# Patient Record
Sex: FEMALE | Race: WHITE | NOT HISPANIC OR LATINO | Employment: FULL TIME | URBAN - METROPOLITAN AREA
[De-identification: names, ages, dates, MRNs, and addresses within clinical notes are randomized per-mention and may not be internally consistent; named-entity substitution may affect disease eponyms.]

---

## 2017-01-16 ENCOUNTER — PATIENT MESSAGE (OUTPATIENT)
Dept: NEUROLOGY | Facility: CLINIC | Age: 42
End: 2017-01-16

## 2017-01-18 ENCOUNTER — PATIENT MESSAGE (OUTPATIENT)
Dept: NEUROLOGY | Facility: CLINIC | Age: 42
End: 2017-01-18

## 2017-01-18 DIAGNOSIS — G47.419 NARCOLEPSY WITHOUT CATAPLEXY(347.00): ICD-10-CM

## 2017-01-19 ENCOUNTER — PATIENT MESSAGE (OUTPATIENT)
Dept: NEUROLOGY | Facility: CLINIC | Age: 42
End: 2017-01-19

## 2017-01-19 RX ORDER — METHYLPHENIDATE HYDROCHLORIDE 10 MG/1
10 TABLET ORAL 4 TIMES DAILY
Qty: 120 TABLET | Refills: 0 | Status: SHIPPED | OUTPATIENT
Start: 2017-01-19 | End: 2017-02-20 | Stop reason: SDUPTHER

## 2017-01-24 ENCOUNTER — TELEPHONE (OUTPATIENT)
Dept: NEUROLOGY | Facility: CLINIC | Age: 42
End: 2017-01-24

## 2017-01-24 NOTE — TELEPHONE ENCOUNTER
Left message for patient to contact our office to discuss rescheduling appointment of 3-17 as  will be out of the office.

## 2017-01-25 ENCOUNTER — TELEPHONE (OUTPATIENT)
Dept: NEUROLOGY | Facility: CLINIC | Age: 42
End: 2017-01-25

## 2017-02-13 ENCOUNTER — PATIENT MESSAGE (OUTPATIENT)
Dept: INTERNAL MEDICINE | Facility: CLINIC | Age: 42
End: 2017-02-13

## 2017-02-13 ENCOUNTER — TELEPHONE (OUTPATIENT)
Dept: NEUROLOGY | Facility: CLINIC | Age: 42
End: 2017-02-13

## 2017-02-16 ENCOUNTER — OFFICE VISIT (OUTPATIENT)
Dept: INTERNAL MEDICINE | Facility: CLINIC | Age: 42
End: 2017-02-16
Payer: COMMERCIAL

## 2017-02-16 VITALS
DIASTOLIC BLOOD PRESSURE: 78 MMHG | OXYGEN SATURATION: 99 % | HEIGHT: 61 IN | SYSTOLIC BLOOD PRESSURE: 130 MMHG | RESPIRATION RATE: 16 BRPM | TEMPERATURE: 99 F | BODY MASS INDEX: 40.46 KG/M2 | HEART RATE: 80 BPM | WEIGHT: 214.31 LBS

## 2017-02-16 DIAGNOSIS — M54.16 LUMBAR BACK PAIN WITH RADICULOPATHY AFFECTING RIGHT LOWER EXTREMITY: Primary | ICD-10-CM

## 2017-02-16 DIAGNOSIS — G89.28 CHRONIC PAIN FOLLOWING SURGERY OR PROCEDURE: ICD-10-CM

## 2017-02-16 DIAGNOSIS — R32 URINARY INCONTINENCE, UNSPECIFIED TYPE: ICD-10-CM

## 2017-02-16 DIAGNOSIS — G47.421 NARCOLEPSY DUE TO UNDERLYING CONDITION WITH CATAPLEXY: ICD-10-CM

## 2017-02-16 DIAGNOSIS — E66.01 MORBID OBESITY WITH BODY MASS INDEX (BMI) OF 40.0 TO 44.9 IN ADULT: ICD-10-CM

## 2017-02-16 DIAGNOSIS — E03.8 HYPOTHYROIDISM DUE TO HASHIMOTO'S THYROIDITIS: ICD-10-CM

## 2017-02-16 DIAGNOSIS — Z13.6 SCREENING FOR CARDIOVASCULAR CONDITION: ICD-10-CM

## 2017-02-16 DIAGNOSIS — R60.9 EDEMA, UNSPECIFIED TYPE: ICD-10-CM

## 2017-02-16 DIAGNOSIS — E06.3 HYPOTHYROIDISM DUE TO HASHIMOTO'S THYROIDITIS: ICD-10-CM

## 2017-02-16 PROCEDURE — 99204 OFFICE O/P NEW MOD 45 MIN: CPT | Mod: S$GLB,,, | Performed by: INTERNAL MEDICINE

## 2017-02-16 RX ORDER — LEVOTHYROXINE SODIUM 25 UG/1
1 TABLET ORAL DAILY
COMMUNITY
Start: 2017-02-07 | End: 2019-07-10 | Stop reason: SDUPTHER

## 2017-02-16 NOTE — MR AVS SNAPSHOT
[facility]  [address] ,  [address]  [place] [address]  Phone: [phone]  Fax: [phone]                  Amanda Navarrete   2017 7:40 AM   Office Visit    Description:  Female : 1975   Provider:  Jovany Regalado MD   Department:  [facility]           Reason for Visit     Establish Care     Urinary Tract Infection     Foot Swelling           Diagnoses this Visit        Comments    Lumbar back pain with radiculopathy affecting right lower extremity    -  Primary     Hypothyroidism due to Hashimoto's thyroiditis         Urinary incontinence, unspecified type         Screening for cardiovascular condition         Morbid obesity with body mass index (BMI) of 40.0 to 44.9 in adult         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    Follow-up and Disposition History      [facility] On Call     [facility] On Call Nurse Care Line -  Assistance  Registered nurses in the [facility] On Call Center provide clinical advisement, health education, appointment booking, and other advisory services.  Call for this free service at [phone].             Medications           Message regarding Medications     Verify the changes and/or additions to your medication regime listed below are the same as discussed with your clinician today.  If any of these changes or additions are incorrect, please notify your healthcare provider.        STOP taking these medications     duloxetine (CYMBALTA) 30 MG capsule     fluconazole (DIFLUCAN) 150 MG Tab            Verify that the below list  "of medications is an accurate representation of the medications you are currently taking.  If none reported, the list may be blank. If incorrect, please contact your healthcare provider. Carry this list with you in case of emergency.           Current Medications     cyclobenzaprine (FLEXERIL) 10 MG tablet     levothyroxine (SYNTHROID) 25 MCG tablet Take 1 tablet by mouth once daily.    medroxyPROGESTERone (DEPO-PROVERA) 150 mg/mL injection     methylphenidate (RITALIN) 10 MG tablet Take 1 tablet (10 mg total) by mouth 4 (four) times daily.    modafinil (PROVIGIL) 200 MG Tab TAKE 1 TABLET BY MOUTH EVERY MORNING AND 1/2 AT NOON    promethazine (PHENERGAN) 25 MG tablet     topiramate (TOPAMAX) 100 MG tablet TAKE 1 TABLET BY MOUTH IN THE MORNING AND 1 AND 1/2 TABLET AT BEDTIME    valacyclovir (VALTREX) 1000 MG tablet            Clinical Reference Information           Your Vitals Were     BP Pulse Temp Resp Height Weight    130/78 (BP Location: Left arm, Patient Position: Sitting, BP Method: Manual) 80 98.6 °F (37 °C) (Oral) 16 5' 1" (1.549 m) 97.2 kg (214 lb 4.6 oz)    SpO2 BMI             99% 40.49 kg/m2         Blood Pressure          Most Recent Value    BP  130/78      Allergies as of 2/16/2017     Tamiflu [Oseltamivir]      Immunizations Administered on Date of Encounter - 2/16/2017     None      Orders Placed During Today's Visit     Future Labs/Procedures Expected by Expires      2/16/2017 5/17/2017    CBC auto differential  2/16/2017 2/16/2018    Comprehensive metabolic panel  2/16/2017 2/16/2018    Lipid panel  2/16/2017 2/16/2018    Rheumatoid factor  2/16/2017 (Approximate) 5/17/2017    Sedimentation rate, manual  2/16/2017 2/16/2018    TSH  2/16/2017 2/16/2018    Uric acid  2/16/2017 2/16/2018    Urinalysis  2/16/2017 2/16/2018    Urine culture  2/16/2017 (Approximate) 4/17/2017    Vitamin D  2/16/2017 (Approximate) 5/17/2017      Language Assistance Services     ATTENTION: Language assistance services " are available, free of charge. Please call [phone].      ATENCIÓN: Si habla nnekaañol, tiene a orlando disposición servicios gratuitos de asistencia lingüística. Llame al [phone].     CHÚ Ý: N?u b?n nói Ti?ng Vi?t, có các d?ch v? h? tr? ngôn ng? mi?n phí dành cho b?n. G?i s? [phone].         [facility] complies with applicable Federal civil rights laws and does not discriminate on the basis of race, color, national origin, age, disability, or sex.

## 2017-02-16 NOTE — PROGRESS NOTES
"Subjective:      Patient ID: Amanda Navarrete is a 41 y.o. female.    Chief Complaint: Establish Care; Urinary Tract Infection (pt c/o urinating more then frequent then normal); and Foot Swelling (pt c/o both feet swelling)    HPI:   Sees Neurology,Gyn,Ortho,Pain management., Neurosurgeon, PT.  Review of Systems   Constitutional: Activity change: doesn't do anything. Appetite change: gained 80#   HENT: Positive for postnasal drip. Negative for sinus pressure, sneezing and sore throat.         Rt. TMJ   Respiratory: Negative.    Cardiovascular: Negative.    Gastrointestinal: Positive for blood in stool, constipation and diarrhea. Negative for nausea, rectal pain and vomiting.        Saw her GYN md for annual and then had BRBPR.  Episodic.   Endocrine: Negative.    Genitourinary: Positive for dysuria, frequency and urgency.   Musculoskeletal: Positive for arthralgias, back pain and gait problem.        Injured back 6/2003 picking up a box.  Incapacitated since then  Hands and feet swell.   Skin: Negative.    Allergic/Immunologic: Positive for environmental allergies.   Neurological: Positive for weakness and headaches. Negative for seizures.        Migraine   Hematological: Negative.    Psychiatric/Behavioral: Positive for dysphoric mood and sleep disturbance. Negative for suicidal ideas.     Sometimes takes Excedrine migraine OTC, but no longer takes NSAI, due to melanotic stools.  Objective:     Visit Vitals    /78 (BP Location: Left arm, Patient Position: Sitting, BP Method: Manual)    Pulse 80    Temp 98.6 °F (37 °C) (Oral)    Resp 16    Ht 5' 1" (1.549 m)    Wt 97.2 kg (214 lb 4.6 oz)    SpO2 99%    BMI 40.49 kg/m2       Physical Exam   Constitutional: She is oriented to person, place, and time. She appears well-developed and well-nourished. No distress.   HENT:   Head: Normocephalic and atraumatic.   Right Ear: External ear normal.   Left Ear: External ear normal.   Nose: Nose normal. "   Mouth/Throat: Oropharynx is clear and moist.   Eyes: Conjunctivae and EOM are normal. Pupils are equal, round, and reactive to light.   Neck: Normal range of motion. Neck supple.   Cardiovascular: Normal rate, regular rhythm and normal heart sounds.    Pulmonary/Chest: Effort normal and breath sounds normal.   Abdominal: Soft. Bowel sounds are normal.   Musculoskeletal: Normal range of motion. She exhibits edema.   Puffy feet   Neurological: She is alert and oriented to person, place, and time.   Skin: Skin is warm and dry. She is not diaphoretic.   Psychiatric: She has a normal mood and affect. Her behavior is normal. Judgment and thought content normal.   Nursing note and vitals reviewed.      Assessment:     1. Lumbar back pain with radiculopathy affecting right lower extremity    2. Hypothyroidism due to Hashimoto's thyroiditis    3. Urinary incontinence, unspecified type    4. Screening for cardiovascular condition    5. Morbid obesity with body mass index (BMI) of 40.0 to 44.9 in adult    6. 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 Edema, unspecified type    8. 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

## 2017-02-17 ENCOUNTER — PATIENT MESSAGE (OUTPATIENT)
Dept: NEUROLOGY | Facility: CLINIC | Age: 42
End: 2017-02-17

## 2017-02-17 DIAGNOSIS — G47.419 NARCOLEPSY WITHOUT CATAPLEXY(347.00): ICD-10-CM

## 2017-02-20 RX ORDER — METHYLPHENIDATE HYDROCHLORIDE 10 MG/1
10 TABLET ORAL 4 TIMES DAILY
Qty: 120 TABLET | Refills: 0 | Status: SHIPPED | OUTPATIENT
Start: 2017-02-20 | End: 2017-03-21 | Stop reason: SDUPTHER

## 2017-03-15 ENCOUNTER — PATIENT MESSAGE (OUTPATIENT)
Dept: NEUROLOGY | Facility: CLINIC | Age: 42
End: 2017-03-15

## 2017-03-17 ENCOUNTER — PATIENT MESSAGE (OUTPATIENT)
Dept: NEUROLOGY | Facility: CLINIC | Age: 42
End: 2017-03-17

## 2017-03-20 ENCOUNTER — TELEPHONE (OUTPATIENT)
Dept: NEUROLOGY | Facility: CLINIC | Age: 42
End: 2017-03-20

## 2017-03-20 NOTE — TELEPHONE ENCOUNTER
----- Message from Caroline Marinelli sent at 3/20/2017  9:42 AM CDT -----  Contact: Patient [phone]  Patient is requesting a refill on her methylphenidate (RITALIN) 10 MG tablet.    [facility] [address] [place] [address]  Phone: [phone] Fax: [phone]

## 2017-03-21 ENCOUNTER — TELEPHONE (OUTPATIENT)
Dept: NEUROLOGY | Facility: CLINIC | Age: 42
End: 2017-03-21

## 2017-03-21 ENCOUNTER — PATIENT MESSAGE (OUTPATIENT)
Dept: INTERNAL MEDICINE | Facility: CLINIC | Age: 42
End: 2017-03-21

## 2017-03-21 DIAGNOSIS — G47.419 NARCOLEPSY WITHOUT CATAPLEXY(347.00): ICD-10-CM

## 2017-03-21 RX ORDER — METHYLPHENIDATE HYDROCHLORIDE 10 MG/1
10 TABLET ORAL 4 TIMES DAILY
Qty: 120 TABLET | Refills: 0 | Status: SHIPPED | OUTPATIENT
Start: 2017-03-21 | End: 2017-04-20 | Stop reason: SDUPTHER

## 2017-03-21 RX ORDER — MELOXICAM 7.5 MG/1
TABLET ORAL
COMMUNITY
Start: 2017-02-26 | End: 2017-05-29

## 2017-03-21 NOTE — TELEPHONE ENCOUNTER
----- Message from Caesar Keith MD sent at 3/16/2017  4:28 PM CDT -----  Call Ritalin Rx on 3/21/2017

## 2017-03-23 ENCOUNTER — TELEPHONE (OUTPATIENT)
Dept: INTERNAL MEDICINE | Facility: CLINIC | Age: 42
End: 2017-03-23

## 2017-03-23 DIAGNOSIS — G47.411 NARCOLEPSY CATAPLEXY SYNDROME: Primary | ICD-10-CM

## 2017-03-23 RX ORDER — MODAFINIL 200 MG/1
TABLET ORAL
Qty: 45 TABLET | Refills: 5 | Status: SHIPPED | OUTPATIENT
Start: 2017-03-23 | End: 2017-11-29 | Stop reason: SDUPTHER

## 2017-03-23 NOTE — TELEPHONE ENCOUNTER
I have looked at her lab results from 2/16/17 and see that her  is abnormal and her vit D is low. I don't see that she was put on Vit D or where she was referred to Rheumatology. Is this something that the patient should be informed of? Please advise

## 2017-03-23 NOTE — TELEPHONE ENCOUNTER
I called pt to make her appointment to come in per . Pt states she does not have a car. She will call office back when she get a ride. Vf/ma

## 2017-04-18 ENCOUNTER — PATIENT MESSAGE (OUTPATIENT)
Dept: NEUROLOGY | Facility: CLINIC | Age: 42
End: 2017-04-18

## 2017-04-20 ENCOUNTER — TELEPHONE (OUTPATIENT)
Dept: NEUROLOGY | Facility: CLINIC | Age: 42
End: 2017-04-20

## 2017-04-20 DIAGNOSIS — G47.419 NARCOLEPSY WITHOUT CATAPLEXY(347.00): ICD-10-CM

## 2017-04-20 RX ORDER — METHYLPHENIDATE HYDROCHLORIDE 10 MG/1
10 TABLET ORAL 4 TIMES DAILY
Qty: 120 TABLET | Refills: 0 | Status: SHIPPED | OUTPATIENT
Start: 2017-04-20 | End: 2017-05-18 | Stop reason: SDUPTHER

## 2017-04-20 NOTE — TELEPHONE ENCOUNTER
----- Message from Caesar Keith MD sent at 4/19/2017  3:22 PM CDT -----       I appologize i realized that my last message has a typo and it says it is 3 times a day and it is 4 x a day and i do not want it to change my prior authorization if it is sent wrong . It is for 10 mg , 120 pills 4x times a day, i am sure you know but i know how redicous the insurace is . Thank you for your help. Lluvia for the confussion.   Amanda Navarrete      Prescription is due tomorrow at which time it will be sent to the pharmacy.

## 2017-04-20 NOTE — TELEPHONE ENCOUNTER
----- Message from Seymour Solares sent at 4/20/2017 10:22 AM CDT -----  Contact: Self [phone]  Patient is calling to get an update on the medication refill ( methylphenidate (RITALIN) 10 MG tablet )     [facility] - [place] [address]  Phone: [phone] Fax: [phone]

## 2017-05-16 ENCOUNTER — TELEPHONE (OUTPATIENT)
Dept: NEUROLOGY | Facility: CLINIC | Age: 42
End: 2017-05-16

## 2017-05-16 NOTE — TELEPHONE ENCOUNTER
----- Message from Caroline Marinelli sent at 5/16/2017  2:53 PM CDT -----  Contact: Patient [phone]  Patient is calling to request a refill on her methylphenidate (RITALIN) 10 MG tablet.        [facility] - [place] [address]  Phone: [phone] Fax: [phone]

## 2017-05-18 ENCOUNTER — TELEPHONE (OUTPATIENT)
Dept: NEUROLOGY | Facility: CLINIC | Age: 42
End: 2017-05-18

## 2017-05-18 DIAGNOSIS — G47.419 NARCOLEPSY WITHOUT CATAPLEXY(347.00): ICD-10-CM

## 2017-05-18 RX ORDER — METHYLPHENIDATE HYDROCHLORIDE 10 MG/1
10 TABLET ORAL 4 TIMES DAILY
Qty: 120 TABLET | Refills: 0 | Status: SHIPPED | OUTPATIENT
Start: 2017-05-19 | End: 2017-06-16 | Stop reason: SDUPTHER

## 2017-05-18 NOTE — TELEPHONE ENCOUNTER
----- Message from Renu Garduno sent at 5/18/2017 10:52 AM CDT -----  Contact: OTTO MELENDREZ [[id number]]  x_  1st Request  _  2nd Request  _  3rd Request    Please refill the medication(s) listed below.     Medication #1 methylphenidate (RITALIN) 10 MG tablet      Preferred Pharmacy:  [facility] [address]  [facility] [address]  [facility] [place] [address]  Phone: [phone] Fax: [phone]

## 2017-05-18 NOTE — TELEPHONE ENCOUNTER
----- Message from Caesar Keith MD sent at 5/16/2017  4:24 PM CDT -----  Needs refill on Ritalin, due on May 19

## 2017-05-26 DIAGNOSIS — Z12.31 OTHER SCREENING MAMMOGRAM: ICD-10-CM

## 2017-05-29 ENCOUNTER — OFFICE VISIT (OUTPATIENT)
Dept: INTERNAL MEDICINE | Facility: CLINIC | Age: 42
End: 2017-05-29
Payer: COMMERCIAL

## 2017-05-29 VITALS
OXYGEN SATURATION: 98 % | BODY MASS INDEX: 39.48 KG/M2 | TEMPERATURE: 99 F | DIASTOLIC BLOOD PRESSURE: 80 MMHG | SYSTOLIC BLOOD PRESSURE: 120 MMHG | RESPIRATION RATE: 16 BRPM | HEIGHT: 61 IN | WEIGHT: 209.13 LBS | HEART RATE: 100 BPM

## 2017-05-29 DIAGNOSIS — E55.9 VITAMIN D DEFICIENCY: ICD-10-CM

## 2017-05-29 DIAGNOSIS — J02.9 SORE THROAT: Primary | ICD-10-CM

## 2017-05-29 DIAGNOSIS — R19.7 DIARRHEA, UNSPECIFIED TYPE: ICD-10-CM

## 2017-05-29 LAB
CTP QC/QA: YES
S PYO RRNA THROAT QL PROBE: NEGATIVE

## 2017-05-29 PROCEDURE — 99214 OFFICE O/P EST MOD 30 MIN: CPT | Mod: S$GLB,,, | Performed by: INTERNAL MEDICINE

## 2017-05-29 PROCEDURE — 87880 STREP A ASSAY W/OPTIC: CPT | Mod: ,,, | Performed by: INTERNAL MEDICINE

## 2017-05-29 RX ORDER — ERGOCALCIFEROL 1.25 MG/1
50000 CAPSULE ORAL
COMMUNITY
End: 2019-07-10

## 2017-05-29 NOTE — PROGRESS NOTES
Subjective:      Patient ID: Amanda Navarrete is a 42 y.o. female.    Chief Complaint: Results (Lab Results); Diarrhea (four weeks); Fatigue; and Sore Throat (off and on for four)    HPI:   43y/o WF, has had a long history of low back pain, all stemming from a workman's comp injury in 2003,  Resulting in surgery at [facility], 11/15.  All previous arthritic work-up is negative.  Has on/off diarrhea, with weight shifts of up to 15#, per pt.  States she has an odor to her urine.      Date/Time Component Value Flag Lab Status   02/16/17 0846 TSH 2.210 - Final result   02/16/17 0846 HDL 52 - Final result   02/16/17 0846 CHOL 204 High Final result   02/16/17 0846 TRIG 88 - Final result   02/16/17 0846 LDLCALC 134.4 - Final result   02/16/17 0846 CHOLHDL 25.5 - Final result   02/16/17 0846 NONHDLCHOL 152 - Final result   02/16/17 0846 TOTALCHOLEST 3.9 - Final result   02/16/17 0847 COLORU Yellow - Final result   02/16/17 0847 APPEARANCEUA Clear - Final result   02/16/17 0847 SPECGRAV >=1.030 Abnormal Final result   02/16/17 0847 PHUR 6.0 - Final result   02/16/17 0847 KETONESU Negative - Final result   02/16/17 0847 OCCULTUA Negative - Final result   02/16/17 0847 NITRITE Negative - Final result   02/16/17 0847 UROBILINOGEN Negative - Final result   11/19/15 0708 PREGNANCYTES Negative - Final result   02/16/17 0847 LEUKOCYTESUR Negative - Final result   02/16/17 0846 WBC 6.34 - Final result   02/16/17 0846 RBC 4.31 - Final result   02/16/17 0846 HGB 13.7 - Final result   02/16/17 0846 HCT 41.0 - Final result   02/16/17 0846 MCH 31.8 High Final result   02/16/17 0846 RDW 13.2 - Final result   02/16/17 0846  - Final result   02/16/17 0846 MPV 10.8 - Final result   03/19/12 1538 SEGS 90 High Final result   03/19/12 1538 LYMPHS 9 Low Final result   03/19/12 1538 PLTEST Adequate - Final result   02/16/17 0846 GLU 95 - Final result   02/16/17 0846 BUN 14 - Final result   02/16/17 0846 CREATININE 0.61 - Final result   02/16/17  "0846 CALCIUM 9.2 - Final result   02/16/17 0846  High Final result   02/16/17 0846 K 3.9 - Final result   02/16/17 0846  - Final result   02/16/17 0846 PROT 7.5 - Final result   02/16/17 0846 ALBUMIN 4.4 - Final result   02/16/17 0846 BILITOT 0.5 - Final result   02/16/17 0846 AST 28 - Final result   02/16/17 0846 ALKPHOS 87 - Final result   02/16/17 0846 CO2 25 - Final result   02/16/17 0846 ALT 38 - Final result   02/16/17 0846 ANIONGAP 16 - Final result   02/16/17 0846 EGFRNONAA >60.0 - Final result   02/16/17 0846 ESTGFRAFRICA >60.0 - Final result   02/16/17 0846 MCV 95 -        Review of Systems   Constitutional: Positive for unexpected weight change. Negative for activity change, chills and fever.   HENT: Positive for rhinorrhea and trouble swallowing. Negative for hearing loss.    Eyes: Negative for discharge and visual disturbance.   Respiratory: Positive for wheezing. Negative for chest tightness.    Cardiovascular: Negative for chest pain and palpitations.   Gastrointestinal: Positive for blood in stool, diarrhea and vomiting. Negative for constipation.   Endocrine: Positive for polydipsia and polyuria.   Genitourinary: Positive for dysuria. Negative for difficulty urinating, hematuria and menstrual problem.   Musculoskeletal: Positive for arthralgias, joint swelling and neck pain.   Neurological: Positive for weakness and headaches.   Psychiatric/Behavioral: Positive for confusion and dysphoric mood.       Objective:   /80 (BP Location: Left arm, Patient Position: Sitting, BP Method: Manual)   Pulse 100   Temp 98.5 °F (36.9 °C) (Oral)   Resp 16   Ht 5' 1" (1.549 m)   Wt 94.8 kg (209 lb 1.7 oz)   SpO2 98%   BMI 39.51 kg/m²     Physical Exam   Constitutional: She is oriented to person, place, and time. She appears well-developed and well-nourished.   HENT:   Head: Normocephalic and atraumatic.   Right Ear: External ear normal.   Left Ear: External ear normal.   Nose: Nose normal. "   Mouth/Throat: Oropharynx is clear and moist.   Eyes: Conjunctivae are normal. Pupils are equal, round, and reactive to light.   Neck: Normal range of motion. Neck supple.   Cardiovascular: Normal rate, regular rhythm and normal heart sounds.    Pulmonary/Chest: Effort normal and breath sounds normal.   Abdominal: Soft. Bowel sounds are normal.   Musculoskeletal: Normal range of motion.   Neurological: She is alert and oriented to person, place, and time.   Skin: Skin is warm and dry.   Psychiatric: She has a normal mood and affect. Her behavior is normal.   Nursing note and vitals reviewed.      Assessment:     1. Sore throat    2. Diarrhea, unspecified type    3. Vitamin D deficiency    4. BMI 40.0-44.9, adult    Her mammograms are done at [facility], by her GYN, because she has had multiple problems with the interpretations, and needed  Other testing.  Plan:     Sore throat  -     POCT Rapid Strep A    Diarrhea, unspecified type    Vitamin D deficiency    BMI 40.0-44.9, adult

## 2017-06-15 ENCOUNTER — TELEPHONE (OUTPATIENT)
Dept: NEUROLOGY | Facility: CLINIC | Age: 42
End: 2017-06-15

## 2017-06-15 NOTE — TELEPHONE ENCOUNTER
----- Message from Chasidy Tarango sent at 6/15/2017  3:57 PM CDT -----  Contact: Pt  Pt is requesting a refill on Ritalin 10mg to be sent to [facility] [phone] (Phone)  [phone] (Fax)     Pt contact number [phone]  Thanks

## 2017-06-16 ENCOUNTER — PATIENT MESSAGE (OUTPATIENT)
Dept: NEUROLOGY | Facility: CLINIC | Age: 42
End: 2017-06-16

## 2017-06-16 ENCOUNTER — TELEPHONE (OUTPATIENT)
Dept: NEUROLOGY | Facility: CLINIC | Age: 42
End: 2017-06-16

## 2017-06-16 DIAGNOSIS — G47.419 NARCOLEPSY WITHOUT CATAPLEXY(347.00): ICD-10-CM

## 2017-06-16 RX ORDER — DULOXETIN HYDROCHLORIDE 60 MG/1
60 CAPSULE, DELAYED RELEASE ORAL DAILY
Refills: 2 | COMMUNITY
Start: 2017-03-10 | End: 2018-07-20

## 2017-06-16 RX ORDER — METHYLPHENIDATE HYDROCHLORIDE 10 MG/1
10 TABLET ORAL 4 TIMES DAILY
Qty: 120 TABLET | Refills: 0 | Status: SHIPPED | OUTPATIENT
Start: 2017-06-17 | End: 2017-06-17 | Stop reason: SDUPTHER

## 2017-06-16 RX ORDER — OXYCODONE AND ACETAMINOPHEN 10; 325 MG/1; MG/1
1 TABLET ORAL 2 TIMES DAILY PRN
Refills: 0 | COMMUNITY
Start: 2017-04-12 | End: 2018-07-20

## 2017-06-16 NOTE — TELEPHONE ENCOUNTER
----- Message from Caesar Keith MD sent at 6/15/2017  4:47 PM CDT -----  Contact: Pt  Pt is requesting a refill on Ritalin 10mg to be sent to [facility] [phone] (Phone)  [phone] (Fax)      Pt contact number [phone]

## 2017-06-17 ENCOUNTER — NURSE TRIAGE (OUTPATIENT)
Dept: ADMINISTRATIVE | Facility: CLINIC | Age: 42
End: 2017-06-17

## 2017-06-17 DIAGNOSIS — G47.419 NARCOLEPSY WITHOUT CATAPLEXY(347.00): ICD-10-CM

## 2017-06-17 RX ORDER — METHYLPHENIDATE HYDROCHLORIDE 10 MG/1
10 TABLET ORAL 4 TIMES DAILY
Qty: 120 TABLET | Refills: 0 | Status: SHIPPED | OUTPATIENT
Start: 2017-06-17 | End: 2017-06-19 | Stop reason: SDUPTHER

## 2017-06-17 NOTE — TELEPHONE ENCOUNTER
Spoke with dr wilks/neuro. Dr ramires on call for his own pts. MD will sent again today within two hours. Will confirm that sent. Pt notified. Spoke with Guy at pharmacy - will loan pts pills today until receiving escribe. MD to escribe again today. Pharmacist states that computer system for escribe being updated and that is why escribe rx may not have gone through. Pt notified. call back with questions.     Reason for Disposition   Caller has medication question about med not prescribed by PCP and triager unable to answer question (e.g., compatibility with other med, storage)    Protocols used: ST MEDICATION QUESTION CALL-A-AH

## 2017-06-17 NOTE — TELEPHONE ENCOUNTER
Pt called re rx. Called thurs evening re rx. escribe failed per epic. Guy at pharmacy states that their escribe has been down since yest. Per  no neuro MD on call. Pt going without med x 3 days. Per pharmacist rx cannot be called in. Cannot give three day emergency fill of med for weekend. Call back with questions.     Reason for Disposition   Caller has medication question about med not prescribed by PCP and triager unable to answer question (e.g., compatibility with other med, storage)    Protocols used: ST MEDICATION QUESTION CALL-A-

## 2017-06-19 DIAGNOSIS — G47.419 NARCOLEPSY WITHOUT CATAPLEXY(347.00): ICD-10-CM

## 2017-06-19 RX ORDER — METHYLPHENIDATE HYDROCHLORIDE 10 MG/1
10 TABLET ORAL 4 TIMES DAILY
Qty: 120 TABLET | Refills: 0 | Status: SHIPPED | OUTPATIENT
Start: 2017-06-19 | End: 2017-07-14 | Stop reason: SDUPTHER

## 2017-06-19 RX ORDER — METHYLPHENIDATE HYDROCHLORIDE 10 MG/1
10 TABLET ORAL 4 TIMES DAILY
Qty: 120 TABLET | Refills: 0 | Status: CANCELLED | OUTPATIENT
Start: 2017-06-19 | End: 2017-07-19

## 2017-06-30 ENCOUNTER — OFFICE VISIT (OUTPATIENT)
Dept: NEUROLOGY | Facility: CLINIC | Age: 42
End: 2017-06-30
Payer: COMMERCIAL

## 2017-06-30 VITALS
HEART RATE: 87 BPM | BODY MASS INDEX: 39.87 KG/M2 | HEIGHT: 61 IN | WEIGHT: 211.19 LBS | DIASTOLIC BLOOD PRESSURE: 85 MMHG | SYSTOLIC BLOOD PRESSURE: 121 MMHG

## 2017-06-30 DIAGNOSIS — G47.411 NARCOLEPSY CATAPLEXY SYNDROME: Primary | ICD-10-CM

## 2017-06-30 DIAGNOSIS — G43.009 MIGRAINE WITHOUT AURA AND WITHOUT STATUS MIGRAINOSUS, NOT INTRACTABLE: ICD-10-CM

## 2017-06-30 PROCEDURE — 99214 OFFICE O/P EST MOD 30 MIN: CPT | Mod: S$GLB,,, | Performed by: PSYCHIATRY & NEUROLOGY

## 2017-06-30 PROCEDURE — 99999 PR PBB SHADOW E&M-EST. PATIENT-LVL III: CPT | Mod: PBBFAC,,, | Performed by: PSYCHIATRY & NEUROLOGY

## 2017-06-30 NOTE — PROGRESS NOTES
Subjective:       Patient ID: Amanda Navarrete is a 42 y.o. female.    Chief Complaint:  Narcolepsy      History of Present Illness  HPI   This is a 42-year-old female who is being followed by me with a history of narcolepsy with sleep attacks and occasional mild cataplectic episodes during which time her hands become weak. However these are infrequent. Symptoms are well controlled with a combination of Ritalin and Provigil.  She also has a history of migraines. Headaches are intermittent, usually noted on awakening in the morning. Headaches are usually frontal temporal and pressure-like and are not associated with any focal neurological or visual symptoms. They are usually precipitated by stress. She is presently on Topamax 100 mg twice a day which has decreased the intensity and frequency of the headaches.  She denies any new medical problems otherwise and has been overall stable    She continues to be followed by pain management and orthopedics surgery for her chronic back problems and is also seeing neurosurgery.       Review of Systems  Review of Systems   Constitutional: Negative.    Eyes: Negative.    Respiratory: Negative.    Cardiovascular: Negative.    Gastrointestinal: Negative.    Genitourinary: Negative.    Musculoskeletal: Positive for back pain.   Skin: Negative.    Neurological: Positive for headaches.   Psychiatric/Behavioral: Negative.        Objective:      Neurologic Exam      Physical Exam   Constitutional: She appears well-developed and well-nourished.   HENT:   Head: Normocephalic and atraumatic.   Right Ear: Hearing normal.   Left Ear: Hearing normal.   Neck: Normal range of motion. Neck supple. Carotid bruit is not present.   Neurological: She is alert. She has normal reflexes. She displays no atrophy. No cranial nerve deficit or sensory deficit. She exhibits normal muscle tone. She displays a negative Romberg sign. Coordination and gait normal.   Mental status examination: Patient is fully  oriented and able to give an adequate history.  Recall of recent and past information is good.  Immediate recall is normal.  Attention span and concentration was normal.  No difficulty with spelling backwards and serial sevens.  Judgment and insight is normal.  Language functions are intact with no evidence of aphasia or dysarthria.  Comprehension is unimpaired.  Affect is appropriate, mood was even.  No thought disorder is noted.   Vitals reviewed.        Assessment:        1. Narcolepsy cataplexy syndrome    2. Migraine without aura and without status migrainosus, not intractable             Plan:       No medication changes.  Continue Ritalin and Provigil.  Follow-up in 6 months if stable.

## 2017-07-14 DIAGNOSIS — G47.419 NARCOLEPSY WITHOUT CATAPLEXY(347.00): ICD-10-CM

## 2017-07-14 RX ORDER — METHYLPHENIDATE HYDROCHLORIDE 10 MG/1
10 TABLET ORAL 4 TIMES DAILY
Qty: 120 TABLET | Refills: 0 | Status: SHIPPED | OUTPATIENT
Start: 2017-07-14 | End: 2017-08-11 | Stop reason: SDUPTHER

## 2017-08-09 ENCOUNTER — PATIENT MESSAGE (OUTPATIENT)
Dept: NEUROLOGY | Facility: CLINIC | Age: 42
End: 2017-08-09

## 2017-08-09 DIAGNOSIS — G47.419 NARCOLEPSY WITHOUT CATAPLEXY(347.00): ICD-10-CM

## 2017-08-09 RX ORDER — METHYLPHENIDATE HYDROCHLORIDE 10 MG/1
10 TABLET ORAL 4 TIMES DAILY
Qty: 120 TABLET | Refills: 0 | Status: CANCELLED | OUTPATIENT
Start: 2017-08-09 | End: 2017-09-08

## 2017-08-11 ENCOUNTER — TELEPHONE (OUTPATIENT)
Dept: NEUROLOGY | Facility: CLINIC | Age: 42
End: 2017-08-11

## 2017-08-11 DIAGNOSIS — G47.419 NARCOLEPSY WITHOUT CATAPLEXY(347.00): ICD-10-CM

## 2017-08-11 RX ORDER — METHYLPHENIDATE HYDROCHLORIDE 10 MG/1
10 TABLET ORAL 4 TIMES DAILY
Qty: 120 TABLET | Refills: 0 | Status: SHIPPED | OUTPATIENT
Start: 2017-08-11 | End: 2017-09-08 | Stop reason: SDUPTHER

## 2017-08-11 NOTE — TELEPHONE ENCOUNTER
----- Message from Caesar Keith MD sent at 8/9/2017  4:27 PM CDT -----  Amanda WhitleyLandon would like a refill of the following medications:   methylphenidate (RITALIN) 10 MG tablet [Caesar Keith MD]     Preferred pharmacy: [facility] - [place], [place] - [address], [place]     Comment:   Please send my prescription for ritilin 10 mg 4 times a day. 120 pill to the [place] pharmacy.  I know   We discussed not waiting until the end of the week to avoid issues so i am.sending this today to make sure that you have time to get this message and send my prescription before the week is out.   Thank you for your help

## 2017-09-06 ENCOUNTER — PATIENT MESSAGE (OUTPATIENT)
Dept: NEUROLOGY | Facility: CLINIC | Age: 42
End: 2017-09-06

## 2017-09-06 DIAGNOSIS — G47.419 NARCOLEPSY WITHOUT CATAPLEXY(347.00): ICD-10-CM

## 2017-09-06 RX ORDER — METHYLPHENIDATE HYDROCHLORIDE 10 MG/1
10 TABLET ORAL 4 TIMES DAILY
Qty: 120 TABLET | Refills: 0 | Status: CANCELLED | OUTPATIENT
Start: 2017-09-06 | End: 2017-10-06

## 2017-09-08 ENCOUNTER — PATIENT MESSAGE (OUTPATIENT)
Dept: NEUROLOGY | Facility: CLINIC | Age: 42
End: 2017-09-08

## 2017-09-08 ENCOUNTER — TELEPHONE (OUTPATIENT)
Dept: NEUROLOGY | Facility: CLINIC | Age: 42
End: 2017-09-08

## 2017-09-08 DIAGNOSIS — G47.419 NARCOLEPSY WITHOUT CATAPLEXY(347.00): ICD-10-CM

## 2017-09-08 RX ORDER — METHYLPHENIDATE HYDROCHLORIDE 10 MG/1
10 TABLET ORAL 4 TIMES DAILY
Qty: 120 TABLET | Refills: 0 | Status: SHIPPED | OUTPATIENT
Start: 2017-09-09 | End: 2017-10-06 | Stop reason: SDUPTHER

## 2017-09-08 NOTE — TELEPHONE ENCOUNTER
----- Message from Caesar Keith MD sent at 9/6/2017  1:34 PM CDT -----  Amanda Landon would like a refill of the following medications:         methylphenidate (RITALIN) 10 MG tablet [Caesar Keith MD]     Preferred pharmacy: [place] [address], [facility] [place]

## 2017-09-11 ENCOUNTER — TELEPHONE (OUTPATIENT)
Dept: NEUROLOGY | Facility: CLINIC | Age: 42
End: 2017-09-11

## 2017-09-11 NOTE — TELEPHONE ENCOUNTER
----- Message from Nayely Guerrero sent at 9/8/2017  4:45 PM CDT -----  Contact: pt  _  1st Request  _  2nd Request  _  3rd Request    Please refill the medication(s) listed below. Please call the patient when the prescription(s) is ready for  at the phone number (___)(___-_____) .[phone]    Medication #1methylphenidate (RITALIN) 10 MG tablet - needs PA    Medication #2      Preferred Pharmacy:[facility]

## 2017-10-06 ENCOUNTER — PATIENT MESSAGE (OUTPATIENT)
Dept: NEUROLOGY | Facility: CLINIC | Age: 42
End: 2017-10-06

## 2017-10-06 DIAGNOSIS — G47.419 NARCOLEPSY WITHOUT CATAPLEXY(347.00): ICD-10-CM

## 2017-10-06 DIAGNOSIS — G47.411 PRIMARY NARCOLEPSY WITH CATAPLEXY: ICD-10-CM

## 2017-10-06 RX ORDER — METHYLPHENIDATE HYDROCHLORIDE 10 MG/1
10 TABLET ORAL 4 TIMES DAILY
Qty: 120 TABLET | Refills: 0 | Status: SHIPPED | OUTPATIENT
Start: 2017-10-06 | End: 2017-11-03 | Stop reason: SDUPTHER

## 2017-10-06 RX ORDER — METHYLPHENIDATE HYDROCHLORIDE 10 MG/1
10 TABLET ORAL 4 TIMES DAILY
Qty: 120 TABLET | Refills: 0 | Status: CANCELLED | OUTPATIENT
Start: 2017-10-06 | End: 2017-11-05

## 2017-11-03 ENCOUNTER — PATIENT MESSAGE (OUTPATIENT)
Dept: NEUROLOGY | Facility: CLINIC | Age: 42
End: 2017-11-03

## 2017-11-03 DIAGNOSIS — G47.411 PRIMARY NARCOLEPSY WITH CATAPLEXY: ICD-10-CM

## 2017-11-03 RX ORDER — METHYLPHENIDATE HYDROCHLORIDE 10 MG/1
10 TABLET ORAL 4 TIMES DAILY
Qty: 120 TABLET | Refills: 0 | Status: SHIPPED | OUTPATIENT
Start: 2017-11-03 | End: 2017-11-21 | Stop reason: SDUPTHER

## 2017-11-21 ENCOUNTER — TELEPHONE (OUTPATIENT)
Dept: NEUROLOGY | Facility: CLINIC | Age: 42
End: 2017-11-21

## 2017-11-21 ENCOUNTER — PATIENT MESSAGE (OUTPATIENT)
Dept: NEUROLOGY | Facility: CLINIC | Age: 42
End: 2017-11-21

## 2017-11-21 DIAGNOSIS — G47.411 PRIMARY NARCOLEPSY WITH CATAPLEXY: ICD-10-CM

## 2017-11-21 RX ORDER — METHYLPHENIDATE HYDROCHLORIDE 10 MG/1
10 TABLET ORAL 4 TIMES DAILY
Qty: 120 TABLET | Refills: 0 | Status: SHIPPED | OUTPATIENT
Start: 2017-12-01 | End: 2017-11-21 | Stop reason: SDUPTHER

## 2017-11-21 RX ORDER — DICLOFENAC SODIUM 10 MG/G
GEL TOPICAL
Refills: 0 | COMMUNITY
Start: 2017-10-20

## 2017-11-21 RX ORDER — METHYLPHENIDATE HYDROCHLORIDE 10 MG/1
10 TABLET ORAL 4 TIMES DAILY
Qty: 120 TABLET | Refills: 0 | Status: SHIPPED | OUTPATIENT
Start: 2017-12-01 | End: 2017-12-29 | Stop reason: SDUPTHER

## 2017-11-21 RX ORDER — OXYCODONE HYDROCHLORIDE 10 MG/1
10 TABLET, FILM COATED, EXTENDED RELEASE ORAL 2 TIMES DAILY PRN
Refills: 0 | COMMUNITY
Start: 2017-10-30 | End: 2019-02-01

## 2017-11-21 NOTE — TELEPHONE ENCOUNTER
Patient will need Ritlian  prescription due for next Thursday, and wants to know if you want her to  predated RX as she will be in the area today.

## 2017-11-27 DIAGNOSIS — G47.411 NARCOLEPSY CATAPLEXY SYNDROME: ICD-10-CM

## 2017-11-27 RX ORDER — MODAFINIL 200 MG/1
TABLET ORAL
Qty: 45 TABLET | Refills: 5 | Status: CANCELLED | OUTPATIENT
Start: 2017-11-27

## 2017-11-28 ENCOUNTER — PATIENT MESSAGE (OUTPATIENT)
Dept: NEUROLOGY | Facility: CLINIC | Age: 42
End: 2017-11-28

## 2017-11-28 ENCOUNTER — TELEPHONE (OUTPATIENT)
Dept: NEUROLOGY | Facility: CLINIC | Age: 42
End: 2017-11-28

## 2017-11-28 DIAGNOSIS — G47.411 NARCOLEPSY CATAPLEXY SYNDROME: ICD-10-CM

## 2017-11-28 NOTE — TELEPHONE ENCOUNTER
----- Message from Miguel Fairchild sent at 2017 10:31 AM CST -----  Contact: Pt  X_ 1st Request  _ 2nd Request  _ 3rd Request    Who: OTTO MELENDREZ [[id number]]    Why: Request refill on  prescription of modafinil (PROVIGIL) 200 MG Tab sent to [id number] - [place], [place] - [address]    What Number to Call Back: [phone]    When to Expect a call back: (Before the end of the day)  -- if call after 3:00 call back will be tomorrow.

## 2017-11-29 RX ORDER — MODAFINIL 200 MG/1
TABLET ORAL
Qty: 45 TABLET | Refills: 5 | Status: SHIPPED | OUTPATIENT
Start: 2017-11-29 | End: 2017-12-16 | Stop reason: SDUPTHER

## 2017-12-16 DIAGNOSIS — G47.411 NARCOLEPSY CATAPLEXY SYNDROME: ICD-10-CM

## 2017-12-16 RX ORDER — MODAFINIL 200 MG/1
TABLET ORAL
Qty: 45 TABLET | Refills: 5 | Status: SHIPPED | OUTPATIENT
Start: 2017-12-16 | End: 2018-06-01 | Stop reason: SDUPTHER

## 2017-12-27 ENCOUNTER — TELEPHONE (OUTPATIENT)
Dept: NEUROLOGY | Facility: CLINIC | Age: 42
End: 2017-12-27

## 2017-12-27 NOTE — TELEPHONE ENCOUNTER
----- Message from John Saravia sent at 12/26/2017  4:41 PM CST -----  Contact: [phone]  Pt is asking for refill on methylphenidate HCl (RITALIN) 10 MG tablet    [facility] [address] [place] [address]  Phone: [phone] Fax: [phone]

## 2017-12-28 ENCOUNTER — PATIENT MESSAGE (OUTPATIENT)
Dept: NEUROLOGY | Facility: CLINIC | Age: 42
End: 2017-12-28

## 2017-12-28 DIAGNOSIS — G47.411 NARCOLEPSY CATAPLEXY SYNDROME: Primary | ICD-10-CM

## 2017-12-28 DIAGNOSIS — G47.411 PRIMARY NARCOLEPSY WITH CATAPLEXY: ICD-10-CM

## 2017-12-29 ENCOUNTER — OFFICE VISIT (OUTPATIENT)
Dept: NEUROLOGY | Facility: CLINIC | Age: 42
End: 2017-12-29
Payer: COMMERCIAL

## 2017-12-29 VITALS
HEART RATE: 88 BPM | DIASTOLIC BLOOD PRESSURE: 89 MMHG | BODY MASS INDEX: 42.35 KG/M2 | WEIGHT: 224.31 LBS | SYSTOLIC BLOOD PRESSURE: 133 MMHG | HEIGHT: 61 IN

## 2017-12-29 DIAGNOSIS — G47.411 PRIMARY NARCOLEPSY WITH CATAPLEXY: ICD-10-CM

## 2017-12-29 DIAGNOSIS — M54.9 BACK PAIN, UNSPECIFIED BACK LOCATION, UNSPECIFIED BACK PAIN LATERALITY, UNSPECIFIED CHRONICITY: ICD-10-CM

## 2017-12-29 DIAGNOSIS — G43.009 MIGRAINE WITHOUT AURA AND WITHOUT STATUS MIGRAINOSUS, NOT INTRACTABLE: ICD-10-CM

## 2017-12-29 DIAGNOSIS — G47.411 NARCOLEPSY CATAPLEXY SYNDROME: Primary | ICD-10-CM

## 2017-12-29 PROCEDURE — 99214 OFFICE O/P EST MOD 30 MIN: CPT | Mod: S$GLB,,, | Performed by: PSYCHIATRY & NEUROLOGY

## 2017-12-29 PROCEDURE — 99999 PR PBB SHADOW E&M-EST. PATIENT-LVL III: CPT | Mod: PBBFAC,,, | Performed by: PSYCHIATRY & NEUROLOGY

## 2017-12-29 RX ORDER — METHYLPHENIDATE HYDROCHLORIDE 10 MG/1
10 TABLET ORAL 4 TIMES DAILY
Qty: 120 TABLET | Refills: 0 | Status: SHIPPED | OUTPATIENT
Start: 2017-12-29 | End: 2018-01-26 | Stop reason: SDUPTHER

## 2017-12-29 RX ORDER — METHYLPHENIDATE HYDROCHLORIDE 10 MG/1
10 TABLET ORAL 4 TIMES DAILY
Qty: 120 TABLET | Refills: 0 | Status: CANCELLED | OUTPATIENT
Start: 2017-12-29 | End: 2018-01-28

## 2017-12-29 NOTE — PROGRESS NOTES
Subjective:       Patient ID: Amanda Navarrete is a 42 y.o. female.    Chief Complaint:  Narcolepsy      History of Present Illness  HPI   This is a 42-year-old female who is being followed by me with a history of narcolepsy with sleep attacks and occasional mild cataplectic episodes during which time her hands become weak. However these are infrequent. Symptoms are well controlled with a combination of Ritalin and Provigil.  She also has a history of migraines. Headaches are intermittent, usually noted on awakening in the morning. Headaches are usually frontal temporal and pressure-like and are not associated with any focal neurological or visual symptoms. They are usually precipitated by stress. She is presently on Topamax 100 mg twice a day which has decreased the intensity and frequency of the headaches.  She denies any new medical problems otherwise and has been overall stable.  She continues to follow-up with pain management for her back problems.  She reports having had back surgery a few weeks ago.         Review of Systems  Review of Systems   Constitutional: Negative.    Eyes: Negative.    Respiratory: Negative.    Cardiovascular: Negative.    Gastrointestinal: Negative.    Genitourinary: Negative.    Musculoskeletal: Positive for back pain.   Skin: Negative.    Neurological: Positive for headaches.   Psychiatric/Behavioral: Negative.        Objective:      Neurologic Exam      Physical Exam   Constitutional: She appears well-developed and well-nourished.   HENT:   Head: Normocephalic and atraumatic.   Right Ear: Hearing normal.   Left Ear: Hearing normal.   Neck: Normal range of motion. Neck supple. Carotid bruit is not present.   Neurological: She is alert. She has normal reflexes. She displays no atrophy. No cranial nerve deficit or sensory deficit. She exhibits normal muscle tone. She displays a negative Romberg sign. Coordination and gait normal.   Mental status examination: Patient is fully oriented  and able to give an adequate history.  Recall of recent and past information is good.  Immediate recall is normal.  Attention span and concentration was normal.  No difficulty with spelling backwards and serial sevens.  Judgment and insight is normal.  Language functions are intact with no evidence of aphasia or dysarthria.  Comprehension is unimpaired.  Affect is appropriate, mood was even.  No thought disorder is noted.   Vitals reviewed.        Assessment:        1. Narcolepsy cataplexy syndrome    2. Migraine without aura and without status migrainosus, not intractable    3. Back pain, unspecified back location, unspecified back pain laterality, unspecified chronicity             Plan:       No medication changes.  Continue Ritalin and Provigil.  Follow-up in 6 months if stable.

## 2018-01-25 ENCOUNTER — PATIENT MESSAGE (OUTPATIENT)
Dept: NEUROLOGY | Facility: CLINIC | Age: 43
End: 2018-01-25

## 2018-01-25 DIAGNOSIS — G47.411 PRIMARY NARCOLEPSY WITH CATAPLEXY: ICD-10-CM

## 2018-01-26 RX ORDER — METHYLPHENIDATE HYDROCHLORIDE 10 MG/1
10 TABLET ORAL 4 TIMES DAILY
Qty: 120 TABLET | Refills: 0 | Status: SHIPPED | OUTPATIENT
Start: 2018-01-26 | End: 2018-02-23 | Stop reason: SDUPTHER

## 2018-01-26 NOTE — TELEPHONE ENCOUNTER
----- Message from John Saravia sent at 1/26/2018  3:43 PM CST -----  Contact: Self @ [phone]  Pt is asking for refill on methylphenidate HCl (RITALIN) 10 MG tablet. Pt is asking this be completed today.    [facility] [place] - [address] [place] [address]  Phone: [phone] Fax: [phone]

## 2018-01-30 ENCOUNTER — PATIENT MESSAGE (OUTPATIENT)
Dept: NEUROLOGY | Facility: CLINIC | Age: 43
End: 2018-01-30

## 2018-02-10 ENCOUNTER — PATIENT MESSAGE (OUTPATIENT)
Dept: NEUROLOGY | Facility: CLINIC | Age: 43
End: 2018-02-10

## 2018-02-21 RX ORDER — TOPIRAMATE 100 MG/1
TABLET, FILM COATED ORAL
Qty: 75 TABLET | Refills: 9 | Status: SHIPPED | OUTPATIENT
Start: 2018-02-21 | End: 2018-12-14 | Stop reason: SDUPTHER

## 2018-02-22 ENCOUNTER — PATIENT MESSAGE (OUTPATIENT)
Dept: NEUROLOGY | Facility: CLINIC | Age: 43
End: 2018-02-22

## 2018-02-22 DIAGNOSIS — G47.411 PRIMARY NARCOLEPSY WITH CATAPLEXY: ICD-10-CM

## 2018-02-23 RX ORDER — METHYLPHENIDATE HYDROCHLORIDE 10 MG/1
10 TABLET ORAL 4 TIMES DAILY
Qty: 120 TABLET | Refills: 0 | Status: SHIPPED | OUTPATIENT
Start: 2018-02-24 | End: 2018-03-22 | Stop reason: SDUPTHER

## 2018-02-23 RX ORDER — PROMETHAZINE HYDROCHLORIDE 25 MG/1
TABLET ORAL
COMMUNITY
Start: 2018-02-15 | End: 2020-12-08

## 2018-02-23 RX ORDER — ASPIRIN 325 MG
TABLET, DELAYED RELEASE (ENTERIC COATED) ORAL
Refills: 0 | COMMUNITY
Start: 2018-02-05 | End: 2019-09-18 | Stop reason: SDUPTHER

## 2018-03-22 ENCOUNTER — PATIENT MESSAGE (OUTPATIENT)
Dept: NEUROLOGY | Facility: CLINIC | Age: 43
End: 2018-03-22

## 2018-03-22 DIAGNOSIS — G47.411 PRIMARY NARCOLEPSY WITH CATAPLEXY: ICD-10-CM

## 2018-03-23 RX ORDER — METHYLPHENIDATE HYDROCHLORIDE 10 MG/1
10 TABLET ORAL 4 TIMES DAILY
Qty: 120 TABLET | Refills: 0 | Status: SHIPPED | OUTPATIENT
Start: 2018-03-23 | End: 2018-04-11 | Stop reason: SDUPTHER

## 2018-03-23 NOTE — TELEPHONE ENCOUNTER
----- Message from John Saravia sent at 3/23/2018 10:12 AM CDT -----  Contact: Self @ [phone]  Pt is asking for refill on methylphenidate HCl (RITALIN) 10 MG tablet    [facility] [place] [address] [place] [address]  Phone: [phone] Fax: [phone]

## 2018-04-11 ENCOUNTER — PATIENT MESSAGE (OUTPATIENT)
Dept: NEUROLOGY | Facility: CLINIC | Age: 43
End: 2018-04-11

## 2018-04-11 ENCOUNTER — TELEPHONE (OUTPATIENT)
Dept: NEUROLOGY | Facility: CLINIC | Age: 43
End: 2018-04-11

## 2018-04-11 DIAGNOSIS — G47.411 PRIMARY NARCOLEPSY WITH CATAPLEXY: ICD-10-CM

## 2018-04-11 RX ORDER — METHYLPHENIDATE HYDROCHLORIDE 10 MG/1
10 TABLET ORAL 4 TIMES DAILY
Qty: 120 TABLET | Refills: 0 | Status: SHIPPED | OUTPATIENT
Start: 2018-04-11 | End: 2018-05-11 | Stop reason: SDUPTHER
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
## 2018-05-11 ENCOUNTER — PATIENT MESSAGE (OUTPATIENT)
Dept: NEUROLOGY | Facility: CLINIC | Age: 43
End: 2018-05-11

## 2018-05-11 DIAGNOSIS — G47.411 PRIMARY NARCOLEPSY WITH CATAPLEXY: ICD-10-CM

## 2018-05-14 DIAGNOSIS — G47.411 PRIMARY NARCOLEPSY WITH CATAPLEXY: ICD-10-CM

## 2018-05-14 RX ORDER — METHYLPHENIDATE HYDROCHLORIDE 10 MG/1
10 TABLET ORAL 4 TIMES DAILY
Qty: 120 TABLET | Refills: 0 | Status: SHIPPED | OUTPATIENT
Start: 2018-05-14 | End: 2018-06-11 | Stop reason: SDUPTHER

## 2018-05-14 RX ORDER — METHYLPHENIDATE HYDROCHLORIDE 10 MG/1
10 TABLET ORAL 4 TIMES DAILY
Qty: 120 TABLET | Refills: 0 | Status: CANCELLED | OUTPATIENT
Start: 2018-05-14 | End: 2018-06-13

## 2018-06-01 DIAGNOSIS — G47.411 NARCOLEPSY CATAPLEXY SYNDROME: ICD-10-CM

## 2018-06-01 RX ORDER — MODAFINIL 200 MG/1
TABLET ORAL
Qty: 45 TABLET | Refills: 4 | Status: SHIPPED | OUTPATIENT
Start: 2018-06-01 | End: 2018-10-30 | Stop reason: SDUPTHER

## 2018-06-11 DIAGNOSIS — G47.411 PRIMARY NARCOLEPSY WITH CATAPLEXY: ICD-10-CM

## 2018-06-12 RX ORDER — METHYLPHENIDATE HYDROCHLORIDE 10 MG/1
10 TABLET ORAL 4 TIMES DAILY
Qty: 120 TABLET | Refills: 0 | Status: SHIPPED | OUTPATIENT
Start: 2018-06-12 | End: 2018-07-10 | Stop reason: SDUPTHER

## 2018-07-09 ENCOUNTER — PATIENT MESSAGE (OUTPATIENT)
Dept: INTERNAL MEDICINE | Facility: CLINIC | Age: 43
End: 2018-07-09

## 2018-07-09 ENCOUNTER — PATIENT MESSAGE (OUTPATIENT)
Dept: NEUROLOGY | Facility: CLINIC | Age: 43
End: 2018-07-09

## 2018-07-09 DIAGNOSIS — G47.411 PRIMARY NARCOLEPSY WITH CATAPLEXY: ICD-10-CM

## 2018-07-09 RX ORDER — METHYLPHENIDATE HYDROCHLORIDE 10 MG/1
10 TABLET ORAL 4 TIMES DAILY
Qty: 120 TABLET | Refills: 0 | Status: CANCELLED | OUTPATIENT
Start: 2018-07-09 | End: 2018-08-08

## 2018-07-09 RX ORDER — METHYLPHENIDATE HYDROCHLORIDE 10 MG/1
10 TABLET ORAL 4 TIMES DAILY
Qty: 120 TABLET | Refills: 0 | OUTPATIENT
Start: 2018-07-09 | End: 2018-08-08

## 2018-07-10 DIAGNOSIS — G47.411 PRIMARY NARCOLEPSY WITH CATAPLEXY: ICD-10-CM

## 2018-07-10 RX ORDER — OXYCODONE AND ACETAMINOPHEN 7.5; 325 MG/1; MG/1
TABLET ORAL
COMMUNITY
Start: 2018-05-24 | End: 2018-07-20

## 2018-07-10 RX ORDER — METHYLPHENIDATE HYDROCHLORIDE 10 MG/1
10 TABLET ORAL 4 TIMES DAILY
Qty: 120 TABLET | Refills: 0 | Status: SHIPPED | OUTPATIENT
Start: 2018-07-11 | End: 2018-08-10 | Stop reason: SDUPTHER

## 2018-07-10 NOTE — TELEPHONE ENCOUNTER
Left message on patient voice Dr. Regalado does not write medication for Ritlin. Please call office back. Vf/ma

## 2018-07-13 PROBLEM — M54.16 LUMBAR RADICULITIS: Status: ACTIVE | Noted: 2018-07-13

## 2018-07-20 ENCOUNTER — OFFICE VISIT (OUTPATIENT)
Dept: NEUROLOGY | Facility: CLINIC | Age: 43
End: 2018-07-20
Payer: COMMERCIAL

## 2018-07-20 VITALS
WEIGHT: 219.19 LBS | SYSTOLIC BLOOD PRESSURE: 113 MMHG | BODY MASS INDEX: 41.38 KG/M2 | HEART RATE: 90 BPM | DIASTOLIC BLOOD PRESSURE: 84 MMHG | HEIGHT: 61 IN

## 2018-07-20 DIAGNOSIS — G43.009 MIGRAINE WITHOUT AURA AND WITHOUT STATUS MIGRAINOSUS, NOT INTRACTABLE: ICD-10-CM

## 2018-07-20 DIAGNOSIS — G47.411 NARCOLEPSY CATAPLEXY SYNDROME: Primary | ICD-10-CM

## 2018-07-20 DIAGNOSIS — M54.9 BACK PAIN, UNSPECIFIED BACK LOCATION, UNSPECIFIED BACK PAIN LATERALITY, UNSPECIFIED CHRONICITY: ICD-10-CM

## 2018-07-20 PROCEDURE — 3008F BODY MASS INDEX DOCD: CPT | Mod: CPTII,S$GLB,, | Performed by: PSYCHIATRY & NEUROLOGY

## 2018-07-20 PROCEDURE — 99999 PR PBB SHADOW E&M-EST. PATIENT-LVL III: CPT | Mod: PBBFAC,,, | Performed by: PSYCHIATRY & NEUROLOGY

## 2018-07-20 PROCEDURE — 99214 OFFICE O/P EST MOD 30 MIN: CPT | Mod: S$GLB,,, | Performed by: PSYCHIATRY & NEUROLOGY

## 2018-07-20 NOTE — PROGRESS NOTES
Subjective:       Patient ID: Amanda Navarrete is a 43 y.o. female.    Chief Complaint:  Narcolepsy      History of Present Illness  HPI   This is a 43-year-old female who is being followed by me with a history of narcolepsy with sleep attacks and occasional mild cataplectic episodes during which time her hands become weak. However these are infrequent. Symptoms are well controlled with a combination of Ritalin and Provigil.  She also has a history of migraines. Headaches are intermittent, usually noted on awakening in the morning. Headaches are usually frontal temporal and pressure-like and are not associated with any focal neurological or visual symptoms. They are usually precipitated by stress. She is presently on Topamax 100 mg twice a day which has decreased the intensity and frequency of the headaches.  She denies any new medical problems otherwise and has been overall stable.  She continues to follow-up with pain management for her back problems having had back surgery and is presently on pain medications.  She reports that she may need further surgery and placement of a nerve stimulator in the future.  She denies any new medical problems otherwise.         Review of Systems  Review of Systems   Constitutional: Negative.    Eyes: Negative.    Respiratory: Negative.    Cardiovascular: Negative.    Gastrointestinal: Negative.    Genitourinary: Negative.    Musculoskeletal: Positive for back pain.   Skin: Negative.    Neurological: Positive for headaches.   Psychiatric/Behavioral: Negative.        Objective:      Neurologic Exam      Physical Exam   Constitutional: She appears well-developed and well-nourished.   HENT:   Head: Normocephalic and atraumatic.   Right Ear: Hearing normal.   Left Ear: Hearing normal.   Neck: Normal range of motion. Neck supple. Carotid bruit is not present.   Neurological: She is alert. She has normal reflexes. She displays no atrophy. No cranial nerve deficit or sensory deficit. She  exhibits normal muscle tone. She displays a negative Romberg sign. Coordination and gait normal.   Mental status examination: Patient is fully oriented and able to give an adequate history.  Recall of recent and past information is good.  Immediate recall is normal.  Attention span and concentration was normal.  No difficulty with spelling backwards and serial sevens.  Judgment and insight is normal.  Language functions are intact with no evidence of aphasia or dysarthria.  Comprehension is unimpaired.  Affect is appropriate, mood was even.  No thought disorder is noted.   Vitals reviewed.        Assessment:        1. Narcolepsy cataplexy syndrome    2. Migraine without aura and without status migrainosus, not intractable    3. Back pain, unspecified back location, unspecified back pain laterality, unspecified chronicity             Plan:       No medication changes.  Continue Ritalin and Provigil.  Continue topiramate for headache prophylaxis.  Follow-up in 6 months if stable.

## 2018-07-20 NOTE — PATIENT INSTRUCTIONS
No medication changes.  Continue Ritalin and Provigil.  Continue topiramate for headache prophylaxis.

## 2018-08-02 DIAGNOSIS — Z12.39 BREAST CANCER SCREENING: ICD-10-CM

## 2018-08-09 ENCOUNTER — PATIENT MESSAGE (OUTPATIENT)
Dept: NEUROLOGY | Facility: CLINIC | Age: 43
End: 2018-08-09

## 2018-08-09 DIAGNOSIS — G47.411 PRIMARY NARCOLEPSY WITH CATAPLEXY: ICD-10-CM

## 2018-08-09 RX ORDER — METHYLPHENIDATE HYDROCHLORIDE 10 MG/1
10 TABLET ORAL 4 TIMES DAILY
Qty: 120 TABLET | Refills: 0 | Status: CANCELLED | OUTPATIENT
Start: 2018-08-09 | End: 2018-09-08

## 2018-08-10 ENCOUNTER — TELEPHONE (OUTPATIENT)
Dept: SLEEP MEDICINE | Facility: CLINIC | Age: 43
End: 2018-08-10

## 2018-08-10 DIAGNOSIS — G47.411 PRIMARY NARCOLEPSY WITH CATAPLEXY: ICD-10-CM

## 2018-08-10 RX ORDER — METHYLPHENIDATE HYDROCHLORIDE 10 MG/1
10 TABLET ORAL 4 TIMES DAILY
Qty: 120 TABLET | Refills: 0 | Status: SHIPPED | OUTPATIENT
Start: 2018-08-10 | End: 2018-09-07 | Stop reason: SDUPTHER

## 2018-08-10 NOTE — TELEPHONE ENCOUNTER
----- Message from Chuck Mcbride sent at 8/10/2018  9:36 AM CDT -----            Name of Who is Calling: OTTO MELENDREZ [[id number]]      What is the request in detail: Pt states she needs a refill the medication below to the pharmacy below. She states she was informed  Dr. Keith is out of the office until Tuesday and she needs a refill because she runs out today. She states she cannot operate a vehicle without the medication. Please call to discuss:    methylphenidate HCl (RITALIN) 10 MG tablet 120 tablet 0 7/11/2018 8/10/2018 No  Sig - Route: Take 1 tablet (10 mg total) by mouth 4 (four) times daily. - Oral  Class: Normal  Order: [id number]    [facility] [place] - [address], [place]    Can the clinic reply by [facility]: yes      [facility]: [phone]

## 2018-08-31 ENCOUNTER — TELEPHONE (OUTPATIENT)
Dept: NEUROLOGY | Facility: CLINIC | Age: 43
End: 2018-08-31

## 2018-08-31 NOTE — TELEPHONE ENCOUNTER
----- Message from John Saravia sent at 8/31/2018  3:47 PM CDT -----  Needs Advice    Reason for call: Pt is calling to confirm if the doctor will be in the clinic next Friday, even though I confirmed the doctor would be in next Friday. If the doctor will be in on Friday, pt is not requesting a call back  Communication Preference: [phone]  Additional Information:

## 2018-09-07 ENCOUNTER — PATIENT MESSAGE (OUTPATIENT)
Dept: NEUROLOGY | Facility: CLINIC | Age: 43
End: 2018-09-07

## 2018-09-07 DIAGNOSIS — G47.411 PRIMARY NARCOLEPSY WITH CATAPLEXY: ICD-10-CM

## 2018-09-07 RX ORDER — METHYLPHENIDATE HYDROCHLORIDE 10 MG/1
10 TABLET ORAL 4 TIMES DAILY
Qty: 120 TABLET | Refills: 0 | Status: SHIPPED | OUTPATIENT
Start: 2018-09-08 | End: 2018-10-03 | Stop reason: SDUPTHER

## 2018-09-29 DIAGNOSIS — G47.411 NARCOLEPSY CATAPLEXY SYNDROME: ICD-10-CM

## 2018-10-01 RX ORDER — OXYCODONE AND ACETAMINOPHEN 7.5; 325 MG/1; MG/1
1 TABLET ORAL 2 TIMES DAILY PRN
Refills: 0 | COMMUNITY
Start: 2018-08-17 | End: 2019-05-30

## 2018-10-01 RX ORDER — MODAFINIL 200 MG/1
TABLET ORAL
Qty: 45 TABLET | Refills: 3 | OUTPATIENT
Start: 2018-10-01

## 2018-10-03 ENCOUNTER — PATIENT MESSAGE (OUTPATIENT)
Dept: NEUROLOGY | Facility: CLINIC | Age: 43
End: 2018-10-03

## 2018-10-03 DIAGNOSIS — G47.411 PRIMARY NARCOLEPSY WITH CATAPLEXY: ICD-10-CM

## 2018-10-04 NOTE — TELEPHONE ENCOUNTER
Spoke to patient to explain that her prescriptions have been sent in two days early for the last several months, and that she may consider seeing sleep specialist in [place] to explore options of newer medications which may not be so difficult to obtain. Patients only request is that she needs this sent into the pharmacy in the early part of the week so if it has to be ordered she will not have to be without medication.

## 2018-10-05 RX ORDER — METHYLPHENIDATE HYDROCHLORIDE 10 MG/1
10 TABLET ORAL 4 TIMES DAILY
Qty: 120 TABLET | Refills: 0 | Status: SHIPPED | OUTPATIENT
Start: 2018-10-06 | End: 2018-11-05 | Stop reason: SDUPTHER

## 2018-10-29 DIAGNOSIS — G47.411 NARCOLEPSY CATAPLEXY SYNDROME: ICD-10-CM

## 2018-10-30 DIAGNOSIS — G47.411 NARCOLEPSY CATAPLEXY SYNDROME: ICD-10-CM

## 2018-10-30 RX ORDER — MODAFINIL 200 MG/1
TABLET ORAL
Qty: 45 TABLET | Refills: 3 | OUTPATIENT
Start: 2018-10-30

## 2018-10-30 RX ORDER — DULOXETIN HYDROCHLORIDE 30 MG/1
CAPSULE, DELAYED RELEASE ORAL
COMMUNITY
Start: 2018-10-19 | End: 2019-09-18

## 2018-10-30 RX ORDER — MODAFINIL 200 MG/1
TABLET ORAL
Qty: 45 TABLET | Refills: 5 | Status: SHIPPED | OUTPATIENT
Start: 2018-10-30 | End: 2019-04-27 | Stop reason: SDUPTHER

## 2018-10-30 RX ORDER — CLONAZEPAM 0.5 MG/1
TABLET ORAL
COMMUNITY
Start: 2018-10-19

## 2018-11-05 ENCOUNTER — PATIENT MESSAGE (OUTPATIENT)
Dept: NEUROLOGY | Facility: CLINIC | Age: 43
End: 2018-11-05

## 2018-11-05 DIAGNOSIS — G47.411 PRIMARY NARCOLEPSY WITH CATAPLEXY: ICD-10-CM

## 2018-11-05 RX ORDER — METHYLPHENIDATE HYDROCHLORIDE 10 MG/1
10 TABLET ORAL 4 TIMES DAILY
Qty: 120 TABLET | Refills: 0 | Status: SHIPPED | OUTPATIENT
Start: 2018-11-06 | End: 2018-12-03 | Stop reason: SDUPTHER

## 2018-12-03 ENCOUNTER — PATIENT MESSAGE (OUTPATIENT)
Dept: NEUROLOGY | Facility: CLINIC | Age: 43
End: 2018-12-03

## 2018-12-03 DIAGNOSIS — G47.411 PRIMARY NARCOLEPSY WITH CATAPLEXY: ICD-10-CM

## 2018-12-03 RX ORDER — METHYLPHENIDATE HYDROCHLORIDE 10 MG/1
10 TABLET ORAL 4 TIMES DAILY
Qty: 120 TABLET | Refills: 0 | Status: SHIPPED | OUTPATIENT
Start: 2018-12-03 | End: 2019-01-03 | Stop reason: SDUPTHER

## 2018-12-14 RX ORDER — TOPIRAMATE 100 MG/1
TABLET, FILM COATED ORAL
Qty: 75 TABLET | Refills: 11 | Status: SHIPPED | OUTPATIENT
Start: 2018-12-14 | End: 2019-07-10 | Stop reason: SDUPTHER

## 2019-01-02 ENCOUNTER — TELEPHONE (OUTPATIENT)
Dept: NEUROLOGY | Facility: CLINIC | Age: 44
End: 2019-01-02

## 2019-01-02 DIAGNOSIS — G47.411 PRIMARY NARCOLEPSY WITH CATAPLEXY: ICD-10-CM

## 2019-01-02 NOTE — TELEPHONE ENCOUNTER
----- Message from John Saravia sent at 1/2/2019  2:45 PM CST -----  Rx Refill/Request     Is this a Refill or New Rx: Refill  Rx Name and Strength: methylphenidate HCl (RITALIN) 10 MG tablet   Preferred Pharmacy with phone number: see below  Communication Preference: [phone]  Additional Information:     [facility] [place] - [address], [address], [place] [address]  Phone: [phone] Fax: [phone]

## 2019-01-03 RX ORDER — ONDANSETRON 4 MG/1
TABLET, FILM COATED ORAL
COMMUNITY
Start: 2018-11-30 | End: 2020-12-08

## 2019-01-03 RX ORDER — METHYLPHENIDATE HYDROCHLORIDE 10 MG/1
10 TABLET ORAL 4 TIMES DAILY
Qty: 120 TABLET | Refills: 0 | Status: SHIPPED | OUTPATIENT
Start: 2019-01-03 | End: 2019-02-01 | Stop reason: SDUPTHER

## 2019-01-03 RX ORDER — PNEUMOCOCCAL VACCINE POLYVALENT 25; 25; 25; 25; 25; 25; 25; 25; 25; 25; 25; 25; 25; 25; 25; 25; 25; 25; 25; 25; 25; 25; 25 UG/.5ML; UG/.5ML; UG/.5ML; UG/.5ML; UG/.5ML; UG/.5ML; UG/.5ML; UG/.5ML; UG/.5ML; UG/.5ML; UG/.5ML; UG/.5ML; UG/.5ML; UG/.5ML; UG/.5ML; UG/.5ML; UG/.5ML; UG/.5ML; UG/.5ML; UG/.5ML; UG/.5ML; UG/.5ML; UG/.5ML
INJECTION, SOLUTION INTRAMUSCULAR; SUBCUTANEOUS
COMMUNITY
Start: 2018-11-17 | End: 2020-04-24 | Stop reason: ALTCHOICE

## 2019-01-03 RX ORDER — CARIPRAZINE 3 MG/1
3 CAPSULE, GELATIN COATED ORAL DAILY
COMMUNITY
Start: 2018-12-10 | End: 2024-03-21

## 2019-01-03 RX ORDER — TETANUS TOXOID, REDUCED DIPHTHERIA TOXOID AND ACELLULAR PERTUSSIS VACCINE, ADSORBED 5; 2.5; 8; 8; 2.5 [IU]/.5ML; [IU]/.5ML; UG/.5ML; UG/.5ML; UG/.5ML
SUSPENSION INTRAMUSCULAR
COMMUNITY
Start: 2018-11-17 | End: 2020-08-24 | Stop reason: ALTCHOICE

## 2019-01-31 ENCOUNTER — PATIENT MESSAGE (OUTPATIENT)
Dept: NEUROLOGY | Facility: CLINIC | Age: 44
End: 2019-01-31

## 2019-02-01 ENCOUNTER — OFFICE VISIT (OUTPATIENT)
Dept: NEUROLOGY | Facility: CLINIC | Age: 44
End: 2019-02-01
Payer: COMMERCIAL

## 2019-02-01 VITALS
HEART RATE: 101 BPM | BODY MASS INDEX: 40.05 KG/M2 | SYSTOLIC BLOOD PRESSURE: 123 MMHG | HEIGHT: 61 IN | WEIGHT: 212.13 LBS | DIASTOLIC BLOOD PRESSURE: 85 MMHG

## 2019-02-01 DIAGNOSIS — G43.009 MIGRAINE WITHOUT AURA AND WITHOUT STATUS MIGRAINOSUS, NOT INTRACTABLE: ICD-10-CM

## 2019-02-01 DIAGNOSIS — M54.9 BACK PAIN, UNSPECIFIED BACK LOCATION, UNSPECIFIED BACK PAIN LATERALITY, UNSPECIFIED CHRONICITY: ICD-10-CM

## 2019-02-01 DIAGNOSIS — G47.411 NARCOLEPSY CATAPLEXY SYNDROME: Primary | ICD-10-CM

## 2019-02-01 DIAGNOSIS — G47.411 PRIMARY NARCOLEPSY WITH CATAPLEXY: ICD-10-CM

## 2019-02-01 PROCEDURE — 3008F PR BODY MASS INDEX (BMI) DOCUMENTED: ICD-10-PCS | Mod: CPTII,S$GLB,, | Performed by: PSYCHIATRY & NEUROLOGY

## 2019-02-01 PROCEDURE — 99999 PR PBB SHADOW E&M-EST. PATIENT-LVL III: CPT | Mod: PBBFAC,,, | Performed by: PSYCHIATRY & NEUROLOGY

## 2019-02-01 PROCEDURE — 99214 OFFICE O/P EST MOD 30 MIN: CPT | Mod: S$GLB,,, | Performed by: PSYCHIATRY & NEUROLOGY

## 2019-02-01 PROCEDURE — 3008F BODY MASS INDEX DOCD: CPT | Mod: CPTII,S$GLB,, | Performed by: PSYCHIATRY & NEUROLOGY

## 2019-02-01 PROCEDURE — 99214 PR OFFICE/OUTPT VISIT, EST, LEVL IV, 30-39 MIN: ICD-10-PCS | Mod: S$GLB,,, | Performed by: PSYCHIATRY & NEUROLOGY

## 2019-02-01 PROCEDURE — 99999 PR PBB SHADOW E&M-EST. PATIENT-LVL III: ICD-10-PCS | Mod: PBBFAC,,, | Performed by: PSYCHIATRY & NEUROLOGY

## 2019-02-01 RX ORDER — METHYLPHENIDATE HYDROCHLORIDE 10 MG/1
10 TABLET ORAL 4 TIMES DAILY
Qty: 120 TABLET | Refills: 0 | Status: SHIPPED | OUTPATIENT
Start: 2019-02-01 | End: 2019-03-04 | Stop reason: SDUPTHER

## 2019-02-01 NOTE — PROGRESS NOTES
Subjective:       Patient ID: Amanda Navarrete is a 43 y.o. female.    Chief Complaint:  narcolepsy      History of Present Illness  HPI   This is a 43-year-old female who is being followed by me with a history of narcolepsy with sleep attacks and occasional mild cataplectic episodes during which time her hands become weak. However these are infrequent. Symptoms are well controlled with a combination of Ritalin and Provigil.  She also has a history of migraines. Headaches are intermittent, usually noted on awakening in the morning. Headaches are usually frontal temporal and pressure-like and are not associated with any focal neurological or visual symptoms. They are usually precipitated by stress. She is presently on Topamax 100 mg twice a day which has decreased the intensity and frequency of the headaches.  She denies any new medical problems otherwise and has been overall stable.  She continues to follow-up with pain management for her back problems having had back surgery and is presently on pain medications.  She is also under the care of his psychiatrist because of significant problem with depression and reports that she was diagnosed as having bipolar disorder. She denies any new medical problems otherwise.         Review of Systems  Review of Systems   Constitutional: Negative.    Eyes: Negative.    Respiratory: Negative.    Cardiovascular: Negative.    Gastrointestinal: Negative.    Genitourinary: Negative.    Musculoskeletal: Positive for back pain.   Skin: Negative.    Neurological: Positive for headaches.   Psychiatric/Behavioral: Negative.        Objective:      Neurologic Exam      Physical Exam   Constitutional: She appears well-developed and well-nourished.   HENT:   Head: Normocephalic and atraumatic.   Right Ear: Hearing normal.   Left Ear: Hearing normal.   Neck: Normal range of motion. Neck supple. Carotid bruit is not present.   Neurological: She is alert. She has normal reflexes. She displays  no atrophy. No cranial nerve deficit or sensory deficit. She exhibits normal muscle tone. She displays a negative Romberg sign. Coordination and gait normal.   Mental status examination: Patient is fully oriented and able to give an adequate history.  Recall of recent and past information is good.  Immediate recall is normal.  Attention span and concentration was normal.  No difficulty with spelling backwards and serial sevens.  Judgment and insight is normal.  Language functions are intact with no evidence of aphasia or dysarthria.  Comprehension is unimpaired.  Affect is appropriate, mood was even.  No thought disorder is noted.   Vitals reviewed.        Assessment:        1. Narcolepsy cataplexy syndrome    2. Migraine without aura and without status migrainosus, not intractable    3. Back pain, unspecified back location, unspecified back pain laterality, unspecified chronicity             Plan:       No medication changes.  Continue Ritalin and Provigil.  Continue topiramate for headache prophylaxis.  Follow-up in 6 months if stable.

## 2019-02-28 ENCOUNTER — TELEPHONE (OUTPATIENT)
Dept: NEUROLOGY | Facility: CLINIC | Age: 44
End: 2019-02-28

## 2019-03-02 ENCOUNTER — PATIENT MESSAGE (OUTPATIENT)
Dept: NEUROLOGY | Facility: CLINIC | Age: 44
End: 2019-03-02

## 2019-03-02 DIAGNOSIS — G47.411 PRIMARY NARCOLEPSY WITH CATAPLEXY: ICD-10-CM

## 2019-03-04 ENCOUNTER — PATIENT MESSAGE (OUTPATIENT)
Dept: NEUROLOGY | Facility: CLINIC | Age: 44
End: 2019-03-04

## 2019-03-04 RX ORDER — METHYLPHENIDATE HYDROCHLORIDE 10 MG/1
10 TABLET ORAL 4 TIMES DAILY
Qty: 120 TABLET | Refills: 0 | Status: SHIPPED | OUTPATIENT
Start: 2019-03-04 | End: 2019-04-01 | Stop reason: SDUPTHER

## 2019-04-01 ENCOUNTER — PATIENT MESSAGE (OUTPATIENT)
Dept: NEUROLOGY | Facility: CLINIC | Age: 44
End: 2019-04-01

## 2019-04-01 DIAGNOSIS — G47.411 PRIMARY NARCOLEPSY WITH CATAPLEXY: ICD-10-CM

## 2019-04-01 RX ORDER — METHYLPHENIDATE HYDROCHLORIDE 10 MG/1
10 TABLET ORAL 4 TIMES DAILY
Qty: 120 TABLET | Refills: 0 | Status: SHIPPED | OUTPATIENT
Start: 2019-04-02 | End: 2019-04-30 | Stop reason: SDUPTHER

## 2019-04-27 DIAGNOSIS — G47.411 NARCOLEPSY CATAPLEXY SYNDROME: ICD-10-CM

## 2019-04-29 RX ORDER — MODAFINIL 200 MG/1
TABLET ORAL
Qty: 45 TABLET | Refills: 5 | Status: SHIPPED | OUTPATIENT
Start: 2019-04-29 | End: 2019-10-16 | Stop reason: SDUPTHER

## 2019-04-30 ENCOUNTER — PATIENT MESSAGE (OUTPATIENT)
Dept: NEUROLOGY | Facility: CLINIC | Age: 44
End: 2019-04-30

## 2019-04-30 DIAGNOSIS — G47.411 PRIMARY NARCOLEPSY WITH CATAPLEXY: ICD-10-CM

## 2019-04-30 RX ORDER — METHYLPHENIDATE HYDROCHLORIDE 10 MG/1
10 TABLET ORAL 4 TIMES DAILY
Qty: 120 TABLET | Refills: 0 | Status: SHIPPED | OUTPATIENT
Start: 2019-04-30 | End: 2019-05-30 | Stop reason: SDUPTHER

## 2019-05-30 ENCOUNTER — PATIENT MESSAGE (OUTPATIENT)
Dept: NEUROLOGY | Facility: CLINIC | Age: 44
End: 2019-05-30

## 2019-05-30 DIAGNOSIS — G47.411 PRIMARY NARCOLEPSY WITH CATAPLEXY: ICD-10-CM

## 2019-05-30 RX ORDER — OXYCODONE AND ACETAMINOPHEN 5; 325 MG/1; MG/1
1 TABLET ORAL 2 TIMES DAILY PRN
Refills: 0 | COMMUNITY
Start: 2019-05-20

## 2019-05-30 RX ORDER — METHOCARBAMOL 500 MG/1
500 TABLET, FILM COATED ORAL NIGHTLY
Refills: 2 | COMMUNITY
Start: 2019-05-20

## 2019-05-30 RX ORDER — METHYLPHENIDATE HYDROCHLORIDE 10 MG/1
10 TABLET ORAL 4 TIMES DAILY
Qty: 120 TABLET | Refills: 0 | Status: SHIPPED | OUTPATIENT
Start: 2019-05-30 | End: 2019-06-28 | Stop reason: SDUPTHER

## 2019-06-26 ENCOUNTER — TELEPHONE (OUTPATIENT)
Dept: ADMINISTRATIVE | Facility: HOSPITAL | Age: 44
End: 2019-06-26

## 2019-06-26 ENCOUNTER — PATIENT OUTREACH (OUTPATIENT)
Dept: ADMINISTRATIVE | Facility: HOSPITAL | Age: 44
End: 2019-06-26

## 2019-06-28 ENCOUNTER — TELEPHONE (OUTPATIENT)
Dept: ADMINISTRATIVE | Facility: HOSPITAL | Age: 44
End: 2019-06-28

## 2019-06-28 ENCOUNTER — PATIENT MESSAGE (OUTPATIENT)
Dept: NEUROLOGY | Facility: CLINIC | Age: 44
End: 2019-06-28

## 2019-06-28 DIAGNOSIS — G47.411 PRIMARY NARCOLEPSY WITH CATAPLEXY: ICD-10-CM

## 2019-06-28 RX ORDER — METHYLPHENIDATE HYDROCHLORIDE 10 MG/1
10 TABLET ORAL 4 TIMES DAILY
Qty: 120 TABLET | Refills: 0 | Status: SHIPPED | OUTPATIENT
Start: 2019-06-29 | End: 2019-08-09 | Stop reason: SDUPTHER

## 2019-07-10 ENCOUNTER — OFFICE VISIT (OUTPATIENT)
Dept: FAMILY MEDICINE | Facility: CLINIC | Age: 44
End: 2019-07-10
Payer: COMMERCIAL

## 2019-07-10 ENCOUNTER — TELEPHONE (OUTPATIENT)
Dept: ADMINISTRATIVE | Facility: HOSPITAL | Age: 44
End: 2019-07-10

## 2019-07-10 VITALS
RESPIRATION RATE: 16 BRPM | HEART RATE: 83 BPM | HEIGHT: 61 IN | SYSTOLIC BLOOD PRESSURE: 112 MMHG | WEIGHT: 218.88 LBS | OXYGEN SATURATION: 99 % | BODY MASS INDEX: 41.32 KG/M2 | DIASTOLIC BLOOD PRESSURE: 72 MMHG | TEMPERATURE: 98 F

## 2019-07-10 DIAGNOSIS — R26.81 GAIT INSTABILITY: ICD-10-CM

## 2019-07-10 DIAGNOSIS — M54.16 LUMBAR BACK PAIN WITH RADICULOPATHY AFFECTING RIGHT LOWER EXTREMITY: ICD-10-CM

## 2019-07-10 DIAGNOSIS — E78.00 PURE HYPERCHOLESTEROLEMIA: ICD-10-CM

## 2019-07-10 DIAGNOSIS — R35.0 FREQUENT URINATION: Primary | ICD-10-CM

## 2019-07-10 DIAGNOSIS — E06.3 HYPOTHYROIDISM DUE TO HASHIMOTO'S THYROIDITIS: ICD-10-CM

## 2019-07-10 DIAGNOSIS — E03.8 HYPOTHYROIDISM DUE TO HASHIMOTO'S THYROIDITIS: ICD-10-CM

## 2019-07-10 DIAGNOSIS — R60.0 BILATERAL LOWER EXTREMITY EDEMA: ICD-10-CM

## 2019-07-10 DIAGNOSIS — E55.9 VITAMIN D DEFICIENCY: ICD-10-CM

## 2019-07-10 DIAGNOSIS — G47.411 NARCOLEPSY CATAPLEXY SYNDROME: ICD-10-CM

## 2019-07-10 DIAGNOSIS — F41.1 GENERALIZED ANXIETY DISORDER: ICD-10-CM

## 2019-07-10 LAB
BILIRUB SERPL-MCNC: ABNORMAL MG/DL
BLOOD, POC UA: ABNORMAL
GLUCOSE UR QL STRIP: NORMAL
KETONES UR QL STRIP: NEGATIVE
LEUKOCYTE ESTERASE URINE, POC: NEGATIVE
NITRITE, POC UA: NEGATIVE
PH, POC UA: 5
PROTEIN, POC: NEGATIVE
SPECIFIC GRAVITY, POC UA: 1.02
UROBILINOGEN, POC UA: NORMAL

## 2019-07-10 PROCEDURE — 3008F PR BODY MASS INDEX (BMI) DOCUMENTED: ICD-10-PCS | Mod: CPTII,S$GLB,, | Performed by: INTERNAL MEDICINE

## 2019-07-10 PROCEDURE — 99999 PR PBB SHADOW E&M-EST. PATIENT-LVL IV: CPT | Mod: PBBFAC,,, | Performed by: INTERNAL MEDICINE

## 2019-07-10 PROCEDURE — 81000 POCT URINALYSIS: ICD-10-PCS | Mod: S$GLB,,, | Performed by: INTERNAL MEDICINE

## 2019-07-10 PROCEDURE — 99215 PR OFFICE/OUTPT VISIT, EST, LEVL V, 40-54 MIN: ICD-10-PCS | Mod: 25,S$GLB,, | Performed by: INTERNAL MEDICINE

## 2019-07-10 PROCEDURE — 99215 OFFICE O/P EST HI 40 MIN: CPT | Mod: 25,S$GLB,, | Performed by: INTERNAL MEDICINE

## 2019-07-10 PROCEDURE — 81000 URINALYSIS NONAUTO W/SCOPE: CPT | Mod: S$GLB,,, | Performed by: INTERNAL MEDICINE

## 2019-07-10 PROCEDURE — 99999 PR PBB SHADOW E&M-EST. PATIENT-LVL IV: ICD-10-PCS | Mod: PBBFAC,,, | Performed by: INTERNAL MEDICINE

## 2019-07-10 PROCEDURE — 3008F BODY MASS INDEX DOCD: CPT | Mod: CPTII,S$GLB,, | Performed by: INTERNAL MEDICINE

## 2019-07-10 RX ORDER — VALACYCLOVIR HYDROCHLORIDE 1 G/1
1000 TABLET, FILM COATED ORAL EVERY 12 HOURS PRN
Qty: 60 TABLET | Refills: 1 | Status: SHIPPED | OUTPATIENT
Start: 2019-07-10 | End: 2020-08-24 | Stop reason: SDUPTHER

## 2019-07-10 RX ORDER — LEVOTHYROXINE SODIUM 25 UG/1
25 TABLET ORAL DAILY
Qty: 30 TABLET | Refills: 3 | Status: SHIPPED | OUTPATIENT
Start: 2019-07-10 | End: 2019-10-24 | Stop reason: SDUPTHER

## 2019-07-10 RX ORDER — OXYBUTYNIN CHLORIDE 15 MG/1
15 TABLET, EXTENDED RELEASE ORAL DAILY
Qty: 30 TABLET | Refills: 3 | Status: SHIPPED | OUTPATIENT
Start: 2019-07-10 | End: 2019-09-18

## 2019-07-10 RX ORDER — TOPIRAMATE 100 MG/1
TABLET, FILM COATED ORAL
Qty: 75 TABLET | Refills: 3 | Status: SHIPPED | OUTPATIENT
Start: 2019-07-10 | End: 2020-01-22

## 2019-07-10 NOTE — PATIENT INSTRUCTIONS
We have reviewed your prescription medications. We checked a urine sample because of frequent urination. I have ordered labs for maintenance and to evaluate swelling in ankles. I refilled your thyroid medicine and your Valtrex. Please call Dr Ness's office to find out about plans for your neurology folow-up. Continue to follow-up with psychiatry, pain management, and GYN as recommended. We will add ditropan for bladder control. Follow-up in 4-6 weeks.

## 2019-07-10 NOTE — PROGRESS NOTES
NEW PATIENT VISIT INTERNAL MEDICINE    Patient Active Problem List   Diagnosis    Back pain    Migraine headache    TMJ arthralgia    GERD (gastroesophageal reflux disease)    Narcolepsy cataplexy syndrome    Lumbar back pain with radiculopathy affecting right lower extremity    Back pain with right-sided radiculopathy    Hypothyroidism due to Hashimoto's thyroiditis    Lumbar radiculitis    Generalized anxiety disorder    Vitamin D deficiency    Gait instability       CC:   Chief Complaint   Patient presents with    Establish Care    Foot Swelling     both ankles    Weight Gain       HPI: Amanda Navarrete   is a 44 y.o. female  I have reviewed the PMH, SH and FH for this patient. This is a new patient to me. She  has a past medical history of Affective disorder, Back pain, GERD (gastroesophageal reflux disease), Headache(784.0), Migraine headache, Narcolepsy without cataplexy(347.00), Thyroid disease, and TMJ arthralgia. She has seen Dr Regalado a few years ago, but she has been seeing a doctor at [facility] recently. She sees a pain management doctor for chronic back pain. She had back surgery a few years ago but her pain has been persistent despite the surgery. She sees a psychiatrist for anxiety. She sees a neurologist, Dr Diaz, for narcolepsy. She had been seeing a gynecologist, but her gynecologist has moved. She is concerned about swelling in her legs. It has been going on for a couple years. She does not usually see impressions in her skin. She does not have any redness. She reports that she has had a lot of weight gain since her surgery. She reports that she has gained almost 100 pounds in the last 2.5 years. She is unable to exercise because of her back pain and gait instability. She is also concerned about bladder leakage and urinary frequency. She does not feel like she has a bladder infection. She is not having fever or dysuria. She has not had a hysterectomy. She reports that she  had kidney reflux as a child, and they were considering doing surgery to repair it, but it got better. Her neurologist has done nerve testing on her lower extremities and it was normal. She has not had labs done in a while. Her BP looks good at 112/72.  She also has a history of vitamin D deficiency. She reports that her vitamin D was as low as 9 and her GYN started her on supplements. We will retest this.     Patient's family history indicates:    Problem: Hypertension      Relation: Mother          Age of Onset: (Not Specified)  Problem: Kidney disease      Relation: Mother          Age of Onset: (Not Specified)  Problem: Sleep apnea      Relation: Mother          Age of Onset: (Not Specified)  Problem: Narcolepsy      Relation: Father          Age of Onset: (Not Specified)  Problem: Cancer      Relation: Maternal Aunt          Age of Onset: (Not Specified)          Comment: colon cancer gene  Problem: Cancer      Relation: Paternal Aunt          Age of Onset: (Not Specified)          Comment: lung ca          ROS: Review of Systems   Constitutional: Positive for fatigue and unexpected weight change. Negative for appetite change, chills and fever.   HENT: Negative for congestion, ear discharge, ear pain, mouth sores, postnasal drip, rhinorrhea, sinus pressure, sinus pain and sore throat.    Eyes: Negative for discharge, redness, itching and visual disturbance.   Respiratory: Negative for cough, chest tightness, shortness of breath and wheezing.    Cardiovascular: Positive for leg swelling. Negative for chest pain and palpitations.   Gastrointestinal: Negative for abdominal pain, blood in stool, constipation, diarrhea, nausea and vomiting.   Endocrine: Negative for cold intolerance, heat intolerance and polyuria.   Genitourinary: Positive for frequency. Negative for difficulty urinating, dysuria, flank pain, hematuria, pelvic pain, urgency, vaginal bleeding and vaginal discharge.   Musculoskeletal: Negative for  arthralgias, back pain, joint swelling, myalgias, neck pain and neck stiffness.   Skin: Negative for rash and wound.   Neurological: Negative for dizziness, tremors, syncope, speech difficulty, weakness, light-headedness, numbness and headaches.   Hematological: Negative for adenopathy. Does not bruise/bleed easily.   Psychiatric/Behavioral: Negative for agitation, decreased concentration, dysphoric mood and sleep disturbance. The patient is not nervous/anxious.        PMHX:   Past Medical History:   Diagnosis Date    Affective disorder     sses Dr. Dial, psychiatry    Back pain     GERD (gastroesophageal reflux disease)     Headache(784.0)     Migraine headache     Narcolepsy without cataplexy(347.00)     Thyroid disease     hyperthyroidism    TMJ arthralgia        PSHX:   Past Surgical History:   Procedure Laterality Date     SECTION, CLASSIC      COSMETIC SURGERY      breast augmentation    FUSION-TRANSLUMINAL LUMBAR INTERBODY (TLIF) N/A 2015    Per[facility]illon Jr[facility]    INJECTION, STEROID, CAUDAL, EPIDURAL N/A 2018    Performed by Geoffrey [facility]man [facility] at [facility] OR    SPINE SURGERY  2015    fusion L spine       FAMHX:   Family History   Problem Relation Age of Onset    Hypertension Mother     Kidney disease Mother     Sleep apnea Mother     Narcolepsy Father     Cancer Maternal Aunt         colon cancer gene    Cancer Paternal Aunt         lung ca       SOCHX:   Social History     Socioeconomic History    Marital status:      Spouse name: John    Number of children: 3    Years of education: Not on file    Highest education level: Not on file   Occupational History    Occupation: post master   Social Needs    Financial resource strain: Not on file    Food insecurity:     Worry: Not on file     Inability: Not on file    Transportation needs:     Medical: Not on file     Non-medical: Not on file   Tobacco Use    Smoking status:  Former Smoker     Packs/day: 1.00     Years: 15.00     Pack years: 15.00     Start date: 8/11/2014    Smokeless tobacco: Never Used   Substance and Sexual Activity    Alcohol use: Yes     Comment: Social, rare    Drug use: No    Sexual activity: Yes     Partners: Male   Lifestyle    Physical activity:     Days per week: Not on file     Minutes per session: Not on file    Stress: Not on file   Relationships    Social connections:     Talks on phone: Not on file     Gets together: Not on file     Attends Latter-day service: Not on file     Active member of club or organization: Not on file     Attends meetings of clubs or organizations: Not on file     Relationship status: Not on file   Other Topics Concern    Not on file   Social History Narrative    She works at [employer]. She still has a daughter living at home while in college. The others are grown. She has 4 dogs.        ALLERGIES:   Review of patient's allergies indicates:   Allergen Reactions    Tamiflu [oseltamivir] Itching and Swelling       MEDS:   Current Outpatient Medications:     clonazePAM (KLONOPIN) 0.5 MG tablet, , Disp: , Rfl:     diclofenac sodium 1 % Gel, APPLY 2 GRAMS TOPICALLY TO AFFECTED AREA(S) UP TO 5 TIMES DAILY (10 GRAMS PER DAY), Disp: , Rfl: 0    DULoxetine (CYMBALTA) 30 MG capsule, , Disp: , Rfl:     levothyroxine (SYNTHROID) 25 MCG tablet, Take 1 tablet (25 mcg total) by mouth once daily., Disp: 30 tablet, Rfl: 3    methocarbamol (ROBAXIN) 500 MG Tab, Take 500 mg by mouth every evening., Disp: , Rfl: 2    methylphenidate HCl (RITALIN) 10 MG tablet, Take 1 tablet (10 mg total) by mouth 4 (four) times daily., Disp: 120 tablet, Rfl: 0    modafinil (PROVIGIL) 200 MG Tab, TAKE 1 TABLET BY MOUTH EVERY MORNING AND 1/2 TABLET AT NOON., Disp: 45 tablet, Rfl: 5    oxyCODONE-acetaminophen (PERCOCET) 5-325 mg per tablet, Take 1 tablet by mouth 2 (two) times daily as needed., Disp: , Rfl: 0    topiramate (TOPAMAX) 100  "MG tablet, TAKE 1 TABLET BY MOUTH IN THE MORNING AND 1 AND 1/2 TABLET AT BEDTIME, Disp: 75 tablet, Rfl: 3    valACYclovir (VALTREX) 1000 MG tablet, Take 1 tablet (1,000 mg total) by mouth every 12 (twelve) hours as needed., Disp: 60 tablet, Rfl: 1    VRAYLAR 3 mg Cap, Take 3 mg by mouth once daily. , Disp: , Rfl:     BOOSTRIX TDAP 2.5-8-5 Lf-mcg-Lf/0.5mL Susp, , Disp: , Rfl:     cholecalciferol, vitamin D3, 50,000 unit capsule, , Disp: , Rfl: 0    FLUZONE QUAD 9697-6817 60 mcg (15 mcg x 4)/0.5 mL Susp, , Disp: , Rfl:     medroxyPROGESTERone (DEPO-PROVERA) 150 mg/mL injection, , Disp: , Rfl:     ondansetron (ZOFRAN) 4 MG tablet, , Disp: , Rfl:     oxybutynin (DITROPAN XL) 15 MG TR24, Take 1 tablet (15 mg total) by mouth once daily., Disp: 30 tablet, Rfl: 3    PNEUMOVAX 23 25 mcg/0.5 mL, , Disp: , Rfl:     promethazine (PHENERGAN) 25 MG tablet, , Disp: , Rfl:     OBJECTIVE:   Vitals:    07/10/19 0929   BP: 112/72   BP Location: Left arm   Patient Position: Sitting   BP Method: Large (Manual)   Pulse: 83   Resp: 16   Temp: 97.9 °F (36.6 °C)   TempSrc: Oral   SpO2: 99%   Weight: 99.3 kg (218 lb 14.4 oz)   Height: 5' 1" (1.549 m)     Body mass index is 41.36 kg/m².    Physical Exam   Constitutional: She is oriented to person, place, and time. She appears well-developed and well-nourished. She does not have a sickly appearance. No distress.   HENT:   Head: Normocephalic and atraumatic. Hair is normal.   Right Ear: Hearing and external ear normal. Tympanic membrane is not erythematous, not retracted and not bulging. No middle ear effusion.   Left Ear: Hearing and external ear normal. Tympanic membrane is not erythematous, not retracted and not bulging.  No middle ear effusion.   Nose: Nose normal. No rhinorrhea or sinus tenderness. Right sinus exhibits no maxillary sinus tenderness and no frontal sinus tenderness. Left sinus exhibits no frontal sinus tenderness.   Mouth/Throat: Oropharynx is clear and moist and " mucous membranes are normal. No oropharyngeal exudate or posterior oropharyngeal erythema. No tonsillar exudate.   Eyes: Pupils are equal, round, and reactive to light. Conjunctivae, EOM and lids are normal. Right eye exhibits no discharge. Left eye exhibits no discharge. Right conjunctiva is not injected. Left conjunctiva is not injected. No scleral icterus.   Neck: Normal range of motion. Neck supple. No JVD present. No tracheal tenderness present. Carotid bruit is not present. No neck rigidity. No tracheal deviation present. No thyroid mass and no thyromegaly present.   Cardiovascular: Normal rate, regular rhythm, normal heart sounds and intact distal pulses. PMI is not displaced. Exam reveals no gallop and no friction rub.   No murmur heard.  Pulmonary/Chest: Effort normal and breath sounds normal. No tachypnea. No respiratory distress. She has no decreased breath sounds. She has no wheezes. She has no rhonchi. She has no rales. She exhibits no tenderness.   Abdominal: Soft. Bowel sounds are normal. She exhibits no distension and no mass. There is no hepatosplenomegaly. There is no tenderness. There is no rigidity, no rebound, no guarding and no CVA tenderness. No hernia.   Musculoskeletal: Normal range of motion. She exhibits no edema or tenderness.   Lymphadenopathy:     She has no cervical adenopathy.   Neurological: She is alert and oriented to person, place, and time. She displays no atrophy, no tremor and normal reflexes. No cranial nerve deficit or sensory deficit. Gait normal.   Skin: Skin is warm and dry. No bruising and no rash noted. She is not diaphoretic. No cyanosis or erythema. No pallor. Nails show no clubbing.   Psychiatric: She has a normal mood and affect. Her speech is normal and behavior is normal. Judgment normal. Her mood appears not anxious. She is not agitated and not aggressive. She does not exhibit a depressed mood.   Nursing note and vitals reviewed.        [facility] Patient [facility]  Questionnaire 2/1/2019 7/20/2018 12/29/2017   Over the last two weeks how often have you been bothered by little interest or pleasure in doing things 0 0 0   Over the last two weeks how often have you been bothered by feeling down, depressed or hopeless 0 0 0   PHQ-2 Total Score 0 0 0       PERTINENT RESULTS:   UA showed bilirubinuria  Otherwise nl.      ASSESSMENT:  Problem List Items Addressed This Visit        Neuro    Lumbar back pain with radiculopathy affecting right lower extremity - follow-up with pain doctor.        Psychiatric    Generalized anxiety disorder - sees psychiatry. On duloxetine, clonazepine, vraylar       Endocrine    Hypothyroidism due to Hashimoto's thyroiditis - we will check tsh. I refilled thyroid for now.       Vitamin D deficiency- check vitamin D level. Just completed supplements.     Relevant Orders    Vitamin D (Completed)       Other    Narcolepsy cataplexy syndrome - I informed her that Dr. Diaz has recently passed away. She will callhis office. He has been prescribing her meds for narcolepsy and she had an appointment planned with him next week.       Gait instability - she uses a walker.       Other Visit Diagnoses     Frequent urination    -  Primary - UA was normal except for bilirubinuria. We will try adding ditropan.     Relevant Orders    POCT Urinalysis (Completed)    Bilateral lower extremity edema    - uncertain cause. She has had a cardiac work-up in the past. We will check her labs to make sure nothing needs to be adjusted. None of her medicines should be causing this.     Relevant Orders    CBC auto differential (Completed)    Comprehensive metabolic panel (Completed)    TSH (Completed)    Pure hypercholesterolemia    - she is not on meds. Last ldl was 134. We will check labs.     Relevant Orders    Lipid panel (Completed)          PLAN:   Orders Placed This Encounter    CBC auto differential    Comprehensive metabolic panel    Lipid panel    TSH    Vitamin D     POCT Urinalysis    levothyroxine (SYNTHROID) 25 MCG tablet    valACYclovir (VALTREX) 1000 MG tablet    oxybutynin (DITROPAN XL) 15 MG TR24    topiramate (TOPAMAX) 100 MG tablet       Time spent > 40 minutes    Follow-up with me in 1 month.

## 2019-07-23 ENCOUNTER — TELEPHONE (OUTPATIENT)
Dept: NEUROLOGY | Facility: CLINIC | Age: 44
End: 2019-07-23

## 2019-07-23 NOTE — TELEPHONE ENCOUNTER
----- Message from Seymour Solares sent at 7/22/2019 10:57 AM CDT -----  Contact: Patient @ [phone]  Patient requesting a return call regarding the 8-9th appt, pls call

## 2019-07-23 NOTE — TELEPHONE ENCOUNTER
Kate, this is Kwame I've received your request I'm returning your call from the Neurology clinic at [facility]. I just wanted to let you know that I'm working on getting an answer for you by you call back and reschedule with another Neurologist.

## 2019-07-26 ENCOUNTER — TELEPHONE (OUTPATIENT)
Dept: NEUROLOGY | Facility: CLINIC | Age: 44
End: 2019-07-26

## 2019-07-26 NOTE — TELEPHONE ENCOUNTER
----- Message from Seymour Solares sent at 7/22/2019 10:57 AM CDT -----  Contact: Patient @ [phone]  Patient requesting a return call regarding the 8-9th appt, pls call

## 2019-08-02 ENCOUNTER — PATIENT MESSAGE (OUTPATIENT)
Dept: INTERNAL MEDICINE | Facility: CLINIC | Age: 44
End: 2019-08-02

## 2019-08-06 ENCOUNTER — PATIENT MESSAGE (OUTPATIENT)
Dept: INTERNAL MEDICINE | Facility: CLINIC | Age: 44
End: 2019-08-06

## 2019-08-06 RX ORDER — ROSUVASTATIN CALCIUM 10 MG/1
10 TABLET, COATED ORAL DAILY
Qty: 30 TABLET | Refills: 3 | Status: SHIPPED | OUTPATIENT
Start: 2019-08-06 | End: 2019-11-26 | Stop reason: SDUPTHER

## 2019-08-07 ENCOUNTER — PATIENT OUTREACH (OUTPATIENT)
Dept: ADMINISTRATIVE | Facility: HOSPITAL | Age: 44
End: 2019-08-07

## 2019-08-09 DIAGNOSIS — G47.411 PRIMARY NARCOLEPSY WITH CATAPLEXY: ICD-10-CM

## 2019-08-09 RX ORDER — METHYLPHENIDATE HYDROCHLORIDE 10 MG/1
10 TABLET ORAL 4 TIMES DAILY
Qty: 120 TABLET | Refills: 0 | Status: SHIPPED | OUTPATIENT
Start: 2019-08-09 | End: 2019-09-09

## 2019-09-09 DIAGNOSIS — G47.411 PRIMARY NARCOLEPSY WITH CATAPLEXY: ICD-10-CM

## 2019-09-09 RX ORDER — METHYLPHENIDATE HYDROCHLORIDE 10 MG/1
10 TABLET ORAL 4 TIMES DAILY
Qty: 120 TABLET | Refills: 0 | Status: CANCELLED | OUTPATIENT
Start: 2019-09-09 | End: 2019-10-09

## 2019-09-09 NOTE — TELEPHONE ENCOUNTER
----- Message from John Saravia sent at 9/9/2019 12:37 PM CDT -----  Rx Refill/Request     Is this a Refill or New Rx: Refill   Rx Name and Strength: methylphenidate HCl (RITALIN) 10 MG tablet    Preferred Pharmacy with phone number: see below  Communication Preference: [phone]  Additional Information: requesting before appt on 09/26/19, pt of Dr. Inna REHMAN #[id number] - [place] - [id number] [facility] [address] [id number] [facility] [address]  [place] [id number]  Phone: [phone] Fax: [phone]

## 2019-09-10 RX ORDER — METHYLPHENIDATE HYDROCHLORIDE 10 MG/1
10 TABLET ORAL 4 TIMES DAILY
Qty: 120 TABLET | Refills: 0 | Status: SHIPPED | OUTPATIENT
Start: 2019-09-10 | End: 2019-10-07 | Stop reason: SDUPTHER

## 2019-09-18 ENCOUNTER — OFFICE VISIT (OUTPATIENT)
Dept: FAMILY MEDICINE | Facility: CLINIC | Age: 44
End: 2019-09-18
Payer: COMMERCIAL

## 2019-09-18 VITALS
SYSTOLIC BLOOD PRESSURE: 116 MMHG | HEIGHT: 61 IN | BODY MASS INDEX: 38.96 KG/M2 | TEMPERATURE: 99 F | HEART RATE: 60 BPM | OXYGEN SATURATION: 98 % | WEIGHT: 206.38 LBS | DIASTOLIC BLOOD PRESSURE: 80 MMHG

## 2019-09-18 DIAGNOSIS — E55.9 VITAMIN D DEFICIENCY: Primary | ICD-10-CM

## 2019-09-18 DIAGNOSIS — M54.16 LUMBAR BACK PAIN WITH RADICULOPATHY AFFECTING RIGHT LOWER EXTREMITY: ICD-10-CM

## 2019-09-18 DIAGNOSIS — E78.2 HYPERLIPIDEMIA, MIXED: ICD-10-CM

## 2019-09-18 DIAGNOSIS — Z23 NEED FOR VACCINATION: ICD-10-CM

## 2019-09-18 PROCEDURE — 99214 PR OFFICE/OUTPT VISIT, EST, LEVL IV, 30-39 MIN: ICD-10-PCS | Mod: 25,S$GLB,, | Performed by: INTERNAL MEDICINE

## 2019-09-18 PROCEDURE — 99214 OFFICE O/P EST MOD 30 MIN: CPT | Mod: 25,S$GLB,, | Performed by: INTERNAL MEDICINE

## 2019-09-18 PROCEDURE — 99999 PR PBB SHADOW E&M-EST. PATIENT-LVL III: CPT | Mod: PBBFAC,,, | Performed by: INTERNAL MEDICINE

## 2019-09-18 PROCEDURE — 90471 IMMUNIZATION ADMIN: CPT | Mod: S$GLB,,, | Performed by: INTERNAL MEDICINE

## 2019-09-18 PROCEDURE — 90686 FLU VACCINE (QUAD) GREATER THAN OR EQUAL TO 3YO PRESERVATIVE FREE IM: ICD-10-PCS | Mod: S$GLB,,, | Performed by: INTERNAL MEDICINE

## 2019-09-18 PROCEDURE — 99999 PR PBB SHADOW E&M-EST. PATIENT-LVL III: ICD-10-PCS | Mod: PBBFAC,,, | Performed by: INTERNAL MEDICINE

## 2019-09-18 PROCEDURE — 3008F BODY MASS INDEX DOCD: CPT | Mod: CPTII,S$GLB,, | Performed by: INTERNAL MEDICINE

## 2019-09-18 PROCEDURE — 90686 IIV4 VACC NO PRSV 0.5 ML IM: CPT | Mod: S$GLB,,, | Performed by: INTERNAL MEDICINE

## 2019-09-18 PROCEDURE — 3008F PR BODY MASS INDEX (BMI) DOCUMENTED: ICD-10-PCS | Mod: CPTII,S$GLB,, | Performed by: INTERNAL MEDICINE

## 2019-09-18 PROCEDURE — 90471 FLU VACCINE (QUAD) GREATER THAN OR EQUAL TO 3YO PRESERVATIVE FREE IM: ICD-10-PCS | Mod: S$GLB,,, | Performed by: INTERNAL MEDICINE

## 2019-09-18 RX ORDER — DULOXETIN HYDROCHLORIDE 60 MG/1
1 CAPSULE, DELAYED RELEASE ORAL DAILY
COMMUNITY
Start: 2019-09-04

## 2019-09-18 RX ORDER — ERGOCALCIFEROL 1.25 MG/1
50000 CAPSULE ORAL
Qty: 5 CAPSULE | Refills: 5 | Status: SHIPPED | OUTPATIENT
Start: 2019-09-18 | End: 2020-05-04

## 2019-09-18 RX ORDER — OXYBUTYNIN CHLORIDE 15 MG/1
1 TABLET, EXTENDED RELEASE ORAL DAILY
COMMUNITY
Start: 2019-08-08 | End: 2019-10-30 | Stop reason: SDUPTHER

## 2019-09-18 NOTE — PROGRESS NOTES
Subjective:       Patient ID: Amanda Navarrete is a 44 y.o. female.    Chief Complaint: Follow-up (6 weeks)     I have reviewed the PMH,  and  for this patient.  She has a past medical history of Affective disorder, Back pain, GERD (gastroesophageal reflux disease), Headache(784.0), Hyperlipidemia, mixed (9/18/2019), Migraine headache, Narcolepsy without cataplexy(347.00), Thyroid disease, and TMJ arthralgia. She is here for follow-up of hyperlipidemia and vitamin-D deficiency.  Her last blood work showed a high cholesterol and so we started her on Crestor.  She reports that she is not having any side effects from the medicine.  She is not having muscle aches or reflux.  She is concerned about the vitamin-D deficiency.  I had advised her to start taking a 1000 units of vitamin-D over-the-counter but she did not get that message.  She reports that when she took 32656 units of vitamin-D in the past it was difficult to bring her vitamin-D levels up.  Her vitamin-D on this lab work was 15.    Active Ambulatory Problems     Diagnosis Date Noted    Back pain     Migraine headache     TMJ arthralgia     GERD (gastroesophageal reflux disease)     Narcolepsy cataplexy syndrome     Lumbar back pain with radiculopathy affecting right lower extremity 11/20/2015    Back pain with right-sided radiculopathy 11/20/2015    Hypothyroidism due to Hashimoto's thyroiditis 02/16/2017    Lumbar radiculitis 07/13/2018    Generalized anxiety disorder 07/10/2019    Vitamin D deficiency 07/10/2019    Gait instability 07/10/2019    Hyperlipidemia, mixed 09/18/2019     Resolved Ambulatory Problems     Diagnosis Date Noted    Spondylosis of lumbar region without myelopathy or radiculopathy 11/19/2015     Past Medical History:   Diagnosis Date    Affective disorder     Back pain     GERD (gastroesophageal reflux disease)     Headache(784.0)     Hyperlipidemia, mixed 9/18/2019    Migraine headache     Narcolepsy without  cataplexy(347.00)     Thyroid disease     TMJ arthralgia          MEDICATIONS:  Current Outpatient Medications:     clonazePAM (KLONOPIN) 0.5 MG tablet, , Disp: , Rfl:     diclofenac sodium 1 % Gel, APPLY 2 GRAMS TOPICALLY TO AFFECTED AREA(S) UP TO 5 TIMES DAILY (10 GRAMS PER DAY), Disp: , Rfl: 0    DULoxetine (CYMBALTA) 60 MG capsule, Take 1 capsule by mouth once daily., Disp: , Rfl:     FLUZONE QUAD [id number] 60 mcg (15 mcg x 4)/0.5 mL Susp, , Disp: , Rfl:     levothyroxine (SYNTHROID) 25 MCG tablet, Take 1 tablet (25 mcg total) by mouth once daily., Disp: 30 tablet, Rfl: 3    methocarbamol (ROBAXIN) 500 MG Tab, Take 500 mg by mouth every evening., Disp: , Rfl: 2    methylphenidate HCl (RITALIN) 10 MG tablet, Take 1 tablet (10 mg total) by mouth 4 (four) times daily., Disp: 120 tablet, Rfl: 0    modafinil (PROVIGIL) 200 MG Tab, TAKE 1 TABLET BY MOUTH EVERY MORNING AND 1/2 TABLET AT NOON., Disp: 45 tablet, Rfl: 5    oxybutynin (DITROPAN XL) 15 MG TR24, Take 1 tablet by mouth once daily., Disp: , Rfl:     oxyCODONE-acetaminophen (PERCOCET) 5-325 mg per tablet, Take 1 tablet by mouth 2 (two) times daily as needed., Disp: , Rfl: 0    promethazine (PHENERGAN) 25 MG tablet, , Disp: , Rfl:     rosuvastatin (CRESTOR) 10 MG tablet, Take 1 tablet (10 mg total) by mouth once daily., Disp: 30 tablet, Rfl: 3    topiramate (TOPAMAX) 100 MG tablet, TAKE 1 TABLET BY MOUTH IN THE MORNING AND 1 AND 1/2 TABLET AT BEDTIME, Disp: 75 tablet, Rfl: 3    valACYclovir (VALTREX) 1000 MG tablet, Take 1 tablet (1,000 mg total) by mouth every 12 (twelve) hours as needed., Disp: 60 tablet, Rfl: 1    VRAYLAR 3 mg Cap, Take 3 mg by mouth once daily. , Disp: , Rfl:     BOOSTRIX TDAP 2.5-8-5 Lf-mcg-Lf/0.5mL Susp, , Disp: , Rfl:     ergocalciferol (ERGOCALCIFEROL) 50,000 unit Cap, Take 1 capsule (50,000 Units total) by mouth every 7 days., Disp: 5 capsule, Rfl: 5    medroxyPROGESTERone (DEPO-PROVERA) 150 mg/mL injection, , Disp:  , Rfl:     ondansetron (ZOFRAN) 4 MG tablet, , Disp: , Rfl:     PNEUMOVAX 23 25 mcg/0.5 mL, , Disp: , Rfl:       HEALTH MAINTENANCE:   Health Maintenance   Topic Date Due    Mammogram  05/31/2021    Pap Smear with HPV Cotest  04/04/2022    Lipid Panel  07/10/2024    TETANUS VACCINE  11/17/2028       Review of Systems   Constitutional: Negative for chills, fatigue and fever.   HENT: Negative for congestion, ear discharge, ear pain, rhinorrhea and sore throat.    Eyes: Negative for discharge, redness and itching.   Respiratory: Negative for cough, chest tightness, shortness of breath and wheezing.    Cardiovascular: Negative for chest pain, palpitations and leg swelling.   Gastrointestinal: Negative for abdominal pain, constipation, diarrhea, nausea and vomiting.   Endocrine: Negative for cold intolerance and heat intolerance.   Genitourinary: Negative for dysuria, flank pain, frequency and hematuria.   Musculoskeletal: Negative for arthralgias, back pain, joint swelling and myalgias.   Skin: Negative for color change and rash.   Neurological: Negative for dizziness, tremors, numbness and headaches.   Psychiatric/Behavioral: Negative for dysphoric mood and sleep disturbance. The patient is not nervous/anxious.        Objective:          A1C:      CBC:  Recent Labs   Lab 02/16/17  0846 07/10/19  1034   WBC 6.34 7.74   RBC 4.31 4.43   Hemoglobin 13.7 14.0   Hematocrit 41.0 41.5   Platelets 290 251   Mean Corpuscular Volume 95 94   Mean Corpuscular Hemoglobin 31.8 H 31.6 H   Mean Corpuscular Hemoglobin Conc 33.4 33.7     CMP:  Recent Labs   Lab 02/16/17  0846 07/10/19  1034   Glucose 95 90   Calcium 9.2 9.3   Albumin 4.4 4.3   Total Protein 7.5 7.6   Sodium 146 H 146 H   Potassium 3.9 4.0   CO2 25 24   Chloride 105 110   BUN, Bld 14 11   Creatinine 0.61 0.75   Alkaline Phosphatase 87 97   ALT 38 22   AST 28 22   Total Bilirubin 0.5 0.6     LIPIDS:  Recent Labs   Lab 02/16/17  0846 07/10/19  1034   TSH 2.210 2.570    HDL 52 57   Cholesterol 204 H 231 H   Triglycerides 88 110   LDL Cholesterol 134.4 152.0   Hdl/Cholesterol Ratio 25.5 24.7   Non-HDL Cholesterol 152 174   Total Cholesterol/HDL Ratio 3.9 4.1     TSH:  Recent Labs   Lab 02/16/17  0846 07/10/19  1034   TSH 2.210 2.570           Physical Exam   Constitutional: She appears well-developed and well-nourished. She does not have a sickly appearance.   HENT:   Head: Normocephalic and atraumatic.   Right Ear: External ear normal. Tympanic membrane is not erythematous. No middle ear effusion.   Left Ear: External ear normal. Tympanic membrane is not erythematous.  No middle ear effusion.   Nose: No rhinorrhea. Right sinus exhibits no maxillary sinus tenderness and no frontal sinus tenderness. Left sinus exhibits no maxillary sinus tenderness and no frontal sinus tenderness.   Mouth/Throat: No posterior oropharyngeal edema or posterior oropharyngeal erythema. No tonsillar exudate.   Eyes: Pupils are equal, round, and reactive to light. Conjunctivae and EOM are normal. Right eye exhibits no discharge. Left eye exhibits no discharge. Right conjunctiva is not injected. Left conjunctiva is not injected.   Neck: No thyromegaly present.   Cardiovascular: Normal rate, regular rhythm, normal heart sounds and intact distal pulses.   No murmur heard.  Pulmonary/Chest: Effort normal and breath sounds normal. She has no wheezes. She has no rhonchi. She has no rales.   Abdominal: Bowel sounds are normal. She exhibits no distension. There is no tenderness.   Musculoskeletal: She exhibits no edema or tenderness.   Lymphadenopathy:     She has no cervical adenopathy.   Neurological: She displays normal reflexes. No cranial nerve deficit.   Skin: Skin is warm and dry. No lesion and no rash noted.   Psychiatric: She has a normal mood and affect. Her behavior is normal. Her mood appears not anxious. Her speech is not rapid and/or pressured. She is not agitated and not aggressive. She does not  exhibit a depressed mood.             Assessment and Plan:     Problem List Items Addressed This Visit        Neuro    Lumbar back pain with radiculopathy affecting right lower extremity - chronic. folow-up with pain management.        Cardiac/Vascular    Hyperlipidemia, mixed - on crestor. Check labs.     Relevant Orders    Comprehensive metabolic panel    Lipid panel       Endocrine    Vitamin D deficiency - Primary - begin vitamin D 50,000 units a day. Recheck next time.       Other Visit Diagnoses     Need for vaccination    - give flu shot.           Orders Placed This Encounter    Influenza - Quadrivalent (6 months+) (PF)    Comprehensive metabolic panel    Lipid panel    ergocalciferol (ERGOCALCIFEROL) 50,000 unit Cap         Follow-up with me in January. .       Yeni Marroquin MD,  FACP  Internal Medicine

## 2019-09-18 NOTE — PATIENT INSTRUCTIONS
We have reviewed your prescription medications. We will start you back on vitamin D 50,000 units a week. We alex check labs to folow-up on starting cholesterol medicine. We are giving you a flu shot today. Follow-up in January.    gradual onset

## 2019-09-19 ENCOUNTER — TELEPHONE (OUTPATIENT)
Dept: SLEEP MEDICINE | Facility: CLINIC | Age: 44
End: 2019-09-19

## 2019-09-19 NOTE — TELEPHONE ENCOUNTER
----- Message from Yury Coyne sent at 9/19/2019  1:34 PM CDT -----  Contact: Pt   Type:  Sooner Appoointment Request    When is the first available appointment?9/26/19    Best Call Back Number:[phone]    Additional Info:  The Pt states that she was offered an earlier appt, but when she went to click on it she couldn't get it.  If the appt isn't available any longer, the Pt would like to keep the appt she has since she waited 2 months for it.

## 2019-09-26 ENCOUNTER — OFFICE VISIT (OUTPATIENT)
Dept: SLEEP MEDICINE | Facility: CLINIC | Age: 44
End: 2019-09-26
Payer: COMMERCIAL

## 2019-09-26 VITALS
WEIGHT: 205.31 LBS | BODY MASS INDEX: 38.76 KG/M2 | HEART RATE: 68 BPM | HEIGHT: 61 IN | SYSTOLIC BLOOD PRESSURE: 102 MMHG | DIASTOLIC BLOOD PRESSURE: 74 MMHG

## 2019-09-26 DIAGNOSIS — G47.411 PRIMARY NARCOLEPSY WITH CATAPLEXY: Primary | ICD-10-CM

## 2019-09-26 DIAGNOSIS — G43.009 MIGRAINE WITHOUT AURA AND WITHOUT STATUS MIGRAINOSUS, NOT INTRACTABLE: ICD-10-CM

## 2019-09-26 PROCEDURE — 99999 PR PBB SHADOW E&M-EST. PATIENT-LVL IV: CPT | Mod: PBBFAC,,, | Performed by: NURSE PRACTITIONER

## 2019-09-26 PROCEDURE — 3008F BODY MASS INDEX DOCD: CPT | Mod: CPTII,S$GLB,, | Performed by: NURSE PRACTITIONER

## 2019-09-26 PROCEDURE — 3008F PR BODY MASS INDEX (BMI) DOCUMENTED: ICD-10-PCS | Mod: CPTII,S$GLB,, | Performed by: NURSE PRACTITIONER

## 2019-09-26 PROCEDURE — 99214 OFFICE O/P EST MOD 30 MIN: CPT | Mod: S$GLB,,, | Performed by: NURSE PRACTITIONER

## 2019-09-26 PROCEDURE — 99999 PR PBB SHADOW E&M-EST. PATIENT-LVL IV: ICD-10-PCS | Mod: PBBFAC,,, | Performed by: NURSE PRACTITIONER

## 2019-09-26 PROCEDURE — 99214 PR OFFICE/OUTPT VISIT, EST, LEVL IV, 30-39 MIN: ICD-10-PCS | Mod: S$GLB,,, | Performed by: NURSE PRACTITIONER

## 2019-09-26 NOTE — PROGRESS NOTES
"Amanda Navarrete returns today for mgt of narcolepsy and migraine, last seen by neuro 2/2019, initial visit with me    She continues to take 10mg Ritalin QID and provigil 200mg qam and 100mg afternoon. She has to be sure not to leave house without it or else can't drive. She keeps med in her purse. No study report available, used to see Dr. Ruffin. DICKSON remain stable.   Has mild infrequent cataplexy.     HX  She has history of narcolepsy with sleep attacks and occasional mild cataplectic episodes during which time her hands become weak. However these are infrequent. Symptoms are well controlled with a combination of Ritalin and Provigil.  She also has a history of migraines. Headaches are intermittent, usually noted on awakening in the morning. Headaches are usually frontal temporal and pressure-like and are not associated with any focal neurological or visual symptoms. They are usually precipitated by stress. She is presently on Topamax 100 mg twice a day which has decreased the intensity and frequency of the headaches.  She denies any new medical problems otherwise and has been overall stable.  She continues to follow-up with pain management for her back problems having had back surgery and is presently on pain medications.  She is also under the care of his psychiatrist because of significant problem with depression and reports that she was diagnosed as having bipolar disorder    OBJECTIVE  Well groomed, W/D, obese  /74 (BP Location: Left arm, Patient Position: Sitting, BP Method: Medium (Automatic))   Pulse 68   Ht 5' 1" (1.549 m)   Wt 93.1 kg (205 lb 4.8 oz)   BMI 38.79 kg/m²     ASSESSMENT:  Narcolepsy with mild cataplexy  Migraine w/o aura      PLAN:  Continue topamax 100mg as prescribed  Continue Ritalin 10mg qid and provigil 200mg qm and 1/2 tab noon BUT recommend beginning Xyrem (discussed today/safe use) and increasing Ritalin to XR 20mg and 20mg bid prn (most likely wouldn't need " provigil)  Attempt obtain old study /MSLT report  RTC 6- mos, sooner if begins Xyrem (pt enrollment papers signed today)

## 2019-10-07 ENCOUNTER — PATIENT MESSAGE (OUTPATIENT)
Dept: SLEEP MEDICINE | Facility: CLINIC | Age: 44
End: 2019-10-07

## 2019-10-07 DIAGNOSIS — G47.411 PRIMARY NARCOLEPSY WITH CATAPLEXY: ICD-10-CM

## 2019-10-07 RX ORDER — METHYLPHENIDATE HYDROCHLORIDE 10 MG/1
10 TABLET ORAL 4 TIMES DAILY
Qty: 120 TABLET | Refills: 0 | Status: SHIPPED | OUTPATIENT
Start: 2019-10-07 | End: 2019-11-06

## 2019-10-08 DIAGNOSIS — G47.411 PRIMARY NARCOLEPSY WITH CATAPLEXY: Primary | ICD-10-CM

## 2019-10-08 RX ORDER — METHYLPHENIDATE HYDROCHLORIDE 20 MG/1
20 CAPSULE, EXTENDED RELEASE ORAL EVERY MORNING
Qty: 30 CAPSULE | Refills: 0 | Status: SHIPPED | OUTPATIENT
Start: 2019-10-08 | End: 2019-11-13 | Stop reason: SDUPTHER

## 2019-10-16 ENCOUNTER — PATIENT MESSAGE (OUTPATIENT)
Dept: SLEEP MEDICINE | Facility: CLINIC | Age: 44
End: 2019-10-16

## 2019-10-16 DIAGNOSIS — G47.411 NARCOLEPSY CATAPLEXY SYNDROME: ICD-10-CM

## 2019-10-16 RX ORDER — MODAFINIL 200 MG/1
TABLET ORAL
Qty: 45 TABLET | Refills: 5 | Status: SHIPPED | OUTPATIENT
Start: 2019-10-16 | End: 2020-03-23

## 2019-10-24 RX ORDER — LEVOTHYROXINE SODIUM 25 UG/1
TABLET ORAL
Qty: 30 TABLET | Refills: 4 | Status: SHIPPED | OUTPATIENT
Start: 2019-10-24 | End: 2020-02-21

## 2019-10-30 RX ORDER — OXYBUTYNIN CHLORIDE 15 MG/1
TABLET, EXTENDED RELEASE ORAL DAILY
Qty: 30 TABLET | Refills: 4 | Status: SHIPPED | OUTPATIENT
Start: 2019-10-30 | End: 2020-03-24

## 2019-11-13 ENCOUNTER — PATIENT MESSAGE (OUTPATIENT)
Dept: SLEEP MEDICINE | Facility: CLINIC | Age: 44
End: 2019-11-13

## 2019-11-13 DIAGNOSIS — G47.411 PRIMARY NARCOLEPSY WITH CATAPLEXY: ICD-10-CM

## 2019-11-13 RX ORDER — METHYLPHENIDATE HYDROCHLORIDE 20 MG/1
20 CAPSULE, EXTENDED RELEASE ORAL EVERY MORNING
Qty: 30 CAPSULE | Refills: 0 | Status: SHIPPED | OUTPATIENT
Start: 2019-11-13 | End: 2019-11-18 | Stop reason: SDUPTHER

## 2019-11-13 NOTE — TELEPHONE ENCOUNTER
methylphenidate HCl (RITALIN LA) 20 MG 24 hr capsule () 20 mg, Every morning  EditCancel Reorder       Summary: Take 1 capsule (20 mg total) by mouth every morning., Starting Tue 10/8/2019, Until 2019, Normal   Dose, Route, Frequency: 20 mg, Oral, Every morning  Start: 10/8/2019  End: 2019  Ord/Sold: 10/8/2019 (O)  Report  Adh:   Long-term:   Pharmacy: [id number] - [place], [place] - [id number] [address]  Med Dose History       Patient Sig: Take 1 capsule (20 mg total) by mouth every morning.       Ordered on: 10/8/2019       Authorized by: ADAMS SANCHEZ       Dispense: 30 capsule       Refills: 0 ordered        Last office visit: 2019

## 2019-11-18 ENCOUNTER — PATIENT MESSAGE (OUTPATIENT)
Dept: SLEEP MEDICINE | Facility: CLINIC | Age: 44
End: 2019-11-18

## 2019-11-18 DIAGNOSIS — G47.411 PRIMARY NARCOLEPSY WITH CATAPLEXY: ICD-10-CM

## 2019-11-19 DIAGNOSIS — G47.411 PRIMARY NARCOLEPSY WITH CATAPLEXY: Primary | ICD-10-CM

## 2019-11-19 RX ORDER — METHYLPHENIDATE HYDROCHLORIDE 20 MG/1
20 CAPSULE, EXTENDED RELEASE ORAL EVERY MORNING
Qty: 30 CAPSULE | Refills: 0 | Status: SHIPPED | OUTPATIENT
Start: 2019-11-19 | End: 2019-12-16 | Stop reason: SDUPTHER

## 2019-11-19 RX ORDER — METHYLPHENIDATE HYDROCHLORIDE 10 MG/1
10 TABLET ORAL 4 TIMES DAILY
Qty: 120 TABLET | Refills: 0 | Status: SHIPPED | OUTPATIENT
Start: 2019-11-19 | End: 2019-12-16 | Stop reason: SDUPTHER

## 2019-11-26 RX ORDER — ROSUVASTATIN CALCIUM 10 MG/1
TABLET, COATED ORAL
Qty: 30 TABLET | Refills: 3 | Status: SHIPPED | OUTPATIENT
Start: 2019-11-26 | End: 2020-12-08 | Stop reason: SDUPTHER

## 2019-12-16 DIAGNOSIS — G47.411 PRIMARY NARCOLEPSY WITH CATAPLEXY: ICD-10-CM

## 2019-12-17 RX ORDER — METHYLPHENIDATE HYDROCHLORIDE 10 MG/1
10 TABLET ORAL 4 TIMES DAILY
Qty: 120 TABLET | Refills: 0 | Status: SHIPPED | OUTPATIENT
Start: 2019-12-17 | End: 2020-01-20 | Stop reason: SDUPTHER

## 2019-12-17 RX ORDER — METHYLPHENIDATE HYDROCHLORIDE 20 MG/1
20 CAPSULE, EXTENDED RELEASE ORAL EVERY MORNING
Qty: 30 CAPSULE | Refills: 0 | Status: SHIPPED | OUTPATIENT
Start: 2019-12-17 | End: 2020-01-16

## 2020-01-20 ENCOUNTER — PATIENT MESSAGE (OUTPATIENT)
Dept: SLEEP MEDICINE | Facility: CLINIC | Age: 45
End: 2020-01-20

## 2020-01-20 DIAGNOSIS — G47.411 PRIMARY NARCOLEPSY WITH CATAPLEXY: ICD-10-CM

## 2020-01-21 RX ORDER — METHYLPHENIDATE HYDROCHLORIDE 10 MG/1
10 TABLET ORAL 4 TIMES DAILY
Qty: 120 TABLET | Refills: 0 | Status: SHIPPED | OUTPATIENT
Start: 2020-01-21 | End: 2020-02-18 | Stop reason: SDUPTHER

## 2020-01-22 RX ORDER — TOPIRAMATE 100 MG/1
TABLET, FILM COATED ORAL
Qty: 75 TABLET | Refills: 2 | Status: SHIPPED | OUTPATIENT
Start: 2020-01-22 | End: 2020-04-21

## 2020-02-18 ENCOUNTER — PATIENT MESSAGE (OUTPATIENT)
Dept: SLEEP MEDICINE | Facility: CLINIC | Age: 45
End: 2020-02-18

## 2020-02-18 DIAGNOSIS — G47.411 PRIMARY NARCOLEPSY WITH CATAPLEXY: ICD-10-CM

## 2020-02-19 RX ORDER — METHYLPHENIDATE HYDROCHLORIDE 10 MG/1
10 TABLET ORAL 4 TIMES DAILY
Qty: 120 TABLET | Refills: 0 | Status: SHIPPED | OUTPATIENT
Start: 2020-02-19 | End: 2020-03-02 | Stop reason: SDUPTHER

## 2020-02-21 RX ORDER — LEVOTHYROXINE SODIUM 25 UG/1
TABLET ORAL
Qty: 30 TABLET | Refills: 0 | Status: SHIPPED | OUTPATIENT
Start: 2020-02-21 | End: 2020-03-22

## 2020-03-02 ENCOUNTER — PATIENT OUTREACH (OUTPATIENT)
Dept: ADMINISTRATIVE | Facility: HOSPITAL | Age: 45
End: 2020-03-02

## 2020-03-02 DIAGNOSIS — G47.411 PRIMARY NARCOLEPSY WITH CATAPLEXY: ICD-10-CM

## 2020-03-02 RX ORDER — METHYLPHENIDATE HYDROCHLORIDE 10 MG/1
10 TABLET ORAL 4 TIMES DAILY
Qty: 120 TABLET | Refills: 0 | Status: SHIPPED | OUTPATIENT
Start: 2020-03-02 | End: 2020-04-16 | Stop reason: SDUPTHER

## 2020-03-22 DIAGNOSIS — G47.411 NARCOLEPSY CATAPLEXY SYNDROME: ICD-10-CM

## 2020-03-22 RX ORDER — LEVOTHYROXINE SODIUM 25 UG/1
TABLET ORAL
Qty: 30 TABLET | Refills: 0 | Status: SHIPPED | OUTPATIENT
Start: 2020-03-22 | End: 2020-04-21

## 2020-03-23 RX ORDER — MODAFINIL 200 MG/1
TABLET ORAL
Qty: 45 TABLET | Refills: 4 | Status: SHIPPED | OUTPATIENT
Start: 2020-03-23 | End: 2020-08-19

## 2020-03-24 RX ORDER — OXYBUTYNIN CHLORIDE 15 MG/1
TABLET, EXTENDED RELEASE ORAL DAILY
Qty: 30 TABLET | Refills: 0 | Status: SHIPPED | OUTPATIENT
Start: 2020-03-24 | End: 2020-04-27

## 2020-03-24 NOTE — TELEPHONE ENCOUNTER
The patient is due for follow-up appointment. Please ask them to schedule it. The patient can schedule a virtual visit now or schedule an in -office visit in about a month.

## 2020-04-08 ENCOUNTER — TELEPHONE (OUTPATIENT)
Dept: SLEEP MEDICINE | Facility: CLINIC | Age: 45
End: 2020-04-08

## 2020-04-16 ENCOUNTER — TELEPHONE (OUTPATIENT)
Dept: SLEEP MEDICINE | Facility: CLINIC | Age: 45
End: 2020-04-16

## 2020-04-16 ENCOUNTER — OFFICE VISIT (OUTPATIENT)
Dept: SLEEP MEDICINE | Facility: CLINIC | Age: 45
End: 2020-04-16
Payer: COMMERCIAL

## 2020-04-16 DIAGNOSIS — G47.411 NARCOLEPSY CATAPLEXY SYNDROME: Primary | ICD-10-CM

## 2020-04-16 DIAGNOSIS — G47.411 PRIMARY NARCOLEPSY WITH CATAPLEXY: ICD-10-CM

## 2020-04-16 DIAGNOSIS — G43.009 MIGRAINE WITHOUT AURA AND WITHOUT STATUS MIGRAINOSUS, NOT INTRACTABLE: ICD-10-CM

## 2020-04-16 PROCEDURE — 99214 OFFICE O/P EST MOD 30 MIN: CPT | Mod: 95,CR,, | Performed by: NURSE PRACTITIONER

## 2020-04-16 PROCEDURE — 99214 PR OFFICE/OUTPT VISIT, EST, LEVL IV, 30-39 MIN: ICD-10-PCS | Mod: 95,CR,, | Performed by: NURSE PRACTITIONER

## 2020-04-16 RX ORDER — METHYLPHENIDATE HYDROCHLORIDE 20 MG/1
20 CAPSULE, EXTENDED RELEASE ORAL EVERY MORNING
Qty: 30 CAPSULE | Refills: 0 | Status: SHIPPED | OUTPATIENT
Start: 2020-04-16 | End: 2020-05-22 | Stop reason: SDUPTHER

## 2020-04-16 RX ORDER — METHYLPHENIDATE HYDROCHLORIDE 10 MG/1
10 TABLET ORAL 4 TIMES DAILY
Qty: 120 TABLET | Refills: 0 | Status: SHIPPED | OUTPATIENT
Start: 2020-04-16 | End: 2020-05-22 | Stop reason: SDUPTHER

## 2020-04-16 NOTE — PROGRESS NOTES
The patient location is: home  The chief complaint leading to consultation is- migraine and narcolepsy with cataplexy mgt  Visit type: TELE AUDIOVISUAL  Each patient to whom he or she provides medical services by telemedicine is:  (1) informed of the relationship between the physician and patient and the respective role of any other health care provider with respect to management of the patient; and (2) notified that he or she may decline to receive medical services by telemedicine and may withdraw from such care at any time. COVID-19    NOTE: She continues to take 10mg Ritalin QID (less since covid-19) and Ritalin XR 20mg qam. She didn't get it at pharmacy 3mos ago so instead of XR has been taking provigil 200mg qam and 100mg afternoon. She has to be sure not to leave house without it or else can't drive. Has mild cataplexy few times per week.  No study report available, used to see Dr. Ruffin. DICKSON remain stable in location and nature as below. Takes topamax 100mg am and 50mg qhs. Denies parasthesias, word finding difficulties, dry mouth. Defers xyrem      HX  She has history of narcolepsy with sleep attacks and occasional mild cataplectic episodes during which time her hands become weak. However these are infrequent. Symptoms are well controlled with a combination of Ritalin and Provigil.  She also has a history of migraines. Headaches are intermittent, usually noted on awakening in the morning. Headaches are usually frontal temporal and pressure-like and are not associated with any focal neurological or visual symptoms. They are usually precipitated by stress. She is presently on Topamax 100 mg twice a day which has decreased the intensity and frequency of the headaches.  She denies any new medical problems otherwise and has been overall stable.  She continues to follow-up with pain management for her back problems having had back surgery and is presently on pain medications.  She is also under the care of his  psychiatrist because of significant problem with depression and reports that she was diagnosed as having bipolar disorder    OBJECTIVE  Well groomed, W/D    ASSESSMENT:  Narcolepsy with mild cataplexy  Migraine w/o aura      PLAN:  Continue topamax 100mg as prescribed (defers CGRB inhibitor at this time, discussed new preventive and abortive therapies)  Continue Ritalin 10mg qid (provigil 200mg qm and 1/2 tab noon prn) and Ritalin XR 20mg   Attempt in future again, obtaining old study /MSLT report  RTC 6- mos, sooner

## 2020-04-24 ENCOUNTER — OFFICE VISIT (OUTPATIENT)
Dept: FAMILY MEDICINE | Facility: CLINIC | Age: 45
End: 2020-04-24
Payer: COMMERCIAL

## 2020-04-24 DIAGNOSIS — E06.3 HYPOTHYROIDISM DUE TO HASHIMOTO'S THYROIDITIS: ICD-10-CM

## 2020-04-24 DIAGNOSIS — F41.1 GENERALIZED ANXIETY DISORDER: ICD-10-CM

## 2020-04-24 DIAGNOSIS — G43.009 MIGRAINE WITHOUT AURA AND WITHOUT STATUS MIGRAINOSUS, NOT INTRACTABLE: ICD-10-CM

## 2020-04-24 DIAGNOSIS — E55.9 VITAMIN D DEFICIENCY: Primary | ICD-10-CM

## 2020-04-24 DIAGNOSIS — E78.2 HYPERLIPIDEMIA, MIXED: ICD-10-CM

## 2020-04-24 DIAGNOSIS — M54.16 LUMBAR BACK PAIN WITH RADICULOPATHY AFFECTING RIGHT LOWER EXTREMITY: ICD-10-CM

## 2020-04-24 DIAGNOSIS — E03.8 HYPOTHYROIDISM DUE TO HASHIMOTO'S THYROIDITIS: ICD-10-CM

## 2020-04-24 DIAGNOSIS — G47.411 NARCOLEPSY CATAPLEXY SYNDROME: ICD-10-CM

## 2020-04-24 PROCEDURE — 99214 OFFICE O/P EST MOD 30 MIN: CPT | Mod: 95,,, | Performed by: INTERNAL MEDICINE

## 2020-04-24 PROCEDURE — 99214 PR OFFICE/OUTPT VISIT, EST, LEVL IV, 30-39 MIN: ICD-10-PCS | Mod: 95,,, | Performed by: INTERNAL MEDICINE

## 2020-04-24 NOTE — PROGRESS NOTES
Subjective:       Patient ID: Amanda Navarrete is a 45 y.o. female.    Chief Complaint: No chief complaint on file.     I have reviewed the PMH,  and FH for this patient    She  has a past medical history of Affective disorder, Back pain, GERD (gastroesophageal reflux disease), Headache(784.0), Hyperlipidemia, mixed (9/18/2019), Migraine headache, Narcolepsy without cataplexy(347.00), Thyroid disease, and TMJ arthralgia.     The patient presents today for follow-up of chronic conditions.     The patient location is: [place]  The chief complaint leading to consultation is: chronic conditions  Visit type: audiovisual  Total time spent with patient: 6 minutes  Each patient to whom he or she provides medical services by telemedicine is:  (1) informed of the relationship between the physician and patient and the respective role of any other health care provider with respect to management of the patient; and (2) notified that he or she may decline to receive medical services by telemedicine and may withdraw from such care at any time.    The patient presents today for follow-up of chronic conditions.  She is a former patient of Dr. Regalado who is here today to establish care.  I have seen her in the past when Dr. Regalado was out.  She reports that she has been doing well.  The last time she was here, her vitamin-D was low.  We started her back on the weekly dose of vitamin-D.  These levels have not been checked since last visit.  She is also on Synthroid.  She is on multiple medicines prescribed by her psychiatrist.  She feels that her mood is good.  She takes Crestor for high cholesterol.  She is not having any problems with it.  We need to get her cholesterol rechecked.  She reports that she has been staying inside.  She does wear a mask when she goes to the grocery.  She has been seeing neurology about narcolepsy and migraine prevention.  She takes Topamax for migraine prevention.  She takes Ritalin and Provigil  for narcolepsy.  It appears that a sleep doctor has been prescribing the Ritalin and Provigil.  She also has hypothyroidism.  We will check her thyroid blood tests.  She is on levothyroxine.      The patient denies chest pain, shortness of breath, nausea, or dizziness.     Active Ambulatory Problems     Diagnosis Date Noted    Back pain     Migraine headache     TMJ arthralgia     GERD (gastroesophageal reflux disease)     Narcolepsy cataplexy syndrome     Lumbar back pain with radiculopathy affecting right lower extremity 11/20/2015    Back pain with right-sided radiculopathy 11/20/2015    Hypothyroidism due to Hashimoto's thyroiditis 02/16/2017    Lumbar radiculitis 07/13/2018    Generalized anxiety disorder 07/10/2019    Vitamin D deficiency 07/10/2019    Gait instability 07/10/2019    Hyperlipidemia, mixed 09/18/2019     Resolved Ambulatory Problems     Diagnosis Date Noted    Spondylosis of lumbar region without myelopathy or radiculopathy 11/19/2015     Past Medical History:   Diagnosis Date    Affective disorder     Headache(784.0)     Narcolepsy without cataplexy(347.00)     Thyroid disease          MEDICATIONS:  Current Outpatient Medications:     BOOSTRIX TDAP 2.5-8-5 Lf-mcg-Lf/0.5mL Susp, , Disp: , Rfl:     clonazePAM (KLONOPIN) 0.5 MG tablet, , Disp: , Rfl:     diclofenac sodium 1 % Gel, APPLY 2 GRAMS TOPICALLY TO AFFECTED AREA(S) UP TO 5 TIMES DAILY (10 GRAMS PER DAY), Disp: , Rfl: 0    DULoxetine (CYMBALTA) 60 MG capsule, Take 1 capsule by mouth once daily., Disp: , Rfl:     ergocalciferol (ERGOCALCIFEROL) 50,000 unit Cap, Take 1 capsule (50,000 Units total) by mouth every 7 days., Disp: 5 capsule, Rfl: 5    levothyroxine (SYNTHROID) 25 MCG tablet, TAKE 1 TABLET BY MOUTH ONCE DAILY, Disp: 30 tablet, Rfl: 0    medroxyPROGESTERone (DEPO-PROVERA) 150 mg/mL injection, , Disp: , Rfl:     methocarbamol (ROBAXIN) 500 MG Tab, Take 500 mg by mouth every evening., Disp: , Rfl: 2     methylphenidate HCl (RITALIN LA) 20 MG 24 hr capsule, Take 1 capsule (20 mg total) by mouth every morning., Disp: 30 capsule, Rfl: 0    methylphenidate HCl (RITALIN) 10 MG tablet, Take 1 tablet (10 mg total) by mouth 4 (four) times daily., Disp: 120 tablet, Rfl: 0    modafiniL (PROVIGIL) 200 MG Tab, TAKE 1 TABLET BY MOUTH EVERY MORNING AND 1/2 TABLET AT NOON., Disp: 45 tablet, Rfl: 4    ondansetron (ZOFRAN) 4 MG tablet, , Disp: , Rfl:     oxybutynin (DITROPAN XL) 15 MG TR24, TAKE 1 TABLET BY MOUTH ONCE DAILY, Disp: 30 tablet, Rfl: 0    oxyCODONE-acetaminophen (PERCOCET) 5-325 mg per tablet, Take 1 tablet by mouth 2 (two) times daily as needed., Disp: , Rfl: 0    promethazine (PHENERGAN) 25 MG tablet, , Disp: , Rfl:     rosuvastatin (CRESTOR) 10 MG tablet, TAKE 1 TABLET BY MOUTH ONCE DAILY, Disp: 30 tablet, Rfl: 3    topiramate (TOPAMAX) 100 MG tablet, TAKE 1 TABLET BY MOUTH IN THE MORNING AND 1 & 1/2 TABLET AT BEDTIME., Disp: 75 tablet, Rfl: 0    valACYclovir (VALTREX) 1000 MG tablet, Take 1 tablet (1,000 mg total) by mouth every 12 (twelve) hours as needed., Disp: 60 tablet, Rfl: 1    VRAYLAR 3 mg Cap, Take 3 mg by mouth once daily. , Disp: , Rfl:       HEALTH MAINTENANCE:   Health Maintenance   Topic Date Due    Mammogram  05/31/2021    Pap Smear with HPV Cotest  04/04/2022    Lipid Panel  09/18/2024    TETANUS VACCINE  11/17/2028       Review of Systems   Constitutional: Negative for activity change, chills, fatigue, fever and unexpected weight change.   HENT: Negative for congestion, ear discharge, ear pain, hearing loss, rhinorrhea, sore throat and trouble swallowing.    Eyes: Negative for discharge, redness, itching and visual disturbance.   Respiratory: Negative for cough, chest tightness, shortness of breath and wheezing.    Cardiovascular: Negative for chest pain, palpitations and leg swelling.   Gastrointestinal: Negative for abdominal pain, blood in stool, constipation, diarrhea, nausea and  vomiting.   Endocrine: Negative for cold intolerance, heat intolerance, polydipsia and polyuria.   Genitourinary: Negative for difficulty urinating, dysuria, flank pain, frequency, hematuria and menstrual problem.   Musculoskeletal: Negative for arthralgias, back pain, joint swelling, myalgias and neck pain.   Skin: Negative for color change and rash.   Neurological: Negative for dizziness, tremors, weakness, numbness and headaches.   Psychiatric/Behavioral: Negative for confusion, dysphoric mood and sleep disturbance. The patient is not nervous/anxious.        Objective:          A1C:      CBC:  Recent Labs   Lab 07/10/19  1034   WBC 7.74   RBC 4.43   Hemoglobin 14.0   Hematocrit 41.5   Platelets 251   Mean Corpuscular Volume 94   Mean Corpuscular Hemoglobin 31.6 H   Mean Corpuscular Hemoglobin Conc 33.7     CMP:  Recent Labs   Lab 07/10/19  1034 09/18/19  0917   Glucose 90 104   Calcium 9.3 9.5   Albumin 4.3 4.2   Total Protein 7.6 7.6   Sodium 146 H 140   Potassium 4.0 4.2   CO2 24 22 L   Chloride 110 111 H   BUN, Bld 11 12   Creatinine 0.75 0.78   Alkaline Phosphatase 97 98   ALT 22 19   AST 22 21   Total Bilirubin 0.6 0.5     LIPIDS:  Recent Labs   Lab 07/10/19  1034 09/18/19  0917   TSH 2.570  --    HDL 57 44   Cholesterol 231 H 197   Triglycerides 110 199 H   LDL Cholesterol 152.0 113.2   Hdl/Cholesterol Ratio 24.7 22.3   Non-HDL Cholesterol 174 153   Total Cholesterol/HDL Ratio 4.1 4.5     TSH:  Recent Labs   Lab 07/10/19  1034   TSH 2.570           Physical Exam   Constitutional: She appears well-developed and well-nourished. No distress.   HENT:   Head: Normocephalic and atraumatic.   Eyes: Pupils are equal, round, and reactive to light. Conjunctivae and EOM are normal.   Pulmonary/Chest: Effort normal. No respiratory distress.   Musculoskeletal: Normal range of motion.   Skin: She is not diaphoretic.   Psychiatric: She has a normal mood and affect. Her behavior is normal.             Assessment and Plan:      Problem List Items Addressed This Visit        Neuro    Migraine headache- she is on Topamax for prevention.  She saw neurology for this in the past, but Dr. Keith has passed away.  Stable.      Lumbar back pain with radiculopathy affecting right lower extremity-she has been seeing Dr. Aleman about back pain.  Stable.       Psychiatric    Generalized anxiety disorder- she sees a psychiatrist about this.  Follow-up as recommended.       Endocrine    Hypothyroidism due to Hashimoto's thyroiditis- we will check thyroid hormone.  She is on levothyroxine.  Last TSH was normal.      Vitamin D deficiency - Primary- she reports that she has been taking her vitamin-D weekly.  We will order recheck on this.       Other    Narcolepsy cataplexy syndrome- stable.  She sees a sleep medicine doctor for this.  She takes Provigil and Ritalin.    Hyperlipidemia - she is stable on Crestor.  We will refill this when it is needed.  Check labs.          Orders Placed This Encounter    CBC auto differential    Comprehensive metabolic panel    Lipid panel    TSH    Vitamin D         Follow-up with me in 6 months.       Yeni Marroquin MD,  FACP  Internal Medicine

## 2020-04-24 NOTE — PATIENT INSTRUCTIONS
We have reviewed your prescription medications.   We will order routine labs.   Follow-up with Dr Zhou, neurology, sleep doctor, and psychiatry as recommended.

## 2020-04-27 RX ORDER — OXYBUTYNIN CHLORIDE 15 MG/1
TABLET, EXTENDED RELEASE ORAL DAILY
Qty: 90 TABLET | Refills: 1 | Status: SHIPPED | OUTPATIENT
Start: 2020-04-27 | End: 2020-08-24 | Stop reason: SDUPTHER

## 2020-05-04 RX ORDER — ERGOCALCIFEROL 1.25 MG/1
CAPSULE ORAL
Qty: 5 CAPSULE | Refills: 4 | Status: SHIPPED | OUTPATIENT
Start: 2020-05-04 | End: 2020-05-29

## 2020-05-11 NOTE — PATIENT INSTRUCTIONS
[facility]  Report of Consultation    Jessica Larson Patient Status:  Observation    1987 MRN [id number]   [facility] 3NE-A Attending Oswald Sam MD   Hosp Day # 0 PCP DESTIN TAYLOR, Lilia Mccoy MD       Chief Complaint:  Abdominal p No medication changes.  Continue Ritalin and Provigil.     SURGICAL HISTORY  09/09/2019    cysto right rpg, right stent dr Denise Everett   • OTHER SURGICAL HISTORY  09/16/2019    cysto stent removal dr Onofre Acosta     Family History   Problem Relation Age of Onset   • Other (Other) Mother       reports that she has never smoked. Continuous  •  ALPRAZolam (XANAX) tab 0.25 mg, 0.25 mg, Oral, Daily PRN  •  Sertraline HCl (ZOLOFT) tab 50 mg, 50 mg, Oral, Nightly  •  acetaminophen (TYLENOL) tab 650 mg, 650 mg, Oral, Q6H PRN  •  morphINE sulfate (PF) 4 MG/ML injection 1 mg, 1 mg, Nahum Arlene and reactive to light. EOM are normal.   Neck: Normal range of motion. Neck supple. Cardiovascular: Normal rate and regular rhythm. Edema not present. Pulmonary/Chest: Effort normal and breath sounds normal.   Abdominal: Soft.  Bowel sounds are seda cholecystectomy with intraoperative cholangiogram by Dr. Tamera Damian. 2. N.p.o. until procedure  3. Continue pain control  4.  Continue antiemetics as needed      Leodan Sauceda  5/11/2020  9:42 AM    Addendum:    I have seen and examined the patient in the pre cholecystectomy with cholangiogram today. ·   · The [place]-operative care plan was discussed with the patient, who voices understanding. Activity and lifting recommendations were discussed in length.    ·   · The risks, benefits, and alternatives to the pro

## 2020-05-21 RX ORDER — TOPIRAMATE 100 MG/1
TABLET, FILM COATED ORAL
Qty: 75 TABLET | Refills: 0 | Status: SHIPPED | OUTPATIENT
Start: 2020-05-21 | End: 2020-07-28

## 2020-05-21 RX ORDER — LEVOTHYROXINE SODIUM 25 UG/1
TABLET ORAL
Qty: 30 TABLET | Refills: 0 | Status: SHIPPED | OUTPATIENT
Start: 2020-05-21 | End: 2020-05-29

## 2020-05-22 DIAGNOSIS — G47.411 PRIMARY NARCOLEPSY WITH CATAPLEXY: ICD-10-CM

## 2020-05-22 RX ORDER — METHYLPHENIDATE HYDROCHLORIDE 10 MG/1
10 TABLET ORAL 4 TIMES DAILY
Qty: 120 TABLET | Refills: 0 | Status: SHIPPED | OUTPATIENT
Start: 2020-05-22 | End: 2020-06-22 | Stop reason: SDUPTHER

## 2020-05-22 RX ORDER — METHYLPHENIDATE HYDROCHLORIDE 20 MG/1
20 CAPSULE, EXTENDED RELEASE ORAL EVERY MORNING
Qty: 30 CAPSULE | Refills: 0 | Status: SHIPPED | OUTPATIENT
Start: 2020-05-22 | End: 2020-06-22 | Stop reason: SDUPTHER

## 2020-05-29 ENCOUNTER — TELEPHONE (OUTPATIENT)
Dept: INTERNAL MEDICINE | Facility: CLINIC | Age: 45
End: 2020-05-29

## 2020-05-29 ENCOUNTER — TELEPHONE (OUTPATIENT)
Dept: FAMILY MEDICINE | Facility: CLINIC | Age: 45
End: 2020-05-29

## 2020-05-29 RX ORDER — LEVOTHYROXINE SODIUM 50 UG/1
50 TABLET ORAL
Qty: 30 TABLET | Refills: 3 | Status: SHIPPED | OUTPATIENT
Start: 2020-05-29 | End: 2020-08-24 | Stop reason: SDUPTHER

## 2020-05-29 RX ORDER — ERGOCALCIFEROL 1.25 MG/1
50000 CAPSULE ORAL
Qty: 10 CAPSULE | Refills: 2 | Status: SHIPPED | OUTPATIENT
Start: 2020-06-01 | End: 2020-08-24 | Stop reason: SDUPTHER

## 2020-05-29 RX ORDER — ERGOCALCIFEROL 1.25 MG/1
50000 CAPSULE ORAL
Qty: 10 CAPSULE | Refills: 5 | Status: SHIPPED | OUTPATIENT
Start: 2020-05-29 | End: 2020-05-29

## 2020-05-29 NOTE — TELEPHONE ENCOUNTER
Spoke with pharmacist clarified the directions on patients vitamin d script. Pt should take medication twice a week.

## 2020-05-29 NOTE — TELEPHONE ENCOUNTER
----- Message from Mariola Carrasco sent at 5/29/2020 10:38 AM CDT -----  Type:  Pharmacy Calling to Clarify an RX    Name of Caller:Leonor  Pharmacy Name:[facility]  Prescription Name: vitamin D  What do they need to clarify?: Needs clarification on the sig  Best Call Back Number:[phone]  Additional Information:

## 2020-05-29 NOTE — TELEPHONE ENCOUNTER
Spoke with pt.as advised by . Pt.wants to sched.own follow up appt. In 3 months.----- Message from Marcie Marroquin MD sent at 5/29/2020  8:33 AM CDT -----  Vitamin D was a little worse. Increase vitamin D to 2 pills a week. Cholesterol was OK. TSh was high. The blood chemistries, kidney function test  and liver function tests were within normal range.Blood counts were good. I am sending new prescriptions for vitamin D and levothyroxine. Make appt to recheck in 3 months.

## 2020-05-29 NOTE — TELEPHONE ENCOUNTER
----- Message from Mariola Carrasco sent at 5/29/2020 10:38 AM CDT -----  Type:  Pharmacy Calling to Clarify an RX    Name of Caller:Leonor  Pharmacy Name:[facility]  Prescription Name: vitamin D  What do they need to clarify?: Needs clarification on the sig  Best Call Back Number:[phone]  Additional Information:

## 2020-06-05 ENCOUNTER — PATIENT MESSAGE (OUTPATIENT)
Dept: FAMILY MEDICINE | Facility: CLINIC | Age: 45
End: 2020-06-05

## 2020-07-21 DIAGNOSIS — G47.411 PRIMARY NARCOLEPSY WITH CATAPLEXY: ICD-10-CM

## 2020-07-21 RX ORDER — METHYLPHENIDATE HYDROCHLORIDE 10 MG/1
10 TABLET ORAL 4 TIMES DAILY
Qty: 120 TABLET | Refills: 0 | Status: SHIPPED | OUTPATIENT
Start: 2020-07-21 | End: 2020-08-23 | Stop reason: SDUPTHER

## 2020-07-21 RX ORDER — METHYLPHENIDATE HYDROCHLORIDE 20 MG/1
20 CAPSULE, EXTENDED RELEASE ORAL EVERY MORNING
Qty: 30 CAPSULE | Refills: 0 | Status: SHIPPED | OUTPATIENT
Start: 2020-07-21 | End: 2020-08-23 | Stop reason: SDUPTHER

## 2020-08-19 DIAGNOSIS — G47.411 NARCOLEPSY CATAPLEXY SYNDROME: ICD-10-CM

## 2020-08-19 RX ORDER — MODAFINIL 200 MG/1
TABLET ORAL
Qty: 45 TABLET | Refills: 0 | Status: SHIPPED | OUTPATIENT
Start: 2020-08-19 | End: 2020-10-19

## 2020-08-24 ENCOUNTER — OFFICE VISIT (OUTPATIENT)
Dept: FAMILY MEDICINE | Facility: CLINIC | Age: 45
End: 2020-08-24
Payer: COMMERCIAL

## 2020-08-24 VITALS
WEIGHT: 209.81 LBS | HEART RATE: 105 BPM | OXYGEN SATURATION: 98 % | SYSTOLIC BLOOD PRESSURE: 122 MMHG | TEMPERATURE: 99 F | RESPIRATION RATE: 18 BRPM | HEIGHT: 61 IN | DIASTOLIC BLOOD PRESSURE: 68 MMHG | BODY MASS INDEX: 39.61 KG/M2

## 2020-08-24 DIAGNOSIS — E03.8 HYPOTHYROIDISM DUE TO HASHIMOTO'S THYROIDITIS: Primary | ICD-10-CM

## 2020-08-24 DIAGNOSIS — E55.9 VITAMIN D DEFICIENCY: ICD-10-CM

## 2020-08-24 DIAGNOSIS — E78.2 HYPERLIPIDEMIA, MIXED: ICD-10-CM

## 2020-08-24 DIAGNOSIS — F41.1 GENERALIZED ANXIETY DISORDER: ICD-10-CM

## 2020-08-24 DIAGNOSIS — M54.10 BACK PAIN WITH RIGHT-SIDED RADICULOPATHY: ICD-10-CM

## 2020-08-24 DIAGNOSIS — E06.3 HYPOTHYROIDISM DUE TO HASHIMOTO'S THYROIDITIS: Primary | ICD-10-CM

## 2020-08-24 PROCEDURE — 99999 PR PBB SHADOW E&M-EST. PATIENT-LVL V: ICD-10-PCS | Mod: PBBFAC,,, | Performed by: INTERNAL MEDICINE

## 2020-08-24 PROCEDURE — 3008F PR BODY MASS INDEX (BMI) DOCUMENTED: ICD-10-PCS | Mod: CPTII,S$GLB,, | Performed by: INTERNAL MEDICINE

## 2020-08-24 PROCEDURE — 99214 OFFICE O/P EST MOD 30 MIN: CPT | Mod: S$GLB,,, | Performed by: INTERNAL MEDICINE

## 2020-08-24 PROCEDURE — 99999 PR PBB SHADOW E&M-EST. PATIENT-LVL V: CPT | Mod: PBBFAC,,, | Performed by: INTERNAL MEDICINE

## 2020-08-24 PROCEDURE — 3008F BODY MASS INDEX DOCD: CPT | Mod: CPTII,S$GLB,, | Performed by: INTERNAL MEDICINE

## 2020-08-24 PROCEDURE — 99214 PR OFFICE/OUTPT VISIT, EST, LEVL IV, 30-39 MIN: ICD-10-PCS | Mod: S$GLB,,, | Performed by: INTERNAL MEDICINE

## 2020-08-24 RX ORDER — OXYBUTYNIN CHLORIDE 15 MG/1
15 TABLET, EXTENDED RELEASE ORAL DAILY
Qty: 90 TABLET | Refills: 1 | Status: SHIPPED | OUTPATIENT
Start: 2020-08-24 | End: 2020-12-08 | Stop reason: SDUPTHER

## 2020-08-24 RX ORDER — TOPIRAMATE 100 MG/1
TABLET, FILM COATED ORAL
Qty: 75 TABLET | Refills: 5 | Status: SHIPPED | OUTPATIENT
Start: 2020-08-24 | End: 2020-12-08 | Stop reason: SDUPTHER

## 2020-08-24 RX ORDER — LEVOTHYROXINE SODIUM 50 UG/1
50 TABLET ORAL
Qty: 30 TABLET | Refills: 5 | Status: SHIPPED | OUTPATIENT
Start: 2020-08-24 | End: 2020-12-08

## 2020-08-24 RX ORDER — ERGOCALCIFEROL 1.25 MG/1
50000 CAPSULE ORAL
Qty: 10 CAPSULE | Refills: 5 | Status: SHIPPED | OUTPATIENT
Start: 2020-08-24 | End: 2020-12-08 | Stop reason: SDUPTHER

## 2020-08-24 RX ORDER — VALACYCLOVIR HYDROCHLORIDE 1 G/1
1000 TABLET, FILM COATED ORAL EVERY 12 HOURS PRN
Qty: 60 TABLET | Refills: 1 | Status: SHIPPED | OUTPATIENT
Start: 2020-08-24 | End: 2020-12-08

## 2020-08-24 NOTE — PATIENT INSTRUCTIONS
We have reviewed your prescription medications.   We will recheck the vitamin D and the thyroid test.   I refilled your medicines.   Try to get more sun exposure.  Follow-up in about 3 months.

## 2020-08-31 NOTE — PROGRESS NOTES
Subjective:       Patient ID: Amanda Navarrete is a 45 y.o. female.    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     The patient presents today for follow-up of chronic conditions. We did a virtual visit in May. She had labs checked after that and we increased her synthroid because her TSh was 5.2. Her BP is good today at 122/68. She is having some back pain. She takes all of her medicines. She is not having any side effects from crestor. She does complain of some back pain, but this is chronic. She reports pain is 7/10 on the pain scale. She sees a psychiatrist about anxiety and depression. She feels ok in this regard.     The patient denies chest pain, shortness of breath, nausea, or dizziness.     Active Ambulatory Problems     Diagnosis Date Noted    Back pain     Migraine headache     TMJ arthralgia     GERD (gastroesophageal reflux disease)     Narcolepsy cataplexy syndrome     Lumbar back pain with radiculopathy affecting right lower extremity 11/20/2015    Back pain with right-sided radiculopathy 11/20/2015    Hypothyroidism due to Hashimoto's thyroiditis 02/16/2017    Lumbar radiculitis 07/13/2018    Generalized anxiety disorder 07/10/2019    Vitamin D deficiency 07/10/2019    Gait instability 07/10/2019    Hyperlipidemia, mixed 09/18/2019     Resolved Ambulatory Problems     Diagnosis Date Noted    Spondylosis of lumbar region without myelopathy or radiculopathy 11/19/2015     Past Medical History:   Diagnosis Date    Affective disorder     Headache(784.0)     Narcolepsy without cataplexy(347.00)     Thyroid disease          MEDICATIONS:  Current Outpatient Medications:     clonazePAM (KLONOPIN)  0.5 MG tablet, , Disp: , Rfl:     diclofenac sodium 1 % Gel, APPLY 2 GRAMS TOPICALLY TO AFFECTED AREA(S) UP TO 5 TIMES DAILY (10 GRAMS PER DAY), Disp: , Rfl: 0    DULoxetine (CYMBALTA) 60 MG capsule, Take 1 capsule by mouth once daily., Disp: , Rfl:     ergocalciferol (ERGOCALCIFEROL) 50,000 unit Cap, Take 1 capsule (50,000 Units total) by mouth twice a week., Disp: 10 capsule, Rfl: 5    levothyroxine (SYNTHROID) 50 MCG tablet, Take 1 tablet (50 mcg total) by mouth before breakfast., Disp: 30 tablet, Rfl: 5    methocarbamol (ROBAXIN) 500 MG Tab, Take 500 mg by mouth every evening., Disp: , Rfl: 2    modafiniL (PROVIGIL) 200 MG Tab, TAKE 1 TABLET BY MOUTH EVERY MORNING AND 1/2 AT NOON., Disp: 45 tablet, Rfl: 0    oxybutynin (DITROPAN XL) 15 MG TR24, Take 1 tablet (15 mg total) by mouth once daily., Disp: 90 tablet, Rfl: 1    oxyCODONE-acetaminophen (PERCOCET) 5-325 mg per tablet, Take 1 tablet by mouth 2 (two) times daily as needed., Disp: , Rfl: 0    rosuvastatin (CRESTOR) 10 MG tablet, TAKE 1 TABLET BY MOUTH ONCE DAILY, Disp: 30 tablet, Rfl: 3    topiramate (TOPAMAX) 100 MG tablet, TAKE ONE TABLET BY MOUTH EVERY MORNING AND 1 AND 1/2 AT BEDTIME, Disp: 75 tablet, Rfl: 5    valACYclovir (VALTREX) 1000 MG tablet, Take 1 tablet (1,000 mg total) by mouth every 12 (twelve) hours as needed., Disp: 60 tablet, Rfl: 1    VRAYLAR 3 mg Cap, Take 3 mg by mouth once daily. , Disp: , Rfl:     medroxyPROGESTERone (DEPO-PROVERA) 150 mg/mL injection, , Disp: , Rfl:     methylphenidate HCl (RITALIN LA) 20 MG 24 hr capsule, Take 1 capsule (20 mg total) by mouth every morning., Disp: 30 capsule, Rfl: 0    methylphenidate HCl (RITALIN) 10 MG tablet, Take 1 tablet (10 mg total) by mouth 4 (four) times daily., Disp: 120 tablet, Rfl: 0    ondansetron (ZOFRAN) 4 MG tablet, , Disp: , Rfl:     promethazine (PHENERGAN) 25 MG tablet, , Disp: , Rfl:     valACYclovir (VALTREX) 1000 MG tablet, Take 1 tablet (1,000 mg total) by  mouth every 12 (twelve) hours as needed., Disp: 60 tablet, Rfl: 1      HEALTH MAINTENANCE:   Health Maintenance   Topic Date Due    Hepatitis C Screening  1975    Mammogram  05/31/2021    Lipid Panel  05/22/2025    TETANUS VACCINE  11/17/2028       Review of Systems   Constitutional: Negative for chills, fatigue and fever.   HENT: Negative for congestion, ear discharge, ear pain, rhinorrhea and sore throat.    Eyes: Negative for discharge, redness and itching.   Respiratory: Negative for cough, chest tightness, shortness of breath and wheezing.    Cardiovascular: Negative for chest pain, palpitations and leg swelling.   Gastrointestinal: Negative for abdominal pain, constipation, diarrhea, nausea and vomiting.   Endocrine: Negative for cold intolerance and heat intolerance.   Genitourinary: Negative for dysuria, flank pain, frequency and hematuria.   Musculoskeletal: Negative for arthralgias, back pain, joint swelling and myalgias.   Skin: Negative for color change and rash.   Neurological: Negative for dizziness, tremors, numbness and headaches.   Psychiatric/Behavioral: Negative for dysphoric mood and sleep disturbance. The patient is not nervous/anxious.        Objective:          A1C:      CBC:  Recent Labs   Lab 07/10/19  1034 05/22/20  0745   WBC 7.74 6.78   RBC 4.43 4.26   Hemoglobin 14.0 13.4   Hematocrit 41.5 40.6   Platelets 251 209   Mean Corpuscular Volume 94 95   Mean Corpuscular Hemoglobin 31.6 H 31.5 H   Mean Corpuscular Hemoglobin Conc 33.7 33.0     CMP:  Recent Labs   Lab 07/10/19  1034 09/18/19  0917 05/22/20  0745   Glucose 90 104 102   Calcium 9.3 9.5 9.2   Albumin 4.3 4.2 3.9   Total Protein 7.6 7.6 6.6   Sodium 146 H 140 144   Potassium 4.0 4.2 3.9   CO2 24 22 L 18 L   Chloride 110 111 H 114 H   BUN, Bld 11 12 11   Creatinine 0.75 0.78 0.68   Alkaline Phosphatase 97 98 61   ALT 22 19 13   AST 22 21 20   Total Bilirubin 0.6 0.5 0.3     LIPIDS:  Recent Labs   Lab 07/10/19  1034  09/18/19  0917 05/22/20  0745 08/24/20  0932   TSH 2.570  --  5.280 H 1.710   HDL 57 44 49  --    Cholesterol 231 H 197 186  --    Triglycerides 110 199 H 75  --    LDL Cholesterol 152.0 113.2 122.0  --    Hdl/Cholesterol Ratio 24.7 22.3 26.3  --    Non-HDL Cholesterol 174 153 137  --    Total Cholesterol/HDL Ratio 4.1 4.5 3.8  --      TSH:  Recent Labs   Lab 07/10/19  1034 05/22/20  0745 08/24/20  0932   TSH 2.570 5.280 H 1.710           Physical Exam  Constitutional:       Appearance: She is well-developed.   HENT:      Head: Normocephalic and atraumatic.      Right Ear: External ear normal. No middle ear effusion. Tympanic membrane is not erythematous.      Left Ear: External ear normal.  No middle ear effusion. Tympanic membrane is not erythematous.      Nose: No rhinorrhea.      Right Sinus: No maxillary sinus tenderness or frontal sinus tenderness.      Left Sinus: No maxillary sinus tenderness or frontal sinus tenderness.      Mouth/Throat:      Pharynx: No posterior oropharyngeal erythema.      Tonsils: No tonsillar exudate.   Eyes:      General:         Right eye: No discharge.         Left eye: No discharge.      Conjunctiva/sclera: Conjunctivae normal.      Right eye: Right conjunctiva is not injected.      Left eye: Left conjunctiva is not injected.      Pupils: Pupils are equal, round, and reactive to light.   Neck:      Thyroid: No thyromegaly.   Cardiovascular:      Rate and Rhythm: Normal rate and regular rhythm.      Heart sounds: Normal heart sounds. No murmur.   Pulmonary:      Effort: Pulmonary effort is normal.      Breath sounds: Normal breath sounds. No wheezing, rhonchi or rales.   Abdominal:      General: Bowel sounds are normal. There is no distension.      Tenderness: There is no abdominal tenderness.   Musculoskeletal:         General: No tenderness.   Lymphadenopathy:      Cervical: No cervical adenopathy.   Skin:     General: Skin is warm and dry.      Findings: No lesion or rash.    Neurological:      Cranial Nerves: No cranial nerve deficit.      Deep Tendon Reflexes: Reflexes normal.   Psychiatric:         Mood and Affect: Mood is not anxious or depressed.         Speech: Speech is not rapid and pressured.         Behavior: Behavior normal. Behavior is not agitated or aggressive.               Assessment and Plan:     Problem List Items Addressed This Visit        Neuro    Back pain with right-sided radiculopathy - increasing pain. Follow-up with back doctor.        Psychiatric    Generalized anxiety disorder - stable. Follow-up with psychiatry.        Cardiac/Vascular    Hyperlipidemia, mixed - on crestor. No side effects.        Endocrine    Hypothyroidism due to Hashimoto's thyroiditis - Primary - We adjusted thyroid medicine in may. Recheck levels. She feels better.     Relevant Orders    TSH (Completed)    Vitamin D deficiency - she is on a supplement. Get more sunlight also.     Relevant Orders    Vitamin D (Completed)          Orders Placed This Encounter    Vitamin D    TSH    topiramate (TOPAMAX) 100 MG tablet    valACYclovir (VALTREX) 1000 MG tablet    levothyroxine (SYNTHROID) 50 MCG tablet    oxybutynin (DITROPAN XL) 15 MG TR24    ergocalciferol (ERGOCALCIFEROL) 50,000 unit Cap         Follow-up  in 6 months.       Yeni Marroquin MD,  FACP  Internal Medicine

## 2020-10-17 ENCOUNTER — IMMUNIZATION (OUTPATIENT)
Dept: FAMILY MEDICINE | Facility: CLINIC | Age: 45
End: 2020-10-17
Payer: COMMERCIAL

## 2020-10-17 PROCEDURE — 90686 IIV4 VACC NO PRSV 0.5 ML IM: CPT | Mod: S$GLB,,, | Performed by: FAMILY MEDICINE

## 2020-10-17 PROCEDURE — 90471 IMMUNIZATION ADMIN: CPT | Mod: S$GLB,,, | Performed by: FAMILY MEDICINE

## 2020-10-17 PROCEDURE — 90471 FLU VACCINE (QUAD) GREATER THAN OR EQUAL TO 3YO PRESERVATIVE FREE IM: ICD-10-PCS | Mod: S$GLB,,, | Performed by: FAMILY MEDICINE

## 2020-10-17 PROCEDURE — 90686 FLU VACCINE (QUAD) GREATER THAN OR EQUAL TO 3YO PRESERVATIVE FREE IM: ICD-10-PCS | Mod: S$GLB,,, | Performed by: FAMILY MEDICINE

## 2020-10-18 DIAGNOSIS — G47.411 NARCOLEPSY CATAPLEXY SYNDROME: ICD-10-CM

## 2020-10-19 RX ORDER — MODAFINIL 200 MG/1
TABLET ORAL
Qty: 45 TABLET | Refills: 0 | Status: SHIPPED | OUTPATIENT
Start: 2020-10-19 | End: 2020-11-17

## 2020-10-27 DIAGNOSIS — G47.411 PRIMARY NARCOLEPSY WITH CATAPLEXY: ICD-10-CM

## 2020-10-27 RX ORDER — METHYLPHENIDATE HYDROCHLORIDE 10 MG/1
10 TABLET ORAL 4 TIMES DAILY
Qty: 120 TABLET | Refills: 0 | Status: SHIPPED | OUTPATIENT
Start: 2020-10-27 | End: 2020-12-07 | Stop reason: SDUPTHER

## 2020-10-27 RX ORDER — METHYLPHENIDATE HYDROCHLORIDE 20 MG/1
20 CAPSULE, EXTENDED RELEASE ORAL EVERY MORNING
Qty: 30 CAPSULE | Refills: 0 | Status: SHIPPED | OUTPATIENT
Start: 2020-10-27 | End: 2020-12-07 | Stop reason: SDUPTHER

## 2020-10-27 NOTE — TELEPHONE ENCOUNTER
methylphenidate HCl (RITALIN LA) 20 MG 24 hr capsule 20 mg, Every morning  EditCancel Reorder      Summary: Take 1 capsule (20 mg total) by mouth every morning., Starting Wed 9/30/2020, Normal   Dose, Route, Frequency: 20 mg, Oral, Every morning  Start: 9/30/2020  Ord/Sold: 9/30/2020 (O)  Report  Adh:   Long-term:   Pharmacy: [id number] - [place] - [id number]  Med Dose History       Patient Sig: Take 1 capsule (20 mg total) by mouth every morning.       Ordered on: 9/30/2020       Authorized by: ADAMS SANCHEZ       Dispense: 30 capsule       Refills: 0 ordered        methylphenidate HCl (RITALIN) 10 MG tablet 10 mg, 4 times daily  EditCancel Reorder       Summary: Take 1 tablet (10 mg total) by mouth 4 (four) times daily., Starting Wed 9/30/2020, Normal   Dose, Route, Frequency: 10 mg, Oral, 4 times daily  Start: 9/30/2020  Ord/Sold: 9/30/2020 (O)  Report  Adh:   Long-term:   Pharmacy: [id number] - [place] - [id number]  Med Dose History       Patient Sig: Take 1 tablet (10 mg total) by mouth 4 (four) times daily.       Ordered on: 9/30/2020       Authorized by: ADAMS SANCHEZ       Dispense: 120 tablet       Refills: 0 ordered        Last office visit: 4/16/2020

## 2020-12-07 DIAGNOSIS — G47.411 PRIMARY NARCOLEPSY WITH CATAPLEXY: ICD-10-CM

## 2020-12-07 PROBLEM — E66.812 CLASS 2 OBESITY DUE TO EXCESS CALORIES IN ADULT: Status: ACTIVE | Noted: 2020-12-07

## 2020-12-07 PROBLEM — E66.09 CLASS 2 OBESITY DUE TO EXCESS CALORIES IN ADULT: Status: ACTIVE | Noted: 2020-12-07

## 2020-12-07 RX ORDER — METHYLPHENIDATE HYDROCHLORIDE 20 MG/1
20 CAPSULE, EXTENDED RELEASE ORAL EVERY MORNING
Qty: 30 CAPSULE | Refills: 0 | Status: SHIPPED | OUTPATIENT
Start: 2020-12-07 | End: 2021-01-11 | Stop reason: SDUPTHER

## 2020-12-07 RX ORDER — METHYLPHENIDATE HYDROCHLORIDE 10 MG/1
10 TABLET ORAL 4 TIMES DAILY
Qty: 120 TABLET | Refills: 0 | Status: SHIPPED | OUTPATIENT
Start: 2020-12-07 | End: 2021-01-11 | Stop reason: SDUPTHER

## 2020-12-07 NOTE — TELEPHONE ENCOUNTER
methylphenidate HCl (RITALIN LA) 20 MG 24 hr capsule 20 mg, Every morning  EditCancel Reorder       Summary: Take 1 capsule (20 mg total) by mouth every morning., Starting Tue 10/27/2020, Normal   Dose, Route, Frequency: 20 mg, Oral, Every morning  Start: 10/27/2020  Ord/Sold: 10/27/2020 (O)  Report  Adh:   Long-term:   Pharmacy: [id number] - [place] - [id number]  Med Dose History       Patient Sig: Take 1 capsule (20 mg total) by mouth every morning.       Ordered on: 10/27/2020       Authorized by: ADAMS SANCHEZ       Dispense: 30 capsule       Refills: 0 ordered        methylphenidate HCl (RITALIN) 10 MG tablet 10 mg, 4 times daily  EditCancel Reorder       Summary: Take 1 tablet (10 mg total) by mouth 4 (four) times daily., Starting Tue 10/27/2020, Normal   Dose, Route, Frequency: 10 mg, Oral, 4 times daily  Start: 10/27/2020  Ord/Sold: 10/27/2020 (O)  Report  Adh:   Long-term:   Pharmacy: [id number] - [place] - [id number]  Med Dose History       Patient Sig: Take 1 tablet (10 mg total) by mouth 4 (four) times daily.       Ordered on: 10/27/2020       Authorized by: ADAMS SANCHEZ       Dispense: 120 tablet       Refills: 0 ordered        Last office visit: 4/16/2020

## 2020-12-07 NOTE — PROGRESS NOTES
FAMILY MEDICINE  [facility]    CC:   Chief Complaint   Patient presents with    Establish Care    Medication Refill     all meds        SUBJECTIVE:  Amanda Navarrete   is a 45 y.o. female  - with obesity, chronic low back pain with bilateral sciatica, migraine headaches, hypothyroidism, anxiety, hyperlipidemia, vitamin-D deficiency, GERD, and narcolepsy cataplexy syndrome presents to establish care and refill medications. Last PCP Dr. Marroquin    Sleep Medicine: NP Estela Reilly  Gynecology: Dr. Duong  - mammogram and [facility] NIRMAL signed 12/8/2020  Ortho Dr. Najera  Pain Dr. Geoffrey Aleman    1. Hypothyroidism     Age diagnosed: 35 yo    Symptomatic at diagnosis: none and reports noted on routine labs   - family history of thyroid disease: mother and issters  Prior evaluation by Endocrinologist: No  Prior thyroid US: no    Current medication:   levothyroxine (SYNTHROID) 50 MCG tablet, Take 1 tablet (50 mcg total) by mouth before breakfast., Disp: 30 tablet, Rfl: 5    Side effects from medication: none  Scheduled for medication: AM fasting separate from other medications    Symptoms from thyroid issue: denies fatigue, weight changes, heat/cold intolerance, bowel/skin changes or CVS symptoms  Concerns: denies    Lab Results       Component                Value               Date                       TSH                      1.710               08/24/2020                 FREET4                   0.85                05/22/2020              2. Migraine Headache    Age of onset: teenager    Headache history: diffuse to one sided  - well controlled on Topamax  Medications tried and failed in the past: OTC mediations, triptans  Neurology evaluation: Dr. Keith in the past    Current treatment:   methocarbamol (ROBAXIN) 500 MG Tab, Take 500 mg by mouth every evening., Disp: , Rfl: 2  topiramate (TOPAMAX) 100 MG tablet, TAKE ONE TABLET BY MOUTH EVERY MORNING AND 1 AND 1/2 AT BEDTIME, Disp: 75 tablet, Rfl:  5    Current symptoms: none      ROS: Review of Systems   Constitutional: Negative.    HENT: Negative.    Eyes: Negative.    Respiratory: Negative.    Cardiovascular: Negative.    Gastrointestinal: Negative.    Endocrine: Negative.    Genitourinary: Negative.    Musculoskeletal: Positive for back pain (Chronic unchanged).        Otherwise negative   Skin: Negative.    Allergic/Immunologic: Negative.    Neurological: Negative.    Hematological: Negative.    Psychiatric/Behavioral: Negative.        Past Medical History:   Diagnosis Date    Affective disorder     sses Dr. Dial, psychiatry    Back pain     GERD (gastroesophageal reflux disease)     Headache(784.0)     Hyperlipidemia, mixed 2019    Migraine headache     Narcolepsy without cataplexy(347.00)     Thyroid disease     hyperthyroidism    TMJ arthralgia        Past Surgical History:   Procedure Laterality Date    CAUDAL EPIDURAL STEROID INJECTION N/A 2018    Procedure: INJECTION, STEROID, CAUDAL, EPIDURAL;  Surgeon: Geoffrey Aleman MD;  [facility];  Service: Pain Management;  Laterality: N/A;     SECTION, CLASSIC      COSMETIC SURGERY      breast augmentation    SPINE SURGERY  2015    fusion L spine       Family History   Problem Relation Age of Onset    Hypertension Mother     Kidney disease Mother     Sleep apnea Mother     Narcolepsy Father     Cancer Maternal Aunt         colon cancer gene    Cancer Paternal Aunt         lung ca       Social History     Tobacco Use    Smoking status: Former Smoker     Packs/day: 1.00     Years: 15.00     Pack years: 15.00     Start date: 2014    Smokeless tobacco: Never Used   Substance Use Topics    Alcohol use: Yes     Frequency: Never     Drinks per session: 1 or 2     Binge frequency: Never     Comment: Social, rare    Drug use: No       Social History     Social History Narrative    She works at [employer]. She still has a daughter living  at home while in college. The others are grown. She has 4 dogs.        ALLERGIES:   Review of patient's allergies indicates:   Allergen Reactions    Tamiflu [oseltamivir] Itching and Swelling       MEDS:   Current Outpatient Medications:     clonazePAM (KLONOPIN) 0.5 MG tablet, , Disp: , Rfl:     diclofenac sodium 1 % Gel, APPLY 2 GRAMS TOPICALLY TO AFFECTED AREA(S) UP TO 5 TIMES DAILY (10 GRAMS PER DAY), Disp: , Rfl: 0    DULoxetine (CYMBALTA) 60 MG capsule, Take 1 capsule by mouth once daily., Disp: , Rfl:     [START ON 12/10/2020] ergocalciferol (ERGOCALCIFEROL) 50,000 unit Cap, Take 1 capsule (50,000 Units total) by mouth twice a week., Disp: 10 capsule, Rfl: 5    levothyroxine (SYNTHROID) 50 MCG tablet, Take 1 tablet (50 mcg total) by mouth before breakfast., Disp: 90 tablet, Rfl: 1    methocarbamol (ROBAXIN) 500 MG Tab, Take 500 mg by mouth every evening., Disp: , Rfl: 2    methylphenidate HCl (RITALIN LA) 20 MG 24 hr capsule, Take 1 capsule (20 mg total) by mouth every morning., Disp: 30 capsule, Rfl: 0    methylphenidate HCl (RITALIN) 10 MG tablet, Take 1 tablet (10 mg total) by mouth 4 (four) times daily., Disp: 120 tablet, Rfl: 0    modafiniL (PROVIGIL) 200 MG Tab, TAKE 1 TABLET BY MOUTH EVERY MORNING AND 1/2 AT NOON , Disp: 45 tablet, Rfl: 3    oxybutynin (DITROPAN XL) 15 MG TR24, Take 1 tablet (15 mg total) by mouth once daily., Disp: 90 tablet, Rfl: 4    oxyCODONE-acetaminophen (PERCOCET) 5-325 mg per tablet, Take 1 tablet by mouth 2 (two) times daily as needed., Disp: , Rfl: 0    rosuvastatin (CRESTOR) 10 MG tablet, Take 1 tablet (10 mg total) by mouth once daily., Disp: 90 tablet, Rfl: 4    topiramate (TOPAMAX) 100 MG tablet, TAKE ONE TABLET BY MOUTH EVERY MORNING AND 1 AND 1/2 AT BEDTIME, Disp: 75 tablet, Rfl: 5    valACYclovir (VALTREX) 1000 MG tablet, Take 1 tablet (1,000 mg total) by mouth every 12 (twelve) hours as needed., Disp: 60 tablet, Rfl: 5    VRAYLAR 3 mg Cap, Take 3 mg by  "mouth once daily. , Disp: , Rfl:     OBJECTIVE:   Vitals:    12/08/20 0857   BP: 110/60   BP Location: Left arm   Patient Position: Sitting   BP Method: X-Large (Manual)   Pulse: 98   Resp: 18   Temp: 98.1 °F (36.7 °C)   TempSrc: Oral   SpO2: 99%   Weight: 98.1 kg (216 lb 4.8 oz)   Height: 5' 1" (1.549 m)     Body mass index is 40.87 kg/m².    Physical Exam  Vitals signs reviewed.   Constitutional:       General: She is not in acute distress.  HENT:      Head: Normocephalic and atraumatic.      Right Ear: Tympanic membrane and ear canal normal.      Left Ear: Tympanic membrane and ear canal normal.   Eyes:      General: No scleral icterus.     Pupils: Pupils are equal, round, and reactive to light.   Neck:      Musculoskeletal: Normal range of motion and neck supple.      Thyroid: No thyromegaly.      Vascular: No carotid bruit.   Cardiovascular:      Rate and Rhythm: Normal rate and regular rhythm.      Pulses: Normal pulses.      Heart sounds: Normal heart sounds. No murmur. No friction rub. No gallop.    Pulmonary:      Effort: Pulmonary effort is normal.      Breath sounds: Normal breath sounds. No wheezing, rhonchi or rales.   Abdominal:      General: Bowel sounds are normal. There is no distension.      Palpations: Abdomen is soft.      Tenderness: There is no abdominal tenderness. There is no guarding or rebound.   Musculoskeletal:      Right lower leg: No edema.      Left lower leg: No edema.   Skin:     General: Skin is warm.      Capillary Refill: Capillary refill takes less than 2 seconds.   Neurological:      Mental Status: She is alert.           PERTINENT RESULTS:   No visits with results within 1 Week(s) from this visit.   Latest known visit with results is:   Lab Visit on 08/24/2020   Component Date Value Ref Range Status    Vit D, 25-Hydroxy 08/24/2020 23* 30 - 96 ng/mL Final    Comment: Vitamin D deficiency.........<10 ng/mL                              Vitamin D insufficiency......10-29 ng/mL     "   Vitamin D sufficiency........> or equal to 30 ng/mL  Vitamin D toxicity............>100 ng/mL      TSH 08/24/2020 1.710  0.400 - 4.000 uIU/mL Final    Comment: Warning:  Heterophilic antibodies in serum or plasma of   certain individuals are known to cause interference with   immunoassays. These antibodies may be present in blood samples   from individuals regularly exposed to animal or who have been   treated with animal products.   Patients taking high doses of supplemental biotin may have  negatively biased results.          ASSESSMENT/PLAN:  Problem List Items Addressed This Visit        Neuro    Migraine headache    Overview     - well controlled  - continue current medication         Relevant Medications    topiramate (TOPAMAX) 100 MG tablet       Cardiac/Vascular    Hyperlipidemia, mixed    Overview     Lab Results   Component Value Date    LDLCALC 122.0 05/22/2020     - LDL goal <100  - repeat lipids next visit  - continue rosuvastatin 10 mg daily           Relevant Medications    rosuvastatin (CRESTOR) 10 MG tablet    Other Relevant Orders    Comprehensive Metabolic Panel    Lipid Panel       Renal/    Overactive bladder    Relevant Medications    oxybutynin (DITROPAN XL) 15 MG TR24       ID    History of herpes labialis    Relevant Medications    valACYclovir (VALTREX) 1000 MG tablet       Endocrine    Hypothyroidism due to Hashimoto's thyroiditis - Primary    Overview     Lab Results   Component Value Date    TSH 1.710 08/24/2020     - well controlled  - continue current medication         Relevant Medications    levothyroxine (SYNTHROID) 50 MCG tablet    Other Relevant Orders    CBC Auto Differential    TSH    Vitamin D deficiency    Overview     - reports very low in the past  - on D2 50,000 units twice a week and last D on this regimen was 23  - recommend continued D2 18315 units twice a week.  Recommend adjusted D3 5000 units daily 1 month prior to next visit with repeat vitamin-D at that time          Relevant Medications    ergocalciferol (ERGOCALCIFEROL) 50,000 unit Cap (Start on 12/10/2020)       Other    Class 3 severe obesity due to excess calories with serious comorbidity and body mass index (BMI) of 40.0 to 44.9 in adult    Overview     - discussed recommendation for diet and cardiovascular exercise  - counseling on lifestyle modifications for risk factor reduction           Other Visit Diagnoses     Need for hepatitis C screening test        Relevant Orders    Hepatitis C Antibody          ORDERS:   Orders Placed This Encounter    Hepatitis C Antibody    CBC Auto Differential    Comprehensive Metabolic Panel    Lipid Panel    TSH    levothyroxine (SYNTHROID) 50 MCG tablet    oxybutynin (DITROPAN XL) 15 MG TR24    valACYclovir (VALTREX) 1000 MG tablet    rosuvastatin (CRESTOR) 10 MG tablet    topiramate (TOPAMAX) 100 MG tablet    ergocalciferol (ERGOCALCIFEROL) 50,000 unit Cap     HIV screening: discussed recommendations for HIV screening. Pt declined  Vaccines recommended: up to date    Follow-up 6 months with labs or sooner if any concerns.    Dr. Laya Jaffe D.O.   Family Medicine

## 2020-12-08 ENCOUNTER — OFFICE VISIT (OUTPATIENT)
Dept: FAMILY MEDICINE | Facility: CLINIC | Age: 45
End: 2020-12-08
Payer: COMMERCIAL

## 2020-12-08 ENCOUNTER — PATIENT OUTREACH (OUTPATIENT)
Dept: ADMINISTRATIVE | Facility: HOSPITAL | Age: 45
End: 2020-12-08

## 2020-12-08 VITALS
HEIGHT: 61 IN | BODY MASS INDEX: 40.84 KG/M2 | SYSTOLIC BLOOD PRESSURE: 110 MMHG | TEMPERATURE: 98 F | OXYGEN SATURATION: 99 % | RESPIRATION RATE: 18 BRPM | WEIGHT: 216.31 LBS | HEART RATE: 98 BPM | DIASTOLIC BLOOD PRESSURE: 60 MMHG

## 2020-12-08 DIAGNOSIS — N32.81 OVERACTIVE BLADDER: ICD-10-CM

## 2020-12-08 DIAGNOSIS — E03.8 HYPOTHYROIDISM DUE TO HASHIMOTO'S THYROIDITIS: Primary | ICD-10-CM

## 2020-12-08 DIAGNOSIS — E78.2 HYPERLIPIDEMIA, MIXED: ICD-10-CM

## 2020-12-08 DIAGNOSIS — E66.01 CLASS 3 SEVERE OBESITY DUE TO EXCESS CALORIES WITH SERIOUS COMORBIDITY AND BODY MASS INDEX (BMI) OF 40.0 TO 44.9 IN ADULT: ICD-10-CM

## 2020-12-08 DIAGNOSIS — G43.019 INTRACTABLE MIGRAINE WITHOUT AURA AND WITHOUT STATUS MIGRAINOSUS: ICD-10-CM

## 2020-12-08 DIAGNOSIS — E55.9 VITAMIN D DEFICIENCY: ICD-10-CM

## 2020-12-08 DIAGNOSIS — E06.3 HYPOTHYROIDISM DUE TO HASHIMOTO'S THYROIDITIS: Primary | ICD-10-CM

## 2020-12-08 DIAGNOSIS — Z86.19 HISTORY OF HERPES LABIALIS: ICD-10-CM

## 2020-12-08 DIAGNOSIS — Z11.59 NEED FOR HEPATITIS C SCREENING TEST: ICD-10-CM

## 2020-12-08 PROBLEM — E66.813 CLASS 3 SEVERE OBESITY DUE TO EXCESS CALORIES WITH SERIOUS COMORBIDITY AND BODY MASS INDEX (BMI) OF 40.0 TO 44.9 IN ADULT: Status: ACTIVE | Noted: 2020-12-07

## 2020-12-08 PROCEDURE — 3008F PR BODY MASS INDEX (BMI) DOCUMENTED: ICD-10-PCS | Mod: CPTII,S$GLB,, | Performed by: FAMILY MEDICINE

## 2020-12-08 PROCEDURE — 1125F PR PAIN SEVERITY QUANTIFIED, PAIN PRESENT: ICD-10-PCS | Mod: S$GLB,,, | Performed by: FAMILY MEDICINE

## 2020-12-08 PROCEDURE — 3008F BODY MASS INDEX DOCD: CPT | Mod: CPTII,S$GLB,, | Performed by: FAMILY MEDICINE

## 2020-12-08 PROCEDURE — 99214 PR OFFICE/OUTPT VISIT, EST, LEVL IV, 30-39 MIN: ICD-10-PCS | Mod: S$GLB,,, | Performed by: FAMILY MEDICINE

## 2020-12-08 PROCEDURE — 99999 PR PBB SHADOW E&M-EST. PATIENT-LVL V: CPT | Mod: PBBFAC,,, | Performed by: FAMILY MEDICINE

## 2020-12-08 PROCEDURE — 99999 PR PBB SHADOW E&M-EST. PATIENT-LVL V: ICD-10-PCS | Mod: PBBFAC,,, | Performed by: FAMILY MEDICINE

## 2020-12-08 PROCEDURE — 1125F AMNT PAIN NOTED PAIN PRSNT: CPT | Mod: S$GLB,,, | Performed by: FAMILY MEDICINE

## 2020-12-08 PROCEDURE — 99214 OFFICE O/P EST MOD 30 MIN: CPT | Mod: S$GLB,,, | Performed by: FAMILY MEDICINE

## 2020-12-08 RX ORDER — ERGOCALCIFEROL 1.25 MG/1
50000 CAPSULE ORAL
Qty: 10 CAPSULE | Refills: 5 | Status: SHIPPED | OUTPATIENT
Start: 2020-12-10 | End: 2021-12-22 | Stop reason: SDUPTHER

## 2020-12-08 RX ORDER — VALACYCLOVIR HYDROCHLORIDE 1 G/1
1000 TABLET, FILM COATED ORAL EVERY 12 HOURS PRN
Qty: 60 TABLET | Refills: 5 | Status: SHIPPED | OUTPATIENT
Start: 2020-12-08 | End: 2022-02-07

## 2020-12-08 RX ORDER — LEVOTHYROXINE SODIUM 50 UG/1
50 TABLET ORAL
Qty: 90 TABLET | Refills: 1 | Status: SHIPPED | OUTPATIENT
Start: 2020-12-08 | End: 2021-09-04

## 2020-12-08 RX ORDER — ROSUVASTATIN CALCIUM 10 MG/1
10 TABLET, COATED ORAL DAILY
Qty: 90 TABLET | Refills: 4 | Status: SHIPPED | OUTPATIENT
Start: 2020-12-08 | End: 2021-12-22 | Stop reason: SDUPTHER

## 2020-12-08 RX ORDER — OXYBUTYNIN CHLORIDE 15 MG/1
15 TABLET, EXTENDED RELEASE ORAL DAILY
Qty: 90 TABLET | Refills: 4 | Status: SHIPPED | OUTPATIENT
Start: 2020-12-08 | End: 2021-12-22 | Stop reason: SDUPTHER

## 2020-12-08 RX ORDER — TOPIRAMATE 100 MG/1
TABLET, FILM COATED ORAL
Qty: 75 TABLET | Refills: 5 | Status: SHIPPED | OUTPATIENT
Start: 2020-12-08 | End: 2021-12-14

## 2020-12-08 NOTE — LETTER
AUTHORIZATION FOR RELEASE OF   CONFIDENTIAL INFORMATION    Dear [facility],    We are seeing Amanda Navarrete, date of birth 1975, in the clinic at [facility]. Laya Jaffe DO is the patient's PCP. Amanda Navarrete has an outstanding lab/procedure at the time we reviewed her chart. In order to help keep her health information updated, she has authorized us to request the following medical record(s):          ( X )  MAMMOGRAM & ULTRASOUND          Please fax records to us at [phone].     Attention: Rosalba Gonsalez     If you have any questions, please contact me at [phone].          Patient Name: Amanda Navarrete  : 1975  Patient Phone #: [phone]

## 2020-12-08 NOTE — PROGRESS NOTES
Scanned signed NIRMAL in .   Sent E-fax to [facility] for mammogram records. Attached signed NIRMAL.

## 2020-12-08 NOTE — PATIENT INSTRUCTIONS
1. Continue D2 50,000 twice a week   2. 1 month before visit with me switch to D3 over the counter 5,000 units daily

## 2020-12-11 ENCOUNTER — PATIENT OUTREACH (OUTPATIENT)
Dept: ADMINISTRATIVE | Facility: HOSPITAL | Age: 45
End: 2020-12-11

## 2020-12-11 PROBLEM — M47.896 OTHER SPONDYLOSIS, LUMBAR REGION: Status: ACTIVE | Noted: 2020-12-11

## 2021-01-11 DIAGNOSIS — G47.411 PRIMARY NARCOLEPSY WITH CATAPLEXY: ICD-10-CM

## 2021-01-11 RX ORDER — METHYLPHENIDATE HYDROCHLORIDE 10 MG/1
10 TABLET ORAL 4 TIMES DAILY
Qty: 120 TABLET | Refills: 0 | Status: SHIPPED | OUTPATIENT
Start: 2021-01-11 | End: 2021-02-24 | Stop reason: SDUPTHER

## 2021-01-11 RX ORDER — METHYLPHENIDATE HYDROCHLORIDE 20 MG/1
20 CAPSULE, EXTENDED RELEASE ORAL EVERY MORNING
Qty: 30 CAPSULE | Refills: 0 | Status: SHIPPED | OUTPATIENT
Start: 2021-01-11 | End: 2021-03-05 | Stop reason: SDUPTHER

## 2021-02-24 DIAGNOSIS — G47.411 PRIMARY NARCOLEPSY WITH CATAPLEXY: ICD-10-CM

## 2021-02-24 RX ORDER — METHYLPHENIDATE HYDROCHLORIDE 10 MG/1
10 TABLET ORAL 4 TIMES DAILY
Qty: 120 TABLET | Refills: 0 | OUTPATIENT
Start: 2021-02-24

## 2021-02-24 RX ORDER — METHYLPHENIDATE HYDROCHLORIDE 10 MG/1
10 TABLET ORAL 4 TIMES DAILY
Qty: 120 TABLET | Refills: 0 | Status: SHIPPED | OUTPATIENT
Start: 2021-02-24 | End: 2021-02-24 | Stop reason: SDUPTHER

## 2021-02-24 RX ORDER — METHYLPHENIDATE HYDROCHLORIDE 20 MG/1
20 CAPSULE, EXTENDED RELEASE ORAL EVERY MORNING
Qty: 30 CAPSULE | Refills: 0 | OUTPATIENT
Start: 2021-02-24

## 2021-03-04 ENCOUNTER — TELEPHONE (OUTPATIENT)
Dept: SLEEP MEDICINE | Facility: CLINIC | Age: 46
End: 2021-03-04

## 2021-03-05 ENCOUNTER — TELEPHONE (OUTPATIENT)
Dept: SLEEP MEDICINE | Facility: CLINIC | Age: 46
End: 2021-03-05

## 2021-03-05 ENCOUNTER — OFFICE VISIT (OUTPATIENT)
Dept: SLEEP MEDICINE | Facility: CLINIC | Age: 46
End: 2021-03-05
Payer: COMMERCIAL

## 2021-03-05 VITALS — SYSTOLIC BLOOD PRESSURE: 119 MMHG | HEART RATE: 96 BPM | DIASTOLIC BLOOD PRESSURE: 84 MMHG | TEMPERATURE: 97 F

## 2021-03-05 DIAGNOSIS — G47.411 PRIMARY NARCOLEPSY WITH CATAPLEXY: ICD-10-CM

## 2021-03-05 DIAGNOSIS — G47.411 NARCOLEPSY CATAPLEXY SYNDROME: Primary | ICD-10-CM

## 2021-03-05 DIAGNOSIS — F41.1 GENERALIZED ANXIETY DISORDER: ICD-10-CM

## 2021-03-05 DIAGNOSIS — G47.30 SLEEP APNEA, UNSPECIFIED TYPE: ICD-10-CM

## 2021-03-05 DIAGNOSIS — G43.019 INTRACTABLE MIGRAINE WITHOUT AURA AND WITHOUT STATUS MIGRAINOSUS: ICD-10-CM

## 2021-03-05 PROCEDURE — 99214 OFFICE O/P EST MOD 30 MIN: CPT | Mod: S$GLB,,, | Performed by: NURSE PRACTITIONER

## 2021-03-05 PROCEDURE — 99214 PR OFFICE/OUTPT VISIT, EST, LEVL IV, 30-39 MIN: ICD-10-PCS | Mod: S$GLB,,, | Performed by: NURSE PRACTITIONER

## 2021-03-05 PROCEDURE — 1125F PR PAIN SEVERITY QUANTIFIED, PAIN PRESENT: ICD-10-PCS | Mod: S$GLB,,, | Performed by: NURSE PRACTITIONER

## 2021-03-05 PROCEDURE — 1125F AMNT PAIN NOTED PAIN PRSNT: CPT | Mod: S$GLB,,, | Performed by: NURSE PRACTITIONER

## 2021-03-05 PROCEDURE — 99999 PR PBB SHADOW E&M-EST. PATIENT-LVL III: ICD-10-PCS | Mod: PBBFAC,,, | Performed by: NURSE PRACTITIONER

## 2021-03-05 PROCEDURE — 99999 PR PBB SHADOW E&M-EST. PATIENT-LVL III: CPT | Mod: PBBFAC,,, | Performed by: NURSE PRACTITIONER

## 2021-03-05 RX ORDER — METHYLPHENIDATE HYDROCHLORIDE 10 MG/1
10 TABLET ORAL 4 TIMES DAILY
Qty: 120 TABLET | Refills: 0 | Status: SHIPPED | OUTPATIENT
Start: 2021-03-05 | End: 2021-03-09 | Stop reason: SDUPTHER

## 2021-03-05 RX ORDER — METHYLPHENIDATE HYDROCHLORIDE 20 MG/1
20 CAPSULE, EXTENDED RELEASE ORAL EVERY MORNING
Qty: 30 CAPSULE | Refills: 0 | Status: SHIPPED | OUTPATIENT
Start: 2021-03-05 | End: 2021-04-12 | Stop reason: SDUPTHER

## 2021-03-05 RX ORDER — UBROGEPANT 100 MG/1
100 TABLET ORAL
Qty: 10 TABLET | Refills: 3 | Status: SHIPPED | OUTPATIENT
Start: 2021-03-05 | End: 2021-12-08 | Stop reason: SDUPTHER

## 2021-03-09 ENCOUNTER — TELEPHONE (OUTPATIENT)
Dept: SLEEP MEDICINE | Facility: OTHER | Age: 46
End: 2021-03-09

## 2021-03-09 ENCOUNTER — TELEPHONE (OUTPATIENT)
Dept: SLEEP MEDICINE | Facility: CLINIC | Age: 46
End: 2021-03-09

## 2021-03-09 ENCOUNTER — PATIENT MESSAGE (OUTPATIENT)
Dept: SLEEP MEDICINE | Facility: CLINIC | Age: 46
End: 2021-03-09

## 2021-03-09 DIAGNOSIS — G47.411 PRIMARY NARCOLEPSY WITH CATAPLEXY: ICD-10-CM

## 2021-03-09 RX ORDER — METHYLPHENIDATE HYDROCHLORIDE 10 MG/1
10 TABLET ORAL 4 TIMES DAILY
Qty: 120 TABLET | Refills: 0 | Status: SHIPPED | OUTPATIENT
Start: 2021-03-09 | End: 2021-03-09 | Stop reason: SDUPTHER

## 2021-03-09 RX ORDER — METHYLPHENIDATE HYDROCHLORIDE 10 MG/1
10 TABLET ORAL 4 TIMES DAILY
Qty: 120 TABLET | Refills: 0 | Status: SHIPPED | OUTPATIENT
Start: 2021-03-09 | End: 2021-03-12 | Stop reason: SDUPTHER

## 2021-03-10 ENCOUNTER — TELEPHONE (OUTPATIENT)
Dept: SLEEP MEDICINE | Facility: CLINIC | Age: 46
End: 2021-03-10

## 2021-03-11 ENCOUNTER — TELEPHONE (OUTPATIENT)
Dept: SLEEP MEDICINE | Facility: CLINIC | Age: 46
End: 2021-03-11

## 2021-03-12 ENCOUNTER — TELEPHONE (OUTPATIENT)
Dept: SLEEP MEDICINE | Facility: CLINIC | Age: 46
End: 2021-03-12

## 2021-03-12 ENCOUNTER — TELEPHONE (OUTPATIENT)
Dept: SLEEP MEDICINE | Facility: OTHER | Age: 46
End: 2021-03-12

## 2021-03-12 ENCOUNTER — PATIENT MESSAGE (OUTPATIENT)
Dept: SLEEP MEDICINE | Facility: CLINIC | Age: 46
End: 2021-03-12

## 2021-03-12 DIAGNOSIS — G47.411 PRIMARY NARCOLEPSY WITH CATAPLEXY: ICD-10-CM

## 2021-03-12 RX ORDER — METHYLPHENIDATE HYDROCHLORIDE 10 MG/1
10 TABLET ORAL 4 TIMES DAILY
Qty: 120 TABLET | Refills: 0 | Status: SHIPPED | OUTPATIENT
Start: 2021-03-12 | End: 2021-03-12 | Stop reason: SDUPTHER

## 2021-03-12 RX ORDER — METHYLPHENIDATE HYDROCHLORIDE 10 MG/1
10 TABLET ORAL 4 TIMES DAILY
Qty: 120 TABLET | Refills: 0 | Status: SHIPPED | OUTPATIENT
Start: 2021-03-12 | End: 2021-04-12 | Stop reason: SDUPTHER

## 2021-03-16 ENCOUNTER — TELEPHONE (OUTPATIENT)
Dept: SLEEP MEDICINE | Facility: CLINIC | Age: 46
End: 2021-03-16

## 2021-03-25 ENCOUNTER — TELEPHONE (OUTPATIENT)
Dept: SLEEP MEDICINE | Facility: OTHER | Age: 46
End: 2021-03-25

## 2021-03-26 ENCOUNTER — HOSPITAL ENCOUNTER (OUTPATIENT)
Dept: SLEEP MEDICINE | Facility: OTHER | Age: 46
Discharge: HOME | End: 2021-03-26
Attending: NURSE PRACTITIONER
Payer: COMMERCIAL

## 2021-03-26 DIAGNOSIS — G47.33 OSA (OBSTRUCTIVE SLEEP APNEA): ICD-10-CM

## 2021-03-26 DIAGNOSIS — G47.30 SLEEP APNEA, UNSPECIFIED TYPE: ICD-10-CM

## 2021-03-26 PROCEDURE — 95800 SLP STDY UNATTENDED: CPT | Mod: 26,,, | Performed by: PSYCHIATRY & NEUROLOGY

## 2021-03-26 PROCEDURE — 95800 SLP STDY UNATTENDED: CPT

## 2021-03-26 PROCEDURE — 95800 PR SLEEP STUDY, UNATTENDED, RECORD HEART RATE/O2 SAT/RESP ANAL/SLEEP TIME: ICD-10-PCS | Mod: 26,,, | Performed by: PSYCHIATRY & NEUROLOGY

## 2021-04-06 DIAGNOSIS — G47.33 OBSTRUCTIVE SLEEP APNEA: Primary | ICD-10-CM

## 2021-04-12 DIAGNOSIS — G47.411 PRIMARY NARCOLEPSY WITH CATAPLEXY: ICD-10-CM

## 2021-04-13 RX ORDER — METHYLPHENIDATE HYDROCHLORIDE 20 MG/1
20 CAPSULE, EXTENDED RELEASE ORAL EVERY MORNING
Qty: 30 CAPSULE | Refills: 0 | Status: SHIPPED | OUTPATIENT
Start: 2021-04-13 | End: 2021-05-18 | Stop reason: SDUPTHER

## 2021-04-13 RX ORDER — METHYLPHENIDATE HYDROCHLORIDE 10 MG/1
10 TABLET ORAL 4 TIMES DAILY
Qty: 120 TABLET | Refills: 0 | Status: SHIPPED | OUTPATIENT
Start: 2021-04-13 | End: 2021-04-15

## 2021-04-15 ENCOUNTER — PATIENT MESSAGE (OUTPATIENT)
Dept: SLEEP MEDICINE | Facility: CLINIC | Age: 46
End: 2021-04-15

## 2021-04-15 DIAGNOSIS — G47.411 PRIMARY NARCOLEPSY WITH CATAPLEXY: ICD-10-CM

## 2021-04-15 RX ORDER — METHYLPHENIDATE HYDROCHLORIDE 10 MG/1
TABLET ORAL
Qty: 120 TABLET | Refills: 0 | Status: SHIPPED | OUTPATIENT
Start: 2021-04-15 | End: 2021-05-17 | Stop reason: SDUPTHER

## 2021-05-04 ENCOUNTER — PATIENT MESSAGE (OUTPATIENT)
Dept: RESEARCH | Facility: HOSPITAL | Age: 46
End: 2021-05-04

## 2021-05-10 ENCOUNTER — PATIENT MESSAGE (OUTPATIENT)
Dept: RESEARCH | Facility: HOSPITAL | Age: 46
End: 2021-05-10

## 2021-05-17 DIAGNOSIS — G47.411 PRIMARY NARCOLEPSY WITH CATAPLEXY: ICD-10-CM

## 2021-05-17 RX ORDER — METHYLPHENIDATE HYDROCHLORIDE 10 MG/1
TABLET ORAL
Qty: 120 TABLET | Refills: 0 | Status: SHIPPED | OUTPATIENT
Start: 2021-05-17 | End: 2021-06-14 | Stop reason: SDUPTHER

## 2021-05-25 ENCOUNTER — TELEPHONE (OUTPATIENT)
Dept: FAMILY MEDICINE | Facility: CLINIC | Age: 46
End: 2021-05-25

## 2021-06-09 PROBLEM — G89.4 CHRONIC PAIN SYNDROME: Status: ACTIVE | Noted: 2021-06-09

## 2021-06-09 PROBLEM — R76.8 POSITIVE HEPATITIS C ANTIBODY TEST: Status: ACTIVE | Noted: 2021-06-09

## 2021-06-10 ENCOUNTER — PATIENT MESSAGE (OUTPATIENT)
Dept: FAMILY MEDICINE | Facility: CLINIC | Age: 46
End: 2021-06-10

## 2021-06-10 ENCOUNTER — OFFICE VISIT (OUTPATIENT)
Dept: FAMILY MEDICINE | Facility: CLINIC | Age: 46
End: 2021-06-10
Payer: COMMERCIAL

## 2021-06-10 VITALS
WEIGHT: 201.81 LBS | BODY MASS INDEX: 38.1 KG/M2 | OXYGEN SATURATION: 99 % | HEIGHT: 61 IN | HEART RATE: 71 BPM | DIASTOLIC BLOOD PRESSURE: 89 MMHG | SYSTOLIC BLOOD PRESSURE: 120 MMHG | RESPIRATION RATE: 18 BRPM

## 2021-06-10 DIAGNOSIS — G47.33 OSA (OBSTRUCTIVE SLEEP APNEA): ICD-10-CM

## 2021-06-10 DIAGNOSIS — E06.3 HYPOTHYROIDISM DUE TO HASHIMOTO'S THYROIDITIS: Primary | ICD-10-CM

## 2021-06-10 DIAGNOSIS — R76.8 POSITIVE HEPATITIS C ANTIBODY TEST: ICD-10-CM

## 2021-06-10 DIAGNOSIS — R68.2 DRY MOUTH: ICD-10-CM

## 2021-06-10 DIAGNOSIS — G43.019 INTRACTABLE MIGRAINE WITHOUT AURA AND WITHOUT STATUS MIGRAINOSUS: ICD-10-CM

## 2021-06-10 DIAGNOSIS — Z11.4 SCREENING FOR HIV (HUMAN IMMUNODEFICIENCY VIRUS): ICD-10-CM

## 2021-06-10 DIAGNOSIS — R76.8 POSITIVE ANA (ANTINUCLEAR ANTIBODY): ICD-10-CM

## 2021-06-10 DIAGNOSIS — R35.0 URINARY FREQUENCY: ICD-10-CM

## 2021-06-10 DIAGNOSIS — E03.8 HYPOTHYROIDISM DUE TO HASHIMOTO'S THYROIDITIS: Primary | ICD-10-CM

## 2021-06-10 DIAGNOSIS — E55.9 VITAMIN D INSUFFICIENCY: ICD-10-CM

## 2021-06-10 DIAGNOSIS — E78.2 HYPERLIPIDEMIA, MIXED: ICD-10-CM

## 2021-06-10 PROCEDURE — 3008F BODY MASS INDEX DOCD: CPT | Mod: CPTII,S$GLB,, | Performed by: FAMILY MEDICINE

## 2021-06-10 PROCEDURE — 1125F AMNT PAIN NOTED PAIN PRSNT: CPT | Mod: S$GLB,,, | Performed by: FAMILY MEDICINE

## 2021-06-10 PROCEDURE — 3008F PR BODY MASS INDEX (BMI) DOCUMENTED: ICD-10-PCS | Mod: CPTII,S$GLB,, | Performed by: FAMILY MEDICINE

## 2021-06-10 PROCEDURE — 1125F PR PAIN SEVERITY QUANTIFIED, PAIN PRESENT: ICD-10-PCS | Mod: S$GLB,,, | Performed by: FAMILY MEDICINE

## 2021-06-10 PROCEDURE — 99214 PR OFFICE/OUTPT VISIT, EST, LEVL IV, 30-39 MIN: ICD-10-PCS | Mod: S$GLB,,, | Performed by: FAMILY MEDICINE

## 2021-06-10 PROCEDURE — 99214 OFFICE O/P EST MOD 30 MIN: CPT | Mod: S$GLB,,, | Performed by: FAMILY MEDICINE

## 2021-06-10 PROCEDURE — 99999 PR PBB SHADOW E&M-EST. PATIENT-LVL IV: CPT | Mod: PBBFAC,,, | Performed by: FAMILY MEDICINE

## 2021-06-10 PROCEDURE — 99999 PR PBB SHADOW E&M-EST. PATIENT-LVL IV: ICD-10-PCS | Mod: PBBFAC,,, | Performed by: FAMILY MEDICINE

## 2021-06-11 ENCOUNTER — PATIENT MESSAGE (OUTPATIENT)
Dept: FAMILY MEDICINE | Facility: CLINIC | Age: 46
End: 2021-06-11

## 2021-06-14 ENCOUNTER — PATIENT MESSAGE (OUTPATIENT)
Dept: FAMILY MEDICINE | Facility: CLINIC | Age: 46
End: 2021-06-14

## 2021-06-14 DIAGNOSIS — G47.411 PRIMARY NARCOLEPSY WITH CATAPLEXY: ICD-10-CM

## 2021-06-14 DIAGNOSIS — R76.8 POSITIVE HEPATITIS C ANTIBODY TEST: ICD-10-CM

## 2021-06-14 RX ORDER — ERYTHROMYCIN 5 MG/G
OINTMENT OPHTHALMIC 3 TIMES DAILY
Qty: 3.5 G | Refills: 0 | Status: SHIPPED | OUTPATIENT
Start: 2021-06-14 | End: 2021-06-21

## 2021-06-14 RX ORDER — METHYLPHENIDATE HYDROCHLORIDE 10 MG/1
TABLET ORAL
Qty: 120 TABLET | Refills: 0 | Status: SHIPPED | OUTPATIENT
Start: 2021-06-14 | End: 2021-07-16 | Stop reason: SDUPTHER

## 2021-07-12 ENCOUNTER — PATIENT MESSAGE (OUTPATIENT)
Dept: FAMILY MEDICINE | Facility: CLINIC | Age: 46
End: 2021-07-12

## 2021-08-03 ENCOUNTER — OFFICE VISIT (OUTPATIENT)
Dept: URGENT CARE | Facility: CLINIC | Age: 46
End: 2021-08-03
Payer: COMMERCIAL

## 2021-08-03 VITALS
HEART RATE: 99 BPM | HEIGHT: 61 IN | BODY MASS INDEX: 37.76 KG/M2 | TEMPERATURE: 98 F | OXYGEN SATURATION: 97 % | DIASTOLIC BLOOD PRESSURE: 80 MMHG | WEIGHT: 200 LBS | RESPIRATION RATE: 16 BRPM | SYSTOLIC BLOOD PRESSURE: 110 MMHG

## 2021-08-03 DIAGNOSIS — J02.9 SORE THROAT: Primary | ICD-10-CM

## 2021-08-03 DIAGNOSIS — J06.9 URI, ACUTE: ICD-10-CM

## 2021-08-03 LAB
CTP QC/QA: YES
SARS-COV-2 RDRP RESP QL NAA+PROBE: NEGATIVE

## 2021-08-03 PROCEDURE — 1159F PR MEDICATION LIST DOCUMENTED IN MEDICAL RECORD: ICD-10-PCS | Mod: CPTII,S$GLB,, | Performed by: FAMILY MEDICINE

## 2021-08-03 PROCEDURE — 99203 OFFICE O/P NEW LOW 30 MIN: CPT | Mod: S$GLB,,, | Performed by: FAMILY MEDICINE

## 2021-08-03 PROCEDURE — U0002 COVID-19 LAB TEST NON-CDC: HCPCS | Mod: QW,S$GLB,, | Performed by: FAMILY MEDICINE

## 2021-08-03 PROCEDURE — 99203 PR OFFICE/OUTPT VISIT, NEW, LEVL III, 30-44 MIN: ICD-10-PCS | Mod: S$GLB,,, | Performed by: FAMILY MEDICINE

## 2021-08-03 PROCEDURE — 1159F MED LIST DOCD IN RCRD: CPT | Mod: CPTII,S$GLB,, | Performed by: FAMILY MEDICINE

## 2021-08-03 PROCEDURE — 3079F PR MOST RECENT DIASTOLIC BLOOD PRESSURE 80-89 MM HG: ICD-10-PCS | Mod: CPTII,S$GLB,, | Performed by: FAMILY MEDICINE

## 2021-08-03 PROCEDURE — 1125F AMNT PAIN NOTED PAIN PRSNT: CPT | Mod: CPTII,S$GLB,, | Performed by: FAMILY MEDICINE

## 2021-08-03 PROCEDURE — 3074F PR MOST RECENT SYSTOLIC BLOOD PRESSURE < 130 MM HG: ICD-10-PCS | Mod: CPTII,S$GLB,, | Performed by: FAMILY MEDICINE

## 2021-08-03 PROCEDURE — 3008F BODY MASS INDEX DOCD: CPT | Mod: CPTII,S$GLB,, | Performed by: FAMILY MEDICINE

## 2021-08-03 PROCEDURE — 1125F PR PAIN SEVERITY QUANTIFIED, PAIN PRESENT: ICD-10-PCS | Mod: CPTII,S$GLB,, | Performed by: FAMILY MEDICINE

## 2021-08-03 PROCEDURE — 3008F PR BODY MASS INDEX (BMI) DOCUMENTED: ICD-10-PCS | Mod: CPTII,S$GLB,, | Performed by: FAMILY MEDICINE

## 2021-08-03 PROCEDURE — 3074F SYST BP LT 130 MM HG: CPT | Mod: CPTII,S$GLB,, | Performed by: FAMILY MEDICINE

## 2021-08-03 PROCEDURE — 3079F DIAST BP 80-89 MM HG: CPT | Mod: CPTII,S$GLB,, | Performed by: FAMILY MEDICINE

## 2021-08-03 PROCEDURE — U0002: ICD-10-PCS | Mod: QW,S$GLB,, | Performed by: FAMILY MEDICINE

## 2021-08-03 RX ORDER — LEVONORGESTREL 52 MG/1
INTRAUTERINE DEVICE INTRAUTERINE
COMMUNITY
Start: 2021-03-12

## 2021-08-04 DIAGNOSIS — Z12.31 OTHER SCREENING MAMMOGRAM: ICD-10-CM

## 2021-08-06 ENCOUNTER — OFFICE VISIT (OUTPATIENT)
Dept: URGENT CARE | Facility: CLINIC | Age: 46
End: 2021-08-06
Payer: COMMERCIAL

## 2021-08-06 VITALS
RESPIRATION RATE: 16 BRPM | HEART RATE: 100 BPM | WEIGHT: 200 LBS | BODY MASS INDEX: 37.76 KG/M2 | TEMPERATURE: 99 F | SYSTOLIC BLOOD PRESSURE: 113 MMHG | OXYGEN SATURATION: 97 % | DIASTOLIC BLOOD PRESSURE: 74 MMHG | HEIGHT: 61 IN

## 2021-08-06 DIAGNOSIS — J02.9 SORE THROAT: ICD-10-CM

## 2021-08-06 DIAGNOSIS — J06.9 VIRAL URI WITH COUGH: Primary | ICD-10-CM

## 2021-08-06 LAB
CTP QC/QA: YES
CTP QC/QA: YES
MOLECULAR STREP A: NEGATIVE
SARS-COV-2 RDRP RESP QL NAA+PROBE: NEGATIVE

## 2021-08-06 PROCEDURE — 3078F DIAST BP <80 MM HG: CPT | Mod: CPTII,S$GLB,, | Performed by: NURSE PRACTITIONER

## 2021-08-06 PROCEDURE — U0002 COVID-19 LAB TEST NON-CDC: HCPCS | Mod: QW,S$GLB,, | Performed by: NURSE PRACTITIONER

## 2021-08-06 PROCEDURE — U0002: ICD-10-PCS | Mod: QW,S$GLB,, | Performed by: NURSE PRACTITIONER

## 2021-08-06 PROCEDURE — 3078F PR MOST RECENT DIASTOLIC BLOOD PRESSURE < 80 MM HG: ICD-10-PCS | Mod: CPTII,S$GLB,, | Performed by: NURSE PRACTITIONER

## 2021-08-06 PROCEDURE — 99214 OFFICE O/P EST MOD 30 MIN: CPT | Mod: S$GLB,,, | Performed by: NURSE PRACTITIONER

## 2021-08-06 PROCEDURE — 1125F AMNT PAIN NOTED PAIN PRSNT: CPT | Mod: CPTII,S$GLB,, | Performed by: NURSE PRACTITIONER

## 2021-08-06 PROCEDURE — 3074F PR MOST RECENT SYSTOLIC BLOOD PRESSURE < 130 MM HG: ICD-10-PCS | Mod: CPTII,S$GLB,, | Performed by: NURSE PRACTITIONER

## 2021-08-06 PROCEDURE — 3074F SYST BP LT 130 MM HG: CPT | Mod: CPTII,S$GLB,, | Performed by: NURSE PRACTITIONER

## 2021-08-06 PROCEDURE — 3008F BODY MASS INDEX DOCD: CPT | Mod: CPTII,S$GLB,, | Performed by: NURSE PRACTITIONER

## 2021-08-06 PROCEDURE — 3008F PR BODY MASS INDEX (BMI) DOCUMENTED: ICD-10-PCS | Mod: CPTII,S$GLB,, | Performed by: NURSE PRACTITIONER

## 2021-08-06 PROCEDURE — 1159F PR MEDICATION LIST DOCUMENTED IN MEDICAL RECORD: ICD-10-PCS | Mod: CPTII,S$GLB,, | Performed by: NURSE PRACTITIONER

## 2021-08-06 PROCEDURE — 99214 PR OFFICE/OUTPT VISIT, EST, LEVL IV, 30-39 MIN: ICD-10-PCS | Mod: S$GLB,,, | Performed by: NURSE PRACTITIONER

## 2021-08-06 PROCEDURE — 1160F RVW MEDS BY RX/DR IN RCRD: CPT | Mod: CPTII,S$GLB,, | Performed by: NURSE PRACTITIONER

## 2021-08-06 PROCEDURE — 1125F PR PAIN SEVERITY QUANTIFIED, PAIN PRESENT: ICD-10-PCS | Mod: CPTII,S$GLB,, | Performed by: NURSE PRACTITIONER

## 2021-08-06 PROCEDURE — 1159F MED LIST DOCD IN RCRD: CPT | Mod: CPTII,S$GLB,, | Performed by: NURSE PRACTITIONER

## 2021-08-06 PROCEDURE — 87651 POCT STREP A MOLECULAR: ICD-10-PCS | Mod: QW,S$GLB,, | Performed by: NURSE PRACTITIONER

## 2021-08-06 PROCEDURE — 87651 STREP A DNA AMP PROBE: CPT | Mod: QW,S$GLB,, | Performed by: NURSE PRACTITIONER

## 2021-08-06 PROCEDURE — 1160F PR REVIEW ALL MEDS BY PRESCRIBER/CLIN PHARMACIST DOCUMENTED: ICD-10-PCS | Mod: CPTII,S$GLB,, | Performed by: NURSE PRACTITIONER

## 2021-08-06 RX ORDER — IPRATROPIUM BROMIDE 21 UG/1
2 SPRAY, METERED NASAL 2 TIMES DAILY
Qty: 30 ML | Refills: 0 | Status: SHIPPED | OUTPATIENT
Start: 2021-08-06 | End: 2021-08-13

## 2021-08-08 ENCOUNTER — PATIENT MESSAGE (OUTPATIENT)
Dept: FAMILY MEDICINE | Facility: CLINIC | Age: 46
End: 2021-08-08

## 2021-08-22 DIAGNOSIS — G47.411 PRIMARY NARCOLEPSY WITH CATAPLEXY: ICD-10-CM

## 2021-08-23 RX ORDER — METHYLPHENIDATE HYDROCHLORIDE 10 MG/1
TABLET ORAL
Qty: 120 TABLET | Refills: 0 | Status: SHIPPED | OUTPATIENT
Start: 2021-08-23 | End: 2021-09-21 | Stop reason: SDUPTHER

## 2021-08-25 ENCOUNTER — PATIENT OUTREACH (OUTPATIENT)
Dept: ADMINISTRATIVE | Facility: OTHER | Age: 46
End: 2021-08-25

## 2021-08-26 ENCOUNTER — OFFICE VISIT (OUTPATIENT)
Dept: SLEEP MEDICINE | Facility: CLINIC | Age: 46
End: 2021-08-26
Payer: COMMERCIAL

## 2021-08-26 VITALS
SYSTOLIC BLOOD PRESSURE: 143 MMHG | BODY MASS INDEX: 39.18 KG/M2 | WEIGHT: 207.5 LBS | HEIGHT: 61 IN | DIASTOLIC BLOOD PRESSURE: 93 MMHG

## 2021-08-26 DIAGNOSIS — G47.411 NARCOLEPSY CATAPLEXY SYNDROME: ICD-10-CM

## 2021-08-26 DIAGNOSIS — G47.33 OBSTRUCTIVE SLEEP APNEA: Primary | ICD-10-CM

## 2021-08-26 PROCEDURE — 3080F DIAST BP >= 90 MM HG: CPT | Mod: CPTII,S$GLB,, | Performed by: NURSE PRACTITIONER

## 2021-08-26 PROCEDURE — 1159F PR MEDICATION LIST DOCUMENTED IN MEDICAL RECORD: ICD-10-PCS | Mod: CPTII,S$GLB,, | Performed by: NURSE PRACTITIONER

## 2021-08-26 PROCEDURE — 3080F PR MOST RECENT DIASTOLIC BLOOD PRESSURE >= 90 MM HG: ICD-10-PCS | Mod: CPTII,S$GLB,, | Performed by: NURSE PRACTITIONER

## 2021-08-26 PROCEDURE — 99214 OFFICE O/P EST MOD 30 MIN: CPT | Mod: S$GLB,,, | Performed by: NURSE PRACTITIONER

## 2021-08-26 PROCEDURE — 1125F AMNT PAIN NOTED PAIN PRSNT: CPT | Mod: CPTII,S$GLB,, | Performed by: NURSE PRACTITIONER

## 2021-08-26 PROCEDURE — 3077F PR MOST RECENT SYSTOLIC BLOOD PRESSURE >= 140 MM HG: ICD-10-PCS | Mod: CPTII,S$GLB,, | Performed by: NURSE PRACTITIONER

## 2021-08-26 PROCEDURE — 1125F PR PAIN SEVERITY QUANTIFIED, PAIN PRESENT: ICD-10-PCS | Mod: CPTII,S$GLB,, | Performed by: NURSE PRACTITIONER

## 2021-08-26 PROCEDURE — 3008F BODY MASS INDEX DOCD: CPT | Mod: CPTII,S$GLB,, | Performed by: NURSE PRACTITIONER

## 2021-08-26 PROCEDURE — 3077F SYST BP >= 140 MM HG: CPT | Mod: CPTII,S$GLB,, | Performed by: NURSE PRACTITIONER

## 2021-08-26 PROCEDURE — 99214 PR OFFICE/OUTPT VISIT, EST, LEVL IV, 30-39 MIN: ICD-10-PCS | Mod: S$GLB,,, | Performed by: NURSE PRACTITIONER

## 2021-08-26 PROCEDURE — 99999 PR PBB SHADOW E&M-EST. PATIENT-LVL III: ICD-10-PCS | Mod: PBBFAC,,, | Performed by: NURSE PRACTITIONER

## 2021-08-26 PROCEDURE — 99999 PR PBB SHADOW E&M-EST. PATIENT-LVL III: CPT | Mod: PBBFAC,,, | Performed by: NURSE PRACTITIONER

## 2021-08-26 PROCEDURE — 3008F PR BODY MASS INDEX (BMI) DOCUMENTED: ICD-10-PCS | Mod: CPTII,S$GLB,, | Performed by: NURSE PRACTITIONER

## 2021-08-26 PROCEDURE — 1159F MED LIST DOCD IN RCRD: CPT | Mod: CPTII,S$GLB,, | Performed by: NURSE PRACTITIONER

## 2021-10-04 ENCOUNTER — PATIENT MESSAGE (OUTPATIENT)
Dept: ADMINISTRATIVE | Facility: HOSPITAL | Age: 46
End: 2021-10-04

## 2021-10-18 ENCOUNTER — PATIENT MESSAGE (OUTPATIENT)
Dept: SLEEP MEDICINE | Facility: CLINIC | Age: 46
End: 2021-10-18
Payer: COMMERCIAL

## 2021-12-06 ENCOUNTER — PATIENT OUTREACH (OUTPATIENT)
Dept: ADMINISTRATIVE | Facility: HOSPITAL | Age: 46
End: 2021-12-06
Payer: COMMERCIAL

## 2021-12-06 ENCOUNTER — PATIENT MESSAGE (OUTPATIENT)
Dept: FAMILY MEDICINE | Facility: CLINIC | Age: 46
End: 2021-12-06
Payer: COMMERCIAL

## 2021-12-08 DIAGNOSIS — G43.019 INTRACTABLE MIGRAINE WITHOUT AURA AND WITHOUT STATUS MIGRAINOSUS: ICD-10-CM

## 2021-12-08 RX ORDER — UBROGEPANT 100 MG/1
100 TABLET ORAL
Qty: 30 TABLET | Refills: 5 | Status: SHIPPED | OUTPATIENT
Start: 2021-12-08 | End: 2022-03-08

## 2021-12-10 ENCOUNTER — TELEPHONE (OUTPATIENT)
Dept: PHARMACY | Facility: CLINIC | Age: 46
End: 2021-12-10
Payer: COMMERCIAL

## 2021-12-10 DIAGNOSIS — G43.009 MIGRAINE WITHOUT AURA AND WITHOUT STATUS MIGRAINOSUS, NOT INTRACTABLE: Primary | ICD-10-CM

## 2021-12-10 RX ORDER — RIMEGEPANT SULFATE 75 MG/75MG
75 TABLET, ORALLY DISINTEGRATING ORAL ONCE AS NEEDED
Qty: 8 TABLET | Refills: 5 | Status: SHIPPED | OUTPATIENT
Start: 2021-12-10 | End: 2022-02-22 | Stop reason: SDUPTHER

## 2021-12-12 DIAGNOSIS — G43.019 INTRACTABLE MIGRAINE WITHOUT AURA AND WITHOUT STATUS MIGRAINOSUS: ICD-10-CM

## 2021-12-14 ENCOUNTER — TELEPHONE (OUTPATIENT)
Dept: PHARMACY | Facility: CLINIC | Age: 46
End: 2021-12-14
Payer: COMMERCIAL

## 2021-12-14 RX ORDER — TOPIRAMATE 100 MG/1
TABLET, FILM COATED ORAL
Qty: 75 TABLET | Refills: 0 | Status: SHIPPED | OUTPATIENT
Start: 2021-12-14 | End: 2021-12-22 | Stop reason: SDUPTHER

## 2021-12-17 ENCOUNTER — PATIENT MESSAGE (OUTPATIENT)
Dept: FAMILY MEDICINE | Facility: CLINIC | Age: 46
End: 2021-12-17
Payer: COMMERCIAL

## 2021-12-22 ENCOUNTER — OFFICE VISIT (OUTPATIENT)
Dept: FAMILY MEDICINE | Facility: CLINIC | Age: 46
End: 2021-12-22
Payer: COMMERCIAL

## 2021-12-22 VITALS
OXYGEN SATURATION: 94 % | SYSTOLIC BLOOD PRESSURE: 136 MMHG | DIASTOLIC BLOOD PRESSURE: 80 MMHG | HEART RATE: 90 BPM | HEIGHT: 61 IN | WEIGHT: 222.19 LBS | BODY MASS INDEX: 41.95 KG/M2

## 2021-12-22 DIAGNOSIS — E55.9 VITAMIN D DEFICIENCY: ICD-10-CM

## 2021-12-22 DIAGNOSIS — R73.01 IMPAIRED FASTING GLUCOSE: ICD-10-CM

## 2021-12-22 DIAGNOSIS — E66.01 CLASS 3 SEVERE OBESITY DUE TO EXCESS CALORIES WITH SERIOUS COMORBIDITY AND BODY MASS INDEX (BMI) OF 40.0 TO 44.9 IN ADULT: ICD-10-CM

## 2021-12-22 DIAGNOSIS — N32.81 OVERACTIVE BLADDER: ICD-10-CM

## 2021-12-22 DIAGNOSIS — G43.019 INTRACTABLE MIGRAINE WITHOUT AURA AND WITHOUT STATUS MIGRAINOSUS: ICD-10-CM

## 2021-12-22 DIAGNOSIS — E55.9 VITAMIN D INSUFFICIENCY: ICD-10-CM

## 2021-12-22 DIAGNOSIS — E78.2 HYPERLIPIDEMIA, MIXED: Primary | ICD-10-CM

## 2021-12-22 DIAGNOSIS — R76.8 POSITIVE HEPATITIS C ANTIBODY TEST: ICD-10-CM

## 2021-12-22 DIAGNOSIS — E03.8 HYPOTHYROIDISM DUE TO HASHIMOTO'S THYROIDITIS: ICD-10-CM

## 2021-12-22 DIAGNOSIS — E06.3 HYPOTHYROIDISM DUE TO HASHIMOTO'S THYROIDITIS: ICD-10-CM

## 2021-12-22 DIAGNOSIS — G47.33 OSA (OBSTRUCTIVE SLEEP APNEA): ICD-10-CM

## 2021-12-22 PROBLEM — Z00.01 ENCOUNTER FOR GENERAL ADULT MEDICAL EXAMINATION WITH ABNORMAL FINDINGS: Status: RESOLVED | Noted: 2021-12-22 | Resolved: 2021-12-22

## 2021-12-22 PROBLEM — Z00.01 ENCOUNTER FOR GENERAL ADULT MEDICAL EXAMINATION WITH ABNORMAL FINDINGS: Status: ACTIVE | Noted: 2021-12-22

## 2021-12-22 PROCEDURE — 3079F PR MOST RECENT DIASTOLIC BLOOD PRESSURE 80-89 MM HG: ICD-10-PCS | Mod: CPTII,S$GLB,, | Performed by: FAMILY MEDICINE

## 2021-12-22 PROCEDURE — 99999 PR PBB SHADOW E&M-EST. PATIENT-LVL III: CPT | Mod: PBBFAC,,, | Performed by: FAMILY MEDICINE

## 2021-12-22 PROCEDURE — 99214 PR OFFICE/OUTPT VISIT, EST, LEVL IV, 30-39 MIN: ICD-10-PCS | Mod: S$GLB,,, | Performed by: FAMILY MEDICINE

## 2021-12-22 PROCEDURE — 99214 OFFICE O/P EST MOD 30 MIN: CPT | Mod: S$GLB,,, | Performed by: FAMILY MEDICINE

## 2021-12-22 PROCEDURE — 3079F DIAST BP 80-89 MM HG: CPT | Mod: CPTII,S$GLB,, | Performed by: FAMILY MEDICINE

## 2021-12-22 PROCEDURE — 3008F PR BODY MASS INDEX (BMI) DOCUMENTED: ICD-10-PCS | Mod: CPTII,S$GLB,, | Performed by: FAMILY MEDICINE

## 2021-12-22 PROCEDURE — 3008F BODY MASS INDEX DOCD: CPT | Mod: CPTII,S$GLB,, | Performed by: FAMILY MEDICINE

## 2021-12-22 PROCEDURE — 3075F PR MOST RECENT SYSTOLIC BLOOD PRESS GE 130-139MM HG: ICD-10-PCS | Mod: CPTII,S$GLB,, | Performed by: FAMILY MEDICINE

## 2021-12-22 PROCEDURE — 3075F SYST BP GE 130 - 139MM HG: CPT | Mod: CPTII,S$GLB,, | Performed by: FAMILY MEDICINE

## 2021-12-22 PROCEDURE — 99999 PR PBB SHADOW E&M-EST. PATIENT-LVL III: ICD-10-PCS | Mod: PBBFAC,,, | Performed by: FAMILY MEDICINE

## 2021-12-22 RX ORDER — ROSUVASTATIN CALCIUM 10 MG/1
10 TABLET, COATED ORAL DAILY
Qty: 90 TABLET | Refills: 4 | Status: SHIPPED | OUTPATIENT
Start: 2021-12-22 | End: 2023-02-02

## 2021-12-22 RX ORDER — LEVOTHYROXINE SODIUM 50 UG/1
50 TABLET ORAL
Qty: 90 TABLET | Refills: 3 | Status: SHIPPED | OUTPATIENT
Start: 2021-12-22 | End: 2023-02-24

## 2021-12-22 RX ORDER — ERGOCALCIFEROL 1.25 MG/1
50000 CAPSULE ORAL
Qty: 24 CAPSULE | Refills: 3 | Status: SHIPPED | OUTPATIENT
Start: 2021-12-23 | End: 2022-11-28

## 2021-12-22 RX ORDER — OXYBUTYNIN CHLORIDE 15 MG/1
15 TABLET, EXTENDED RELEASE ORAL DAILY
Qty: 90 TABLET | Refills: 4 | Status: SHIPPED | OUTPATIENT
Start: 2021-12-22 | End: 2023-03-17

## 2021-12-22 RX ORDER — TOPIRAMATE 100 MG/1
TABLET, FILM COATED ORAL
Qty: 225 TABLET | Refills: 3 | Status: SHIPPED | OUTPATIENT
Start: 2021-12-22 | End: 2022-10-09

## 2022-01-21 DIAGNOSIS — Z86.19 HISTORY OF HERPES LABIALIS: ICD-10-CM

## 2022-01-21 NOTE — TELEPHONE ENCOUNTER
No new care gaps identified.  Powered by [employer]. Reference number: [id number].   1/21/2022 7:07:56 AM CST

## 2022-02-07 RX ORDER — VALACYCLOVIR HYDROCHLORIDE 1 G/1
TABLET, FILM COATED ORAL
Qty: 60 TABLET | Refills: 5 | Status: SHIPPED | OUTPATIENT
Start: 2022-02-07 | End: 2023-01-11 | Stop reason: SDUPTHER

## 2022-02-07 NOTE — TELEPHONE ENCOUNTER
Refill Authorization Note   Amanda Navarrete  is requesting a refill authorization.  Brief Assessment and Rationale for Refill:  Approve     Medication Therapy Plan:       Medication Reconciliation Completed: No   Comments:   --->Care Gap information included below if applicable.   Orders Placed This Encounter    valACYclovir (VALTREX) 1000 MG tablet      Requested Prescriptions   Signed Prescriptions Disp Refills    valACYclovir (VALTREX) 1000 MG tablet 60 tablet 5     Sig: TAKE ONE TABLET BY MOUTH EVERY TWELVE HOURS AS NEEDED       Antimicrobials:  Oral Antiviral Agents - Anti-Herpetic Passed - 1/21/2022  7:07 AM        Passed - Patient is at least 18 years old        Passed - Negative Pregnancy Status Check        Passed - Valid encounter within last 15 months     Recent Visits  Date Type Provider Dept   12/22/21 Office Visit Laya Jaffe, DO [facility]   06/10/21 Office Visit Laya Jaffe, DO [facility]   12/08/20 Office Visit Laya Jaffe, DO [facility]   08/24/20 Office Visit Marcie Marroquin MD [facility]   04/24/20 Office Visit Marcie Marroquin MD [facility]   Showing recent visits within past 720 days and meeting all other requirements  Future Appointments  No visits were found meeting these conditions.  Showing future appointments within next 150 days and meeting all other requirements      Future Appointments              In 1 month [facility] NP [place] [facility]    In 10 months HOSPITAL LAB, [facility], [facility]    [facility] 10 months [facility]  [facility]                Passed - Cr is 1.39 or below and within 360 days     Lab Results   Component Value Date    CREATININE 0.83 12/17/2021    CREATININE 0.68 06/04/2021    CREATININE 0.68 05/22/2020              Passed - WBC in normal range and within 360 days      WBC   Date Value Ref Range Status   06/04/2021 7.14 3.90 - 12.70 K/uL Final   05/22/2020 6.78 3.90 - 12.70 K/uL Final   07/10/2019 7.74 3.90 - 12.70 K/uL Final              Passed - eGFR within 360 days     Lab Results   Component Value Date    EGFRNONAA >60.0 12/17/2021    EGFRNONAA >60.0 06/04/2021    EGFRNONAA >60.0 05/22/2020                    Appointments  past 12m or future 3m with PCP    Date Provider   Last Visit   12/22/2021 Laya Jaffe DO   Next Visit   Visit date not found Laya Jaffe DO   ED visits in past 90 days: 0     Note composed:3:58 PM 02/07/2022

## 2022-02-20 ENCOUNTER — PATIENT MESSAGE (OUTPATIENT)
Dept: FAMILY MEDICINE | Facility: CLINIC | Age: 47
End: 2022-02-20
Payer: COMMERCIAL

## 2022-02-21 ENCOUNTER — PATIENT MESSAGE (OUTPATIENT)
Dept: FAMILY MEDICINE | Facility: CLINIC | Age: 47
End: 2022-02-21
Payer: COMMERCIAL

## 2022-02-22 ENCOUNTER — PATIENT MESSAGE (OUTPATIENT)
Dept: SLEEP MEDICINE | Facility: CLINIC | Age: 47
End: 2022-02-22
Payer: COMMERCIAL

## 2022-02-22 DIAGNOSIS — G43.009 MIGRAINE WITHOUT AURA AND WITHOUT STATUS MIGRAINOSUS, NOT INTRACTABLE: ICD-10-CM

## 2022-02-22 RX ORDER — RIMEGEPANT SULFATE 75 MG/75MG
75 TABLET, ORALLY DISINTEGRATING ORAL ONCE AS NEEDED
Qty: 24 TABLET | Refills: 3 | Status: SHIPPED | OUTPATIENT
Start: 2022-02-22 | End: 2022-02-22

## 2022-02-22 NOTE — TELEPHONE ENCOUNTER
If she thinks that she has a sinus infection, she will need to be seen. Please schedule with any available provider. I do not have any openings this week since I was out of the office    Dr. Laya Jaffe D.O.   [facility]

## 2022-02-23 DIAGNOSIS — D84.9 IMMUNOSUPPRESSED STATUS: ICD-10-CM

## 2022-03-02 DIAGNOSIS — G47.411 PRIMARY NARCOLEPSY WITH CATAPLEXY: ICD-10-CM

## 2022-03-02 RX ORDER — METHYLPHENIDATE HYDROCHLORIDE 20 MG/1
20 CAPSULE, EXTENDED RELEASE ORAL EVERY MORNING
Qty: 30 CAPSULE | Refills: 0 | Status: SHIPPED | OUTPATIENT
Start: 2022-03-02 | End: 2022-04-04 | Stop reason: SDUPTHER

## 2022-03-02 NOTE — TELEPHONE ENCOUNTER
Requested Prescriptions     Pending Prescriptions Disp Refills    methylphenidate HCl (RITALIN LA) 20 MG 24 hr capsule 30 capsule 0     Sig: Take 1 capsule (20 mg total) by mouth every morning.       LOV: 08/26/2021

## 2022-03-07 DIAGNOSIS — G43.009 MIGRAINE WITHOUT AURA AND WITHOUT STATUS MIGRAINOSUS, NOT INTRACTABLE: ICD-10-CM

## 2022-03-07 RX ORDER — RIMEGEPANT SULFATE 75 MG/75MG
75 TABLET, ORALLY DISINTEGRATING ORAL ONCE AS NEEDED
Qty: 8 TABLET | Refills: 5 | Status: SHIPPED | OUTPATIENT
Start: 2022-03-07 | End: 2022-10-19 | Stop reason: SDUPTHER

## 2022-03-11 ENCOUNTER — OFFICE VISIT (OUTPATIENT)
Dept: URGENT CARE | Facility: CLINIC | Age: 47
End: 2022-03-11
Payer: COMMERCIAL

## 2022-03-11 VITALS
HEIGHT: 61 IN | SYSTOLIC BLOOD PRESSURE: 120 MMHG | DIASTOLIC BLOOD PRESSURE: 73 MMHG | OXYGEN SATURATION: 99 % | BODY MASS INDEX: 41.91 KG/M2 | RESPIRATION RATE: 20 BRPM | WEIGHT: 222 LBS | TEMPERATURE: 97 F | HEART RATE: 97 BPM

## 2022-03-11 DIAGNOSIS — S69.92XA LEFT WRIST INJURY, INITIAL ENCOUNTER: Primary | ICD-10-CM

## 2022-03-11 DIAGNOSIS — M79.602 LEFT ARM PAIN: ICD-10-CM

## 2022-03-11 PROCEDURE — 1159F PR MEDICATION LIST DOCUMENTED IN MEDICAL RECORD: ICD-10-PCS | Mod: CPTII,S$GLB,,

## 2022-03-11 PROCEDURE — 73110 XR WRIST COMPLETE 3 VIEWS LEFT: ICD-10-PCS | Mod: LT,S$GLB,, | Performed by: RADIOLOGY

## 2022-03-11 PROCEDURE — 3074F PR MOST RECENT SYSTOLIC BLOOD PRESSURE < 130 MM HG: ICD-10-PCS | Mod: CPTII,S$GLB,,

## 2022-03-11 PROCEDURE — 73030 X-RAY EXAM OF SHOULDER: CPT | Mod: LT,S$GLB,, | Performed by: RADIOLOGY

## 2022-03-11 PROCEDURE — 1160F RVW MEDS BY RX/DR IN RCRD: CPT | Mod: CPTII,S$GLB,,

## 2022-03-11 PROCEDURE — 1160F PR REVIEW ALL MEDS BY PRESCRIBER/CLIN PHARMACIST DOCUMENTED: ICD-10-PCS | Mod: CPTII,S$GLB,,

## 2022-03-11 PROCEDURE — 3074F SYST BP LT 130 MM HG: CPT | Mod: CPTII,S$GLB,,

## 2022-03-11 PROCEDURE — 73110 X-RAY EXAM OF WRIST: CPT | Mod: LT,S$GLB,, | Performed by: RADIOLOGY

## 2022-03-11 PROCEDURE — 99214 PR OFFICE/OUTPT VISIT, EST, LEVL IV, 30-39 MIN: ICD-10-PCS | Mod: 25,S$GLB,,

## 2022-03-11 PROCEDURE — 99214 OFFICE O/P EST MOD 30 MIN: CPT | Mod: 25,S$GLB,,

## 2022-03-11 PROCEDURE — 73080 X-RAY EXAM OF ELBOW: CPT | Mod: LT,S$GLB,, | Performed by: RADIOLOGY

## 2022-03-11 PROCEDURE — 73080 XR ELBOW COMPLETE 3 VIEW LEFT: ICD-10-PCS | Mod: LT,S$GLB,, | Performed by: RADIOLOGY

## 2022-03-11 PROCEDURE — 3078F PR MOST RECENT DIASTOLIC BLOOD PRESSURE < 80 MM HG: ICD-10-PCS | Mod: CPTII,S$GLB,,

## 2022-03-11 PROCEDURE — 3078F DIAST BP <80 MM HG: CPT | Mod: CPTII,S$GLB,,

## 2022-03-11 PROCEDURE — 1159F MED LIST DOCD IN RCRD: CPT | Mod: CPTII,S$GLB,,

## 2022-03-11 PROCEDURE — 96372 THER/PROPH/DIAG INJ SC/IM: CPT | Mod: S$GLB,,,

## 2022-03-11 PROCEDURE — 3008F BODY MASS INDEX DOCD: CPT | Mod: CPTII,S$GLB,,

## 2022-03-11 PROCEDURE — 3008F PR BODY MASS INDEX (BMI) DOCUMENTED: ICD-10-PCS | Mod: CPTII,S$GLB,,

## 2022-03-11 PROCEDURE — 73030 XR SHOULDER COMPLETE 2 OR MORE VIEWS LEFT: ICD-10-PCS | Mod: LT,S$GLB,, | Performed by: RADIOLOGY

## 2022-03-11 PROCEDURE — 96372 PR INJECTION,THERAP/PROPH/DIAG2ST, IM OR SUBCUT: ICD-10-PCS | Mod: S$GLB,,,

## 2022-03-11 RX ORDER — KETOROLAC TROMETHAMINE 30 MG/ML
30 INJECTION, SOLUTION INTRAMUSCULAR; INTRAVENOUS
Status: COMPLETED | OUTPATIENT
Start: 2022-03-11 | End: 2022-03-11

## 2022-03-11 RX ORDER — IBUPROFEN 800 MG/1
800 TABLET ORAL 3 TIMES DAILY
Qty: 30 TABLET | Refills: 0 | Status: SHIPPED | OUTPATIENT
Start: 2022-03-11 | End: 2022-12-27

## 2022-03-11 RX ADMIN — KETOROLAC TROMETHAMINE 30 MG: 30 INJECTION, SOLUTION INTRAMUSCULAR; INTRAVENOUS at 07:03

## 2022-03-11 NOTE — PROGRESS NOTES
"Subjective:       Patient ID: Amanda Navarrete is a 46 y.o. female.    Vitals:  height is 5' 1" (1.549 m) and weight is 100.7 kg (222 lb). Her temperature is 96.9 °F (36.1 °C). Her blood pressure is 120/73 and her pulse is 97. Her respiration is 20 and oxygen saturation is 99%.     Chief Complaint: Arm Pain    Pt is a 45 y/o female who presents with L wrist pain after a fall that occurred approximately 1 hr PTA. Pt states she is unable to move her L arm without pain. Some numbness and tingling in her fingers. Has not taken anything for the pain yet. She is unsure of how she fell on her arm. She thinks she may have fell on an outstretched hand. Denies CP, SOB, N/V/D. Denies hitting her head.    Arm Pain   The incident occurred less than 1 hour ago. The incident occurred at home. The injury mechanism was a fall. The pain is present in the left wrist and left forearm. The quality of the pain is described as stabbing. The pain radiates to the left arm. The pain is at a severity of 8/10. The pain is moderate. The pain has been constant since the incident. Associated symptoms include numbness and tingling. Pertinent negatives include no chest pain. Nothing aggravates the symptoms. She has tried nothing for the symptoms.       Constitution: Negative for chills and fever.   HENT: Negative for ear pain and sinus pain.    Neck: Negative for neck pain and neck stiffness.   Cardiovascular: Negative for chest pain, leg swelling, palpitations and sob on exertion.   Eyes: Negative for eye pain.   Respiratory: Negative for chest tightness, cough, shortness of breath and wheezing.    Gastrointestinal: Negative for abdominal pain, nausea, vomiting, constipation and diarrhea.   Musculoskeletal: Positive for pain, trauma, joint pain and abnormal ROM of joint. Negative for joint swelling.   Skin: Negative for erythema.   Neurological: Positive for numbness and tingling. Negative for dizziness, light-headedness and headaches.     "   Objective:      Physical Exam   Constitutional: She is oriented to person, place, and time. She appears well-developed. She is cooperative. No distress.   HENT:   Head: Normocephalic and atraumatic.   Nose: Nose normal.   Mouth/Throat: Oropharynx is clear and moist and mucous membranes are normal.   Eyes: Conjunctivae and lids are normal.   Neck: Trachea normal and phonation normal. Neck supple.   Cardiovascular: Normal rate, regular rhythm, normal heart sounds and normal pulses.   Pulmonary/Chest: Effort normal and breath sounds normal.   Abdominal: Normal appearance and bowel sounds are normal. She exhibits no abdominal bruit, no pulsatile midline mass and no mass. Soft.   Musculoskeletal:         General: No deformity.        Arms:       Comments: Pt localizes pain to dorsal aspect for distal forearm near wrist. There is some bruising and TTP noted to that area. Tender throughout exam of LUE, but pt reports pain localizes to her wrist/forarm. Pt holding left arm in flexed position and with very limited active and passive ROM of LUE. No crepitus felt. NVIT distally. No tenderness to chest or clavicles.   Neurological: She is alert and oriented to person, place, and time. She has normal strength and normal reflexes. No sensory deficit.   Skin: Skin is warm, dry, intact, not diaphoretic and no rash. bruising No erythema and No lesion   Psychiatric: Her speech is normal and behavior is normal. Judgment and thought content normal.   Nursing note and vitals reviewed.        X-Ray Elbow Complete Left    Result Date: 3/11/2022  EXAMINATION: XR ELBOW COMPLETE 3 VIEW LEFT CLINICAL HISTORY: Pain in left arm TECHNIQUE: AP, lateral, and oblique views of the left elbow were performed. COMPARISON: None FINDINGS: No definite evidence of acute fracture or dislocation.  Joint spaces appear maintained.  Degenerative changes are noted about the elbow joint.  No definite large elbow joint effusion is appreciated.  No radiopaque  foreign body.     No definite evidence of acute fracture or dislocation. Electronically signed by: Markel Morrow Date:    03/11/2022 Time:    18:59    X-Ray Wrist Complete 3 views Left    Result Date: 3/11/2022  EXAMINATION: XR WRIST COMPLETE 3 VIEWS LEFT CLINICAL HISTORY: Unspecified injury of left wrist, hand and finger(s), initial encounter TECHNIQUE: PA, lateral, and oblique views of the left wrist were performed. COMPARISON: None FINDINGS: No evidence of acute displaced fracture, dislocation, or osseous destructive process.  No radiopaque retained foreign body seen.     No acute osseous abnormality identified. Electronically signed by: Michelle Espitia MD Date:    03/11/2022 Time:    18:09    X-Ray Shoulder 2 or More Views Left    Result Date: 3/11/2022  EXAMINATION: XR SHOULDER COMPLETE 2 OR MORE VIEWS LEFT CLINICAL HISTORY: Pain in left arm TECHNIQUE: Two or three views of the left shoulder were performed. COMPARISON: None FINDINGS: Comment: Note that all images of the left shoulder in PACS are of the left shoulder, per discussion with the performing technologist, Padma MYERS. No definite evidence of acute fracture or dislocation is identified.  Joint spaces appear maintained.  No definite evidence of radiopaque foreign body.  Visualized portions of the chest grossly unremarkable.     No convincing evidence of acute fracture or dislocation of the left shoulder. Electronically signed by: Markel Morrow Date:    03/11/2022 Time:    19:16    Assessment:       1. Left wrist injury, initial encounter    2. Left arm pain          Plan:         Left wrist injury, initial encounter  -     X-Ray Wrist Complete 3 views Left; Future; Expected date: 03/11/2022  -     ketorolac injection 30 mg  -     WRIST BRACE FOR HOME USE  -     Ambulatory referral/consult to Orthopedics    Left arm pain  -     X-Ray Shoulder 2 or More Views Left; Future; Expected date: 03/11/2022  -     X-Ray Elbow Complete Left; Future; Expected  date: 03/11/2022  -     ketorolac injection 30 mg  -     SLING FOR HOME USE  -     Ambulatory referral/consult to Orthopedics    Other orders  -     ibuprofen (ADVIL,MOTRIN) 800 MG tablet; Take 1 tablet (800 mg total) by mouth 3 (three) times daily.  Dispense: 30 tablet; Refill: 0           Medical Decision Making:   Initial Assessment:   Pt is a 47 y/o female who presents with L wrist/forearm pain after falling on her L arm. Unsure exactly how she fell. VSS. On exam, pt is very tender throughout LUE but pain localizes to her L wrist/distal forearm. Very limited active and passive ROM secondary to pain. NVIT distally.  Differential Diagnosis:   Strain, sprain, dislocation, fracture  Clinical Tests:   Radiological Study: Ordered and Reviewed  Urgent Care Management:  Reviewed XR with pt. No findings of acute fracture or dislocation. However, given that patient is very tender on exam and with very limited ROM, will give referral to orthopedics. Pt placed in brace and sling. Toradol injection in office for pain and ibuprofen.  reviewed and pt recently filled prescription for oxycodone-acetaminophen. Pt would also like a copy of her XR to see if she can f/up with her own ortho. Pt given copy of XR and radiology reads. Return and ED precautions for worsening symptoms. Pt verbalizes understanding and agrees with plan.         Patient Instructions                                 Orthopedic Discharge Instructions  If your condition worsens or fails to improve we recommend that you receive another evaluation at the ER immediately or contact your PCP to discuss your concerns or return here. You must understand that you've received an urgent care treatment only and that you may be released before all your medical problems are known or treated. You the patient will arrange for follouwp care as instructed.   Follow up with your Orthopedist within the next 48-72hrs for more evaluation and management of your condition.    If  you were prescribed a narcotic or muscle relaxant do not drive or operate heavy machinery while taking these medications   Tylenol can also be used as directed for pain unless you have an allergy to them or medical condition such as stomach ulcers, kidney or liver disease or blood thinners etc for which you should not be taking these type of medications.   RICE which means rest, ice compression and elevation are helpful.   If you were given a splint wear it at all times.   If you were given crutches use them as we instructed. Do not rest your armpits on the foam pad or you risk compressing the nerves and the vessels there   You may take over the counter Calcium 1000 mg daily and Vitamin D3 600 IU daily for 4-6 weeks to help with the bone healing process. If you are not allergic or do not have any contraindications.      - you received a shot of toradol in clinic today, so do not take ibuprofen until at least 24 hours from now.

## 2022-03-12 NOTE — PATIENT INSTRUCTIONS
Orthopedic Discharge Instructions  If your condition worsens or fails to improve we recommend that you receive another evaluation at the ER immediately or contact your PCP to discuss your concerns or return here. You must understand that you've received an urgent care treatment only and that you may be released before all your medical problems are known or treated. You the patient will arrange for follouwp care as instructed.   Follow up with your Orthopedist within the next 48-72hrs for more evaluation and management of your condition.    If you were prescribed a narcotic or muscle relaxant do not drive or operate heavy machinery while taking these medications   Tylenol can also be used as directed for pain unless you have an allergy to them or medical condition such as stomach ulcers, kidney or liver disease or blood thinners etc for which you should not be taking these type of medications.   RICE which means rest, ice compression and elevation are helpful.   If you were given a splint wear it at all times.   If you were given crutches use them as we instructed. Do not rest your armpits on the foam pad or you risk compressing the nerves and the vessels there   You may take over the counter Calcium 1000 mg daily and Vitamin D3 600 IU daily for 4-6 weeks to help with the bone healing process. If you are not allergic or do not have any contraindications.      - you received a shot of toradol in clinic today, so do not take ibuprofen until at least 24 hours from now.

## 2022-03-18 ENCOUNTER — PATIENT MESSAGE (OUTPATIENT)
Dept: ADMINISTRATIVE | Facility: HOSPITAL | Age: 47
End: 2022-03-18
Payer: COMMERCIAL

## 2022-03-18 DIAGNOSIS — Z12.11 SCREENING FOR COLON CANCER: ICD-10-CM

## 2022-03-21 ENCOUNTER — TELEPHONE (OUTPATIENT)
Dept: URGENT CARE | Facility: CLINIC | Age: 47
End: 2022-03-21
Payer: COMMERCIAL

## 2022-03-21 NOTE — TELEPHONE ENCOUNTER
Called to follow-up on pt. States she was able to follow-up with her orthopedist, where additional xrays were done and was found to have radial head fracture.

## 2022-03-30 DIAGNOSIS — G47.411 PRIMARY NARCOLEPSY WITH CATAPLEXY: ICD-10-CM

## 2022-03-30 RX ORDER — METHYLPHENIDATE HYDROCHLORIDE 10 MG/1
TABLET ORAL
Qty: 120 TABLET | Refills: 0 | Status: SHIPPED | OUTPATIENT
Start: 2022-03-30 | End: 2022-05-02 | Stop reason: SDUPTHER

## 2022-03-30 NOTE — TELEPHONE ENCOUNTER
Requested Prescriptions     Pending Prescriptions Disp Refills    methylphenidate HCl (RITALIN) 10 MG tablet 120 tablet 0     Sig: TAKE ONE TABLET BY MOUTH FOUR TIMES DAILY     LOV: 08/26/2021

## 2022-04-04 DIAGNOSIS — G47.411 PRIMARY NARCOLEPSY WITH CATAPLEXY: ICD-10-CM

## 2022-04-04 RX ORDER — METHYLPHENIDATE HYDROCHLORIDE 20 MG/1
20 CAPSULE, EXTENDED RELEASE ORAL EVERY MORNING
Qty: 30 CAPSULE | Refills: 0 | Status: SHIPPED | OUTPATIENT
Start: 2022-04-04 | End: 2022-07-06 | Stop reason: SDUPTHER

## 2022-04-06 ENCOUNTER — TELEPHONE (OUTPATIENT)
Dept: SLEEP MEDICINE | Facility: CLINIC | Age: 47
End: 2022-04-06
Payer: COMMERCIAL

## 2022-04-08 ENCOUNTER — OFFICE VISIT (OUTPATIENT)
Dept: SLEEP MEDICINE | Facility: CLINIC | Age: 47
End: 2022-04-08
Payer: COMMERCIAL

## 2022-04-08 VITALS
DIASTOLIC BLOOD PRESSURE: 73 MMHG | HEIGHT: 61 IN | BODY MASS INDEX: 45.82 KG/M2 | WEIGHT: 242.69 LBS | SYSTOLIC BLOOD PRESSURE: 117 MMHG | HEART RATE: 86 BPM

## 2022-04-08 DIAGNOSIS — G47.33 OSA (OBSTRUCTIVE SLEEP APNEA): ICD-10-CM

## 2022-04-08 DIAGNOSIS — G47.411 NARCOLEPSY CATAPLEXY SYNDROME: Primary | ICD-10-CM

## 2022-04-08 PROCEDURE — 99214 PR OFFICE/OUTPT VISIT, EST, LEVL IV, 30-39 MIN: ICD-10-PCS | Mod: S$GLB,,, | Performed by: NURSE PRACTITIONER

## 2022-04-08 PROCEDURE — 3074F SYST BP LT 130 MM HG: CPT | Mod: CPTII,S$GLB,, | Performed by: NURSE PRACTITIONER

## 2022-04-08 PROCEDURE — 3078F DIAST BP <80 MM HG: CPT | Mod: CPTII,S$GLB,, | Performed by: NURSE PRACTITIONER

## 2022-04-08 PROCEDURE — 1159F MED LIST DOCD IN RCRD: CPT | Mod: CPTII,S$GLB,, | Performed by: NURSE PRACTITIONER

## 2022-04-08 PROCEDURE — 3008F PR BODY MASS INDEX (BMI) DOCUMENTED: ICD-10-PCS | Mod: CPTII,S$GLB,, | Performed by: NURSE PRACTITIONER

## 2022-04-08 PROCEDURE — 99214 OFFICE O/P EST MOD 30 MIN: CPT | Mod: S$GLB,,, | Performed by: NURSE PRACTITIONER

## 2022-04-08 PROCEDURE — 1159F PR MEDICATION LIST DOCUMENTED IN MEDICAL RECORD: ICD-10-PCS | Mod: CPTII,S$GLB,, | Performed by: NURSE PRACTITIONER

## 2022-04-08 PROCEDURE — 3078F PR MOST RECENT DIASTOLIC BLOOD PRESSURE < 80 MM HG: ICD-10-PCS | Mod: CPTII,S$GLB,, | Performed by: NURSE PRACTITIONER

## 2022-04-08 PROCEDURE — 3008F BODY MASS INDEX DOCD: CPT | Mod: CPTII,S$GLB,, | Performed by: NURSE PRACTITIONER

## 2022-04-08 PROCEDURE — 3074F PR MOST RECENT SYSTOLIC BLOOD PRESSURE < 130 MM HG: ICD-10-PCS | Mod: CPTII,S$GLB,, | Performed by: NURSE PRACTITIONER

## 2022-04-08 NOTE — PROGRESS NOTES
Using apap qhs except use impacted by recent left elbow fx and other falls, not sure if was from BPPV or cataplexy or pain/where legs get weak. Rarely takes muscle relaxant or klonopin. Having ongoing fragmented sleep and excessive daytime sleepiness despite stimulants and provigil. ESS=13.   Interrogation- 3mos 28/90d, avg 5.1h/n AHI 0.6,90% tile 11cm.     HX  She has history of narcolepsy with sleep attacks and occasional mild cataplectic episodes during which time her hands become weak. However these are infrequent. Symptoms are well controlled with a combination of Ritalin and Provigil.  She also has a history of migraines. Headaches are intermittent, usually noted on awakening in the morning. Headaches are usually frontal temporal and pressure-like and are not associated with any focal neurological or visual symptoms. They are usually precipitated by stress. She is presently on Topamax 100 mg twice a day which has decreased the intensity and frequency of the headaches.  She denies any new medical problems otherwise and has been overall stable.  She continues to follow-up with pain management for her back problems having had back surgery and is presently on pain medications.  She is also under the care of his psychiatrist because of significant problem with depression and reports that she was diagnosed as having bipolar disorder    8/2021:She continues to take 10mg Ritalin QID (preferred  20mg bid) and Ritalin XR 20mg qam and provigil 200mg qam and 100mg afternoon. She has to be sure not to leave house without it or else can't drive. Has mild cataplexy  infrequently. HA remain stable in location and nature as below. Takes topamax 100mg am and 50mg qhs. Ubrelvy helps.   Got setup with apap 6-16cm after having + HST revealing AHI 7(RDI 11)/low sat 91%. Doing well. Wants to trial FFM now. Mouth drying despite chin strap and air blowing eyes(eye mask on now also). Snoring resolved and sleep more consolidated.  "  Remote- 3-mos after setup 4/22/21 6:40h/night. AHI 0.3, 90% tile 9cm      OBJECTIVE  Well groomed, W/D  /73   Pulse 86   Ht 5' 1" (1.549 m)   Wt 110.1 kg (242 lb 11.2 oz)   BMI 45.86 kg/m²     ASSESSMENT:  Narcolepsy with mild cataplexy,suboptimal on stimulants  Migraine w/o aura,stable  EUNICE, mild, Remains adherent with PAP, benefits from therapy AHI<5  Depression, stable on meds  Chronic pain, once daily percocet      PLAN:  Continue Ritalin 10mg qid (and provigil 200mg qm and 1/2 tab noon prn) and Ritalin XR 20mg ADD Xywav 2.25G twice nightly 7d then 3G 2x nightly. Can't take klonopin or percocet within 8hr of xywav and avoid muscle relaxants bedtime. Mood is stable. Discussed potential s/e and adm restrictions  [employer] supplies. Continue apap 6-16cm.   RTC 4- mos        "

## 2022-04-13 ENCOUNTER — TELEPHONE (OUTPATIENT)
Dept: NEUROLOGY | Facility: CLINIC | Age: 47
End: 2022-04-13
Payer: COMMERCIAL

## 2022-04-13 NOTE — TELEPHONE ENCOUNTER
Spoke to [facility] she is eligible for 30d mail out xywav to be shipped 4/21st and I faxed off her letter ofappeal urgent today (once appealed if denied I will fax to [facility] for her to be in bridge program)

## 2022-04-19 ENCOUNTER — PATIENT MESSAGE (OUTPATIENT)
Dept: FAMILY MEDICINE | Facility: CLINIC | Age: 47
End: 2022-04-19
Payer: COMMERCIAL

## 2022-04-22 ENCOUNTER — TELEPHONE (OUTPATIENT)
Dept: SLEEP MEDICINE | Facility: CLINIC | Age: 47
End: 2022-04-22
Payer: COMMERCIAL

## 2022-04-22 ENCOUNTER — PATIENT MESSAGE (OUTPATIENT)
Dept: SLEEP MEDICINE | Facility: CLINIC | Age: 47
End: 2022-04-22
Payer: COMMERCIAL

## 2022-04-22 NOTE — TELEPHONE ENCOUNTER
After long time on phone hold speaking to several PA staff the xywav got approved for 6-mos from (they just started a new pa on phone, notified 540G/90d which is approved actually up to 810G)  3/23/22-10/19/22, they will fax letter of approval next week and I will forward that to [facility].

## 2022-04-27 DIAGNOSIS — Z12.11 COLON CANCER SCREENING: ICD-10-CM

## 2022-05-02 ENCOUNTER — PATIENT MESSAGE (OUTPATIENT)
Dept: SLEEP MEDICINE | Facility: CLINIC | Age: 47
End: 2022-05-02
Payer: COMMERCIAL

## 2022-05-02 DIAGNOSIS — G47.411 PRIMARY NARCOLEPSY WITH CATAPLEXY: ICD-10-CM

## 2022-05-02 RX ORDER — METHYLPHENIDATE HYDROCHLORIDE 10 MG/1
TABLET ORAL
Qty: 120 TABLET | Refills: 0 | Status: SHIPPED | OUTPATIENT
Start: 2022-05-02 | End: 2022-06-09 | Stop reason: SDUPTHER

## 2022-05-02 NOTE — TELEPHONE ENCOUNTER
Requested Prescriptions     Pending Prescriptions Disp Refills    methylphenidate HCl (RITALIN) 10 MG tablet 120 tablet 0     Sig: TAKE ONE TABLET BY MOUTH FOUR TIMES DAILY     LOV: 04/08/2022

## 2022-05-04 ENCOUNTER — TELEPHONE (OUTPATIENT)
Dept: SLEEP MEDICINE | Facility: CLINIC | Age: 47
End: 2022-05-04
Payer: COMMERCIAL

## 2022-05-31 ENCOUNTER — PATIENT MESSAGE (OUTPATIENT)
Dept: ADMINISTRATIVE | Facility: HOSPITAL | Age: 47
End: 2022-05-31
Payer: COMMERCIAL

## 2022-06-02 ENCOUNTER — PATIENT OUTREACH (OUTPATIENT)
Dept: ADMINISTRATIVE | Facility: HOSPITAL | Age: 47
End: 2022-06-02
Payer: COMMERCIAL

## 2022-06-17 ENCOUNTER — PATIENT OUTREACH (OUTPATIENT)
Dept: ADMINISTRATIVE | Facility: HOSPITAL | Age: 47
End: 2022-06-17
Payer: COMMERCIAL

## 2022-06-21 ENCOUNTER — LAB VISIT (OUTPATIENT)
Dept: LAB | Facility: HOSPITAL | Age: 47
End: 2022-06-21
Attending: FAMILY MEDICINE
Payer: COMMERCIAL

## 2022-06-21 DIAGNOSIS — Z12.11 COLON CANCER SCREENING: ICD-10-CM

## 2022-06-21 PROCEDURE — 82274 ASSAY TEST FOR BLOOD FECAL: CPT | Performed by: FAMILY MEDICINE

## 2022-06-27 ENCOUNTER — PATIENT OUTREACH (OUTPATIENT)
Dept: ADMINISTRATIVE | Facility: HOSPITAL | Age: 47
End: 2022-06-27
Payer: COMMERCIAL

## 2022-06-30 ENCOUNTER — PATIENT MESSAGE (OUTPATIENT)
Dept: FAMILY MEDICINE | Facility: CLINIC | Age: 47
End: 2022-06-30
Payer: COMMERCIAL

## 2022-06-30 LAB — HEMOCCULT STL QL IA: NEGATIVE

## 2022-07-01 ENCOUNTER — TELEPHONE (OUTPATIENT)
Dept: PHARMACY | Facility: CLINIC | Age: 47
End: 2022-07-01
Payer: COMMERCIAL

## 2022-08-05 LAB — PAP RECOMMENDATION EXT: NORMAL

## 2022-08-26 ENCOUNTER — OFFICE VISIT (OUTPATIENT)
Dept: SLEEP MEDICINE | Facility: CLINIC | Age: 47
End: 2022-08-26
Payer: COMMERCIAL

## 2022-08-26 VITALS
WEIGHT: 243 LBS | SYSTOLIC BLOOD PRESSURE: 127 MMHG | HEART RATE: 80 BPM | BODY MASS INDEX: 45.91 KG/M2 | DIASTOLIC BLOOD PRESSURE: 76 MMHG

## 2022-08-26 DIAGNOSIS — G47.411 NARCOLEPSY CATAPLEXY SYNDROME: Primary | ICD-10-CM

## 2022-08-26 DIAGNOSIS — G47.33 OSA (OBSTRUCTIVE SLEEP APNEA): ICD-10-CM

## 2022-08-26 PROCEDURE — 3008F PR BODY MASS INDEX (BMI) DOCUMENTED: ICD-10-PCS | Mod: CPTII,S$GLB,, | Performed by: NURSE PRACTITIONER

## 2022-08-26 PROCEDURE — 3074F PR MOST RECENT SYSTOLIC BLOOD PRESSURE < 130 MM HG: ICD-10-PCS | Mod: CPTII,S$GLB,, | Performed by: NURSE PRACTITIONER

## 2022-08-26 PROCEDURE — 3078F PR MOST RECENT DIASTOLIC BLOOD PRESSURE < 80 MM HG: ICD-10-PCS | Mod: CPTII,S$GLB,, | Performed by: NURSE PRACTITIONER

## 2022-08-26 PROCEDURE — 99214 PR OFFICE/OUTPT VISIT, EST, LEVL IV, 30-39 MIN: ICD-10-PCS | Mod: S$GLB,,, | Performed by: NURSE PRACTITIONER

## 2022-08-26 PROCEDURE — 1159F MED LIST DOCD IN RCRD: CPT | Mod: CPTII,S$GLB,, | Performed by: NURSE PRACTITIONER

## 2022-08-26 PROCEDURE — 3008F BODY MASS INDEX DOCD: CPT | Mod: CPTII,S$GLB,, | Performed by: NURSE PRACTITIONER

## 2022-08-26 PROCEDURE — 3078F DIAST BP <80 MM HG: CPT | Mod: CPTII,S$GLB,, | Performed by: NURSE PRACTITIONER

## 2022-08-26 PROCEDURE — 99999 PR PBB SHADOW E&M-EST. PATIENT-LVL III: CPT | Mod: PBBFAC,,, | Performed by: NURSE PRACTITIONER

## 2022-08-26 PROCEDURE — 1159F PR MEDICATION LIST DOCUMENTED IN MEDICAL RECORD: ICD-10-PCS | Mod: CPTII,S$GLB,, | Performed by: NURSE PRACTITIONER

## 2022-08-26 PROCEDURE — 99214 OFFICE O/P EST MOD 30 MIN: CPT | Mod: S$GLB,,, | Performed by: NURSE PRACTITIONER

## 2022-08-26 PROCEDURE — 99999 PR PBB SHADOW E&M-EST. PATIENT-LVL III: ICD-10-PCS | Mod: PBBFAC,,, | Performed by: NURSE PRACTITIONER

## 2022-08-26 PROCEDURE — 3074F SYST BP LT 130 MM HG: CPT | Mod: CPTII,S$GLB,, | Performed by: NURSE PRACTITIONER

## 2022-08-26 NOTE — PROGRESS NOTES
Using apap qhs with airsense machine. That is fine. Rarely takes muscle relaxant or klonopin. Doesn't find Ritalin xr effective, more helpful is short acting. Denies s/e with beginning Xywav now 3G 2x nightly. Has bladder condition. Still living in [facility].   Interrogation- 1mos avg 5.3h/n AHI 01.5,90% tile 13cm.     HX  She has history of narcolepsy with sleep attacks and occasional mild cataplectic episodes during which time her hands become weak. However these are infrequent. Symptoms are well controlled with a combination of Ritalin and Provigil.  She also has a history of migraines. Headaches are intermittent, usually noted on awakening in the morning. Headaches are usually frontal temporal and pressure-like and are not associated with any focal neurological or visual symptoms. They are usually precipitated by stress. She is presently on Topamax 100 mg twice a day which has decreased the intensity and frequency of the headaches.  She denies any new medical problems otherwise and has been overall stable.  She continues to follow-up with pain management for her back problems having had back surgery and is presently on pain medications.  She is also under the care of his psychiatrist because of significant problem with depression and reports that she was diagnosed as having bipolar disorder    8/2021:She continues to take 10mg Ritalin QID (preferred  20mg bid) and Ritalin XR 20mg qam and provigil 200mg qam and 100mg afternoon. She has to be sure not to leave house without it or else can't drive. Has mild cataplexy  infrequently. HA remain stable in location and nature as below. Takes topamax 100mg am and 50mg qhs. Ubrelvy helps.   Got setup with apap 6-16cm after having + HST revealing AHI 7(RDI 11)/low sat 91%. Doing well. Wants to trial FFM now. Mouth drying despite chin strap and air blowing eyes(eye mask on now also). Snoring resolved and sleep more consolidated.   Remote- 3-mos after setup 4/22/21  6:40h/night. AHI 0.3, 90% tile 9cm      OBJECTIVE  /76   Pulse 80   Wt 110.2 kg (243 lb)   BMI 45.91 kg/m²     ASSESSMENT:  Narcolepsy with mild cataplexy,suboptimal on stimulants and 6g TDD Xywav thus far  Migraine w/o aura,stable  EUNICE, mild, Remains adherent with PAP, benefits from therapy AHI<5  Depression, stable on meds  Chronic pain, once daily percocet      PLAN:  Continue Ritalin 10mg qid and provigil 200mg qm and 1/2 tab noon prn and Ritalin XR 20mg BUT INCREASE to more therapeutic dose Xywav 3.75 2x nightly 7d then 4.5G 2x nightly thereafter .Can't take klonopin or percocet within 8hr of xywav and avoid muscle relaxants bedtime. Mood is stable.  [employer] supplies. Continue apap 6-16cm.   RTC 3- mos

## 2022-09-06 ENCOUNTER — TELEPHONE (OUTPATIENT)
Dept: SLEEP MEDICINE | Facility: CLINIC | Age: 47
End: 2022-09-06
Payer: COMMERCIAL

## 2022-09-06 NOTE — TELEPHONE ENCOUNTER
----- Message from Mary Paulson sent at 9/6/2022  1:58 PM CDT -----  Type: Patient Call Back    Who called: [facility]     What is the request in detail: need to know why pt is taking xywav     Can the clinic reply by [facility]?    Would the patient rather a call back or a response via [facility]?     Best call back number: [phone], option 3, then 4

## 2022-09-06 NOTE — TELEPHONE ENCOUNTER
Spoke to [employer] [facility] about patients Xywav. They needed the ICD 10 code. ICD 10 code was given

## 2022-09-08 ENCOUNTER — PATIENT OUTREACH (OUTPATIENT)
Dept: ADMINISTRATIVE | Facility: HOSPITAL | Age: 47
End: 2022-09-08
Payer: COMMERCIAL

## 2022-09-08 NOTE — PROGRESS NOTES
09/08/2022 Gap report audit performed. Care Everywhere updates requested and reviewed  Overdue HM topic chart audit and/or requested. LINKS triggered and reconciled. Media reviewed  HM Updated - PAP 2022    Health Maintenance Due   Topic Date Due    Influenza Vaccine (1) 09/01/2022

## 2022-09-13 DIAGNOSIS — G47.411 PRIMARY NARCOLEPSY WITH CATAPLEXY: ICD-10-CM

## 2022-09-13 RX ORDER — METHYLPHENIDATE HYDROCHLORIDE 10 MG/1
TABLET ORAL
Qty: 120 TABLET | Refills: 0 | Status: SHIPPED | OUTPATIENT
Start: 2022-09-13 | End: 2022-09-16 | Stop reason: SDUPTHER

## 2022-09-16 ENCOUNTER — PATIENT MESSAGE (OUTPATIENT)
Dept: SLEEP MEDICINE | Facility: CLINIC | Age: 47
End: 2022-09-16
Payer: COMMERCIAL

## 2022-09-16 DIAGNOSIS — G47.419 NARCOLEPSY WITHOUT CATAPLEXY: Primary | ICD-10-CM

## 2022-09-16 DIAGNOSIS — G47.411 NARCOLEPSY WITH CATAPLEXY: ICD-10-CM

## 2022-09-16 RX ORDER — METHYLPHENIDATE HYDROCHLORIDE 10 MG/1
10 TABLET ORAL 4 TIMES DAILY
Qty: 120 TABLET | Refills: 0 | Status: SHIPPED | OUTPATIENT
Start: 2022-09-16 | End: 2022-12-07 | Stop reason: SDUPTHER

## 2022-09-22 ENCOUNTER — PATIENT MESSAGE (OUTPATIENT)
Dept: FAMILY MEDICINE | Facility: CLINIC | Age: 47
End: 2022-09-22
Payer: COMMERCIAL

## 2022-09-22 DIAGNOSIS — F41.1 GENERALIZED ANXIETY DISORDER: Primary | ICD-10-CM

## 2022-09-22 NOTE — TELEPHONE ENCOUNTER
Referral to psychiatry placed. She can call central scheduling at [phone] or go online/[facility] to schedule an appointment.    If she needs medication ASAP, she will need a visit to be seen with me or new provider.    Dr. Laya Jaffe D.O.   Family Medicine

## 2022-10-04 ENCOUNTER — PATIENT MESSAGE (OUTPATIENT)
Dept: FAMILY MEDICINE | Facility: CLINIC | Age: 47
End: 2022-10-04
Payer: COMMERCIAL

## 2022-10-11 DIAGNOSIS — G47.411 NARCOLEPSY CATAPLEXY SYNDROME: ICD-10-CM

## 2022-10-11 RX ORDER — MODAFINIL 200 MG/1
TABLET ORAL
Qty: 45 TABLET | Refills: 5 | Status: SHIPPED | OUTPATIENT
Start: 2022-10-11 | End: 2023-05-01 | Stop reason: SDUPTHER

## 2022-10-19 DIAGNOSIS — G43.009 MIGRAINE WITHOUT AURA AND WITHOUT STATUS MIGRAINOSUS, NOT INTRACTABLE: ICD-10-CM

## 2022-10-19 RX ORDER — RIMEGEPANT SULFATE 75 MG/75MG
75 TABLET, ORALLY DISINTEGRATING ORAL ONCE AS NEEDED
Qty: 8 TABLET | Refills: 5 | Status: SHIPPED | OUTPATIENT
Start: 2022-10-19 | End: 2024-01-11 | Stop reason: SDUPTHER

## 2022-10-26 DIAGNOSIS — G47.411 PRIMARY NARCOLEPSY WITH CATAPLEXY: ICD-10-CM

## 2022-10-26 RX ORDER — METHYLPHENIDATE HYDROCHLORIDE 20 MG/1
20 CAPSULE, EXTENDED RELEASE ORAL EVERY MORNING
Qty: 30 CAPSULE | Refills: 0 | Status: SHIPPED | OUTPATIENT
Start: 2022-10-26 | End: 2022-12-16 | Stop reason: SDUPTHER

## 2022-11-03 DIAGNOSIS — G47.411 NARCOLEPSY WITH CATAPLEXY: Primary | ICD-10-CM

## 2022-11-03 RX ORDER — (CALCIUM, MAGNESIUM, POTASSIUM, AND SODIUM OXYBATES) .5; .5; .5; .5 G/ML; G/ML; G/ML; G/ML
4.5 SOLUTION ORAL 2 TIMES DAILY
Qty: 450 ML | Refills: 5 | Status: SHIPPED | OUTPATIENT
Start: 2022-11-03 | End: 2023-11-03 | Stop reason: SDUPTHER

## 2022-11-04 ENCOUNTER — SPECIALTY PHARMACY (OUTPATIENT)
Dept: PHARMACY | Facility: CLINIC | Age: 47
End: 2022-11-04
Payer: COMMERCIAL

## 2022-11-04 NOTE — TELEPHONE ENCOUNTER
"Xywav PA attempted via epic. Case ID: [id number].  PA closed due to " Prior authorization duplicate/in process    Staff message sent to [facility]    _____________________________________    Kate, this is Elif Cherry with [facility].  We are working on your prescription that your doctor has sent us. We will be working with your insurance to get this approved for you. We will be calling you along the way with updates on your medication.  If you have any questions, you can reach us at [phone].    Welcome call outcome: Patient/caregiver reached  "

## 2022-11-07 ENCOUNTER — PATIENT MESSAGE (OUTPATIENT)
Dept: PRIMARY CARE CLINIC | Facility: CLINIC | Age: 47
End: 2022-11-07
Payer: COMMERCIAL

## 2022-11-07 RX ORDER — FLUCONAZOLE 150 MG/1
150 TABLET ORAL DAILY
Qty: 1 TABLET | Refills: 0 | Status: SHIPPED | OUTPATIENT
Start: 2022-11-07 | End: 2022-11-08

## 2022-11-11 NOTE — TELEPHONE ENCOUNTER
Xywav PA approved 1620ml/90 days - 11/11/22 to 11/11/2023    Routing out to  [facility]    [facility]

## 2022-11-15 ENCOUNTER — TELEPHONE (OUTPATIENT)
Dept: SLEEP MEDICINE | Facility: CLINIC | Age: 47
End: 2022-11-15
Payer: COMMERCIAL

## 2022-11-15 NOTE — TELEPHONE ENCOUNTER
"----- Message from Adeola Tasha sent at 11/15/2022  2:14 PM CST -----  Regarding: Es GRIFFITH"[facility]  .Type: Patient Call Back    Who called: Es GRIFFITH"[facility]     What is the request in detail: Requesting clarification on a rx that was received for don     Can the clinic reply by [facility]? Call back     Would the patient rather a call back or a response via [facility]? Call back     Best call back number: [phone] opt 3 then 4         "

## 2022-11-15 NOTE — TELEPHONE ENCOUNTER
Spoke to pharamcy in regards to the message they received. She wanted to clarify the dosage and instructions on the RX. The information in the patients chart was relayed to the pharmacist.     BUT INCREASE to more therapeutic dose Xywav 3.75 2x nightly 7d then 4.5G 2x nightly thereafter

## 2022-11-23 DIAGNOSIS — Z12.31 OTHER SCREENING MAMMOGRAM: ICD-10-CM

## 2022-11-28 ENCOUNTER — OFFICE VISIT (OUTPATIENT)
Dept: URGENT CARE | Facility: CLINIC | Age: 47
End: 2022-11-28
Payer: COMMERCIAL

## 2022-11-28 VITALS
HEART RATE: 110 BPM | BODY MASS INDEX: 40.59 KG/M2 | HEIGHT: 61 IN | OXYGEN SATURATION: 98 % | DIASTOLIC BLOOD PRESSURE: 90 MMHG | RESPIRATION RATE: 16 BRPM | WEIGHT: 215 LBS | TEMPERATURE: 99 F | SYSTOLIC BLOOD PRESSURE: 132 MMHG

## 2022-11-28 DIAGNOSIS — J11.1 INFLUENZA: Primary | ICD-10-CM

## 2022-11-28 DIAGNOSIS — J02.9 SORE THROAT: ICD-10-CM

## 2022-11-28 LAB
CTP QC/QA: YES
MOLECULAR STREP A: NEGATIVE
POC MOLECULAR INFLUENZA A AGN: POSITIVE
POC MOLECULAR INFLUENZA B AGN: NEGATIVE
SARS-COV-2 AG RESP QL IA.RAPID: NEGATIVE

## 2022-11-28 PROCEDURE — 1159F MED LIST DOCD IN RCRD: CPT | Mod: CPTII,S$GLB,, | Performed by: FAMILY MEDICINE

## 2022-11-28 PROCEDURE — 87502 INFLUENZA DNA AMP PROBE: CPT | Mod: QW,S$GLB,, | Performed by: FAMILY MEDICINE

## 2022-11-28 PROCEDURE — 1159F PR MEDICATION LIST DOCUMENTED IN MEDICAL RECORD: ICD-10-PCS | Mod: CPTII,S$GLB,, | Performed by: FAMILY MEDICINE

## 2022-11-28 PROCEDURE — 3080F PR MOST RECENT DIASTOLIC BLOOD PRESSURE >= 90 MM HG: ICD-10-PCS | Mod: CPTII,S$GLB,, | Performed by: FAMILY MEDICINE

## 2022-11-28 PROCEDURE — 3008F BODY MASS INDEX DOCD: CPT | Mod: CPTII,S$GLB,, | Performed by: FAMILY MEDICINE

## 2022-11-28 PROCEDURE — 87651 STREP A DNA AMP PROBE: CPT | Mod: QW,S$GLB,, | Performed by: FAMILY MEDICINE

## 2022-11-28 PROCEDURE — 87502 POCT INFLUENZA A/B MOLECULAR: ICD-10-PCS | Mod: QW,S$GLB,, | Performed by: FAMILY MEDICINE

## 2022-11-28 PROCEDURE — 99213 OFFICE O/P EST LOW 20 MIN: CPT | Mod: S$GLB,,, | Performed by: FAMILY MEDICINE

## 2022-11-28 PROCEDURE — 1160F PR REVIEW ALL MEDS BY PRESCRIBER/CLIN PHARMACIST DOCUMENTED: ICD-10-PCS | Mod: CPTII,S$GLB,, | Performed by: FAMILY MEDICINE

## 2022-11-28 PROCEDURE — 87651 POCT STREP A MOLECULAR: ICD-10-PCS | Mod: QW,S$GLB,, | Performed by: FAMILY MEDICINE

## 2022-11-28 PROCEDURE — 99213 PR OFFICE/OUTPT VISIT, EST, LEVL III, 20-29 MIN: ICD-10-PCS | Mod: S$GLB,,, | Performed by: FAMILY MEDICINE

## 2022-11-28 PROCEDURE — 3075F SYST BP GE 130 - 139MM HG: CPT | Mod: CPTII,S$GLB,, | Performed by: FAMILY MEDICINE

## 2022-11-28 PROCEDURE — 87811 SARS-COV-2 COVID19 W/OPTIC: CPT | Mod: QW,S$GLB,, | Performed by: FAMILY MEDICINE

## 2022-11-28 PROCEDURE — 3080F DIAST BP >= 90 MM HG: CPT | Mod: CPTII,S$GLB,, | Performed by: FAMILY MEDICINE

## 2022-11-28 PROCEDURE — 3075F PR MOST RECENT SYSTOLIC BLOOD PRESS GE 130-139MM HG: ICD-10-PCS | Mod: CPTII,S$GLB,, | Performed by: FAMILY MEDICINE

## 2022-11-28 PROCEDURE — 3008F PR BODY MASS INDEX (BMI) DOCUMENTED: ICD-10-PCS | Mod: CPTII,S$GLB,, | Performed by: FAMILY MEDICINE

## 2022-11-28 PROCEDURE — 1160F RVW MEDS BY RX/DR IN RCRD: CPT | Mod: CPTII,S$GLB,, | Performed by: FAMILY MEDICINE

## 2022-11-28 PROCEDURE — 87811 SARS CORONAVIRUS 2 ANTIGEN POCT, MANUAL READ: ICD-10-PCS | Mod: QW,S$GLB,, | Performed by: FAMILY MEDICINE

## 2022-11-28 RX ORDER — RIMEGEPANT SULFATE 75 MG/75MG
TABLET, ORALLY DISINTEGRATING ORAL
COMMUNITY
Start: 2022-07-01 | End: 2023-07-05 | Stop reason: SDUPTHER

## 2022-11-28 RX ORDER — GUAIFENESIN 600 MG/1
1200 TABLET, EXTENDED RELEASE ORAL 2 TIMES DAILY
Qty: 40 TABLET | Refills: 0 | Status: SHIPPED | OUTPATIENT
Start: 2022-11-28 | End: 2022-12-08

## 2022-11-28 RX ORDER — PROMETHAZINE HYDROCHLORIDE AND DEXTROMETHORPHAN HYDROBROMIDE 6.25; 15 MG/5ML; MG/5ML
5 SYRUP ORAL EVERY 6 HOURS PRN
Qty: 118 ML | Refills: 0 | Status: SHIPPED | OUTPATIENT
Start: 2022-11-28 | End: 2023-07-03 | Stop reason: ALTCHOICE

## 2022-11-28 RX ORDER — AMOXICILLIN 500 MG/1
500 CAPSULE ORAL 3 TIMES DAILY
COMMUNITY
Start: 2022-10-30 | End: 2022-12-27

## 2022-11-28 RX ORDER — BENZONATATE 100 MG/1
100 CAPSULE ORAL 3 TIMES DAILY PRN
Qty: 30 CAPSULE | Refills: 0 | Status: SHIPPED | OUTPATIENT
Start: 2022-11-28 | End: 2022-12-08

## 2022-11-28 RX ORDER — CETIRIZINE HYDROCHLORIDE 10 MG/1
10 TABLET ORAL DAILY
Qty: 30 TABLET | Refills: 0 | Status: SHIPPED | OUTPATIENT
Start: 2022-11-28 | End: 2024-03-21

## 2022-11-28 RX ORDER — FLUTICASONE PROPIONATE 50 MCG
1 SPRAY, SUSPENSION (ML) NASAL DAILY
Qty: 16 G | Refills: 0 | Status: SHIPPED | OUTPATIENT
Start: 2022-11-28

## 2022-11-28 NOTE — PATIENT INSTRUCTIONS
General Discharge Instructions   PLEASE READ YOUR DISCHARGE INSTRUCTIONS ENTIRELY AS IT CONTAINS IMPORTANT INFORMATION.  If you were prescribed a narcotic or controlled medication, do not drive or operate heavy equipment or machinery while taking these medications.  If you were prescribed antibiotics, please take them to completion.  You must understand that you've received an Urgent Care treatment only and that you may be released before all your medical problems are known or treated. You, the patient, will arrange for follow up care as instructed.    OVER THE COUNTER RECOMMENDATIONS/SUGGESTIONS.    Make sure to stay well hydrated.    Use Nasal Saline to mechanically move any post nasal drip from your eustachian tube or from the back of your throat.    Use warm salt water gargles to ease your throat pain. Warm salt water gargles as needed for sore throat- 1/2 tsp salt to 1 cup warm water, gargle as desired.    Use an antihistamine such as Claritin, Zyrtec or Allegra to dry you out.    Use pseudoephedrine (behind the counter) to decongest. Pseudoephedrine 30 mg up to 240 mg /day. It can raise your blood pressure and give you palpitations.    Use mucinex (guaifenesin) to break up mucous up to 2400mg/day to loosen any mucous.    The mucinex DM pill has a cough suppressant that can be sedating. It can be used at night to stop the tickle at the back of your throat.    You can use Mucinex D (it has guaifenesin and a high dose of pseudoephedrine) in the mornings to help decongest.    Use Afrin in each nare for no longer than 3 days, as it is addictive. It can also dry out your mucous membranes and cause elevated blood pressure. This is especially useful if you are flying.    Use Flonase 1-2 sprays/nostril per day. It is a local acting steroid nasal spray, if you develop a bloody nose, stop using the medication immediately.    Sometimes Nyquil at night is beneficial to help you get some rest, however it is sedating and it  does have an antihistamine, and tylenol.    Honey is a natural cough suppressant that can be used.    Tylenol up to 4,000 mg a day is safe for short periods and can be used for body aches, pain, and fever. However in high doses and prolonged use it can cause liver irritation.    Ibuprofen is a non-steroidal anti-inflammatory that can be used for body aches, pain, and fever.However it can also cause stomach irritation if over used.     Follow up with your PCP or specialty clinic as instructed in the next 2-3 days if not improved or as needed. You can call [phone] to schedule an appointment with appropriate provider.      If you condition worsens, we recommend that you receive another evaluation at the emergency room immediately or contact your primary medical clinic's after hours call service to discuss your concerns.      Please return here or go to the Emergency Department for any concerns or worsening condition.   You can also call [phone] to schedule an appointment with the appropriate provider.    Please return here or go to the Emergency Department for any concerns or worsening of condition.    Thank you for choosing [facility]!    Our goal in the Urgent Care is to always provide outstanding medical care. You may receive a survey by mail or e-mail in the next week regarding your experience today. We would greatly appreciate you completing and returning the survey. Your feedback provides us with a way to recognize our staff who provide very good care, and it helps us learn how to improve when your experience was below our aspiration of excellence.      We appreciate you trusting us with your medical care. We hope you feel better soon. We will be happy to take care of you for all of your future medical needs.    Sincerely,    TREE Rios  FLU Discharge Instructions    You have been diagnosed with Influenza.   You are contagious for 24 hours after you start the Tamilfu or 24 hours  after your last fever, whichever happens last.  Please drink plenty of fluids.  Please get plenty of rest.  Please return here or go to the Emergency Department for any concerns or worsening of condition.  Tamiflu prescription has been discussed and if prescribed, please take to completion unless you cannot tolerate the side effects.   If you were prescribed a narcotic medication, do not drive or operate heavy equipment or machinery while taking these medications.  If you were given a steroid shot in the clinic and have also been given a prescription for a steroid such as Prednisone or a Medrol Dose Pack, please begin taking them tomorrow.  Take tylenol (acetominophen) for fever, chills or body aches every 4 hours. do not exceed 4000 mg/ day.  Take Motrin (Ibuprofen) every 4 hours for fever, chills, pain or inflammation.  Use an antihistmine such as claritin or zyrtec to dry you out. Use pseudoephedrine (behind the counter) to decongest (beware this can raise your blood pressure). Use mucinex (guaifenisin) to break up mucous

## 2022-11-28 NOTE — PROGRESS NOTES
"Subjective:       Patient ID: Amanda Navarrete is a 47 y.o. female.    Vitals:  height is 5' 1" (1.549 m) and weight is 97.5 kg (215 lb). Her temperature is 99.2 °F (37.3 °C). Her blood pressure is 132/90 (abnormal) and her pulse is 110. Her respiration is 16 and oxygen saturation is 98%.     Chief Complaint: Sinus Problem    Pt c/o cough, congestion, sore throat, headache, sinus pressure, bodyaches, nausea x3days. Pt sts she was exposed to flu and strep. Pt is COVID and flu vaccinated. Pt has been taking theraflu and excedrin for relief.   Provider note begins below:  Past Medical History:  No date: Affective disorder      Comment:  sses Dr. Dial, psychiatry  No date: Back pain  No date: GERD (gastroesophageal reflux disease)  No date: Headache(784.0)  9/18/2019: Hyperlipidemia, mixed  No date: Migraine headache  No date: Narcolepsy without cataplexy(347.00)  No date: Thyroid disease      Comment:  hyperthyroidism  No date: TMJ arthralgia   Pt started 2 days ago with body aches, sore throat Saturday, contacts with flu and strep, worse last night.     Sinus Problem  This is a new problem. The current episode started in the past 7 days. The problem has been gradually worsening since onset. There has been no fever. Her pain is at a severity of 8/10. Associated symptoms include chills, congestion, coughing, diaphoresis, headaches, sinus pressure, sneezing and a sore throat. Pertinent negatives include no ear pain, hoarse voice, neck pain, shortness of breath or swollen glands. Past treatments include oral decongestants. The treatment provided no relief.     Constitution: Positive for chills, sweating and fatigue. Negative for activity change, appetite change, fever, unexpected weight change and generalized weakness.   HENT:  Positive for congestion, postnasal drip, sinus pain, sinus pressure and sore throat. Negative for ear pain, nosebleeds, foreign body in nose, trouble swallowing and voice change.    Neck: " Negative for neck pain and neck stiffness.   Cardiovascular:  Negative for chest pain, leg swelling, palpitations and sob on exertion.   Respiratory:  Positive for cough and sputum production. Negative for chest tightness, bloody sputum, COPD, shortness of breath, stridor, wheezing and asthma.    Gastrointestinal:  Positive for nausea. Negative for abdominal pain, vomiting, constipation and diarrhea.   Musculoskeletal:  Positive for muscle cramps and muscle ache.   Allergic/Immunologic: Positive for sneezing. Negative for asthma.   Neurological:  Positive for headaches.     Objective:      Physical Exam   Constitutional: She is oriented to person, place, and time. She appears well-developed.  Non-toxic appearance. She does not appear ill. No distress. overweight  HENT:   Head: Normocephalic and atraumatic.   Ears:   Right Ear: External ear normal.   Left Ear: External ear normal.   Nose: Nose normal.   Mouth/Throat: Uvula is midline, oropharynx is clear and moist and mucous membranes are normal. No oropharyngeal exudate, posterior oropharyngeal edema, posterior oropharyngeal erythema, tonsillar abscesses or cobblestoning. No tonsillar exudate.   Eyes: Conjunctivae, EOM and lids are normal. Pupils are equal, round, and reactive to light.   Neck: Trachea normal and phonation normal. Neck supple. No Brudzinski's sign and no Kernig's sign noted.   Cardiovascular: S1 normal and S2 normal.   Pulmonary/Chest: Effort normal and breath sounds normal.   Musculoskeletal: Normal range of motion.         General: Normal range of motion.   Lymphadenopathy:     She has no cervical adenopathy.   Neurological: She is alert and oriented to person, place, and time.   Skin: Skin is warm, dry, intact and not diaphoretic.   Psychiatric: Her speech is normal and behavior is normal. Judgment and thought content normal.   Nursing note and vitals reviewed.      Assessment:       1. Influenza    2. Sore throat        Results for orders placed  or performed in visit on 11/28/22   SARS Coronavirus 2 Antigen, POCT Manual Read   Result Value Ref Range    SARS Coronavirus 2 Antigen Negative Negative     Acceptable Yes    POCT Strep A, Molecular   Result Value Ref Range    Molecular Strep A, POC Negative Negative     Acceptable Yes    POCT Influenza A/B MOLECULAR   Result Value Ref Range    POC Molecular Influenza A Ag Positive (A) Negative, Not Reported    POC Molecular Influenza B Ag Negative Negative, Not Reported     Acceptable Yes       Plan:       Req all testing  Advised on salt water gargles, throat lozenges, tea with honey, alternate tylenol and ibu for ha/body aches  Flu +, allergy to tamiflu, unsure about other antivirals, we discussed ok to hold  Otc treatment reviewed  Rtw declined    Discussed results/diagnosis/plan with patient in clinic. Strict precautions given to patient to monitor for worsening signs and symptoms. Advised to follow up with PCP or specialist.    Explained side effects of medications prescribed with patient and informed him/her to discontinue use if he/she has any side effects and to inform UC or PCP if this occurs. All questions answered. Strict ED verses clinic return precautions stressed and given in depth. Advised if symptoms worsens of fail to improve he/she should go to the Emergency Room. Discharge and follow-up instructions given verbally/printed with the patient who expressed understanding and willingness to comply with my recommendations. Patient voiced understanding and in agreement with current treatment plan. Patient exits the exam room in no acute distress. Conversant and engaged during discharge discussion, verbalized understanding.      Influenza  -     cetirizine (ZYRTEC) 10 MG tablet; Take 1 tablet (10 mg total) by mouth once daily.  Dispense: 30 tablet; Refill: 0  -     fluticasone propionate (FLONASE) 50 mcg/actuation nasal spray; 1 spray (50 mcg total) by Each  Nostril route once daily.  Dispense: 16 g; Refill: 0  -     guaiFENesin (MUCINEX) 600 mg 12 hr tablet; Take 2 tablets (1,200 mg total) by mouth 2 (two) times daily. for 10 days  Dispense: 40 tablet; Refill: 0  -     benzonatate (TESSALON) 100 MG capsule; Take 1 capsule (100 mg total) by mouth 3 (three) times daily as needed for Cough.  Dispense: 30 capsule; Refill: 0  -     promethazine-dextromethorphan (PROMETHAZINE-DM) 6.25-15 mg/5 mL Syrp; Take 5 mLs by mouth every 6 (six) hours as needed (cough).  Dispense: 118 mL; Refill: 0    Sore throat  -     SARS Coronavirus 2 Antigen, POCT Manual Read  -     POCT Strep A, Molecular  -     POCT Influenza A/B MOLECULAR  -     diphenhydrAMINE-aluminum-magnesium hydroxide-simethicone-LIDOcaine HCl 2%; Swish and spit 15 mLs every 4 (four) hours as needed (sore throat).  Dispense: 120 each; Refill: 0            Additional MDM:     Heart Failure Score:   COPD = No    Patient Instructions   General Discharge Instructions   PLEASE READ YOUR DISCHARGE INSTRUCTIONS ENTIRELY AS IT CONTAINS IMPORTANT INFORMATION.  If you were prescribed a narcotic or controlled medication, do not drive or operate heavy equipment or machinery while taking these medications.  If you were prescribed antibiotics, please take them to completion.  You must understand that you've received an Urgent Care treatment only and that you may be released before all your medical problems are known or treated. You, the patient, will arrange for follow up care as instructed.    OVER THE COUNTER RECOMMENDATIONS/SUGGESTIONS.    Make sure to stay well hydrated.    Use Nasal Saline to mechanically move any post nasal drip from your eustachian tube or from the back of your throat.    Use warm salt water gargles to ease your throat pain. Warm salt water gargles as needed for sore throat- 1/2 tsp salt to 1 cup warm water, gargle as desired.    Use an antihistamine such as Claritin, Zyrtec or Allegra to dry you out.    Use  pseudoephedrine (behind the counter) to decongest. Pseudoephedrine 30 mg up to 240 mg /day. It can raise your blood pressure and give you palpitations.    Use mucinex (guaifenesin) to break up mucous up to 2400mg/day to loosen any mucous.    The mucinex DM pill has a cough suppressant that can be sedating. It can be used at night to stop the tickle at the back of your throat.    You can use Mucinex D (it has guaifenesin and a high dose of pseudoephedrine) in the mornings to help decongest.    Use Afrin in each nare for no longer than 3 days, as it is addictive. It can also dry out your mucous membranes and cause elevated blood pressure. This is especially useful if you are flying.    Use Flonase 1-2 sprays/nostril per day. It is a local acting steroid nasal spray, if you develop a bloody nose, stop using the medication immediately.    Sometimes Nyquil at night is beneficial to help you get some rest, however it is sedating and it does have an antihistamine, and tylenol.    Honey is a natural cough suppressant that can be used.    Tylenol up to 4,000 mg a day is safe for short periods and can be used for body aches, pain, and fever. However in high doses and prolonged use it can cause liver irritation.    Ibuprofen is a non-steroidal anti-inflammatory that can be used for body aches, pain, and fever.However it can also cause stomach irritation if over used.     Follow up with your PCP or specialty clinic as instructed in the next 2-3 days if not improved or as needed. You can call [phone] to schedule an appointment with appropriate provider.      If you condition worsens, we recommend that you receive another evaluation at the emergency room immediately or contact your primary medical clinic's after hours call service to discuss your concerns.      Please return here or go to the Emergency Department for any concerns or worsening condition.   You can also call [phone] to schedule an appointment with the  appropriate provider.    Please return here or go to the Emergency Department for any concerns or worsening of condition.    Thank you for choosing [facility]!    Our goal in the Urgent Care is to always provide outstanding medical care. You may receive a survey by mail or e-mail in the next week regarding your experience today. We would greatly appreciate you completing and returning the survey. Your feedback provides us with a way to recognize our staff who provide very good care, and it helps us learn how to improve when your experience was below our aspiration of excellence.      We appreciate you trusting us with your medical care. We hope you feel better soon. We will be happy to take care of you for all of your future medical needs.    Sincerely,    TREE Rios  FLU Discharge Instructions    You have been diagnosed with Influenza.   You are contagious for 24 hours after you start the Tamilfu or 24 hours after your last fever, whichever happens last.  Please drink plenty of fluids.  Please get plenty of rest.  Please return here or go to the Emergency Department for any concerns or worsening of condition.  Tamiflu prescription has been discussed and if prescribed, please take to completion unless you cannot tolerate the side effects.   If you were prescribed a narcotic medication, do not drive or operate heavy equipment or machinery while taking these medications.  If you were given a steroid shot in the clinic and have also been given a prescription for a steroid such as Prednisone or a Medrol Dose Pack, please begin taking them tomorrow.  Take tylenol (acetominophen) for fever, chills or body aches every 4 hours. do not exceed 4000 mg/ day.  Take Motrin (Ibuprofen) every 4 hours for fever, chills, pain or inflammation.  Use an antihistmine such as claritin or zyrtec to dry you out. Use pseudoephedrine (behind the counter) to decongest (beware this can raise your blood pressure). Use mucinex  (guaifenisin) to break up mucous

## 2022-12-01 ENCOUNTER — PATIENT MESSAGE (OUTPATIENT)
Dept: PRIMARY CARE CLINIC | Facility: CLINIC | Age: 47
End: 2022-12-01

## 2022-12-01 ENCOUNTER — E-VISIT (OUTPATIENT)
Dept: PRIMARY CARE CLINIC | Facility: CLINIC | Age: 47
End: 2022-12-01
Attending: FAMILY MEDICINE
Payer: COMMERCIAL

## 2022-12-01 DIAGNOSIS — J10.1 INFLUENZA A: Primary | ICD-10-CM

## 2022-12-01 PROCEDURE — 99499 UNLISTED E&M SERVICE: CPT | Mod: S$GLB,,, | Performed by: FAMILY MEDICINE

## 2022-12-01 PROCEDURE — 99499 NO LOS: ICD-10-PCS | Mod: S$GLB,,, | Performed by: FAMILY MEDICINE

## 2022-12-01 RX ORDER — ALBUTEROL SULFATE 90 UG/1
2 AEROSOL, METERED RESPIRATORY (INHALATION) EVERY 6 HOURS PRN
Qty: 18 G | Refills: 0 | Status: SHIPPED | OUTPATIENT
Start: 2022-12-01 | End: 2022-12-12

## 2022-12-01 NOTE — PROGRESS NOTES
Patient ID: Amanda Navarrete is a 47 y.o. female.    Chief Complaint: Influenza    The patient initiated a request through [employer] on 12/1/2022 for evaluation and management with a chief complaint of Influenza     I evaluated the questionnaire submission on 12/1/22.    Called spoke with patient.  She was seen on urgent care 11/28/2022 and diagnosed with influenza a.  She is unable to tolerate Tamiflu.  She reports that since then she is been having cough and fever.  Her fever was 102 F last night.  It did resolve with ibuprofen and Tylenol she has not had any fever today.  She was concerned because she was having increased sputum with her cough.  And she has some dyspnea on exertion.  She denies any chest pain.  She typically has an inhaler that she uses however she came into a friend.  She reports today she is feeling a little bit better than yesterday.  She is not had any fevers this morning.  Her cough persists but sputum has cleared.  She also reports that her dyspnea has improved from yesterday.  She is multiple family members who also diagnosed with influenza.    [facility] Peq Evisit Upper Respitatory/Cough Questionnaire    12/1/2022  1:43 PM CST - Filed by Patient   Do you agree to participate in an E-Visit? Yes   If you have any of the following symptoms, please present to your local ER or call 911:  I acknowledge   What is the main issue that you would like for your doctor to address today? Upper respiratory infection , coughing, trouble breathing, yeast if prescribed antibiotics   Are you able to take your vital signs? No   What symptoms do you currently have?  Chills;  Cough;  Diarrhea;  Fatigue;  Headache;  Nasal Congestion;  Muscle or body aches;  Nausea;  Runny nose;  Sore throat   Have you had a fever? Yes   What has been the range of your fever? 100.4 or greater   When did your symptoms first appear? 12/4/2022   In the last two weeks, have you been in close contact with someone who has COVID-19 or the  Flu? Yes, Flu   In the last two weeks, have you worked or volunteered in a healthcare facility or as a ? Healthcare facilities include a hospital, medical or dental clinic, long-term care facility, or nursing home No   Do you live in a long-term care facility, nursing home, or homeless shelter? No   List what you have done or taken to help your symptoms. Medication and urgent care on monday. Rest and hydrate   How severe are your symptoms? Severe   Have you taken an at home Covid test? No   Have you taken a Flu test? Yes   What were the results? Positive   Have you been fully vaccinated for COVID? (2 Pfizer, 2 Moderna or 1 [employer] vaccine injections) Yes   Have you received a booster? Yes   Have you recieved a Flu shot? Yes   When did you recieve your Flu shot? 11/16/2022   Do you have transportation to get tested for COVID if it is indicated and ordered for you at an [facility] location? Yes   Provide any information you feel is important to your history not asked above took covid test , flu test and strep test on Monday   Please attach any relevant images or files         Active Problem List with Overview Notes    Diagnosis Date Noted    Influenza A 12/01/2022     - supportive care  - albuterol inhaler  - discussed proceed to the ER if severe shortness of breath or chest pain.  - Follow-up in office 12/05/2022 scheduled      Impaired fasting glucose 12/22/2021     - glucose 120 mg/dL but reports that non-fasting  - last glucose 100's mg/dL  - recommend A1C next labs      Positive hepatitis C antibody test 06/09/2021     - 6/4/2021 hepatitis C antibody positive  - 6/11/2021 hepatitis C viral load not detectable  - 6/10/21 Liver US: Liver: Normal in size, measuring 15.3 cm. Homogeneous echotexture. No focal hepatic lesions.  - no history of IV drug use  - positive tattoos      Chronic pain syndrome 06/09/2021    EUNICE (obstructive sleep apnea)      - followed by sleep medicine  - on CPAP       Other spondylosis, lumbar region 12/11/2020    History of herpes labialis 12/08/2020    Overactive bladder 12/08/2020     - well controlled  - continue current medication      Class 3 severe obesity due to excess calories with serious comorbidity and body mass index (BMI) of 40.0 to 44.9 in adult 12/07/2020     - discussed recommendation for diet and cardiovascular exercise  - counseling on lifestyle modifications for risk factor reduction      Hyperlipidemia, mixed 09/18/2019     Lab Results   Component Value Date    LDLCALC 80.4 12/17/2021     - LDL goal <100  - LDL improved since restarting statin and tolerating well  - well controlled  - continue current medication      Generalized anxiety disorder 07/10/2019    Vitamin D deficiency 07/10/2019     12/17/21 D = 36  - she reports that she has tried and failed vitamin-D 3 5000 units daily  - well controlled  - continue D2 50,000 units twice a week.       Gait instability 07/10/2019    Lumbar radiculitis 07/13/2018    Hypothyroidism due to Hashimoto's thyroiditis 02/16/2017     Lab Results   Component Value Date    TSH 2.043 12/17/2021     - well controlled  - continue current medication      Chronic bilateral low back pain with bilateral sciatica     Migraine headache      - well controlled  - continue current medication      GERD (gastroesophageal reflux disease)     Narcolepsy cataplexy syndrome      - followed by sleep medicine          There were no vitals filed for this visit.    Recent Labs Obtained:  Office Visit on 11/28/2022   Component Date Value Ref Range Status    SARS Coronavirus 2 Antigen 11/28/2022 Negative  Negative Final     Acceptable 11/28/2022 Yes   Final    Molecular Strep A, POC 11/28/2022 Negative  Negative Final     Acceptable 11/28/2022 Yes   Final    POC Molecular Influenza A Ag 11/28/2022 Positive (A)  Negative, Not Reported Final    POC Molecular Influenza B Ag 11/28/2022 Negative  Negative, Not Reported  Final     Acceptable 11/28/2022 Yes   Final       Encounter Diagnosis   Name Primary?    Influenza A Yes        No orders of the defined types were placed in this encounter.     Medications Ordered This Encounter   Medications    albuterol (PROVENTIL/VENTOLIN HFA) 90 mcg/actuation inhaler     Sig: Inhale 2 puffs into the lungs every 6 (six) hours as needed for Wheezing. Rescue     Dispense:  18 g     Refill:  0        Problem List Items Addressed This Visit          ID    Influenza A - Primary    Overview     - supportive care  - albuterol inhaler  - discussed proceed to the ER if severe shortness of breath or chest pain.  - Follow-up in office 12/05/2022 scheduled         Relevant Medications    albuterol (PROVENTIL/VENTOLIN HFA) 90 mcg/actuation inhaler       E-Visit Time Tracking:    Day 1 Time (in minutes): 15     Total Time (in minutes): 15        Dr. Laya Jaffe D.O.   Family Medicine

## 2022-12-01 NOTE — TELEPHONE ENCOUNTER
Pt states she tested positive for the flu and she went to urgent care and her symptoms are not getting better and wants to know if she can have antibiotics .

## 2022-12-07 DIAGNOSIS — G47.411 NARCOLEPSY WITH CATAPLEXY: ICD-10-CM

## 2022-12-07 RX ORDER — METHYLPHENIDATE HYDROCHLORIDE 10 MG/1
10 TABLET ORAL 4 TIMES DAILY
Qty: 120 TABLET | Refills: 0 | Status: SHIPPED | OUTPATIENT
Start: 2022-12-07 | End: 2023-01-05 | Stop reason: SDUPTHER

## 2022-12-09 ENCOUNTER — TELEPHONE (OUTPATIENT)
Dept: PHARMACY | Facility: CLINIC | Age: 47
End: 2022-12-09
Payer: COMMERCIAL

## 2022-12-16 DIAGNOSIS — G47.411 PRIMARY NARCOLEPSY WITH CATAPLEXY: ICD-10-CM

## 2022-12-16 RX ORDER — METHYLPHENIDATE HYDROCHLORIDE 20 MG/1
20 CAPSULE, EXTENDED RELEASE ORAL EVERY MORNING
Qty: 30 CAPSULE | Refills: 0 | Status: SHIPPED | OUTPATIENT
Start: 2022-12-16 | End: 2022-12-27

## 2022-12-21 ENCOUNTER — TELEPHONE (OUTPATIENT)
Dept: PHARMACY | Facility: CLINIC | Age: 47
End: 2022-12-21
Payer: COMMERCIAL

## 2022-12-22 PROBLEM — J10.1 INFLUENZA A: Status: RESOLVED | Noted: 2022-12-01 | Resolved: 2022-12-22

## 2022-12-22 NOTE — PROGRESS NOTES
FAMILY MEDICINE  [facility]    Reason for visit:   Chief Complaint   Patient presents with    Sore Throat    Influenza    Nasal Congestion    Headache    Thyroid Problem    Annual Exam    Hyperlipidemia         SUBJECTIVE: Amanda Navarrete is a 47 y.o. female  - with obesity, chronic low back pain with bilateral sciatica, migraine headaches, hypothyroidism, anxiety, hyperlipidemia, vitamin-D deficiency, GERD, and narcolepsy cataplexy syndrome presents for routine annual physical and review of labs but also reports that she has cough and illness since 12/23/22     Sleep Medicine: NP Estela Reilly  Gynecology: Dr. Flores 8/5/2022   - mammograms [facility] and had to reschedule since she had the flu last month  Ortho Dr. Najera  Pain Dr. Geoffrey Aleman    1. Cough, congestion, sore throat    Duration: 12/23/22  Initial symptom: sore throat  Progression: cough, congestion and sore throat    Congestion: yes  Cough: yes  Fever: no  Headache: denies  Weakness: denies  Muscle aches: denies  Appetite and fluid intake: yes    Current treatment regimen: OTC Cold and flu, Mucinex DM  Effects of current treatment: mild relief    Sick contacts: denies      2. Hypothyroidism      Age diagnosed: 33 yo     Symptomatic at diagnosis: none and reports noted on routine labs   - family history of thyroid disease: mother and issters  Prior evaluation by Endocrinologist: No  Prior thyroid US: no     Current medication:   levothyroxine (SYNTHROID) 50 MCG tablet, Take 1 tablet (50 mcg total) by mouth before breakfast., Disp: 30 tablet, Rfl: 5     Side effects from medication: none  Scheduled for medication: AM fasting separate from other medications     Symptoms from thyroid issue: denies fatigue, weight changes, heat/cold intolerance, bowel/skin changes or CVS symptoms  Concerns: reports missed a few doses last several months    Lab Results       Component                Value               Date                        TSH                      5.080 (H)           12/16/2022                 FREET4                   0.91                12/16/2022              Lab Results       Component                Value               Date                       TSH                      2.043               12/17/2021                 FREET4                   0.85                05/22/2020              Lab Results       Component                Value               Date                       TSH                      2.840               06/04/2021                 FREET4                   0.85                05/22/2020                     3. Migraine Headache     Age of onset: teenager     Headache history: diffuse to one sided  - well controlled on Topamax  Medications tried and failed in the past: OTC mediations, triptans  Neurology evaluation: Dr. Keith in the past     Current treatment:   methocarbamol (ROBAXIN) 500 MG Tab, Take 500 mg by mouth every evening., Disp: , Rfl: 2  topiramate (TOPAMAX) 100 MG tablet, TAKE ONE TABLET BY MOUTH EVERY MORNING AND 1 AND 1/2 AT BEDTIME, Disp: 75 tablet, Rfl: 5     Current symptoms: well controlled and no current issues    4. Hyperlipidemia  - hypertension  - LDL goal < 100    Current treatment:  rosuvastatin (CRESTOR) 10 MG tablet, Take 1 tablet (10 mg total) by mouth once daily., Disp: 90 tablet, Rfl: 4    Side effects from current treatment: denies    Lab Results       Component                Value               Date                       CHOL                     154                 12/16/2022                 CHOL                     147                 12/17/2021                 CHOL                     236 (H)             06/04/2021            Lab Results       Component                Value               Date                       HDL                      47                  12/16/2022                 HDL                      54                  12/17/2021                 HDL                       46                  06/04/2021            Lab Results       Component                Value               Date                       LDLCALC                  92.0                12/16/2022                 LDLCALC                  80.4                12/17/2021                 LDLCALC                  174.8 (H)           06/04/2021            Lab Results       Component                Value               Date                       TRIG                     75                  12/16/2022                 TRIG                     63                  12/17/2021                 TRIG                     76                  06/04/2021            Lab Results       Component                Value               Date                       CHOLHDL                  30.5                12/16/2022                 CHOLHDL                  36.7                12/17/2021                 CHOLHDL                  19.5 (L)            06/04/2021                      Review of Systems   All other systems reviewed and are negative.    HEALTH MAINTENANCE:   Health Maintenance   Topic Date Due    Mammogram  11/16/2022    Lipid Panel  12/16/2027    TETANUS VACCINE  11/17/2028    Hepatitis C Screening  Completed     Health Maintenance Topics with due status: Not Due       Topic Last Completion Date    TETANUS VACCINE 11/17/2018    Colorectal Cancer Screening 06/28/2022    Cervical Cancer Screening 08/05/2022    Hemoglobin A1c (Prediabetes) 12/16/2022    Lipid Panel 12/16/2022     Health Maintenance Due   Topic Date Due    COVID-19 Vaccine (4 - Booster for Pfizer series) 01/01/2022    Mammogram  11/16/2022       HISTORY:   Past Medical History:   Diagnosis Date    Affective disorder     sses Dr. Dial, psychiatry    Back pain     GERD (gastroesophageal reflux disease)     Headache(784.0)     Hyperlipidemia, mixed 9/18/2019    Influenza A 12/1/2022    - supportive care - albuterol inhaler - discussed proceed to the ER if severe shortness of breath  or chest pain. - Follow-up in office 2022 scheduled    Migraine headache     Narcolepsy without cataplexy(347.00)     Thyroid disease     hyperthyroidism    TMJ arthralgia        Past Surgical History:   Procedure Laterality Date    CAUDAL EPIDURAL STEROID INJECTION N/A 2018    Procedure: INJECTION, STEROID, CAUDAL, EPIDURAL;  Surgeon: Geoffrey Aleman MD;  Location: [facility] OR;  Service: Pain Management;  Laterality: N/A;     SECTION, CLASSIC      COSMETIC SURGERY      breast augmentation    INJECTION OF ANESTHETIC AGENT INTO SACROILIAC JOINT Bilateral 2020    Procedure: BLOCK, SACROILIAC JOINT;  Surgeon: Geoffrey Aleman MD;  Location: [facility] OR;  Service: Pain Management;  Laterality: Bilateral;    SPINE SURGERY  2015    fusion L spine       Family History   Problem Relation Age of Onset    Hypertension Mother     Kidney disease Mother     Sleep apnea Mother     Narcolepsy Father     Cancer Maternal Aunt         colon cancer gene    Cancer Paternal Aunt         lung ca       Social History     Tobacco Use    Smoking status: Former     Packs/day: 1.00     Years: 15.00     Pack years: 15.00     Types: Cigarettes     Start date: 2014     Passive exposure: Never    Smokeless tobacco: Never   Substance Use Topics    Alcohol use: Yes     Comment: Social, rare    Drug use: No       Social History     Social History Narrative    She works at [employer]. She still has a daughter living at home while in college. The others are grown. She has 4 dogs.        ALLERGIES:   Review of patient's allergies indicates:   Allergen Reactions    Tamiflu [oseltamivir] Itching and Swelling       MEDS:     Current Outpatient Medications:     albuterol (PROVENTIL/VENTOLIN HFA) 90 mcg/actuation inhaler, INHALE 2 PUFFS BY MOUTH EVERY 6 HOURS AS NEEDED FOR FOR WHEEZING. RESCUE, Disp: 18 g, Rfl: 0    cetirizine (ZYRTEC) 10 MG tablet, Take 1 tablet (10 mg total) by mouth once daily., Disp:  30 tablet, Rfl: 0    diclofenac sodium 1 % Gel, APPLY 2 GRAMS TOPICALLY TO AFFECTED AREA(S) UP TO 5 TIMES DAILY (10 GRAMS PER DAY), Disp: , Rfl: 0    diphenhydrAMINE-aluminum-magnesium hydroxide-simethicone-LIDOcaine HCl 2%, Swish and spit 15 mLs every 4 (four) hours as needed (sore throat)., Disp: 120 each, Rfl: 0    ergocalciferol (VITAMIN D2) 50,000 unit Cap, TAKE ONE CAPSULE BY MOUTH TWICE WEEKLY, Disp: 24 capsule, Rfl: 0    fluticasone propionate (FLONASE) 50 mcg/actuation nasal spray, 1 spray (50 mcg total) by Each Nostril route once daily., Disp: 16 g, Rfl: 0    levothyroxine (SYNTHROID) 50 MCG tablet, Take 1 tablet (50 mcg total) by mouth before breakfast., Disp: 90 tablet, Rfl: 3    methocarbamol (ROBAXIN) 500 MG Tab, Take 500 mg by mouth every evening., Disp: , Rfl: 2    methylphenidate HCl (RITALIN) 10 MG tablet, Take 1 tablet (10 mg total) by mouth 4 (four) times daily., Disp: 120 tablet, Rfl: 0    MIRENA 20 mcg/24 hours (6 yrs) 52 mg IUD, , Disp: , Rfl:     modafiniL (PROVIGIL) 200 MG Tab, Take 1 tablet by mouth every morning and take 1/2 tablet at noon., Disp: 45 tablet, Rfl: 5    oxybutynin (DITROPAN XL) 15 MG TR24, Take 1 tablet (15 mg total) by mouth once daily., Disp: 90 tablet, Rfl: 4    oxyCODONE-acetaminophen (PERCOCET) 5-325 mg per tablet, Take 1 tablet by mouth 2 (two) times daily as needed., Disp: , Rfl: 0    promethazine-dextromethorphan (PROMETHAZINE-DM) 6.25-15 mg/5 mL Syrp, Take 5 mLs by mouth every 6 (six) hours as needed (cough)., Disp: 118 mL, Rfl: 0    rimegepant (NURTEC) 75 mg odt, , Disp: , Rfl:     rosuvastatin (CRESTOR) 10 MG tablet, Take 1 tablet (10 mg total) by mouth once daily., Disp: 90 tablet, Rfl: 4    sodium,calcium,mag,pot oxybate (XYWAV) 0.5 gram/mL Soln, Take 4.5 g by mouth 2 (two) times daily., Disp: 450 mL, Rfl: 5    topiramate (TOPAMAX) 100 MG tablet, Take 1 tablet (100 mg total) by mouth once daily AND 1.5 tablets (150 mg total) every evening., Disp: 225 tablet,  "Rfl: 0    valACYclovir (VALTREX) 1000 MG tablet, TAKE ONE TABLET BY MOUTH EVERY TWELVE HOURS AS NEEDED, Disp: 60 tablet, Rfl: 5    clonazePAM (KLONOPIN) 0.5 MG tablet, , Disp: , Rfl:     DULoxetine (CYMBALTA) 60 MG capsule, Take 1 capsule by mouth once daily., Disp: , Rfl:     VRAYLAR 3 mg Cap, Take 3 mg by mouth once daily. , Disp: , Rfl:       Vital signs:   Vitals:    12/27/22 0918 12/27/22 0945   BP: (!) 140/98 132/80   Pulse: 91    Temp: 98.5 °F (36.9 °C)    SpO2: 100%    Weight: 100.4 kg (221 lb 5.5 oz)    Height: 5' 1" (1.549 m)      Body mass index is 41.82 kg/m².  PHYSICAL EXAM:     Physical Exam  Vitals reviewed.   Constitutional:       General: She is not in acute distress.  HENT:      Head: Normocephalic and atraumatic.      Right Ear: Tympanic membrane and ear canal normal.      Left Ear: Tympanic membrane and ear canal normal.      Nose: Congestion and rhinorrhea present. Rhinorrhea is clear.      Right Sinus: No maxillary sinus tenderness or frontal sinus tenderness.      Left Sinus: No maxillary sinus tenderness or frontal sinus tenderness.      Mouth/Throat:      Lips: Pink.      Mouth: Mucous membranes are moist.      Pharynx: Oropharynx is clear.      Comments: History of tonsillectomy  Eyes:      General: No scleral icterus.     Conjunctiva/sclera: Conjunctivae normal.   Neck:      Vascular: No carotid bruit.   Cardiovascular:      Rate and Rhythm: Normal rate and regular rhythm.      Pulses: Normal pulses.      Heart sounds: Normal heart sounds. No murmur heard.    No friction rub. No gallop.   Pulmonary:      Effort: Pulmonary effort is normal.      Breath sounds: Normal breath sounds. No wheezing, rhonchi or rales.   Abdominal:      General: Bowel sounds are normal. There is no distension.      Palpations: Abdomen is soft.      Tenderness: There is no abdominal tenderness. There is no right CVA tenderness or left CVA tenderness.   Musculoskeletal:      Cervical back: Normal range of motion and " neck supple.      Right lower leg: No edema.      Left lower leg: No edema.   Lymphadenopathy:      Cervical: No cervical adenopathy.   Skin:     General: Skin is warm.      Capillary Refill: Capillary refill takes less than 2 seconds.   Neurological:      Mental Status: She is alert.         PHQ4 = No data recorded    PERTINENT RESULTS:   No visits with results within 1 Week(s) from this visit.   Latest known visit with results is:   Lab Visit on 12/16/2022   Component Date Value Ref Range Status    WBC 12/16/2022 9.22  3.90 - 12.70 K/uL Final    RBC 12/16/2022 4.19  4.00 - 5.40 M/uL Final    Hemoglobin 12/16/2022 13.7  12.0 - 16.0 g/dL Final    Hematocrit 12/16/2022 41.1  37.0 - 48.5 % Final    MCV 12/16/2022 98  82 - 98 fL Final    MCH 12/16/2022 32.7 (H)  27.0 - 31.0 pg Final    MCHC 12/16/2022 33.3  32.0 - 36.0 g/dL Final    RDW 12/16/2022 13.2  11.5 - 14.5 % Final    Platelets 12/16/2022 274  150 - 450 K/uL Final    MPV 12/16/2022 11.7  9.2 - 12.9 fL Final    Sodium 12/16/2022 143  136 - 145 mmol/L Final    Potassium 12/16/2022 4.2  3.5 - 5.1 mmol/L Final    Chloride 12/16/2022 110  95 - 110 mmol/L Final    CO2 12/16/2022 23  23 - 29 mmol/L Final    Glucose 12/16/2022 127 (H)  70 - 110 mg/dL Final    BUN 12/16/2022 19 (H)  7 - 17 mg/dL Final    Creatinine 12/16/2022 0.73  0.50 - 1.40 mg/dL Final    Calcium 12/16/2022 8.5 (L)  8.7 - 10.5 mg/dL Final    Total Protein 12/16/2022 7.0  6.0 - 8.4 g/dL Final    Albumin 12/16/2022 4.0  3.5 - 5.2 g/dL Final    Total Bilirubin 12/16/2022 0.3  0.1 - 1.0 mg/dL Final    Comment: For infants and newborns, interpretation of results should be based  on gestational age, weight and in agreement with clinical  observations.    Premature Infant recommended reference ranges:  Up to 24 hours.............<8.0 mg/dL  Up to 48 hours............<12.0 mg/dL  3-5 days..................<15.0 mg/dL  6-29 days.................<15.0 mg/dL      Alkaline Phosphatase 12/16/2022 74  38 - 126 U/L  Final    AST 12/16/2022 20  15 - 46 U/L Final    ALT 12/16/2022 20  10 - 44 U/L Final    Anion Gap 12/16/2022 10  8 - 16 mmol/L Final    eGFR 12/16/2022 >60.0  >60 mL/min/1.73 m^2 Final    Cholesterol 12/16/2022 154  120 - 199 mg/dL Final    Comment: The National Cholesterol Education Program (NCEP) has set the  following guidelines (reference ranges) for Cholesterol:  Optimal.....................<200 mg/dL  Borderline High.............200-239 mg/dL  High........................> or = 240 mg/dL      Triglycerides 12/16/2022 75  30 - 150 mg/dL Final    Comment: The National Cholesterol Education Program (NCEP) has set the  following guidelines (reference values) for triglycerides:  Normal......................<150 mg/dL  Borderline High.............150-199 mg/dL  High........................200-499 mg/dL      HDL 12/16/2022 47  40 - 75 mg/dL Final    Comment: The National Cholesterol Education Program (NCEP) has set the  following guidelines (reference values) for HDL Cholesterol:  Low...............<40 mg/dL  Optimal...........>60 mg/dL      LDL Cholesterol 12/16/2022 92.0  63.0 - 159.0 mg/dL Final    Comment: The National Cholesterol Education Program (NCEP) has set the  following guidelines (reference values) for LDL Cholesterol:  Optimal.......................<130 mg/dL  Borderline High...............130-159 mg/dL  High..........................160-189 mg/dL  Very High.....................>190 mg/dL      HDL/Cholesterol Ratio 12/16/2022 30.5  20.0 - 50.0 % Final    Total Cholesterol/HDL Ratio 12/16/2022 3.3  2.0 - 5.0 Final    Non-HDL Cholesterol 12/16/2022 107  mg/dL Final    Comment: Risk category and Non-HDL cholesterol goals:  Coronary heart disease (CHD)or equivalent (10-year risk of CHD >20%):  Non-HDL cholesterol goal     <130 mg/dL  Two or more CHD risk factors and 10-year risk of CHD <= 20%:  Non-HDL cholesterol goal     <160 mg/dL  0 to 1 CHD risk factor:  Non-HDL cholesterol goal     <190 mg/dL       Vit D, 25-Hydroxy 12/16/2022 45  30 - 96 ng/mL Final    Comment: Vitamin D deficiency.........<10 ng/mL                              Vitamin D insufficiency......10-29 ng/mL       Vitamin D sufficiency........> or equal to 30 ng/mL  Vitamin D toxicity............>100 ng/mL      TSH 12/16/2022 5.080 (H)  0.400 - 4.000 uIU/mL Final    Comment: Warning:  Heterophilic antibodies in serum or plasma of   certain individuals are known to cause interference with   immunoassays. These antibodies may be present in blood samples   from individuals regularly exposed to animal or who have been   treated with animal products.     Patients taking high doses of supplemental biotin may have  negatively biased results.       Free T4 12/16/2022 0.91  0.71 - 1.51 ng/dL Final    Hemoglobin A1C 12/16/2022 5.3  4.0 - 5.6 % Final    Comment: ADA Screening Guidelines:  5.7-6.4%  Consistent with prediabetes  >or=6.5%  Consistent with diabetes    High levels of fetal hemoglobin interfere with the HbA1C  assay. Heterozygous hemoglobin variants (HbS, HgC, etc)do  not significantly interfere with this assay.   However, presence of multiple variants may affect accuracy.      Estimated Avg Glucose 12/16/2022 105  68 - 131 mg/dL Final       ASSESSMENT/PLAN:  1. Upper respiratory tract infection, unspecified type  Overview:  - flu, covid and strept testing negative  - supportive care  - counseling on OTC medications    Orders:  -     POCT rapid strep A  -     POCT Influenza A/B Molecular  -     SARS Coronavirus 2 Antigen, POCT Manual Read    2. Hypothyroidism due to Hashimoto's thyroiditis  Overview:  Lab Results   Component Value Date    TSH 5.080 (H) 12/16/2022     - TSH above goal in previously well controlled  - reports missed some doses  - repeat TSH in 6 weeks    Orders:  -     TSH; Future; Expected date: 01/22/2023    3. Hyperlipidemia, mixed  Overview:  Lab Results   Component Value Date    LDLCALC 92.0 12/16/2022     - LDL goal <100  - well  controlled  - continue current management plan   - patient encouraged to notify me with any changes      4. Impaired fasting glucose  Overview:  Lab Results   Component Value Date    HGBA1C 5.3 12/16/2022     - discussed recommendation for diet and cardiovascular exercise  - counseling on lifestyle modifications for risk factor reduction  - counseling on management options      5. Class 3 severe obesity due to excess calories with serious comorbidity and body mass index (BMI) of 40.0 to 44.9 in adult  Overview:  - discussed recommendation for diet and cardiovascular exercise  - counseling on lifestyle modifications for risk factor reduction            ORDERS:   Orders Placed This Encounter    TSH    POCT rapid strep A    POCT Influenza A/B Molecular    SARS Coronavirus 2 Antigen, POCT Manual Read       Vaccines recommended:  COVID 19 booster    Follow-up in 1 month TSH and 1 year or sooner if any concerns.      This note is dictated using the M*Modal Fluency Direct word recognition program. There are word recognition mistakes that are occasionally missed on review.    Dr. Laya Jaffe D.O.   [facility]

## 2022-12-27 ENCOUNTER — OFFICE VISIT (OUTPATIENT)
Dept: PRIMARY CARE CLINIC | Facility: CLINIC | Age: 47
End: 2022-12-27
Attending: FAMILY MEDICINE
Payer: COMMERCIAL

## 2022-12-27 VITALS
BODY MASS INDEX: 41.78 KG/M2 | DIASTOLIC BLOOD PRESSURE: 80 MMHG | TEMPERATURE: 99 F | WEIGHT: 221.31 LBS | OXYGEN SATURATION: 100 % | SYSTOLIC BLOOD PRESSURE: 132 MMHG | HEIGHT: 61 IN | HEART RATE: 91 BPM

## 2022-12-27 DIAGNOSIS — E66.01 CLASS 3 SEVERE OBESITY DUE TO EXCESS CALORIES WITH SERIOUS COMORBIDITY AND BODY MASS INDEX (BMI) OF 40.0 TO 44.9 IN ADULT: ICD-10-CM

## 2022-12-27 DIAGNOSIS — E03.8 HYPOTHYROIDISM DUE TO HASHIMOTO'S THYROIDITIS: ICD-10-CM

## 2022-12-27 DIAGNOSIS — E06.3 HYPOTHYROIDISM DUE TO HASHIMOTO'S THYROIDITIS: ICD-10-CM

## 2022-12-27 DIAGNOSIS — R73.01 IMPAIRED FASTING GLUCOSE: ICD-10-CM

## 2022-12-27 DIAGNOSIS — E78.2 HYPERLIPIDEMIA, MIXED: ICD-10-CM

## 2022-12-27 DIAGNOSIS — J06.9 UPPER RESPIRATORY TRACT INFECTION, UNSPECIFIED TYPE: Primary | ICD-10-CM

## 2022-12-27 LAB
CTP QC/QA: YES
POC MOLECULAR INFLUENZA A AGN: NEGATIVE
POC MOLECULAR INFLUENZA B AGN: NEGATIVE
S PYO RRNA THROAT QL PROBE: NEGATIVE
SARS-COV-2 AG RESP QL IA.RAPID: NEGATIVE

## 2022-12-27 PROCEDURE — 99999 PR PBB SHADOW E&M-EST. PATIENT-LVL V: ICD-10-PCS | Mod: PBBFAC,,, | Performed by: FAMILY MEDICINE

## 2022-12-27 PROCEDURE — 87811 SARS CORONAVIRUS 2 ANTIGEN POCT, MANUAL READ: ICD-10-PCS | Mod: QW,S$GLB,, | Performed by: FAMILY MEDICINE

## 2022-12-27 PROCEDURE — 87880 STREP A ASSAY W/OPTIC: CPT | Mod: QW,S$GLB,, | Performed by: FAMILY MEDICINE

## 2022-12-27 PROCEDURE — 87502 INFLUENZA DNA AMP PROBE: CPT | Mod: QW,S$GLB,, | Performed by: FAMILY MEDICINE

## 2022-12-27 PROCEDURE — 3008F BODY MASS INDEX DOCD: CPT | Mod: CPTII,S$GLB,, | Performed by: FAMILY MEDICINE

## 2022-12-27 PROCEDURE — 99214 OFFICE O/P EST MOD 30 MIN: CPT | Mod: S$GLB,,, | Performed by: FAMILY MEDICINE

## 2022-12-27 PROCEDURE — 99214 PR OFFICE/OUTPT VISIT, EST, LEVL IV, 30-39 MIN: ICD-10-PCS | Mod: S$GLB,,, | Performed by: FAMILY MEDICINE

## 2022-12-27 PROCEDURE — 1159F MED LIST DOCD IN RCRD: CPT | Mod: CPTII,S$GLB,, | Performed by: FAMILY MEDICINE

## 2022-12-27 PROCEDURE — 3044F PR MOST RECENT HEMOGLOBIN A1C LEVEL <7.0%: ICD-10-PCS | Mod: CPTII,S$GLB,, | Performed by: FAMILY MEDICINE

## 2022-12-27 PROCEDURE — 3044F HG A1C LEVEL LT 7.0%: CPT | Mod: CPTII,S$GLB,, | Performed by: FAMILY MEDICINE

## 2022-12-27 PROCEDURE — 87502 POCT INFLUENZA A/B MOLECULAR: ICD-10-PCS | Mod: QW,S$GLB,, | Performed by: FAMILY MEDICINE

## 2022-12-27 PROCEDURE — 87880 POCT RAPID STREP A: ICD-10-PCS | Mod: QW,S$GLB,, | Performed by: FAMILY MEDICINE

## 2022-12-27 PROCEDURE — 99999 PR PBB SHADOW E&M-EST. PATIENT-LVL V: CPT | Mod: PBBFAC,,, | Performed by: FAMILY MEDICINE

## 2022-12-27 PROCEDURE — 3079F DIAST BP 80-89 MM HG: CPT | Mod: CPTII,S$GLB,, | Performed by: FAMILY MEDICINE

## 2022-12-27 PROCEDURE — 1160F RVW MEDS BY RX/DR IN RCRD: CPT | Mod: CPTII,S$GLB,, | Performed by: FAMILY MEDICINE

## 2022-12-27 PROCEDURE — 87811 SARS-COV-2 COVID19 W/OPTIC: CPT | Mod: QW,S$GLB,, | Performed by: FAMILY MEDICINE

## 2022-12-27 PROCEDURE — 3075F SYST BP GE 130 - 139MM HG: CPT | Mod: CPTII,S$GLB,, | Performed by: FAMILY MEDICINE

## 2022-12-27 PROCEDURE — 1160F PR REVIEW ALL MEDS BY PRESCRIBER/CLIN PHARMACIST DOCUMENTED: ICD-10-PCS | Mod: CPTII,S$GLB,, | Performed by: FAMILY MEDICINE

## 2022-12-27 PROCEDURE — 1159F PR MEDICATION LIST DOCUMENTED IN MEDICAL RECORD: ICD-10-PCS | Mod: CPTII,S$GLB,, | Performed by: FAMILY MEDICINE

## 2022-12-27 PROCEDURE — 3008F PR BODY MASS INDEX (BMI) DOCUMENTED: ICD-10-PCS | Mod: CPTII,S$GLB,, | Performed by: FAMILY MEDICINE

## 2022-12-27 PROCEDURE — 3075F PR MOST RECENT SYSTOLIC BLOOD PRESS GE 130-139MM HG: ICD-10-PCS | Mod: CPTII,S$GLB,, | Performed by: FAMILY MEDICINE

## 2022-12-27 PROCEDURE — 3079F PR MOST RECENT DIASTOLIC BLOOD PRESSURE 80-89 MM HG: ICD-10-PCS | Mod: CPTII,S$GLB,, | Performed by: FAMILY MEDICINE

## 2022-12-27 RX ORDER — METHYLPHENIDATE HYDROCHLORIDE 20 MG/1
1 CAPSULE, EXTENDED RELEASE ORAL
COMMUNITY
Start: 2022-07-07 | End: 2022-12-27 | Stop reason: SDUPTHER

## 2022-12-27 RX ORDER — VALACYCLOVIR HYDROCHLORIDE 1 G/1
1 TABLET, FILM COATED ORAL
COMMUNITY
Start: 2022-02-07 | End: 2022-12-27

## 2022-12-27 RX ORDER — (CALCIUM, MAGNESIUM, POTASSIUM, AND SODIUM OXYBATES) .5; .5; .5; .5 G/ML; G/ML; G/ML; G/ML
SOLUTION ORAL
COMMUNITY
Start: 2022-07-11 | End: 2022-12-27

## 2022-12-27 RX ORDER — METHYLPHENIDATE HYDROCHLORIDE 10 MG/1
1 TABLET ORAL
COMMUNITY
Start: 2022-07-07 | End: 2022-12-27

## 2022-12-27 NOTE — PATIENT INSTRUCTIONS
1. Rest  2. Lots of fluids: water and soups. Avoid sugar drinks and juices  3. Vitamin C 1000 mg daily and Zinc 50 mg daily  4. Over the counter medication like   - cough syrup Delsym as needed  - Vicks vapor on your chest  5. Ibuprofen 600 mg every 6 hours (take with food)  muscle aches, fever and sore throat  6. You can also alternate with Acetaminophen 500 mg three times a day can also be used for muscle aches, fever and sore throat  - avoid if you have severe liver issue  7. Notify me if fever >3 days, fever that resolves and then returns, shortness of breath, chest pain, severe headache or any other concerns

## 2023-01-02 ENCOUNTER — PATIENT MESSAGE (OUTPATIENT)
Dept: PRIMARY CARE CLINIC | Facility: CLINIC | Age: 48
End: 2023-01-02
Payer: COMMERCIAL

## 2023-01-03 ENCOUNTER — PATIENT MESSAGE (OUTPATIENT)
Dept: PRIMARY CARE CLINIC | Facility: CLINIC | Age: 48
End: 2023-01-03
Payer: COMMERCIAL

## 2023-01-03 RX ORDER — AMOXICILLIN AND CLAVULANATE POTASSIUM 875; 125 MG/1; MG/1
1 TABLET, FILM COATED ORAL EVERY 12 HOURS
Qty: 14 TABLET | Refills: 0 | Status: SHIPPED | OUTPATIENT
Start: 2023-01-03 | End: 2023-01-04 | Stop reason: SDUPTHER

## 2023-01-04 ENCOUNTER — TELEPHONE (OUTPATIENT)
Dept: PRIMARY CARE CLINIC | Facility: CLINIC | Age: 48
End: 2023-01-04
Payer: COMMERCIAL

## 2023-01-04 RX ORDER — AMOXICILLIN AND CLAVULANATE POTASSIUM 875; 125 MG/1; MG/1
1 TABLET, FILM COATED ORAL EVERY 12 HOURS
Qty: 14 TABLET | Refills: 0 | Status: SHIPPED | OUTPATIENT
Start: 2023-01-04 | End: 2023-01-11

## 2023-01-04 NOTE — TELEPHONE ENCOUNTER
I already sent medication to [facility] and patient is aware. She notified me last evening  Dr. Laya Jaffe D.O.   Family Medicine

## 2023-01-04 NOTE — TELEPHONE ENCOUNTER
----- Message from Miguel Fairchild sent at 1/3/2023  4:46 PM CST -----  Regarding: Pharm Call  Contact: [facility] ([facility])  Type:  Pharmacy Calling to Clarify an RX    Name of Caller: [facility]      Pharmacy Name:   ([facility])    Prescription Name: amoxicillin-clavulanate 875-125mg (AUGMENTIN) 875-125 mg per tablet    What do they need to clarify?: On back order, would physician like to change or call into a different pharmacy.     Best Call Back Number: [phone]    Additional Information:

## 2023-01-05 DIAGNOSIS — G47.411 NARCOLEPSY WITH CATAPLEXY: ICD-10-CM

## 2023-01-06 RX ORDER — METHYLPHENIDATE HYDROCHLORIDE 10 MG/1
10 TABLET ORAL 4 TIMES DAILY
Qty: 120 TABLET | Refills: 0 | Status: SHIPPED | OUTPATIENT
Start: 2023-01-06 | End: 2023-02-19 | Stop reason: SDUPTHER

## 2023-01-18 ENCOUNTER — PATIENT MESSAGE (OUTPATIENT)
Dept: ADMINISTRATIVE | Facility: HOSPITAL | Age: 48
End: 2023-01-18
Payer: COMMERCIAL

## 2023-02-07 ENCOUNTER — PATIENT MESSAGE (OUTPATIENT)
Dept: PRIMARY CARE CLINIC | Facility: CLINIC | Age: 48
End: 2023-02-07
Payer: COMMERCIAL

## 2023-02-19 DIAGNOSIS — G47.411 NARCOLEPSY WITH CATAPLEXY: ICD-10-CM

## 2023-02-20 RX ORDER — METHYLPHENIDATE HYDROCHLORIDE 10 MG/1
10 TABLET ORAL 4 TIMES DAILY
Qty: 120 TABLET | Refills: 0 | Status: SHIPPED | OUTPATIENT
Start: 2023-02-20 | End: 2023-03-21 | Stop reason: SDUPTHER

## 2023-02-20 NOTE — TELEPHONE ENCOUNTER
Requested Prescriptions     Pending Prescriptions Disp Refills    methylphenidate HCl (RITALIN) 10 MG tablet 120 tablet 0     Sig: Take 1 tablet (10 mg total) by mouth 4 (four) times daily.     LOV: 08/26/2022

## 2023-02-23 DIAGNOSIS — E06.3 HYPOTHYROIDISM DUE TO HASHIMOTO'S THYROIDITIS: ICD-10-CM

## 2023-02-23 DIAGNOSIS — E03.8 HYPOTHYROIDISM DUE TO HASHIMOTO'S THYROIDITIS: ICD-10-CM

## 2023-02-23 NOTE — TELEPHONE ENCOUNTER
No new care gaps identified.  [facility] Embedded Care Gaps. Reference number: [id number]. 2/23/2023   4:33:02 PM CST

## 2023-02-24 RX ORDER — LEVOTHYROXINE SODIUM 50 UG/1
50 TABLET ORAL
Qty: 90 TABLET | Refills: 3 | Status: SHIPPED | OUTPATIENT
Start: 2023-02-24 | End: 2023-05-31 | Stop reason: SDUPTHER

## 2023-02-24 NOTE — TELEPHONE ENCOUNTER
Refill Decision Note   Amanda Landon  is requesting a refill authorization.  Brief Assessment and Rationale for Refill:  Approve     Medication Therapy Plan:       Medication Reconciliation Completed: No    Comments:     No Care Gaps recommended.     Note composed:8:01 AM 02/24/2023

## 2023-03-02 ENCOUNTER — PATIENT MESSAGE (OUTPATIENT)
Dept: SLEEP MEDICINE | Facility: CLINIC | Age: 48
End: 2023-03-02
Payer: COMMERCIAL

## 2023-03-03 ENCOUNTER — TELEPHONE (OUTPATIENT)
Dept: SLEEP MEDICINE | Facility: CLINIC | Age: 48
End: 2023-03-03
Payer: COMMERCIAL

## 2023-03-03 DIAGNOSIS — G47.411 NARCOLEPSY WITH CATAPLEXY: Primary | ICD-10-CM

## 2023-03-03 RX ORDER — METHYLPHENIDATE HYDROCHLORIDE 20 MG/1
20 CAPSULE, EXTENDED RELEASE ORAL EVERY MORNING
Qty: 30 CAPSULE | Refills: 0 | Status: SHIPPED | OUTPATIENT
Start: 2023-03-03 | End: 2023-04-18 | Stop reason: SDUPTHER

## 2023-03-03 NOTE — TELEPHONE ENCOUNTER
----- Message from Marija Elena sent at 3/3/2023 12:05 PM CST -----  Name of Who is Calling:Ameesha from RN jc Dias from [facility]              What is the request in detail:need treatment plan and how medication is being handled.               Can the clinic reply by [facility]:no              What Number to Call Back if not in [facility]:[phone] ext : [phone]

## 2023-03-17 DIAGNOSIS — N32.81 OVERACTIVE BLADDER: ICD-10-CM

## 2023-03-17 RX ORDER — OXYBUTYNIN CHLORIDE 15 MG/1
15 TABLET, EXTENDED RELEASE ORAL DAILY
Qty: 90 TABLET | Refills: 3 | Status: SHIPPED | OUTPATIENT
Start: 2023-03-17 | End: 2023-07-15 | Stop reason: SDUPTHER

## 2023-03-17 NOTE — TELEPHONE ENCOUNTER
Refill Decision Note   Amanda Landon  is requesting a refill authorization.  Brief Assessment and Rationale for Refill:  Approve     Medication Therapy Plan:       Medication Reconciliation Completed: No    Comments:     No Care Gaps recommended.     Note composed:1:24 PM 03/17/2023

## 2023-03-17 NOTE — TELEPHONE ENCOUNTER
No new care gaps identified.  [facility] Embedded Care Gaps. Reference number: [id number]. 3/17/2023   11:03:51 AM NANETTE

## 2023-03-21 DIAGNOSIS — G47.411 NARCOLEPSY WITH CATAPLEXY: ICD-10-CM

## 2023-03-21 RX ORDER — METHYLPHENIDATE HYDROCHLORIDE 10 MG/1
10 TABLET ORAL 4 TIMES DAILY
Qty: 120 TABLET | Refills: 0 | Status: SHIPPED | OUTPATIENT
Start: 2023-03-21 | End: 2023-03-22 | Stop reason: SDUPTHER

## 2023-03-22 ENCOUNTER — PATIENT MESSAGE (OUTPATIENT)
Dept: SLEEP MEDICINE | Facility: CLINIC | Age: 48
End: 2023-03-22
Payer: COMMERCIAL

## 2023-03-22 DIAGNOSIS — G47.411 NARCOLEPSY WITH CATAPLEXY: ICD-10-CM

## 2023-03-22 RX ORDER — METHYLPHENIDATE HYDROCHLORIDE 10 MG/1
10 TABLET ORAL 4 TIMES DAILY
Qty: 120 TABLET | Refills: 0 | Status: SHIPPED | OUTPATIENT
Start: 2023-03-22 | End: 2023-04-19 | Stop reason: SDUPTHER

## 2023-03-22 NOTE — PROGRESS NOTES
7 minutes ago (3:44 PM)       This is for the 10mg Ritalin (generic) 4 times a day. Thank you so much for your help.       Amanda ADAME Staff (supporting Estela Reilly, NP) 9 minutes ago (3:43 PM)       I called the pharmacy and they cant tell me if they have it in stock which is exactly what I thought so yes if you can write/print the prescription out for me to have the hand written prescription to  in [place] that would be great. I will pick it up in [place] tomorrow morning

## 2023-03-27 ENCOUNTER — PATIENT MESSAGE (OUTPATIENT)
Dept: SLEEP MEDICINE | Facility: CLINIC | Age: 48
End: 2023-03-27
Payer: COMMERCIAL

## 2023-03-27 DIAGNOSIS — G47.33 OSA (OBSTRUCTIVE SLEEP APNEA): Primary | ICD-10-CM

## 2023-04-03 DIAGNOSIS — G47.411 NARCOLEPSY WITH CATAPLEXY: ICD-10-CM

## 2023-04-03 DIAGNOSIS — G47.33 OSA (OBSTRUCTIVE SLEEP APNEA): Primary | ICD-10-CM

## 2023-04-03 RX ORDER — METHYLPHENIDATE HYDROCHLORIDE 20 MG/1
20 CAPSULE, EXTENDED RELEASE ORAL EVERY MORNING
Qty: 30 CAPSULE | Refills: 0 | OUTPATIENT
Start: 2023-04-03

## 2023-04-11 ENCOUNTER — TELEPHONE (OUTPATIENT)
Dept: PHARMACY | Facility: CLINIC | Age: 48
End: 2023-04-11
Payer: COMMERCIAL

## 2023-04-18 DIAGNOSIS — G47.411 NARCOLEPSY WITH CATAPLEXY: ICD-10-CM

## 2023-04-18 RX ORDER — METHYLPHENIDATE HYDROCHLORIDE 20 MG/1
20 CAPSULE, EXTENDED RELEASE ORAL EVERY MORNING
Qty: 30 CAPSULE | Refills: 0 | Status: SHIPPED | OUTPATIENT
Start: 2023-04-18 | End: 2023-05-19 | Stop reason: SDUPTHER

## 2023-04-19 ENCOUNTER — PATIENT MESSAGE (OUTPATIENT)
Dept: SLEEP MEDICINE | Facility: CLINIC | Age: 48
End: 2023-04-19
Payer: COMMERCIAL

## 2023-04-19 DIAGNOSIS — G47.411 NARCOLEPSY WITH CATAPLEXY: ICD-10-CM

## 2023-04-19 RX ORDER — METHYLPHENIDATE HYDROCHLORIDE 10 MG/1
10 TABLET ORAL 4 TIMES DAILY
Qty: 120 TABLET | Refills: 0 | Status: SHIPPED | OUTPATIENT
Start: 2023-04-19 | End: 2023-04-19 | Stop reason: SDUPTHER

## 2023-04-19 RX ORDER — METHYLPHENIDATE HYDROCHLORIDE 10 MG/1
10 TABLET ORAL 4 TIMES DAILY
Qty: 120 TABLET | Refills: 0 | Status: SHIPPED | OUTPATIENT
Start: 2023-04-19 | End: 2023-05-19 | Stop reason: SDUPTHER

## 2023-04-20 ENCOUNTER — PATIENT MESSAGE (OUTPATIENT)
Dept: SLEEP MEDICINE | Facility: CLINIC | Age: 48
End: 2023-04-20
Payer: COMMERCIAL

## 2023-04-20 ENCOUNTER — TELEPHONE (OUTPATIENT)
Dept: SLEEP MEDICINE | Facility: CLINIC | Age: 48
End: 2023-04-20
Payer: COMMERCIAL

## 2023-05-01 DIAGNOSIS — G47.411 NARCOLEPSY CATAPLEXY SYNDROME: ICD-10-CM

## 2023-05-01 RX ORDER — MODAFINIL 200 MG/1
TABLET ORAL
Qty: 45 TABLET | Refills: 5 | Status: SHIPPED | OUTPATIENT
Start: 2023-05-01 | End: 2023-10-27 | Stop reason: SDUPTHER

## 2023-05-03 ENCOUNTER — OFFICE VISIT (OUTPATIENT)
Dept: URGENT CARE | Facility: CLINIC | Age: 48
End: 2023-05-03
Payer: COMMERCIAL

## 2023-05-03 VITALS
RESPIRATION RATE: 16 BRPM | WEIGHT: 221 LBS | BODY MASS INDEX: 41.72 KG/M2 | DIASTOLIC BLOOD PRESSURE: 72 MMHG | HEIGHT: 61 IN | TEMPERATURE: 99 F | SYSTOLIC BLOOD PRESSURE: 119 MMHG | HEART RATE: 84 BPM | OXYGEN SATURATION: 98 %

## 2023-05-03 DIAGNOSIS — R42 VERTIGO: Primary | ICD-10-CM

## 2023-05-03 DIAGNOSIS — R19.7 DIARRHEA, UNSPECIFIED TYPE: ICD-10-CM

## 2023-05-03 PROCEDURE — 99214 PR OFFICE/OUTPT VISIT, EST, LEVL IV, 30-39 MIN: ICD-10-PCS | Mod: S$GLB,,, | Performed by: NURSE PRACTITIONER

## 2023-05-03 PROCEDURE — 99214 OFFICE O/P EST MOD 30 MIN: CPT | Mod: S$GLB,,, | Performed by: NURSE PRACTITIONER

## 2023-05-03 RX ORDER — DICYCLOMINE HYDROCHLORIDE 20 MG/1
20 TABLET ORAL
Qty: 20 TABLET | Refills: 0 | Status: SHIPPED | OUTPATIENT
Start: 2023-05-03 | End: 2023-05-08

## 2023-05-03 RX ORDER — MECLIZINE HYDROCHLORIDE 25 MG/1
25 TABLET ORAL 3 TIMES DAILY PRN
Qty: 30 TABLET | Refills: 0 | Status: SHIPPED | OUTPATIENT
Start: 2023-05-03 | End: 2023-07-03 | Stop reason: ALTCHOICE

## 2023-05-03 NOTE — PATIENT INSTRUCTIONS
PLEASE READ YOUR DISCHARGE INSTRUCTIONS ENTIRELY AS IT CONTAINS IMPORTANT INFORMATION.      bentyl for stomach cramping (may cause drowsiness).      Use gatorade/pedialyte or rehydration packets to help stay hydrated. Vitamin water and plain water do not contain rehydrating electrolytes.  Increase clear liquids (water, gatorade, pedialyte, broths, jello, etc) Hold off on solids for 12-18 hours. Then advance to BRAT diet (banana, rice, applesauce, tea, toast/crackers), then advance further as tolerated. Avoid spicy or fatty foods.   Use Peptobismol to help alleviate your diarrhea symptoms.      Avoid imodium unless you have more than 6 loose stools in 24 hours.      PLEASE CALL [phone] TO SCHEDULE YOUR GI APPT        Wash hands frequently while sick. Avoid ibuprofen or other NSAIDS until you are well.      Please go to the ER if you experience worsening pain, blood in your vomit or stool, high fever, dizziness, fainting, swelling of your abdomen, inability to pass gas or stool.      You must understand that you have received an Urgent Care treatment only and that you may be released before all of your medical problems are known or treated.    You must understand that you've received an Urgent Care treatment only and that you may be released before all your medical problems are known or treated. You, the patient, will arrange for follow up care as instructed.  If your condition worsens we recommend that you receive another evaluation at the emergency room immediately or contact your primary medical clinics after hours call service to discuss your concerns.  Please return here or go to the Emergency Department for any concerns or worsening of condition.

## 2023-05-03 NOTE — PROGRESS NOTES
"Subjective:      Patient ID: Amanda Navarrete is a 48 y.o. female.    Vitals:  height is 5' 1" (1.549 m) and weight is 100.2 kg (221 lb). Her oral temperature is 98.6 °F (37 °C). Her blood pressure is 119/72 and her pulse is 84. Her respiration is 16 and oxygen saturation is 98%.     Chief Complaint: Dizziness    48 year old female presents to urgent care today for evaluation of her dizziness. She states it began about a week and half ago. She states she also has been having headaches. Usually when she has vertigo she will take meclizine, but did not take it this time since she couldn't find it. Pt says she has more of the dizziness when standing up but even when she sits if lays down she its has the spells. Pt says she also have been having sinus issues and thought that caused it to start.   She also has been having loose bowels today.She states she just flew home from [place].     Dizziness:   Chronicity:  New  Onset:  1 to 4 weeks ago  Progression since onset:  Gradually worsening  Frequency:  Constantly  Pain Scale:  0/10  Dizziness characteristics:  Lightheaded/impending faint   Associated symptoms: weakness and light-headedness.  Aggravated by:  Getting up and bending  Treatments tried:  Meclizine  Improvements on treatment:  No relief    Neurological:  Positive for dizziness, history of vertigo and light-headedness.    Objective:     Physical Exam   Constitutional: She is oriented to person, place, and time. She appears well-developed. She is cooperative.  Non-toxic appearance. She does not appear ill. No distress.   HENT:   Head: Normocephalic and atraumatic.   Ears:   Right Ear: Hearing, external ear and ear canal normal. A middle ear effusion is present.   Left Ear: Hearing, external ear and ear canal normal. A middle ear effusion is present.   Nose: Nose normal. No mucosal edema, rhinorrhea or nasal deformity. No epistaxis. Right sinus exhibits no maxillary sinus tenderness and no frontal sinus tenderness. " Left sinus exhibits no maxillary sinus tenderness and no frontal sinus tenderness.   Mouth/Throat: Uvula is midline, oropharynx is clear and moist and mucous membranes are normal. No trismus in the jaw. Normal dentition. No uvula swelling. No oropharyngeal exudate, posterior oropharyngeal edema or posterior oropharyngeal erythema.   Eyes: Conjunctivae and lids are normal. No scleral icterus.   Neck: Trachea normal and phonation normal. Neck supple. No edema present. No erythema present. No neck rigidity present.   Cardiovascular: Normal rate, regular rhythm, normal heart sounds and normal pulses.   Pulmonary/Chest: Effort normal and breath sounds normal. No respiratory distress. She has no decreased breath sounds. She has no rhonchi.   Abdominal: Normal appearance and bowel sounds are normal. She exhibits no distension and no mass. Soft. There is no abdominal tenderness.   Musculoskeletal: Normal range of motion.         General: No deformity. Normal range of motion.   Neurological: She is alert and oriented to person, place, and time. She has normal strength. She exhibits normal muscle tone. Coordination normal.   Skin: Skin is warm, dry, intact, not diaphoretic and not pale.   Psychiatric: Her speech is normal and behavior is normal. Judgment and thought content normal.   Nursing note and vitals reviewed.    Vitals:    05/03/23 1802   BP: 119/72   Pulse: 84   Resp:    Temp:      Orthostatics negative   Assessment:     1. Vertigo    2. Diarrhea, unspecified type        Plan:       Vertigo  -     meclizine (ANTIVERT) 25 mg tablet; Take 1 tablet (25 mg total) by mouth 3 (three) times daily as needed for Nausea or Dizziness.  Dispense: 30 tablet; Refill: 0    Diarrhea, unspecified type  -     dicyclomine (BENTYL) 20 mg tablet; Take 1 tablet (20 mg total) by mouth every 6 to 8 hours as needed (abdominal cramping).  Dispense: 20 tablet; Refill: 0                  Patient Instructions   PLEASE READ YOUR DISCHARGE  INSTRUCTIONS ENTIRELY AS IT CONTAINS IMPORTANT INFORMATION.      bentyl for stomach cramping (may cause drowsiness).      Use gatorade/pedialyte or rehydration packets to help stay hydrated. Vitamin water and plain water do not contain rehydrating electrolytes.  Increase clear liquids (water, gatorade, pedialyte, broths, jello, etc) Hold off on solids for 12-18 hours. Then advance to BRAT diet (banana, rice, applesauce, tea, toast/crackers), then advance further as tolerated. Avoid spicy or fatty foods.   Use Peptobismol to help alleviate your diarrhea symptoms.      Avoid imodium unless you have more than 6 loose stools in 24 hours.      PLEASE CALL [phone] TO SCHEDULE YOUR GI APPT        Wash hands frequently while sick. Avoid ibuprofen or other NSAIDS until you are well.      Please go to the ER if you experience worsening pain, blood in your vomit or stool, high fever, dizziness, fainting, swelling of your abdomen, inability to pass gas or stool.      You must understand that you have received an Urgent Care treatment only and that you may be released before all of your medical problems are known or treated.    You must understand that you've received an Urgent Care treatment only and that you may be released before all your medical problems are known or treated. You, the patient, will arrange for follow up care as instructed.  If your condition worsens we recommend that you receive another evaluation at the emergency room immediately or contact your primary medical clinics after hours call service to discuss your concerns.  Please return here or go to the Emergency Department for any concerns or worsening of condition.

## 2023-05-19 ENCOUNTER — PATIENT MESSAGE (OUTPATIENT)
Dept: SLEEP MEDICINE | Facility: CLINIC | Age: 48
End: 2023-05-19
Payer: COMMERCIAL

## 2023-05-19 DIAGNOSIS — G47.411 NARCOLEPSY WITH CATAPLEXY: ICD-10-CM

## 2023-05-19 RX ORDER — METHYLPHENIDATE HYDROCHLORIDE 20 MG/1
20 CAPSULE, EXTENDED RELEASE ORAL EVERY MORNING
Qty: 30 CAPSULE | Refills: 0 | Status: SHIPPED | OUTPATIENT
Start: 2023-05-19 | End: 2023-05-19 | Stop reason: SDUPTHER

## 2023-05-19 RX ORDER — METHYLPHENIDATE HYDROCHLORIDE 10 MG/1
10 TABLET ORAL 4 TIMES DAILY
Qty: 120 TABLET | Refills: 0 | Status: SHIPPED | OUTPATIENT
Start: 2023-05-19 | End: 2023-07-06 | Stop reason: SDUPTHER

## 2023-05-19 RX ORDER — METHYLPHENIDATE HYDROCHLORIDE 10 MG/1
10 TABLET ORAL 4 TIMES DAILY
Qty: 120 TABLET | Refills: 0 | Status: SHIPPED | OUTPATIENT
Start: 2023-05-19 | End: 2023-05-19 | Stop reason: SDUPTHER

## 2023-05-19 RX ORDER — METHYLPHENIDATE HYDROCHLORIDE 20 MG/1
20 CAPSULE, EXTENDED RELEASE ORAL EVERY MORNING
Qty: 30 CAPSULE | Refills: 0 | Status: SHIPPED | OUTPATIENT
Start: 2023-05-19 | End: 2023-07-06 | Stop reason: SDUPTHER

## 2023-05-31 DIAGNOSIS — Z86.19 HISTORY OF HERPES LABIALIS: ICD-10-CM

## 2023-05-31 DIAGNOSIS — E55.9 VITAMIN D DEFICIENCY: ICD-10-CM

## 2023-05-31 DIAGNOSIS — E78.2 HYPERLIPIDEMIA, MIXED: ICD-10-CM

## 2023-05-31 DIAGNOSIS — E06.3 HYPOTHYROIDISM DUE TO HASHIMOTO'S THYROIDITIS: ICD-10-CM

## 2023-05-31 DIAGNOSIS — G43.019 INTRACTABLE MIGRAINE WITHOUT AURA AND WITHOUT STATUS MIGRAINOSUS: ICD-10-CM

## 2023-05-31 DIAGNOSIS — E03.8 HYPOTHYROIDISM DUE TO HASHIMOTO'S THYROIDITIS: ICD-10-CM

## 2023-05-31 RX ORDER — ERGOCALCIFEROL 1.25 MG/1
50000 CAPSULE ORAL
Qty: 12 CAPSULE | Refills: 3 | Status: SHIPPED | OUTPATIENT
Start: 2023-05-31 | End: 2024-02-08 | Stop reason: SDUPTHER

## 2023-05-31 RX ORDER — VALACYCLOVIR HYDROCHLORIDE 1 G/1
1000 TABLET, FILM COATED ORAL EVERY 12 HOURS PRN
Qty: 60 TABLET | Refills: 5 | Status: SHIPPED | OUTPATIENT
Start: 2023-05-31 | End: 2024-01-11 | Stop reason: SDUPTHER

## 2023-05-31 RX ORDER — ROSUVASTATIN CALCIUM 10 MG/1
10 TABLET, COATED ORAL DAILY
Qty: 90 TABLET | Refills: 3 | Status: SHIPPED | OUTPATIENT
Start: 2023-05-31

## 2023-05-31 RX ORDER — LEVOTHYROXINE SODIUM 50 UG/1
50 TABLET ORAL
Qty: 90 TABLET | Refills: 3 | Status: SHIPPED | OUTPATIENT
Start: 2023-05-31 | End: 2024-02-08

## 2023-05-31 RX ORDER — TOPIRAMATE 100 MG/1
TABLET, FILM COATED ORAL
Qty: 225 TABLET | Refills: 3 | Status: SHIPPED | OUTPATIENT
Start: 2023-05-31 | End: 2024-05-30

## 2023-05-31 NOTE — TELEPHONE ENCOUNTER
Refill Encounter    PCP Visits: Recent Visits  Date Type Provider Dept   12/27/22 Office Visit Laya Jaffe DO [facility] Primary Care   Showing recent visits within past 360 days and meeting all other requirements  Future Appointments  No visits were found meeting these conditions.  Showing future appointments within next 720 days and meeting all other requirements     Last 3 Blood Pressure:   BP Readings from Last 3 Encounters:   05/03/23 119/72   12/27/22 132/80   11/28/22 (!) 132/90     Preferred Pharmacy:   [id number] - [place], [place] - [address]  [address]  [place] [address]  Phone: [phone] Fax: [phone]    [facility]  [address]  [place] [address]  Phone: [phone] Fax: [phone]    [facility] - [place], [place] - [address]  [address]  [facility] [address]  Phone: [phone] Fax: [phone]    [id number] - [place], [place] - [address]  [address] [facility]  [place] [address]  Phone: [phone] Fax: [phone]    Requested RX:  Requested Prescriptions     Pending Prescriptions Disp Refills    valACYclovir (VALTREX) 1000 MG tablet 60 tablet 5     Sig: Take 1 tablet (1,000 mg total) by mouth every 12 (twelve) hours as needed.    rosuvastatin (CRESTOR) 10 MG tablet 90 tablet 3     Sig: Take 1 tablet (10 mg total) by mouth once daily.    topiramate (TOPAMAX) 100 MG tablet 225 tablet 3     Sig: Take 1 tablet (100 mg total) by mouth once daily AND 1.5 tablets (150 mg total) every evening.    ergocalciferol (VITAMIN D2) 50,000 unit Cap 24 capsule 0    levothyroxine (SYNTHROID) 50 MCG tablet 90 tablet 3     Sig: Take 1 tablet (50 mcg total) by mouth before breakfast.      RX Route: Normal

## 2023-05-31 NOTE — TELEPHONE ENCOUNTER
No care due was identified.  [facility] Embedded Care Due Messages. Reference number: [id number].   5/31/2023 4:20:58 PM CDT

## 2023-06-01 ENCOUNTER — PATIENT OUTREACH (OUTPATIENT)
Dept: ADMINISTRATIVE | Facility: HOSPITAL | Age: 48
End: 2023-06-01
Payer: COMMERCIAL

## 2023-06-02 ENCOUNTER — PATIENT OUTREACH (OUTPATIENT)
Dept: ADMINISTRATIVE | Facility: HOSPITAL | Age: 48
End: 2023-06-02
Payer: COMMERCIAL

## 2023-06-30 ENCOUNTER — PATIENT MESSAGE (OUTPATIENT)
Dept: PRIMARY CARE CLINIC | Facility: CLINIC | Age: 48
End: 2023-06-30
Payer: COMMERCIAL

## 2023-06-30 ENCOUNTER — PATIENT MESSAGE (OUTPATIENT)
Dept: SLEEP MEDICINE | Facility: CLINIC | Age: 48
End: 2023-06-30
Payer: COMMERCIAL

## 2023-06-30 DIAGNOSIS — R42 VERTIGO: Primary | ICD-10-CM

## 2023-07-03 RX ORDER — MECLIZINE HYDROCHLORIDE 25 MG/1
25 TABLET ORAL 3 TIMES DAILY PRN
Qty: 30 TABLET | Refills: 5 | Status: SHIPPED | OUTPATIENT
Start: 2023-07-03 | End: 2023-10-05 | Stop reason: SDUPTHER

## 2023-07-03 NOTE — TELEPHONE ENCOUNTER
Refill sent to [facility]    Orders Placed This Encounter    meclizine (ANTIVERT) 25 mg tablet     Dr. Laya Jaffe D.O.   [facility]

## 2023-07-05 ENCOUNTER — PATIENT MESSAGE (OUTPATIENT)
Dept: PRIMARY CARE CLINIC | Facility: CLINIC | Age: 48
End: 2023-07-05
Payer: COMMERCIAL

## 2023-07-05 RX ORDER — RIMEGEPANT SULFATE 75 MG/75MG
75 TABLET, ORALLY DISINTEGRATING ORAL ONCE AS NEEDED
Qty: 8 TABLET | Refills: 5 | Status: SHIPPED | OUTPATIENT
Start: 2023-07-05 | End: 2023-11-17

## 2023-07-06 DIAGNOSIS — G47.411 NARCOLEPSY WITH CATAPLEXY: ICD-10-CM

## 2023-07-06 RX ORDER — METHYLPHENIDATE HYDROCHLORIDE 10 MG/1
10 TABLET ORAL 4 TIMES DAILY
Qty: 120 TABLET | Refills: 0 | Status: SHIPPED | OUTPATIENT
Start: 2023-07-06 | End: 2023-07-06 | Stop reason: SDUPTHER

## 2023-07-06 RX ORDER — METHYLPHENIDATE HYDROCHLORIDE 20 MG/1
20 CAPSULE, EXTENDED RELEASE ORAL EVERY MORNING
Qty: 30 CAPSULE | Refills: 0 | Status: SHIPPED | OUTPATIENT
Start: 2023-07-06 | End: 2023-07-06 | Stop reason: SDUPTHER

## 2023-07-06 RX ORDER — METHYLPHENIDATE HYDROCHLORIDE 10 MG/1
10 TABLET ORAL 4 TIMES DAILY
Qty: 120 TABLET | Refills: 0 | Status: SHIPPED | OUTPATIENT
Start: 2023-07-06 | End: 2023-08-10 | Stop reason: SDUPTHER

## 2023-07-06 RX ORDER — METHYLPHENIDATE HYDROCHLORIDE 20 MG/1
20 CAPSULE, EXTENDED RELEASE ORAL EVERY MORNING
Qty: 30 CAPSULE | Refills: 0 | Status: SHIPPED | OUTPATIENT
Start: 2023-07-06 | End: 2023-08-10 | Stop reason: SDUPTHER

## 2023-07-07 ENCOUNTER — TELEPHONE (OUTPATIENT)
Dept: PHARMACY | Facility: CLINIC | Age: 48
End: 2023-07-07
Payer: COMMERCIAL

## 2023-07-15 DIAGNOSIS — N32.81 OVERACTIVE BLADDER: ICD-10-CM

## 2023-07-15 NOTE — TELEPHONE ENCOUNTER
No care due was identified.  [facility] Embedded Care Due Messages. Reference number: [id number].   7/15/2023 4:43:32 PM CDT

## 2023-07-17 RX ORDER — OXYBUTYNIN CHLORIDE 15 MG/1
15 TABLET, EXTENDED RELEASE ORAL DAILY
Qty: 90 TABLET | Refills: 3 | Status: SHIPPED | OUTPATIENT
Start: 2023-07-17

## 2023-07-17 NOTE — TELEPHONE ENCOUNTER
Refill Encounter    PCP Visits: Recent Visits  Date Type Provider Dept   12/27/22 Office Visit Laya Jaffe DO [facility] Primary Care   Showing recent visits within past 360 days and meeting all other requirements  Future Appointments  No visits were found meeting these conditions.  Showing future appointments within next 720 days and meeting all other requirements     Last 3 Blood Pressure:   BP Readings from Last 3 Encounters:   05/03/23 119/72   12/27/22 132/80   11/28/22 (!) 132/90     Preferred Pharmacy:   [id number] - [place] - [address]  [address]  [place] [address]  Phone: [phone] Fax: [phone]    [facility] [place] [facility]  [address]  [place] [address]  Phone: [phone] Fax: [phone]    [facility] - [place], [place] - [address]  [address]  [facility] [address]  Phone: [phone] Fax: [phone]    [id number] [place] - [address]  [address] [facility]  [place] [address]  Phone: [phone] Fax: [phone]    Requested RX:  Requested Prescriptions     Pending Prescriptions Disp Refills    oxybutynin (DITROPAN XL) 15 MG TR24 90 tablet 3     Sig: Take 1 tablet (15 mg total) by mouth once daily.      RX Route: Normal

## 2023-08-09 ENCOUNTER — PATIENT MESSAGE (OUTPATIENT)
Dept: SLEEP MEDICINE | Facility: CLINIC | Age: 48
End: 2023-08-09
Payer: COMMERCIAL

## 2023-08-10 DIAGNOSIS — G47.411 NARCOLEPSY WITH CATAPLEXY: ICD-10-CM

## 2023-08-10 RX ORDER — METHYLPHENIDATE HYDROCHLORIDE 20 MG/1
20 CAPSULE, EXTENDED RELEASE ORAL EVERY MORNING
Qty: 30 CAPSULE | Refills: 0 | Status: SHIPPED | OUTPATIENT
Start: 2023-08-10 | End: 2023-08-10 | Stop reason: SDUPTHER

## 2023-08-10 RX ORDER — METHYLPHENIDATE HYDROCHLORIDE 10 MG/1
10 TABLET ORAL 4 TIMES DAILY
Qty: 120 TABLET | Refills: 0 | Status: SHIPPED | OUTPATIENT
Start: 2023-08-10 | End: 2023-08-10 | Stop reason: SDUPTHER

## 2023-08-10 RX ORDER — METHYLPHENIDATE HYDROCHLORIDE 20 MG/1
20 CAPSULE, EXTENDED RELEASE ORAL EVERY MORNING
Qty: 30 CAPSULE | Refills: 0 | Status: SHIPPED | OUTPATIENT
Start: 2023-08-10 | End: 2023-09-21 | Stop reason: SDUPTHER

## 2023-08-10 RX ORDER — METHYLPHENIDATE HYDROCHLORIDE 10 MG/1
10 TABLET ORAL 4 TIMES DAILY
Qty: 120 TABLET | Refills: 0 | Status: SHIPPED | OUTPATIENT
Start: 2023-08-10 | End: 2023-09-21 | Stop reason: SDUPTHER

## 2023-09-20 ENCOUNTER — PATIENT MESSAGE (OUTPATIENT)
Dept: SLEEP MEDICINE | Facility: CLINIC | Age: 48
End: 2023-09-20
Payer: COMMERCIAL

## 2023-09-21 DIAGNOSIS — G47.411 NARCOLEPSY WITH CATAPLEXY: ICD-10-CM

## 2023-09-21 RX ORDER — METHYLPHENIDATE HYDROCHLORIDE 10 MG/1
10 TABLET ORAL 4 TIMES DAILY
Qty: 120 TABLET | Refills: 0 | Status: SHIPPED | OUTPATIENT
Start: 2023-09-21 | End: 2023-11-08 | Stop reason: SDUPTHER

## 2023-09-21 RX ORDER — METHYLPHENIDATE HYDROCHLORIDE 20 MG/1
20 CAPSULE, EXTENDED RELEASE ORAL EVERY MORNING
Qty: 30 CAPSULE | Refills: 0 | Status: SHIPPED | OUTPATIENT
Start: 2023-09-21 | End: 2023-09-21 | Stop reason: SDUPTHER

## 2023-09-21 RX ORDER — METHYLPHENIDATE HYDROCHLORIDE 10 MG/1
10 TABLET ORAL 4 TIMES DAILY
Qty: 120 TABLET | Refills: 0 | Status: SHIPPED | OUTPATIENT
Start: 2023-09-21 | End: 2023-09-21 | Stop reason: SDUPTHER

## 2023-09-21 RX ORDER — METHYLPHENIDATE HYDROCHLORIDE 20 MG/1
20 CAPSULE, EXTENDED RELEASE ORAL EVERY MORNING
Qty: 30 CAPSULE | Refills: 0 | Status: SHIPPED | OUTPATIENT
Start: 2023-09-21 | End: 2023-11-08 | Stop reason: SDUPTHER

## 2023-09-27 ENCOUNTER — TELEPHONE (OUTPATIENT)
Dept: SLEEP MEDICINE | Facility: CLINIC | Age: 48
End: 2023-09-27
Payer: COMMERCIAL

## 2023-10-05 DIAGNOSIS — R42 VERTIGO: ICD-10-CM

## 2023-10-05 RX ORDER — MECLIZINE HYDROCHLORIDE 25 MG/1
25 TABLET ORAL 3 TIMES DAILY PRN
Qty: 30 TABLET | Refills: 5 | Status: SHIPPED | OUTPATIENT
Start: 2023-10-05

## 2023-10-05 NOTE — TELEPHONE ENCOUNTER
Refill Encounter    PCP Visits: Recent Visits  Date Type Provider Dept   12/27/22 Office Visit Laya Jaffe DO [facility] Primary Care   Showing recent visits within past 360 days and meeting all other requirements  Future Appointments  No visits were found meeting these conditions.  Showing future appointments within next 720 days and meeting all other requirements     Last 3 Blood Pressure:   BP Readings from Last 3 Encounters:   05/03/23 119/72   12/27/22 132/80   11/28/22 (!) 132/90     Preferred Pharmacy:   [id number] - [place] - [address]  [address]  [place] [address]  Phone: [phone] Fax: [phone]    [facility]  [address]  [place] [address]  Phone: [phone] Fax: [phone]    [facility] - [place], [place] - [address]  [address] [address]  Phone: [phone] Fax: [phone]    [id number] [place] - [address]  [address] [facility]  [place] [address]  Phone: [phone] Fax: [phone]    Requested RX:  Requested Prescriptions     Pending Prescriptions Disp Refills    meclizine (ANTIVERT) 25 mg tablet 30 tablet 5     Sig: Take 1 tablet (25 mg total) by mouth 3 (three) times daily as needed for Dizziness.      RX Route: Normal

## 2023-10-05 NOTE — TELEPHONE ENCOUNTER
No care due was identified.  [facility] Embedded Care Due Messages. Reference number: [id number].   10/05/2023 6:29:25 AM CDT

## 2023-10-20 ENCOUNTER — PATIENT MESSAGE (OUTPATIENT)
Dept: ADMINISTRATIVE | Facility: HOSPITAL | Age: 48
End: 2023-10-20
Payer: COMMERCIAL

## 2023-10-20 ENCOUNTER — PATIENT OUTREACH (OUTPATIENT)
Dept: ADMINISTRATIVE | Facility: HOSPITAL | Age: 48
End: 2023-10-20
Payer: COMMERCIAL

## 2023-10-27 DIAGNOSIS — G47.411 NARCOLEPSY CATAPLEXY SYNDROME: ICD-10-CM

## 2023-10-27 RX ORDER — MODAFINIL 200 MG/1
TABLET ORAL
Qty: 45 TABLET | Refills: 5 | Status: SHIPPED | OUTPATIENT
Start: 2023-10-27

## 2023-11-03 DIAGNOSIS — G47.411 NARCOLEPSY WITH CATAPLEXY: ICD-10-CM

## 2023-11-03 RX ORDER — (CALCIUM, MAGNESIUM, POTASSIUM, AND SODIUM OXYBATES) .5; .5; .5; .5 G/ML; G/ML; G/ML; G/ML
4.5 SOLUTION ORAL SEE ADMIN INSTRUCTIONS
Qty: 540 ML | Refills: 5 | Status: ACTIVE | OUTPATIENT
Start: 2023-11-03

## 2023-11-08 ENCOUNTER — PATIENT MESSAGE (OUTPATIENT)
Dept: SLEEP MEDICINE | Facility: CLINIC | Age: 48
End: 2023-11-08
Payer: COMMERCIAL

## 2023-11-08 DIAGNOSIS — G47.411 NARCOLEPSY WITH CATAPLEXY: ICD-10-CM

## 2023-11-08 RX ORDER — METHYLPHENIDATE HYDROCHLORIDE 10 MG/1
10 TABLET ORAL 4 TIMES DAILY
Qty: 120 TABLET | Refills: 0 | Status: SHIPPED | OUTPATIENT
Start: 2023-11-08 | End: 2023-12-29 | Stop reason: SDUPTHER

## 2023-11-08 RX ORDER — METHYLPHENIDATE HYDROCHLORIDE 20 MG/1
20 CAPSULE, EXTENDED RELEASE ORAL EVERY MORNING
Qty: 30 CAPSULE | Refills: 0 | Status: SHIPPED | OUTPATIENT
Start: 2023-11-08 | End: 2023-12-11 | Stop reason: SDUPTHER

## 2023-12-04 ENCOUNTER — PATIENT MESSAGE (OUTPATIENT)
Dept: SLEEP MEDICINE | Facility: CLINIC | Age: 48
End: 2023-12-04
Payer: COMMERCIAL

## 2023-12-11 DIAGNOSIS — G47.411 NARCOLEPSY WITH CATAPLEXY: ICD-10-CM

## 2023-12-11 RX ORDER — METHYLPHENIDATE HYDROCHLORIDE 20 MG/1
CAPSULE, EXTENDED RELEASE ORAL
Qty: 30 CAPSULE | Refills: 0 | Status: SHIPPED | OUTPATIENT
Start: 2023-12-11 | End: 2023-12-29 | Stop reason: SDUPTHER

## 2023-12-11 RX ORDER — METHYLPHENIDATE HYDROCHLORIDE 20 MG/1
20 CAPSULE, EXTENDED RELEASE ORAL EVERY MORNING
Qty: 30 CAPSULE | Refills: 0 | Status: SHIPPED | OUTPATIENT
Start: 2023-12-11 | End: 2023-12-11 | Stop reason: SDUPTHER

## 2023-12-11 RX ORDER — METHYLPHENIDATE HYDROCHLORIDE 20 MG/1
20 CAPSULE, EXTENDED RELEASE ORAL EVERY MORNING
Qty: 30 CAPSULE | Refills: 0 | Status: SHIPPED | OUTPATIENT
Start: 2023-12-11 | End: 2023-12-11 | Stop reason: CLARIF

## 2023-12-11 NOTE — TELEPHONE ENCOUNTER
----- Message from Carli Gomez sent at 12/11/2023  9:51 AM CST -----  Regarding: pt called  Type: Patient Call Back         Who called:OTTO MELENDREZ [[id number]]         What is the request in detail: Pt needs PA done on methylphenidate HCl (RITALIN [place]) 20 MG 24 hr capsule            [facility]  [place] - [address]  [address]  [place] [address]  Phone: [phone] Fax: [phone]        Can the clinic reply by "Mobile Location, IP"VEE? No         Would the patient rather a call back or a response via [facility]? Call back         Best call back number: Telephone Information:  Mobile          [phone]             Additional Information:         Thank you.

## 2023-12-15 ENCOUNTER — PATIENT MESSAGE (OUTPATIENT)
Dept: PRIMARY CARE CLINIC | Facility: CLINIC | Age: 48
End: 2023-12-15
Payer: COMMERCIAL

## 2023-12-29 ENCOUNTER — PATIENT MESSAGE (OUTPATIENT)
Dept: ADMINISTRATIVE | Facility: HOSPITAL | Age: 48
End: 2023-12-29
Payer: COMMERCIAL

## 2023-12-29 DIAGNOSIS — G47.411 NARCOLEPSY WITH CATAPLEXY: ICD-10-CM

## 2023-12-29 RX ORDER — METHYLPHENIDATE HYDROCHLORIDE 10 MG/1
10 TABLET ORAL 4 TIMES DAILY
Qty: 120 TABLET | Refills: 0 | Status: SHIPPED | OUTPATIENT
Start: 2023-12-29 | End: 2024-01-11 | Stop reason: SDUPTHER

## 2023-12-29 RX ORDER — METHYLPHENIDATE HYDROCHLORIDE 20 MG/1
CAPSULE, EXTENDED RELEASE ORAL
Qty: 30 CAPSULE | Refills: 0 | Status: SHIPPED | OUTPATIENT
Start: 2023-12-29 | End: 2024-01-11 | Stop reason: SDUPTHER

## 2023-12-30 DIAGNOSIS — Z12.11 SCREENING FOR COLON CANCER: ICD-10-CM

## 2024-01-11 ENCOUNTER — PATIENT MESSAGE (OUTPATIENT)
Dept: SLEEP MEDICINE | Facility: CLINIC | Age: 49
End: 2024-01-11

## 2024-01-11 ENCOUNTER — OFFICE VISIT (OUTPATIENT)
Dept: SLEEP MEDICINE | Facility: CLINIC | Age: 49
End: 2024-01-11
Payer: COMMERCIAL

## 2024-01-11 DIAGNOSIS — G47.411 NARCOLEPSY WITH CATAPLEXY: ICD-10-CM

## 2024-01-11 DIAGNOSIS — G43.009 MIGRAINE WITHOUT AURA AND WITHOUT STATUS MIGRAINOSUS, NOT INTRACTABLE: ICD-10-CM

## 2024-01-11 DIAGNOSIS — Z86.19 HISTORY OF HERPES LABIALIS: ICD-10-CM

## 2024-01-11 PROCEDURE — 99214 OFFICE O/P EST MOD 30 MIN: CPT | Mod: 95,,, | Performed by: NURSE PRACTITIONER

## 2024-01-11 RX ORDER — METHYLPHENIDATE HYDROCHLORIDE 10 MG/1
10 TABLET ORAL 4 TIMES DAILY
Qty: 120 TABLET | Refills: 0 | Status: SHIPPED | OUTPATIENT
Start: 2024-01-12 | End: 2024-01-11 | Stop reason: SDUPTHER

## 2024-01-11 RX ORDER — METHYLPHENIDATE HYDROCHLORIDE 20 MG/1
CAPSULE, EXTENDED RELEASE ORAL
Qty: 30 CAPSULE | Refills: 0 | Status: SHIPPED | OUTPATIENT
Start: 2024-01-12 | End: 2024-01-11 | Stop reason: SDUPTHER

## 2024-01-11 RX ORDER — RIMEGEPANT SULFATE 75 MG/75MG
75 TABLET, ORALLY DISINTEGRATING ORAL ONCE AS NEEDED
Qty: 16 TABLET | Refills: 5 | Status: SHIPPED | OUTPATIENT
Start: 2024-01-11 | End: 2024-01-28

## 2024-01-11 RX ORDER — METHYLPHENIDATE HYDROCHLORIDE 20 MG/1
CAPSULE, EXTENDED RELEASE ORAL
Qty: 30 CAPSULE | Refills: 0 | Status: SHIPPED | OUTPATIENT
Start: 2024-01-12

## 2024-01-11 RX ORDER — METHYLPHENIDATE HYDROCHLORIDE 10 MG/1
10 TABLET ORAL 4 TIMES DAILY
Qty: 120 TABLET | Refills: 0 | Status: SHIPPED | OUTPATIENT
Start: 2024-01-12

## 2024-01-11 NOTE — TELEPHONE ENCOUNTER
Requested Prescriptions     Pending Prescriptions Disp Refills    rimegepant (NURTEC) 75 mg odt 8 tablet 5     Sig: Take 1 tablet (75 mg total) by mouth once as needed for Migraine. Place ODT tablet on the tongue      1/11/24

## 2024-01-11 NOTE — PROGRESS NOTES
The patient location is: [place]  The chief complaint leading to consultation is: narcolepsy/EUNICE    Visit type: audiovisual    Face to Face time with patient:20 minutes of total time spent on the encounter, which includes face to face time and non-face to face time preparing to see the patient (eg, review of tests), Obtaining and/or reviewing separately obtained history, Documenting clinical information in the electronic or other health record, Independently interpreting results (not separately reported) and communicating results to the patient/family/caregiver, or Care coordination (not separately reported). Each patient to whom he or she provides medical services by telemedicine is:  (1) informed of the relationship between the physician and patient and the respective role of any other health care provider with respect to management of the patient; and (2) notified that he or she may decline to receive medical services by telemedicine and may withdraw from such care at any time.       Using apap qhs with resmed.  Denies s/e with Xywav 4.5G 2x nightly. Still living in [facility] and dog now in PT for next 5 wks ,ritalin xr and qid 10mg  Remote 17/163d uysed avg 5:40h/n AHI 1.0,90% tile 13.4cm      HX  She has history of narcolepsy with sleep attacks and occasional mild cataplectic episodes during which time her hands become weak. However these are infrequent. Symptoms are well controlled with a combination of Ritalin and Provigil.  She also has a history of migraines. Headaches are intermittent, usually noted on awakening in the morning. Headaches are usually frontal temporal and pressure-like and are not associated with any focal neurological or visual symptoms. They are usually precipitated by stress. She is presently on Topamax 100 mg twice a day which has decreased the intensity and frequency of the headaches.  She denies any new medical problems otherwise and has been overall stable.  She continues to follow-up with  pain management for her back problems having had back surgery and is presently on pain medications.  She is also under the care of his psychiatrist because of significant problem with depression and reports that she was diagnosed as having bipolar disorder    8/2021:She continues to take 10mg Ritalin QID (preferred  20mg bid) and Ritalin XR 20mg qam and provigil 200mg qam and 100mg afternoon. She has to be sure not to leave house without it or else can't drive. Has mild cataplexy  infrequently. HA remain stable in location and nature as below. Takes topamax 100mg am and 50mg qhs. Ubrelvy helps.   Got setup with apap 6-16cm after having + HST revealing AHI 7(RDI 11)/low sat 91%. Doing well. Wants to trial FFM now. Mouth drying despite chin strap and air blowing eyes(eye mask on now also). Snoring resolved and sleep more consolidated.   Remote- 3-mos after setup 4/22/21 6:40h/night. AHI 0.3, 90% tile 9cm      ASSESSMENT:  Narcolepsy with mild cataplexy,stable on stimulants and 9G TDD Xywav , when can obtain scripts  EUNICE, mild, Remains adherent with PAP but limited due to living arrangements, benefits from therapy AHI<5  Depression, stable on meds  Chronic pain, once daily percocet      PLAN:  Continue provigil 200mg qam and 1/2 tab noon prn and ritalinl XR 20mg and ritalin 10mg po qid and 4.5G 2x nightly Xywav  [facility] D[facility] supplies. Continue apap 6-16cm.   RTC 12- mos, SOONEr if needed

## 2024-01-11 NOTE — TELEPHONE ENCOUNTER
Care Due:                  Date            Visit Type   Department     Provider  --------------------------------------------------------------------------------                                EP -                              PRIMARY      [facility] PRIMARY  Last Visit: 12-      CARE ([facility])   CARE           Laya Jaffe                              MYCHART                              ANNUAL                              CHECKUP/PHY  [facility] PRIMARY  Next Visit: 02-      S            CARLOS EDUARDO Jaffe                                                            Last  Test          Frequency    Reason                     Performed    Due Date  --------------------------------------------------------------------------------    CBC.........  12 months..  valACYclovir.............  12- 12-    CMP.........  12 months..  rosuvastatin,              12- 12-                             valACYclovir.............    Lipid Panel.  12 months..  rosuvastatin.............  12- 12-    TSH.........  12 months..  levothyroxine............  02- 02-    [employer] Embedded Care Due Messages. Reference number: [id number].   1/11/2024 4:08:31 PM CST

## 2024-01-12 RX ORDER — VALACYCLOVIR HYDROCHLORIDE 1 G/1
1000 TABLET, FILM COATED ORAL EVERY 12 HOURS PRN
Qty: 60 TABLET | Refills: 2 | Status: SHIPPED | OUTPATIENT
Start: 2024-01-12

## 2024-01-12 NOTE — TELEPHONE ENCOUNTER
Provider Staff:  Action required for this patient    Requires labs      Please see care gap opportunities below in Care Due Message.    Thanks!  [facility]     Appointments      Date Provider   Last Visit   12/27/2022 Laya Jaffe, DO   Next Visit   2/8/2024 Laya Jaffe, DO     Refill Decision Note   Amanda WhitleyLandon  is requesting a refill authorization.  Brief Assessment and Rationale for Refill:  Approve     Medication Therapy Plan:         Comments:     Note composed:10:34 AM 01/12/2024

## 2024-01-16 ENCOUNTER — TELEPHONE (OUTPATIENT)
Dept: PRIMARY CARE CLINIC | Facility: CLINIC | Age: 49
End: 2024-01-16
Payer: COMMERCIAL

## 2024-01-16 DIAGNOSIS — E66.01 CLASS 3 SEVERE OBESITY DUE TO EXCESS CALORIES WITH SERIOUS COMORBIDITY AND BODY MASS INDEX (BMI) OF 40.0 TO 44.9 IN ADULT: ICD-10-CM

## 2024-01-16 DIAGNOSIS — Z00.01 ENCOUNTER FOR GENERAL ADULT MEDICAL EXAMINATION WITH ABNORMAL FINDINGS: ICD-10-CM

## 2024-01-16 DIAGNOSIS — R73.01 IMPAIRED FASTING GLUCOSE: ICD-10-CM

## 2024-01-16 DIAGNOSIS — E06.3 HYPOTHYROIDISM DUE TO HASHIMOTO'S THYROIDITIS: Primary | ICD-10-CM

## 2024-01-16 DIAGNOSIS — E03.8 HYPOTHYROIDISM DUE TO HASHIMOTO'S THYROIDITIS: Primary | ICD-10-CM

## 2024-01-16 DIAGNOSIS — E78.2 HYPERLIPIDEMIA, MIXED: ICD-10-CM

## 2024-01-16 DIAGNOSIS — E55.9 VITAMIN D DEFICIENCY: ICD-10-CM

## 2024-01-16 NOTE — TELEPHONE ENCOUNTER
----- Message from Tessa White sent at 1/12/2024  4:57 PM CST -----   Name of Who is Calling:     What is the request in detail:  patient request call back in reference to annual  lab orders Please contact to further discuss and advise      Can the clinic reply by [facility]:     What [facility]:   [phone]

## 2024-01-25 ENCOUNTER — PATIENT OUTREACH (OUTPATIENT)
Dept: ADMINISTRATIVE | Facility: HOSPITAL | Age: 49
End: 2024-01-25
Payer: COMMERCIAL

## 2024-01-30 LAB — HEMOCCULT STL QL IA: NEGATIVE

## 2024-02-06 NOTE — PROGRESS NOTES
FAMILY MEDICINE  [facility]    Reason for visit:   Chief Complaint   Patient presents with    Annual Exam         SUBJECTIVE: Amanda Navarrete is a 48 y.o. female  - with obesity, chronic low back pain with bilateral sciatica, migraine headaches, hypothyroidism, anxiety, hyperlipidemia, vitamin-D deficiency, GERD, and narcolepsy cataplexy syndrome presents for routine annual physical and review of labs.  She also has concerns for POTS and would like evaluation     Sleep Medicine: KIRK Reilly  Gynecology: Dr. Flores 8/5/2022   - mammograms [facility]   Ortho Dr. Najera  Pain Dr. Geoffrey Aleman    Amanda Navarrete reports that several members of her family recently diagnosed POTS.  She feels that it is genetic.  She reports that she has had recurrent episodes of syncope which previously had been attributed to her narcolepsy however she is wondering for related with POTS.  She feels that her blood pressure has been increasing recently.  She is tachycardic at rest today.  And feels that her heart rate goes up when she has any movement or stands up.  She does not get to exercise regularly secondary to her history of back surgery.  She denies any chest pain, shortness for breath however she does have dyspnea on exertion.  She also reports recently her pustule vertigo has worsened.  She follows with a specialist.        Otherwise she reports that she is doing well.  She reports that she has been taking her levothyroxine 50 mcg daily she has not missed any doses after last year when her TSH was above goal.  Her repeat TSH had improved however the TSH was around 3 at that time.  She reports recently she has found it more difficult to gain weight.  She has been having more issues with the vertigo.  She also feels more fatigued.                    Review of Systems   All other systems reviewed and are negative.      HEALTH MAINTENANCE:   Health Maintenance   Topic Date Due    Mammogram   2024    Colorectal Cancer Screening  2025    TETANUS VACCINE  2028    Lipid Panel  2029    Hepatitis C Screening  Completed     Health Maintenance Topics with due status: Not Due       Topic Last Completion Date    TETANUS VACCINE 2018    Cervical Cancer Screening 2022    Mammogram 2023    Colorectal Cancer Screening 2024    Hemoglobin A1c (Prediabetes) 2024    Lipid Panel 2024     Health Maintenance Due   Topic Date Due    COVID-19 Vaccine ( season) 2023       HISTORY:   Past Medical History:   Diagnosis Date    Affective disorder     sses Dr. Dial, psychiatry    Back pain     GERD (gastroesophageal reflux disease)     Headache(784.0)     Hyperlipidemia, mixed 2019    Influenza A 2022    - supportive care - albuterol inhaler - discussed proceed to the ER if severe shortness of breath or chest pain. - Follow-up in office 2022 scheduled    Migraine headache     Narcolepsy without cataplexy(347.00)     Thyroid disease     hyperthyroidism    TMJ arthralgia        Past Surgical History:   Procedure Laterality Date    CAUDAL EPIDURAL STEROID INJECTION N/A 2018    Procedure: INJECTION, STEROID, CAUDAL, EPIDURAL;  Surgeon: Geoffrey Aleman MD;  Location: [facility] OR;  Service: Pain Management;  Laterality: N/A;     SECTION, CLASSIC      COSMETIC SURGERY      breast augmentation    INJECTION OF ANESTHETIC AGENT INTO SACROILIAC JOINT Bilateral 2020    Procedure: BLOCK, SACROILIAC JOINT;  Surgeon: Geoffrey Aleman MD;  Location: [facility] OR;  Service: Pain Management;  Laterality: Bilateral;    SPINE SURGERY  2015    fusion L spine       Family History   Problem Relation Age of Onset    Hypertension Mother     Kidney disease Mother     Sleep apnea Mother     Narcolepsy Father     Cancer Maternal Aunt         colon cancer gene    Cancer Paternal Aunt         lung ca       Social History     Tobacco Use     Smoking status: Former     Current packs/day: 1.00     Average packs/day: 1 pack/day for 15.0 years (15.0 ttl pk-yrs)     Types: Cigarettes     Start date: 8/11/2014     Passive exposure: Never    Smokeless tobacco: Never   Substance Use Topics    Alcohol use: Yes     Comment: Social, rare    Drug use: No       Social History     Social History Narrative    She works at [employer]. She still has a daughter living at home while in college. The others are grown. She has 4 dogs.        ALLERGIES:   Review of patient's allergies indicates:   Allergen Reactions    Tamiflu [oseltamivir] Itching and Swelling       MEDS:   Current Outpatient Medications on File Prior to Visit   Medication Sig Dispense Refill Last Dose    albuterol (PROVENTIL/VENTOLIN HFA) 90 mcg/actuation inhaler Inhale 1-2 puffs into the lungs every 4 (four) hours as needed for Wheezing or Shortness of Breath. Rescue 18 g 5 Taking    clonazePAM (KLONOPIN) 0.5 MG tablet    Taking    diclofenac sodium 1 % Gel APPLY 2 GRAMS TOPICALLY TO AFFECTED AREA(S) UP TO 5 TIMES DAILY (10 GRAMS PER DAY)  0 Taking    DULoxetine (CYMBALTA) 60 MG capsule Take 1 capsule by mouth once daily.   Taking    fluticasone propionate (FLONASE) 50 mcg/actuation nasal spray 1 spray (50 mcg total) by Each Nostril route once daily. 16 g 0 Taking    meclizine (ANTIVERT) 25 mg tablet Take 1 tablet (25 mg total) by mouth 3 (three) times daily as needed for Dizziness. 30 tablet 5 Taking    methocarbamol (ROBAXIN) 500 MG Tab Take 500 mg by mouth every evening.  2 Taking    methylphenidate HCl (RITALIN LA) 20 MG 24 hr capsule 1 pill PO daily 30 capsule 0 Taking    methylphenidate HCl (RITALIN) 10 MG tablet Take 1 tablet (10 mg total) by mouth 4 (four) times daily. 120 tablet 0 Taking    metroNIDAZOLE (FLAGYL) 500 MG tablet Take 1 tablet by mouth 2 (two) times a day. 14 tablet 0 Taking    MIRENA 20 mcg/24 hours (6 yrs) 52 mg IUD    Taking    modafiniL (PROVIGIL) 200 MG Tab Take 1  "tablet by mouth every morning and take 1/2 tablet at noon. 45 tablet 5 Taking    oxybutynin (DITROPAN XL) 15 MG TR24 Take 1 tablet (15 mg total) by mouth once daily. 90 tablet 3 Taking    oxyCODONE-acetaminophen (PERCOCET) 5-325 mg per tablet Take 1 tablet by mouth 2 (two) times daily as needed.  0 Taking    rosuvastatin (CRESTOR) 10 MG tablet Take 1 tablet (10 mg total) by mouth once daily. 90 tablet 3 Taking    sodium,calcium,mag,pot oxybate (XYWAV) 0.5 gram/mL Soln Take 4.5 g by mouth As instructed. 540 mL 5 Taking    topiramate (TOPAMAX) 100 MG tablet Take 1 tablet (100 mg total) by mouth once daily AND 1.5 tablets (150 mg total) every evening. 225 tablet 3 Taking    valACYclovir (VALTREX) 1000 MG tablet Take 1 tablet (1,000 mg total) by mouth every 12 (twelve) hours as needed (cold sores). 60 tablet 2 Taking    VRAYLAR 3 mg Cap Take 3 mg by mouth once daily.    Taking    [DISCONTINUED] ergocalciferol (VITAMIN D2) 50,000 unit Cap Take 1 capsule (50,000 Units total) by mouth every 7 days. 12 capsule 3 Taking    [DISCONTINUED] levothyroxine (SYNTHROID) 50 MCG tablet Take 1 tablet (50 mcg total) by mouth before breakfast. 90 tablet 3 Taking    cetirizine (ZYRTEC) 10 MG tablet Take 1 tablet (10 mg total) by mouth once daily. 30 tablet 0          Vital signs:   Vitals:    02/08/24 0832 02/08/24 0849   BP: (!) 140/100 122/82   Pulse: 104 88   SpO2: 100%    Weight: 126.6 kg (279 lb 1.6 oz)    Height: 5' 1" (1.549 m)      Body mass index is 52.74 kg/m².    PHYSICAL EXAM:     Physical Exam  Vitals reviewed.   Constitutional:       General: She is not in acute distress.  HENT:      Head: Normocephalic and atraumatic.      Right Ear: Tympanic membrane and ear canal normal.      Left Ear: Tympanic membrane and ear canal normal.   Eyes:      General: No scleral icterus.     Conjunctiva/sclera: Conjunctivae normal.   Neck:      Thyroid: No thyromegaly.      Vascular: No carotid bruit.   Cardiovascular:      Rate and Rhythm: " Normal rate and regular rhythm.      Pulses: Normal pulses.      Heart sounds: Normal heart sounds. No murmur heard.     No friction rub. No gallop.   Pulmonary:      Effort: Pulmonary effort is normal.      Breath sounds: Normal breath sounds. No wheezing or rales.   Abdominal:      General: Bowel sounds are normal. There is no distension.      Palpations: Abdomen is soft.      Tenderness: There is no abdominal tenderness. There is no guarding or rebound.   Musculoskeletal:      Cervical back: Normal range of motion and neck supple.      Right lower leg: No edema.      Left lower leg: No edema.   Lymphadenopathy:      Cervical: No cervical adenopathy.   Skin:     General: Skin is warm.      Capillary Refill: Capillary refill takes less than 2 seconds.   Neurological:      Mental Status: She is alert.             PERTINENT RESULTS:   Lab Visit on 02/01/2024   Component Date Value Ref Range Status    WBC 02/01/2024 8.91  3.90 - 12.70 K/uL Final    RBC 02/01/2024 4.48  4.00 - 5.40 M/uL Final    Hemoglobin 02/01/2024 14.6  12.0 - 16.0 g/dL Final    Hematocrit 02/01/2024 42.7  37.0 - 48.5 % Final    MCV 02/01/2024 95  82 - 98 fL Final    MCH 02/01/2024 32.6 (H)  27.0 - 31.0 pg Final    MCHC 02/01/2024 34.2  32.0 - 36.0 g/dL Final    RDW 02/01/2024 13.0  11.5 - 14.5 % Final    Platelets 02/01/2024 273  150 - 450 K/uL Final    MPV 02/01/2024 10.4  9.2 - 12.9 fL Final    Immature Granulocytes 02/01/2024 0.2  0.0 - 0.5 % Final    Gran # (ANC) 02/01/2024 5.4  1.8 - 7.7 K/uL Final    Immature Grans (Abs) 02/01/2024 0.02  0.00 - 0.04 K/uL Final    Comment: Mild elevation in immature granulocytes is non specific and   can be seen in a variety of conditions including stress response,   acute inflammation, trauma and pregnancy. Correlation with other   laboratory and clinical findings is essential.      Lymph # 02/01/2024 2.6  1.0 - 4.8 K/uL Final    Mono # 02/01/2024 0.6  0.3 - 1.0 K/uL Final    Eos # 02/01/2024 0.2  0.0 - 0.5  K/uL Final    Baso # 02/01/2024 0.09  0.00 - 0.20 K/uL Final    nRBC 02/01/2024 0  0 /100 WBC Final    Gran % 02/01/2024 60.4  38.0 - 73.0 % Final    Lymph % 02/01/2024 29.0  18.0 - 48.0 % Final    Mono % 02/01/2024 7.2  4.0 - 15.0 % Final    Eosinophil % 02/01/2024 2.2  0.0 - 8.0 % Final    Basophil % 02/01/2024 1.0  0.0 - 1.9 % Final    Differential Method 02/01/2024 Automated   Final    Sodium 02/01/2024 144  136 - 145 mmol/L Final    Potassium 02/01/2024 4.3  3.5 - 5.1 mmol/L Final    Chloride 02/01/2024 114 (H)  95 - 110 mmol/L Final    CO2 02/01/2024 20 (L)  23 - 29 mmol/L Final    Glucose 02/01/2024 125 (H)  70 - 110 mg/dL Final    BUN 02/01/2024 16  7 - 17 mg/dL Final    Creatinine 02/01/2024 0.70  0.50 - 1.40 mg/dL Final    Calcium 02/01/2024 9.2  8.7 - 10.5 mg/dL Final    Total Protein 02/01/2024 7.2  6.0 - 8.4 g/dL Final    Albumin 02/01/2024 4.1  3.5 - 5.2 g/dL Final    Total Bilirubin 02/01/2024 0.5  0.1 - 1.0 mg/dL Final    Comment: For infants and newborns, interpretation of results should be based  on gestational age, weight and in agreement with clinical  observations.    Premature Infant recommended reference ranges:  Up to 24 hours.............<8.0 mg/dL  Up to 48 hours............<12.0 mg/dL  3-5 days..................<15.0 mg/dL  6-29 days.................<15.0 mg/dL      Alkaline Phosphatase 02/01/2024 61  38 - 126 U/L Final    AST 02/01/2024 36  15 - 46 U/L Final    ALT 02/01/2024 32  10 - 44 U/L Final    Anion Gap 02/01/2024 10  8 - 16 mmol/L Final    eGFR 02/01/2024 >60.0  >60 mL/min/1.73 m^2 Final    Hemoglobin A1C 02/01/2024 5.4  4.0 - 5.6 % Final    Comment: ADA Screening Guidelines:  5.7-6.4%  Consistent with prediabetes  >or=6.5%  Consistent with diabetes    High levels of fetal hemoglobin interfere with the HbA1C  assay. Heterozygous hemoglobin variants (HbS, HgC, etc)do  not significantly interfere with this assay.   However, presence of multiple variants may affect accuracy.       Estimated Avg Glucose 02/01/2024 108  68 - 131 mg/dL Final    Cholesterol 02/01/2024 160  120 - 199 mg/dL Final    Comment: The National Cholesterol Education Program (NCEP) has set the  following guidelines (reference ranges) for Cholesterol:  Optimal.....................<200 mg/dL  Borderline High.............200-239 mg/dL  High........................> or = 240 mg/dL      Triglycerides 02/01/2024 69  30 - 150 mg/dL Final    Comment: The National Cholesterol Education Program (NCEP) has set the  following guidelines (reference values) for triglycerides:  Normal......................<150 mg/dL  Borderline High.............150-199 mg/dL  High........................200-499 mg/dL      HDL 02/01/2024 53  40 - 75 mg/dL Final    Comment: The National Cholesterol Education Program (NCEP) has set the  following guidelines (reference values) for HDL Cholesterol:  Low...............<40 mg/dL  Optimal...........>60 mg/dL      LDL Cholesterol 02/01/2024 93.2  63.0 - 159.0 mg/dL Final    Comment: The National Cholesterol Education Program (NCEP) has set the  following guidelines (reference values) for LDL Cholesterol:  Optimal.......................<130 mg/dL  Borderline High...............130-159 mg/dL  High..........................160-189 mg/dL  Very High.....................>190 mg/dL      HDL/Cholesterol Ratio 02/01/2024 33.1  20.0 - 50.0 % Final    Total Cholesterol/HDL Ratio 02/01/2024 3.0  2.0 - 5.0 Final    Non-HDL Cholesterol 02/01/2024 107  mg/dL Final    Comment: Risk category and Non-HDL cholesterol goals:  Coronary heart disease (CHD)or equivalent (10-year risk of CHD >20%):  Non-HDL cholesterol goal     <130 mg/dL  Two or more CHD risk factors and 10-year risk of CHD <= 20%:  Non-HDL cholesterol goal     <160 mg/dL  0 to 1 CHD risk factor:  Non-HDL cholesterol goal     <190 mg/dL      Vit D, 25-Hydroxy 02/01/2024 22 (L)  30 - 96 ng/mL Final    Comment: Vitamin D deficiency.........<10 ng/mL                               Vitamin D insufficiency......10-29 ng/mL       Vitamin D sufficiency........> or equal to 30 ng/mL  Vitamin D toxicity............>100 ng/mL      TSH 02/01/2024 6.840 (H)  0.400 - 4.000 uIU/mL Final    Comment: Warning:  Heterophilic antibodies in serum or plasma of   certain individuals are known to cause interference with   immunoassays. These antibodies may be present in blood samples   from individuals regularly exposed to animal or who have been   treated with animal products.     Patients taking high doses of supplemental biotin may have  negatively biased results.       Free T4 02/01/2024 0.82  0.71 - 1.51 ng/dL Final     Component Ref Range & Units 13 d ago   Fecal Globin, Insure Negative Negative   Resulting Agency  [facility]              Narrative  Performed by: [facility]  Performed at:  01 - [facility] [address]  [id number]  : Diamond Acosta MD, Phone:  [phone]      Specimen Collected: 01/24/24 23:00 CST Last Resulted: 01/30/24 16:09 CST           Cytology Pap Smear    Component 1 yr ago Comments   Clinical Information: - [facility]   None given   LMP: - [facility]   NONE GIVEN   Prev. Pap: - [facility]   NEGATIVE   Prev. BX: - [facility]   NONE GIVEN   Source: - [employer]   Cervix   Statement of Adequecy: - [facility]   Satisfactory for evaluation.   Endocervical/transformation zone component   present.   Age and/or menstrual status not provided   Interpretation/Result: - [facility]   Negative for intraepithelial lesion or malignancy.   Comment: - [facility]   This Pap test has been evaluated with computer   assisted technology.   Cytotechnologist: - Nadine   LMG, CT(ASCP)   CT screening location: [place] [address] [address], [facility]   [id number]   Comments - [employer]   EXPLANATORY NOTE:     The Pap is a screening test for cervical cancer. It is   not a diagnostic test and is subject to false negative   and false positive results. It is most reliable when a   satisfactory sample, regularly  obtained, is submitted   with relevant clinical findings and history, and when   the Pap result is evaluated along with historic and   current clinical information.   [facility]    Specimen Collected: 08/05/22 12:33    Performed by: NADINE Last Resulted: 08/10/22 09:49   [facility]  Result Received: 01/11/24 09:19       Narrative    [facility] MA MAMMO TOMOSYNTHESIS SCREENING BILATERAL     INDICATION: Screening for breast cancer.  screen Z12.31   Encounter for   screening mammogram for malignant neoplasm of breast   Z12.31   Encounter for screening mammogram for malignant neoplasm of   breast     COMPARISON: 11/16/2021; 11/03/2021     TECHNIQUE: Bilateral CC and MLO views without and with tomosynthesis. R2   CAD utilized.     DENSITY: C. The breasts are heterogeneously dense which may obscure small   masses.     FINDINGS: Bilateral breast implants are noted. There is a small nodular   density just anterior to the implant in the right breast at 6 o'clock   position. Left breast circumscribed mass at 2:00 measures 2.1 cm, likely a   cyst. Additional circumscribed masses in the left breast also likely   represent cysts.   No pleomorphic microcalcifications, areas of architectural distortion, nor   skin thickening.    Exam End: 11/27/23 11:28    Specimen Collected: 11/30/23 09:14 Last Resulted: 11/30/23 09:20       Narrative    DIAGNOSTIC BILATERAL BREAST ULTRASOUND     INDICATION:   Lifetime risk of developing breast cancer in accordance with Tyrer-Cuzick model is estimated at 10.5%.     COMPARISON:   Mammogram dated 11/27/2023           Ultrasound technique: targeted    high-resolution real-time multiplanar ultrasound scanning was performed by the sonographer.       SONOGRAPHIC FINDINGS:   There are multiple bilateral simple cysts, clusters of cysts present. Areas questioned on reading mammogram appear to correspond to a 6 x 4 x 5 mm cluster of cysts at 7:00 10 cm from the  nipple. Finding in the left breast is a 2.1 x 2.1 x 0.9 cm simple cyst at 3:00 7 cm from the nipple.  Procedure Note    Selma Solano MD - 12/08/2023   Formatting of this note might be different from the original.   DIAGNOSTIC BILATERAL BREAST ULTRASOUND     INDICATION:   Lifetime risk of developing breast cancer in accordance with Tyrer-Cuzick model is estimated at 10.5%.     COMPARISON:   Mammogram dated 11/27/2023         Ultrasound technique: targeted    high-resolution real-time multiplanar ultrasound scanning was performed by the sonographer.       SONOGRAPHIC FINDINGS:   There are multiple bilateral simple cysts, clusters of cysts present. Areas questioned on reading mammogram appear to correspond to a 6 x 4 x 5 mm cluster of cysts at 7:00 10 cm from the nipple. Finding in the left breast is a 2.1 x 2.1 x 0.9 cm simple cyst at 3:00 7 cm from the nipple.     IMPRESSION:   Multiple bilateral cysts and clusters of cysts are probably benign.     3-Probably Benign     The patient was notified of the results and recommendations prior to discharge     RECOMMENDATIONS:   Ultrasound in 6 months is recommended.     Electronically Signed By: Selma Solano MD 12/8/2023 11:25 AM CST  Exam End: 12/08/23 11:13     ASSESSMENT/PLAN:    1. Encounter for general adult medical examination with abnormal findings  Overview:  - preventative health counseling  - counseling on current recommendations for breast cancer screening.  Up-to-date and next due 11/23/2024  - counseling on current recommendations for cervical cancer screening.  Up-to-date  - counseling on current recommendations for colon cancer screening.  Average risk repeat colon cancer screening due 01/25/2025      Orders:  -     TSH; Future; Expected date: 03/21/2024    2. Hyperlipidemia, mixed  Overview:  Lab Results   Component Value Date    LDLCALC 93.2 02/01/2024     - LDL goal <100  - well controlled  - continue current management plan   - patient  encouraged to notify me with any changes      3. Syncope, unspecified syncope type  Overview:  - recommend Cardiology evaluation    Orders:  -     Ambulatory referral/consult to Cardiology; Future; Expected date: 02/15/2024    4. Hypothyroidism due to Hashimoto's thyroiditis  Overview:  Lab Results   Component Value Date    TSH 6.840 (H) 02/01/2024     - TSH above goal in previously well controlled but she has had issues with labile TSH  - she denies any missed doses  - recommend increase levothyroxine 50 mcg daily to 75 mcg daily and repeat TSH in 6 weeks    Orders:  -     TSH; Future; Expected date: 03/21/2024  -     levothyroxine (SYNTHROID) 75 MCG tablet; Take 1 tablet (75 mcg total) by mouth before breakfast.  Dispense: 90 tablet; Refill: 0    5. Impaired fasting glucose  Overview:  Lab Results   Component Value Date    HGBA1C 5.4 02/01/2024     - discussed recommendation for diet and cardiovascular exercise  - counseling on lifestyle modifications for risk factor reduction  - counseling on management options      6. Vitamin D deficiency  Overview:  - she reports that she has tried and failed vitamin-D 3 5000 units daily  - D below goal on D2 50,000 weekly  - increase D2 50,000 units back to twice a weekly which was helpful in the past    Orders:  -     ergocalciferol (VITAMIN D2) 50,000 unit Cap; Take 1 capsule (50,000 Units total) by mouth twice a week.  Dispense: 24 capsule; Refill: 3  -     Vitamin D; Future; Expected date: 03/21/2024    7. Class 3 severe obesity due to excess calories with serious comorbidity and body mass index (BMI) of 40.0 to 44.9 in adult  Overview:  - discussed recommendation for diet and cardiovascular exercise  - counseling on lifestyle modifications for risk factor reduction      8. EUNICE (obstructive sleep apnea)  Overview:  - followed by sleep medicine  - on CPAP      9. Needs flu shot  -     Influenza - Quadrivalent (PF)          ORDERS:   Orders Placed This Encounter    Influenza  - Quadrivalent (PF)    TSH    Vitamin D    Ambulatory referral/consult to Cardiology    levothyroxine (SYNTHROID) 75 MCG tablet    ergocalciferol (VITAMIN D2) 50,000 unit Cap       Vaccines recommended:  Flu and COVID-19    Follow-up in 6 weeks with labs or sooner if any concerns.      This note is dictated using the M*Modal Fluency Direct word recognition program. There are word recognition mistakes that are occasionally missed on review.    Dr. Laya Jaffe D.O.   Family Medicine

## 2024-02-08 ENCOUNTER — TELEPHONE (OUTPATIENT)
Dept: PRIMARY CARE CLINIC | Facility: CLINIC | Age: 49
End: 2024-02-08

## 2024-02-08 ENCOUNTER — OFFICE VISIT (OUTPATIENT)
Dept: PRIMARY CARE CLINIC | Facility: CLINIC | Age: 49
End: 2024-02-08
Attending: FAMILY MEDICINE
Payer: COMMERCIAL

## 2024-02-08 VITALS
WEIGHT: 279.13 LBS | OXYGEN SATURATION: 100 % | HEIGHT: 61 IN | DIASTOLIC BLOOD PRESSURE: 82 MMHG | BODY MASS INDEX: 52.7 KG/M2 | SYSTOLIC BLOOD PRESSURE: 122 MMHG | HEART RATE: 88 BPM

## 2024-02-08 DIAGNOSIS — R73.01 IMPAIRED FASTING GLUCOSE: ICD-10-CM

## 2024-02-08 DIAGNOSIS — E06.3 HYPOTHYROIDISM DUE TO HASHIMOTO'S THYROIDITIS: ICD-10-CM

## 2024-02-08 DIAGNOSIS — Z23 NEEDS FLU SHOT: ICD-10-CM

## 2024-02-08 DIAGNOSIS — E78.2 HYPERLIPIDEMIA, MIXED: ICD-10-CM

## 2024-02-08 DIAGNOSIS — G47.33 OSA (OBSTRUCTIVE SLEEP APNEA): ICD-10-CM

## 2024-02-08 DIAGNOSIS — R55 SYNCOPE, UNSPECIFIED SYNCOPE TYPE: ICD-10-CM

## 2024-02-08 DIAGNOSIS — E55.9 VITAMIN D DEFICIENCY: ICD-10-CM

## 2024-02-08 DIAGNOSIS — E03.8 HYPOTHYROIDISM DUE TO HASHIMOTO'S THYROIDITIS: ICD-10-CM

## 2024-02-08 DIAGNOSIS — E66.01 CLASS 3 SEVERE OBESITY DUE TO EXCESS CALORIES WITH SERIOUS COMORBIDITY AND BODY MASS INDEX (BMI) OF 40.0 TO 44.9 IN ADULT: ICD-10-CM

## 2024-02-08 DIAGNOSIS — Z00.01 ENCOUNTER FOR GENERAL ADULT MEDICAL EXAMINATION WITH ABNORMAL FINDINGS: Primary | ICD-10-CM

## 2024-02-08 PROBLEM — J06.9 UPPER RESPIRATORY TRACT INFECTION: Status: RESOLVED | Noted: 2022-12-27 | Resolved: 2024-02-08

## 2024-02-08 PROCEDURE — 3079F DIAST BP 80-89 MM HG: CPT | Mod: CPTII,S$GLB,, | Performed by: FAMILY MEDICINE

## 2024-02-08 PROCEDURE — 3074F SYST BP LT 130 MM HG: CPT | Mod: CPTII,S$GLB,, | Performed by: FAMILY MEDICINE

## 2024-02-08 PROCEDURE — 99999 PR PBB SHADOW E&M-EST. PATIENT-LVL V: CPT | Mod: PBBFAC,,, | Performed by: FAMILY MEDICINE

## 2024-02-08 PROCEDURE — 3044F HG A1C LEVEL LT 7.0%: CPT | Mod: CPTII,S$GLB,, | Performed by: FAMILY MEDICINE

## 2024-02-08 PROCEDURE — 90686 IIV4 VACC NO PRSV 0.5 ML IM: CPT | Mod: S$GLB,,, | Performed by: FAMILY MEDICINE

## 2024-02-08 PROCEDURE — 3008F BODY MASS INDEX DOCD: CPT | Mod: CPTII,S$GLB,, | Performed by: FAMILY MEDICINE

## 2024-02-08 PROCEDURE — 1160F RVW MEDS BY RX/DR IN RCRD: CPT | Mod: CPTII,S$GLB,, | Performed by: FAMILY MEDICINE

## 2024-02-08 PROCEDURE — 1159F MED LIST DOCD IN RCRD: CPT | Mod: CPTII,S$GLB,, | Performed by: FAMILY MEDICINE

## 2024-02-08 PROCEDURE — 99396 PREV VISIT EST AGE 40-64: CPT | Mod: 25,S$GLB,, | Performed by: FAMILY MEDICINE

## 2024-02-08 PROCEDURE — 90471 IMMUNIZATION ADMIN: CPT | Mod: S$GLB,,, | Performed by: FAMILY MEDICINE

## 2024-02-08 RX ORDER — ERGOCALCIFEROL 1.25 MG/1
50000 CAPSULE ORAL
Qty: 24 CAPSULE | Refills: 3 | Status: SHIPPED | OUTPATIENT
Start: 2024-02-08 | End: 2025-02-07

## 2024-02-08 RX ORDER — LEVOTHYROXINE SODIUM 75 UG/1
75 TABLET ORAL
Qty: 90 TABLET | Refills: 0 | Status: SHIPPED | OUTPATIENT
Start: 2024-02-08 | End: 2024-03-21 | Stop reason: SDUPTHER

## 2024-02-08 NOTE — Clinical Note
Please fax cardiology referral to Dr. Tamir Calvert [facility]

## 2024-02-08 NOTE — TELEPHONE ENCOUNTER
----- Message from Laya Jaffe DO sent at 2/8/2024 10:01 AM CST -----  Please fax cardiology referral to Dr. Tamir Calvert [facility]

## 2024-02-27 ENCOUNTER — PATIENT MESSAGE (OUTPATIENT)
Dept: SLEEP MEDICINE | Facility: CLINIC | Age: 49
End: 2024-02-27
Payer: COMMERCIAL

## 2024-02-29 DIAGNOSIS — G47.411 NARCOLEPSY WITH CATAPLEXY: ICD-10-CM

## 2024-02-29 RX ORDER — METHYLPHENIDATE HYDROCHLORIDE 10 MG/1
10 TABLET ORAL 4 TIMES DAILY
Qty: 120 TABLET | Refills: 0 | Status: SHIPPED | OUTPATIENT
Start: 2024-02-29 | End: 2024-04-04 | Stop reason: SDUPTHER

## 2024-02-29 RX ORDER — METHYLPHENIDATE HYDROCHLORIDE 20 MG/1
CAPSULE, EXTENDED RELEASE ORAL
Qty: 30 CAPSULE | Refills: 0 | Status: SHIPPED | OUTPATIENT
Start: 2024-02-29 | End: 2024-04-04 | Stop reason: SDUPTHER

## 2024-03-21 ENCOUNTER — OFFICE VISIT (OUTPATIENT)
Dept: PRIMARY CARE CLINIC | Facility: CLINIC | Age: 49
End: 2024-03-21
Attending: FAMILY MEDICINE
Payer: COMMERCIAL

## 2024-03-21 ENCOUNTER — TELEPHONE (OUTPATIENT)
Dept: PRIMARY CARE CLINIC | Facility: CLINIC | Age: 49
End: 2024-03-21

## 2024-03-21 DIAGNOSIS — E03.8 HYPOTHYROIDISM DUE TO HASHIMOTO'S THYROIDITIS: Primary | ICD-10-CM

## 2024-03-21 DIAGNOSIS — E06.3 HYPOTHYROIDISM DUE TO HASHIMOTO'S THYROIDITIS: Primary | ICD-10-CM

## 2024-03-21 DIAGNOSIS — N39.3 STRESS INCONTINENCE: ICD-10-CM

## 2024-03-21 DIAGNOSIS — E55.9 VITAMIN D DEFICIENCY: ICD-10-CM

## 2024-03-21 DIAGNOSIS — N32.81 OVERACTIVE BLADDER: ICD-10-CM

## 2024-03-21 PROBLEM — R55 SYNCOPE: Status: RESOLVED | Noted: 2024-02-08 | Resolved: 2024-03-21

## 2024-03-21 PROBLEM — Z00.01 ENCOUNTER FOR GENERAL ADULT MEDICAL EXAMINATION WITH ABNORMAL FINDINGS: Status: RESOLVED | Noted: 2021-12-22 | Resolved: 2024-03-21

## 2024-03-21 PROCEDURE — 1159F MED LIST DOCD IN RCRD: CPT | Mod: CPTII,95,, | Performed by: FAMILY MEDICINE

## 2024-03-21 PROCEDURE — 3044F HG A1C LEVEL LT 7.0%: CPT | Mod: CPTII,95,, | Performed by: FAMILY MEDICINE

## 2024-03-21 PROCEDURE — 1160F RVW MEDS BY RX/DR IN RCRD: CPT | Mod: CPTII,95,, | Performed by: FAMILY MEDICINE

## 2024-03-21 PROCEDURE — 99214 OFFICE O/P EST MOD 30 MIN: CPT | Mod: 95,,, | Performed by: FAMILY MEDICINE

## 2024-03-21 RX ORDER — LANOLIN ALCOHOL/MO/W.PET/CERES
100 CREAM (GRAM) TOPICAL DAILY
COMMUNITY

## 2024-03-21 RX ORDER — LEVOTHYROXINE SODIUM 88 UG/1
88 TABLET ORAL
Qty: 90 TABLET | Refills: 0 | Status: SHIPPED | OUTPATIENT
Start: 2024-03-21 | End: 2024-06-20

## 2024-03-21 RX ORDER — FERROUS SULFATE, DRIED 160(50) MG
1 TABLET, EXTENDED RELEASE ORAL DAILY
COMMUNITY

## 2024-03-21 NOTE — TELEPHONE ENCOUNTER
----- Message from Laya Jaffe DO sent at 3/21/2024  3:19 PM CDT -----  Please schedule patient for 2 month virtual follow-up with labs prior to visit.  Please fax cardiology referral from 02/08/2024 to Dr. Tamir Calvert in [place] office

## 2024-03-21 NOTE — Clinical Note
Please schedule patient for 2 month virtual follow-up with labs prior to visit. Please fax cardiology referral from 02/08/2024 to Dr. Tamir Calvert in [place] office

## 2024-03-21 NOTE — PROGRESS NOTES
VIRTUAL/TELEMEDICINE VISIT   FAMILY MEDICINE  [facility]    The patient location is: [place]  The chief complaint leading to consultation is:   Chief Complaint   Patient presents with    follow-up thyroid     Visit type: Virtual visit with synchronous audio and video   Total time spent: 30 minute  Each patient to whom he or she provides medical services by telemedicine is:  (1) informed of the relationship between the physician and patient and the respective role of any other health care provider with respect to management of the patient; and (2) notified that he or she may decline to receive medical services by telemedicine and may withdraw from such care at any time.    Reason for visit:   Chief Complaint   Patient presents with    follow-up thyroid       SUBJECTIVE: Amanda Navarrete is a 48 y.o. female  - with obesity, chronic low back pain with bilateral sciatica, migraine headaches, hypothyroidism, anxiety, hyperlipidemia, vitamin-D deficiency, GERD, and narcolepsy cataplexy syndrome presents for follow-up thyroid     Sleep Medicine: KIRK Reilly  Gynecology: Dr. Flores 8/5/2022   - mammograms [facility] Dr. Najera  Pain Dr. Geoffrey Aleman    Since last visit Amanda Navarrete reports that she has not been able to see Cardiology at but she plans to call and schedule.  She has been taking her levothyroxine 75 mcg daily.  She has not missed any doses.  She takes it separately from her other medications.  She reports that she still feels very fatigued and has some increased irritability recently     She also increased her vitamin D2 11155 units weekly to twice a week.  She reports that she is tolerating well.    She does have overactive bladder and takes oxybutynin XL 15 mg daily.  She reports recently she has been having more issues.  She reports that she has increased stress incontinence.  She reports she sneezes or coughs or anything she loses control.  She also feels  more urgency.    1. Hypothyroidism     Prior evaluation by Endocrinologist: No  Prior thyroid US: no    Current medication:   levothyroxine (SYNTHROID) 75 MCG tablet, Take 1 tablet (75 mcg total) by mouth before breakfast., Disp: 90 tablet,    Side effects from medication: none  Scheduled for medication: AM fasting separate from other medications    Concerns: yes - fatigued irritable    Lab Results       Component                Value               Date                       TSH                      5.850 (H)           2024                 FREET4                   0.79                2024 TSH  6.840 High                    Review of Systems   Cardiovascular:  Negative for palpitations.   Gastrointestinal:  Positive for constipation and diarrhea.   Neurological:  Negative for weakness and headaches.   All other systems reviewed and are negative.        HISTORY:   Past Medical History:   Diagnosis Date    Affective disorder     sses Dr. Dial, psychiatry    Back pain     GERD (gastroesophageal reflux disease)     Headache(784.0)     Hyperlipidemia, mixed 2019    Influenza A 2022    - supportive care - albuterol inhaler - discussed proceed to the ER if severe shortness of breath or chest pain. - Follow-up in office 2022 scheduled    Migraine headache     Narcolepsy without cataplexy(347.00)     Syncope 2024    - recommend Cardiology evaluation    Thyroid disease     hyperthyroidism    TMJ arthralgia        Past Surgical History:   Procedure Laterality Date    CAUDAL EPIDURAL STEROID INJECTION N/A 2018    Procedure: INJECTION, STEROID, CAUDAL, EPIDURAL;  Surgeon: Geoffrey Aleman MD;  [facility];  Service: Pain Management;  Laterality: N/A;     SECTION, CLASSIC      COSMETIC SURGERY      breast augmentation    INJECTION OF ANESTHETIC AGENT INTO SACROILIAC JOINT Bilateral 2020    Procedure: BLOCK, SACROILIAC JOINT;  Surgeon: Geoffrey RAMIRES  MD Love;  [facility];  Service: Pain Management;  Laterality: Bilateral;    SPINE SURGERY  11/19/2015    fusion L spine       Family History   Problem Relation Age of Onset    Hypertension Mother     Kidney disease Mother     Sleep apnea Mother     Narcolepsy Father     Cancer Maternal Aunt         colon cancer gene    Cancer Paternal Aunt         lung ca       Social History     Tobacco Use    Smoking status: Former     Current packs/day: 1.00     Average packs/day: 1 pack/day for 15.1 years (15.1 ttl pk-yrs)     Types: Cigarettes     Start date: 8/11/2014     Passive exposure: Never    Smokeless tobacco: Never   Substance Use Topics    Alcohol use: Yes     Comment: Social, rare    Drug use: No       Social History     Social History Narrative    She works at [employer]. She still has a daughter living at home while in college. The others are grown. She has 4 dogs.        ALLERGIES:   Review of patient's allergies indicates:   Allergen Reactions    Tamiflu [oseltamivir] Itching and Swelling       MEDS:   Current Outpatient Medications on File Prior to Visit   Medication Sig Dispense Refill Last Dose    clonazePAM (KLONOPIN) 0.5 MG tablet        diclofenac sodium 1 % Gel APPLY 2 GRAMS TOPICALLY TO AFFECTED AREA(S) UP TO 5 TIMES DAILY (10 GRAMS PER DAY)  0     DULoxetine (CYMBALTA) 60 MG capsule Take 1 capsule by mouth once daily.       ergocalciferol (VITAMIN D2) 50,000 unit Cap Take 1 capsule (50,000 Units total) by mouth twice a week. 24 capsule 3     fluticasone propionate (FLONASE) 50 mcg/actuation nasal spray 1 spray (50 mcg total) by Each Nostril route once daily. 16 g 0     meclizine (ANTIVERT) 25 mg tablet Take 1 tablet (25 mg total) by mouth 3 (three) times daily as needed for Dizziness. 30 tablet 5     methocarbamol (ROBAXIN) 500 MG Tab Take 500 mg by mouth every evening.  2     methylphenidate HCl (RITALIN LA) 20 MG 24 hr capsule 1 pill PO daily 30 capsule 0     methylphenidate  HCl (RITALIN) 10 MG tablet Take 1 tablet (10 mg total) by mouth 4 (four) times daily. 120 tablet 0     MIRENA 20 mcg/24 hours (6 yrs) 52 mg IUD        modafiniL (PROVIGIL) 200 MG Tab Take 1 tablet by mouth every morning and ½ tablet at noon. 45 tablet 5     oxybutynin (DITROPAN XL) 15 MG TR24 Take 1 tablet (15 mg total) by mouth once daily. 90 tablet 3     oxyCODONE-acetaminophen (PERCOCET) 5-325 mg per tablet Take 1 tablet by mouth 2 (two) times daily as needed.  0     rosuvastatin (CRESTOR) 10 MG tablet Take 1 tablet (10 mg total) by mouth once daily. 90 tablet 3     sodium,calcium,mag,pot oxybate (XYWAV) 0.5 gram/mL Soln Take 4.5 g by mouth As instructed. 540 mL 5     valACYclovir (VALTREX) 1000 MG tablet Take 1 tablet by mouth every 12 hours as needed (cold sores). 60 tablet 2     albuterol (PROVENTIL/VENTOLIN HFA) 90 mcg/actuation inhaler Inhale 1-2 puffs into the lungs every 4 (four) hours as needed for Wheezing or Shortness of Breath. Rescue 18 g 5     calcium-vitamin D3 (OS-RHONDA 500 + D3) 500 mg-5 mcg (200 unit) per tablet Take 1 tablet by mouth once daily.       cyanocobalamin (VITAMIN B-12) 1000 MCG tablet Take 100 mcg by mouth once daily.       topiramate (TOPAMAX) 100 MG tablet Take 1 tablet by mouth once daily AND 1 ½ tablets every evening 225 tablet 3     [DISCONTINUED] cetirizine (ZYRTEC) 10 MG tablet Take 1 tablet (10 mg total) by mouth once daily. 30 tablet 0     [DISCONTINUED] levothyroxine (SYNTHROID) 75 MCG tablet Take 1 tablet (75 mcg total) by mouth before breakfast. 90 tablet 0     [DISCONTINUED] metroNIDAZOLE (FLAGYL) 500 MG tablet Take 1 tablet by mouth 2 (two) times a day. 14 tablet 0     [DISCONTINUED] VRAYLAR 3 mg Cap Take 3 mg by mouth once daily.           Vital signs:   There were no vitals filed for this visit.  There is no height or weight on file to calculate BMI.    PHYSICAL EXAM:     Physical Exam  Constitutional:       General: She is not in acute distress.  Pulmonary:       "Effort: Pulmonary effort is normal. No respiratory distress.   Neurological:      Mental Status: She is alert.   Psychiatric:         Mood and Affect: Affect normal.         Speech: Speech normal.         Behavior: Behavior is cooperative.      Comments: Mood "irritable"           PERTINENT RESULTS:   Lab Visit on 03/19/2024   Component Date Value Ref Range Status    TSH 03/19/2024 5.850 (H)  0.400 - 4.000 uIU/mL Final    Comment: Warning:  Heterophilic antibodies in serum or plasma of   certain individuals are known to cause interference with   immunoassays. These antibodies may be present in blood samples   from individuals regularly exposed to animal or who have been   treated with animal products.     Patients taking high doses of supplemental biotin may have  negatively biased results.       Vit D, 25-Hydroxy 03/19/2024 28 (L)  30 - 96 ng/mL Final    Comment: Vitamin D deficiency.........<10 ng/mL                              Vitamin D insufficiency......10-29 ng/mL       Vitamin D sufficiency........> or equal to 30 ng/mL  Vitamin D toxicity............>100 ng/mL      Free T4 03/19/2024 0.79  0.71 - 1.51 ng/dL Final       ASSESSMENT/PLAN:    1. Hypothyroidism due to Hashimoto's thyroiditis  Overview:  Lab Results   Component Value Date    TSH 5.850 (H) 03/19/2024     - TSH above goal in previously well controlled but she has had issues with labile TSH  - she denies any missed doses  - recommend increase levothyroxine 75 mcg daily to 88 mcg daily and repeat TSH in 8 weeks  - recommend add selenium 25 mcg daily    Orders:  -     levothyroxine (SYNTHROID) 88 MCG tablet; Take 1 tablet (88 mcg total) by mouth before breakfast.  Dispense: 90 tablet; Refill: 0  -     TSH; Future; Expected date: 05/21/2024    2. Vitamin D deficiency  Overview:  - tried and failed vitamin-D 3 5000 units daily  - D improved with D2 50,000 twice a week  - decrease D2 50,000 units to weekly and add D3 5,000 units daily and repeat D next " labs    Orders:  -     Vitamin D; Future; Expected date: 05/21/2024    3. Stress incontinence  -     Ambulatory referral/consult to Urogynecology; Future; Expected date: 03/28/2024  -     Urinalysis, Reflex to Urine Culture Urine, Clean Catch; Future; Expected date: 03/21/2024    4. Overactive bladder  Overview:  - well controlled  - worsening symptoms    Orders:  -     Urinalysis, Reflex to Urine Culture Urine, Clean Catch; Future; Expected date: 03/21/2024            ORDERS:   Orders Placed This Encounter    TSH    Vitamin D    Urinalysis, Reflex to Urine Culture Urine, Clean Catch    Ambulatory referral/consult to Urogynecology    levothyroxine (SYNTHROID) 88 MCG tablet       Vaccines recommended:  COVID-19    Follow up in about 2 months (around 5/21/2024) for Virtual Visit, Labs. or sooner with any concerns      This note is dictated using the M*Modal Fluency Direct word recognition program. There are word recognition mistakes that are occasionally missed on review.    Dr. Laya Jaffe D.O.   Family Medicine

## 2024-04-04 ENCOUNTER — PATIENT MESSAGE (OUTPATIENT)
Dept: SLEEP MEDICINE | Facility: CLINIC | Age: 49
End: 2024-04-04
Payer: COMMERCIAL

## 2024-04-04 DIAGNOSIS — G47.411 NARCOLEPSY WITH CATAPLEXY: ICD-10-CM

## 2024-04-04 RX ORDER — METHYLPHENIDATE HYDROCHLORIDE 10 MG/1
10 TABLET ORAL 4 TIMES DAILY
Qty: 120 TABLET | Refills: 0 | Status: SHIPPED | OUTPATIENT
Start: 2024-04-04 | End: 2024-04-26 | Stop reason: ALTCHOICE

## 2024-04-04 RX ORDER — METHYLPHENIDATE HYDROCHLORIDE 20 MG/1
CAPSULE, EXTENDED RELEASE ORAL
Qty: 30 CAPSULE | Refills: 0 | Status: SHIPPED | OUTPATIENT
Start: 2024-04-04 | End: 2024-04-26 | Stop reason: ALTCHOICE

## 2024-04-04 RX ORDER — (CALCIUM, MAGNESIUM, POTASSIUM, AND SODIUM OXYBATES) .5; .5; .5; .5 G/ML; G/ML; G/ML; G/ML
4.5 SOLUTION ORAL SEE ADMIN INSTRUCTIONS
Qty: 540 ML | Refills: 5 | Status: ACTIVE | OUTPATIENT
Start: 2024-04-04

## 2024-04-26 RX ORDER — DEXTROAMPHETAMINE SACCHARATE, AMPHETAMINE ASPARTATE, DEXTROAMPHETAMINE SULFATE AND AMPHETAMINE SULFATE 5; 5; 5; 5 MG/1; MG/1; MG/1; MG/1
1 TABLET ORAL 2 TIMES DAILY
Qty: 60 TABLET | Refills: 0 | Status: SHIPPED | OUTPATIENT
Start: 2024-05-04 | End: 2024-05-31 | Stop reason: SDUPTHER

## 2024-04-26 RX ORDER — DEXTROAMPHETAMINE SACCHARATE, AMPHETAMINE ASPARTATE MONOHYDRATE, DEXTROAMPHETAMINE SULFATE AND AMPHETAMINE SULFATE 5; 5; 5; 5 MG/1; MG/1; MG/1; MG/1
20 CAPSULE, EXTENDED RELEASE ORAL EVERY MORNING
Qty: 30 CAPSULE | Refills: 0 | Status: SHIPPED | OUTPATIENT
Start: 2024-05-04 | End: 2024-05-31 | Stop reason: SDUPTHER

## 2024-04-29 DIAGNOSIS — R42 VERTIGO: ICD-10-CM

## 2024-04-29 DIAGNOSIS — Z86.19 HISTORY OF HERPES LABIALIS: ICD-10-CM

## 2024-04-29 DIAGNOSIS — G47.411 NARCOLEPSY CATAPLEXY SYNDROME: ICD-10-CM

## 2024-04-29 RX ORDER — MECLIZINE HYDROCHLORIDE 25 MG/1
25 TABLET ORAL 3 TIMES DAILY PRN
Qty: 30 TABLET | Refills: 5 | Status: SHIPPED | OUTPATIENT
Start: 2024-04-29

## 2024-04-29 RX ORDER — MODAFINIL 200 MG/1
TABLET ORAL
Qty: 45 TABLET | Refills: 5 | Status: SHIPPED | OUTPATIENT
Start: 2024-04-29

## 2024-04-29 NOTE — TELEPHONE ENCOUNTER
No care due was identified.  [facility] Embedded Care Due Messages. Reference number: [id number].   4/29/2024 11:05:52 AM CDT

## 2024-04-29 NOTE — TELEPHONE ENCOUNTER
Refill Encounter    PCP Visits: Recent Visits  Date Type Provider Dept   03/21/24 Office Visit Laya Jaffe,  [facility]   02/08/24 Office Visit Laya Jaffe,  [facility]   Showing recent visits within past 360 days and meeting all other requirements  Future Appointments  Date Type Provider Dept   05/21/24 Appointment Laya Jaffe,  [facility]   Showing future appointments within next 720 days and meeting all other requirements     Last 3 Blood Pressure:   BP Readings from Last 3 Encounters:   02/08/24 122/82   01/30/24 132/74   05/03/23 119/72     Preferred Pharmacy:   [id number] - [place] - [address]  [address]  [place] [address]  Phone: [phone] Fax: [phone]    [facility]  [address]  [place] [address]  Phone: [phone] Fax: [phone]    [facility] [place] - [address]  [address]  [place] [address]  Phone: [phone] Fax: [phone]    [id number] - [place] - [address]  [address] [facility]  [place] [address]  Phone: [phone] Fax: [phone]    [facility]  [place]  [address]  [address]  [place] [address]  Phone: [phone] Fax: [phone]    [id number] [place] - [address] AT [address] [facility]  [address]  [address]  [place] [address]  Phone: [phone] Fax: [phone]    Requested RX:  Requested Prescriptions     Pending Prescriptions Disp Refills    meclizine (ANTIVERT) 25 mg tablet 30 tablet 5     Sig: Take 1 tablet (25 mg total) by mouth 3 (three) times daily as needed for Dizziness.      RX Route: Normal

## 2024-04-29 NOTE — TELEPHONE ENCOUNTER
No care due was identified.  [facility] Embedded Care Due Messages. Reference number: [id number].   4/29/2024 11:05:22 AM CDT

## 2024-04-30 RX ORDER — VALACYCLOVIR HYDROCHLORIDE 1 G/1
1000 TABLET, FILM COATED ORAL EVERY 12 HOURS PRN
Qty: 60 TABLET | Refills: 3 | Status: SHIPPED | OUTPATIENT
Start: 2024-04-30

## 2024-04-30 NOTE — TELEPHONE ENCOUNTER
Refill Decision Note   Amanda Navarrete  is requesting a refill authorization.  Brief Assessment and Rationale for Refill:  Approve     Medication Therapy Plan:         Comments:     Note composed:11:41 AM 04/30/2024

## 2024-05-02 ENCOUNTER — OFFICE VISIT (OUTPATIENT)
Dept: UROGYNECOLOGY | Facility: CLINIC | Age: 49
End: 2024-05-02
Attending: FAMILY MEDICINE
Payer: COMMERCIAL

## 2024-05-02 VITALS
HEART RATE: 87 BPM | WEIGHT: 277.31 LBS | DIASTOLIC BLOOD PRESSURE: 85 MMHG | SYSTOLIC BLOOD PRESSURE: 133 MMHG | BODY MASS INDEX: 52.36 KG/M2 | HEIGHT: 61 IN

## 2024-05-02 DIAGNOSIS — R19.8 IRREGULAR BOWEL HABITS: ICD-10-CM

## 2024-05-02 DIAGNOSIS — N81.89 PELVIC FLOOR WEAKNESS: ICD-10-CM

## 2024-05-02 DIAGNOSIS — E66.01 CLASS 3 SEVERE OBESITY WITH BODY MASS INDEX (BMI) OF 50.0 TO 59.9 IN ADULT, UNSPECIFIED OBESITY TYPE, UNSPECIFIED WHETHER SERIOUS COMORBIDITY PRESENT: ICD-10-CM

## 2024-05-02 DIAGNOSIS — N39.46 MIXED INCONTINENCE URGE AND STRESS: Primary | ICD-10-CM

## 2024-05-02 PROCEDURE — 1159F MED LIST DOCD IN RCRD: CPT | Mod: CPTII,S$GLB,, | Performed by: NURSE PRACTITIONER

## 2024-05-02 PROCEDURE — 3079F DIAST BP 80-89 MM HG: CPT | Mod: CPTII,S$GLB,, | Performed by: NURSE PRACTITIONER

## 2024-05-02 PROCEDURE — 99999 PR PBB SHADOW E&M-EST. PATIENT-LVL V: CPT | Mod: PBBFAC,,, | Performed by: NURSE PRACTITIONER

## 2024-05-02 PROCEDURE — 3075F SYST BP GE 130 - 139MM HG: CPT | Mod: CPTII,S$GLB,, | Performed by: NURSE PRACTITIONER

## 2024-05-02 PROCEDURE — 87086 URINE CULTURE/COLONY COUNT: CPT | Performed by: NURSE PRACTITIONER

## 2024-05-02 PROCEDURE — 1160F RVW MEDS BY RX/DR IN RCRD: CPT | Mod: CPTII,S$GLB,, | Performed by: NURSE PRACTITIONER

## 2024-05-02 PROCEDURE — 51701 INSERT BLADDER CATHETER: CPT | Mod: S$GLB,,, | Performed by: NURSE PRACTITIONER

## 2024-05-02 PROCEDURE — 3044F HG A1C LEVEL LT 7.0%: CPT | Mod: CPTII,S$GLB,, | Performed by: NURSE PRACTITIONER

## 2024-05-02 PROCEDURE — 3008F BODY MASS INDEX DOCD: CPT | Mod: CPTII,S$GLB,, | Performed by: NURSE PRACTITIONER

## 2024-05-02 PROCEDURE — 99203 OFFICE O/P NEW LOW 30 MIN: CPT | Mod: 25,S$GLB,, | Performed by: NURSE PRACTITIONER

## 2024-05-02 RX ORDER — VIBEGRON 75 MG/1
75 TABLET, FILM COATED ORAL DAILY
Qty: 30 TABLET | Refills: 11 | Status: SHIPPED | OUTPATIENT
Start: 2024-05-02 | End: 2025-05-02

## 2024-05-02 NOTE — PROGRESS NOTES
[facility] - UROGYNECOLOGY  [address] [address]    Amanda Navarrete  [id number]  1975  May 28, 2024    Consulting Physician: Laya Jaffe DO     Primary M.D.: Laya Jaffe DO    Chief Complaint   Patient presents with    Urinary Incontinence     Currently taking --15mg oxybutynin    Stress, URGE incont    Urinary Urgency    Urinary Frequency       HPI:     1)  UI:  (+) ABHISHEK = (+) UUI  X 9 years. Has worsened since 2015 back surgery (+) pads:1/day, usually moderate wetness.  Daytime frequency: Q 1 hours.  Nocturia: Yes: 2-3/night. Limits fluids prior to bedtime  (--) dysuria,  (--) hematuria,  (--) frequent UTIs.  (+) complete bladder emptying. Taking oxybutynin xl 15 mg --no longer working    2)  POP:  Absent..  Symptoms:(--)  .  (--) vaginal bleeding. (--) vaginal discharge. (+) sexually active.  (--) dyspareunia.   (--)  Vaginal dryness.  (--) vaginal estrogen use.     3)  BM:  (+) constipation/straining.  (+) chronic diarrhea. (+) hematochezia--brb on stool.  (--) fecal incontinence.  (--) fecal smearing/urgency.  (+) complete evacuation.      Past Medical History  Past Medical History:   Diagnosis Date    Affective disorder     sses Dr. Dial, psychiatry    Back pain     GERD (gastroesophageal reflux disease)     Headache(784.0)     Hyperlipidemia, mixed 09/18/2019    Influenza A 12/01/2022    - supportive care - albuterol inhaler - discussed proceed to the ER if severe shortness of breath or chest pain. - Follow-up in office 12/05/2022 scheduled    Migraine headache     Narcolepsy without cataplexy(347.00)     Syncope 02/08/2024    - recommend Cardiology evaluation    Thyroid disease     hyperthyroidism    TMJ arthralgia         Past Surgical History  Past Surgical History:   Procedure Laterality Date    CAUDAL EPIDURAL STEROID INJECTION N/A 7/13/2018    Procedure: INJECTION, STEROID, CAUDAL, EPIDURAL;  Surgeon: Geoffrey Aleman MD;  Location: [facility] OR;  Service: Pain  Management;  Laterality: N/A;     SECTION, CLASSIC      COSMETIC SURGERY      breast augmentation    INJECTION OF ANESTHETIC AGENT INTO SACROILIAC JOINT Bilateral 2020    Procedure: BLOCK, SACROILIAC JOINT;  Surgeon: Geoffrey Aleman MD;  Location: [facility];  Service: Pain Management;  Laterality: Bilateral;    SPINE SURGERY  2015    fusion L spine    pfannenstiel    Hysterectomy: No      Past Ob History    C/s x 2.         Gynecologic History  LMP: No LMP recorded. Patient has had an implant.  Age of menarche: 13  Age of menopause: amenorrhea since mirena 2021  Menstrual history: normal  Pap test: 2022. normal  History of abnormal paps: No.  History of STIs:  Hepatitis C  Mammogram: Date of last: 2023.    Result: Abnormal - 1. Masslike density measuring 6 mm in the posterior depth right breast 6   o'clock position just anterior to the implant.   2. Multiple left breast circumscribed masses including dominant 2:00 2.1   cm mass, likely cysts.   BIRADS: 0-Need Additional Imaging Evaluation   RECOMMENDATIONS:   Ultrasound is recommended.   Recommend bilateral complete breast ultrasound evaluation.   Calculated Tyrer-Cuzick lifetime risk of breast cancer in this patient is   10.5%.   2023  Multiple bilateral cysts and clusters of cysts are probably benign.   3-Probably Benign   The patient was notified of the results and recommendations prior to discharge   RECOMMENDATIONS:   Ultrasound in 6 months is recommended.   Colonoscopy:2024 ifobt negative-- did have colonoscopy years ago-- reports normal   DEXA: n/a    Family History  Family History   Problem Relation Name Age of Onset    Hypertension Mother      Kidney disease Mother      Sleep apnea Mother      Narcolepsy Father      Cancer Maternal Aunt          colon cancer gene    Cancer Paternal Aunt          lung ca      Colon CA: No  Breast CA: No  GYN CA: No   CA: No    Social History  Social History     Tobacco Use    Smoking Status Former    Current packs/day: 1.00    Average packs/day: 1 pack/day for 15.3 years (15.3 ttl pk-yrs)    Types: Cigarettes    Start date: 8/11/2014    Passive exposure: Never   Smokeless Tobacco Never   .  Stopped in 2014  Social History     Substance and Sexual Activity   Alcohol Use Yes    Comment: Social, rare   .    Social History     Substance and Sexual Activity   Drug Use No   .  The patient is .  Resides in [place] [address].  Employment status: out due to back injury-- was        Allergies  Review of patient's allergies indicates:   Allergen Reactions    Tamiflu [oseltamivir] Itching and Swelling       Medications  Current Outpatient Medications on File Prior to Visit   Medication Sig Dispense Refill    albuterol (PROVENTIL/VENTOLIN HFA) 90 mcg/actuation inhaler Inhale 1-2 puffs into the lungs every 4 (four) hours as needed for Wheezing or Shortness of Breath. Rescue 18 g 5    calcium-vitamin D3 (OS-RHONDA 500 + D3) 500 mg-5 mcg (200 unit) per tablet Take 1 tablet by mouth once daily.      clonazePAM (KLONOPIN) 0.5 MG tablet       cyanocobalamin (VITAMIN B-12) 1000 MCG tablet Take 100 mcg by mouth once daily.      dextroamphetamine-amphetamine (ADDERALL XR) 20 MG 24 hr capsule Take 1 capsule (20 mg total) by mouth every morning. 30 capsule 0    dextroamphetamine-amphetamine (ADDERALL) 20 mg tablet Take 1 tablet by mouth 2 (two) times a day. 60 tablet 0    diclofenac sodium 1 % Gel APPLY 2 GRAMS TOPICALLY TO AFFECTED AREA(S) UP TO 5 TIMES DAILY (10 GRAMS PER DAY)  0    DULoxetine (CYMBALTA) 60 MG capsule Take 1 capsule by mouth once daily.      ergocalciferol (VITAMIN D2) 50,000 unit Cap Take 1 capsule (50,000 Units total) by mouth twice a week. 24 capsule 3    fluticasone propionate (FLONASE) 50 mcg/actuation nasal spray 1 spray (50 mcg total) by Each Nostril route once daily. 16 g 0    levothyroxine (SYNTHROID) 88 MCG tablet Take 1 tablet (88 mcg total) by mouth daily before  "breakfast. 90 tablet 0    meclizine (ANTIVERT) 25 mg tablet Take 1 tablet (25 mg total) by mouth 3 (three) times daily as needed for Dizziness. 30 tablet 5    methocarbamol (ROBAXIN) 500 MG Tab Take 500 mg by mouth every evening.  2    MIRENA 20 mcg/24 hours (6 yrs) 52 mg IUD       modafiniL (PROVIGIL) 200 MG Tab Take 1 tablet by mouth every morning and ½ tablet at noon. 45 tablet 5    oxybutynin (DITROPAN XL) 15 MG TR24 Take 1 tablet (15 mg total) by mouth once daily. 90 tablet 3    oxyCODONE-acetaminophen (PERCOCET) 5-325 mg per tablet Take 1 tablet by mouth 2 (two) times daily as needed.  0    rosuvastatin (CRESTOR) 10 MG tablet Take 1 tablet (10 mg total) by mouth once daily. 90 tablet 3    sodium,calcium,mag,pot oxybate (XYWAV) 0.5 gram/mL Soln Take 4.5 g by mouth As instructed. 540 mL 5    topiramate (TOPAMAX) 100 MG tablet Take 1 tablet by mouth once daily AND 1 ½ tablets every evening 225 tablet 3    valACYclovir (VALTREX) 1000 MG tablet Take 1 tablet by mouth every 12 hours as needed (cold sores). 60 tablet 3     No current facility-administered medications on file prior to visit.       Review of Systems A 14 point ROS was reviewed with pertinent positives as noted above in the history of present illness.      Constitutional: negative  Eyes: negative  Endocrine: negative  Gastrointestinal: negative  Cardiovascular: negative  Respiratory: negative  Allergic/Immunologic: negative  Integumentary: negative  Psychiatric: negative  Musculoskeletal: negative   Ear/Nose/Throat: negative  Neurologic: negative  Genitourinary: SEE HPI  Hematologic/Lymphatic: negative   Breast: negative    Urogynecologic Exam  /85   Pulse 87   Ht 5' 1" (1.549 m)   Wt 125.8 kg (277 lb 5.4 oz)   BMI 52.40 kg/m²     GENERAL APPEARANCE:  The patient is well-developed, well-nourished.   Neck:  Supple with no thyromegaly, no carotid bruits.  Heart:  Regular rate and rhythm, no murmurs, rubs or gallops.  Lungs:  Clear.  No CVA " tenderness.  Abdomen:  Soft, nontender, nondistended, no hepatosplenomegaly.  Incisions:  pfannenstiel    PELVIC:    External genitalia:  Normal Bartholins, Skenes and labia bilaterally.    Urethra:  No caruncle, diverticulum or masses.  (--) hypermobility.    Vagina:  Atrophy (--) , no bladder masses or tender, no discharge.    Cervix:  normal appearance  Uterus: normal size, contour, position, consistency, mobility, non-tender  Adnexa: Not palpable.    POP-Q:    No obvious POP present with valsalva.     NEUROLOGIC:  Cranial nerves 2 through 12 intact.  Strength 5/5.  DTRs 2+ lower extremities.  S2 through 4 normal.  Sacral reflexes intact.    EXT: HSIEH, 2+ pulses bilaterally, no C/C/E    COUGH STRESS TEST:  negative  KEGEL: 1 /5 valsalva first    RECTAL:    External:  Normal, (--) hemorrhoids, (--) dovetailing.   Internal: deferred    PVR: 20 mL    Impression    1. Mixed incontinence urge and stress    2. Pelvic floor weakness    3. Class 3 severe obesity with body mass index (BMI) of 50.0 to 59.9 in adult, unspecified obesity type, unspecified whether serious comorbidity present    4. Irregular bowel habits        Initial Plan  The patient was counseled regarding these issues. The patient was given a summary sheet containing each of these issues with possible options for evaluation and management. When appropriate, we also reviewed computer-generated diagrams specific to their diagnoses..  All questions were addressed to the patient's satisfaction.    1)  Mixed urinary incontinence, urge > stress:    --Empty bladder every 3 hours.  Empty well: wait a minute, lean forward on toilet.    --Avoid dietary irritants (see sheet).  Keep diary x 3-5 days to determine your irritants.  --start pelvic floor PT with [facility] (Lashell Ohara or other): [address], [facility], [place]   [address]. Patients can park in the [place] entrance and it is on the first floor. p) [phone] (Grace Mendoza,  ). (f) [phone].      --URGE: trial gemtesa 75 mg -- let me know if it is not affordable--when you start stop the oxybutynin Takes 2-4 weeks to see if will have effect.  For dry mouth: get sour, sugar free lozenge or gum.    --STRESS:  Pessary vs. Sling. --trial PT    2)irregular bowel habits  --hydrate well  --Start daily fiber.  Take 1 tsp of fiber powder (psyllium or other sugar-free powder).  Mix in 8 oz of water.  Take x 3-5 days.  Then, increase fiber by 1 tsp every 3-5 days until stool is easy to pass.  Stop and continue at that dose.   Do not exceed 6 tsps/day.      3)obesity  --referral to weight loss clinic    4)RTC 2 months for follow up    I spent a total of 30 minutes on the day of the visit.  This includes face to face time and non-face to face time preparing to see the patient (eg, review of tests), obtaining and/or reviewing separately obtained history, documenting clinical information in the electronic or other health record, independently interpreting results and communicating results to the patient/family/caregiver, or care coordinator.     Thank you for requesting consultation of your patient.  I look forward to participating in their care.    Barb Baltazar  Female Pelvic Medicine and Reconstructive Surgery  [facility] [place], [place]

## 2024-05-02 NOTE — PATIENT INSTRUCTIONS
1)  Mixed urinary incontinence, urge > stress:    --Empty bladder every 3 hours.  Empty well: wait a minute, lean forward on toilet.    --Avoid dietary irritants (see sheet).  Keep diary x 3-5 days to determine your irritants.  --start pelvic floor PT with [facility] (Lashell Ohara or other): [facility] [facility], [facility] [address], [place]   [address]. Patients can park in the [place] entrance and it is on the first floor. (p) [phone] (Grace Mendoza, ). (f) [phone].      --URGE: trial gemtesa 75 mg -- let me know if it is not affordable--when you start stop the oxybutynin Takes 2-4 weeks to see if will have effect.  For dry mouth: get sour, sugar free lozenge or gum.    --STRESS:  Pessary vs. Sling. --trial PT    2)irregular bowel habits  --hydrate well  --Start daily fiber.  Take 1 tsp of fiber powder (psyllium or other sugar-free powder).  Mix in 8 oz of water.  Take x 3-5 days.  Then, increase fiber by 1 tsp every 3-5 days until stool is easy to pass.  Stop and continue at that dose.   Do not exceed 6 tsps/day.      3)obesity  --referral to weight loss clinic    4)RTC 2 months for follow up    Bladder Irritants  Certain foods and drinks have been associated with worsening symptoms of urinary frequency, urgency, urge incontinence, or  bladder pain. If you suffer from any of these conditions, you may wish to try eliminating one or more of these foods from your  diet and see if your symptoms improve. If bladder symptoms are related to dietary factors, strict adherence to a diet that  eliminates the food should bring marked relief in 10 days. Once you are feeling better, you can begin to add foods back into  your diet, one at a time. If symptoms return, you will be able to identify the irritant. As you add foods back to your diet it is  very important that you drink significant amounts of water.  Low-acid fruit substitutions include apricots, papaya, pears and watermelon. Coffee drinkers can drink  Kava or other lowacid  instant drinks. Tea drinkers can substitute non-citrus herbal and sun brewed teas. Calcium carbonate co-buffered with  calcium ascorbate can be substituted for Vitamin C. Prelief is a dietary supplement that works as an acid blocker for the  bladder.  Where to get more information:   Overcoming Bladder Disorders by Rachael Rey and Darron Gray, 1990   You Dont Have to Live with Cystitis! By Sarah Newell, 1988  List of Common Bladder Irritants*  Alcoholic beverages  Apples and apple juice  Cantaloupe  Carbonated beverages  Chili and spicy foods  Chocolate  Citrus fruit  Coffee (including decaffeinated)  Cranberries and cranberry juice  Grapes  Guava  Milk Products: milk, cheese, cottage cheese, yogurt, ice cream  Peaches  Pineapple  Plums  Strawberries  Sugar especially artificial sweeteners, saccharin, aspartame, corn sweeteners, honey, fructose, sucrose, lactose  Tea  Tomatoes and tomato juice  Vitamin B complex  Vinegar  *Most people are not sensitive to ALL of these products; your goal is to find the foods that make YOUR  symptoms worse

## 2024-05-04 LAB — BACTERIA UR CULT: NO GROWTH

## 2024-05-07 ENCOUNTER — TELEPHONE (OUTPATIENT)
Dept: BARIATRICS | Facility: CLINIC | Age: 49
End: 2024-05-07
Payer: COMMERCIAL

## 2024-05-10 ENCOUNTER — PATIENT MESSAGE (OUTPATIENT)
Dept: UROGYNECOLOGY | Facility: CLINIC | Age: 49
End: 2024-05-10
Payer: COMMERCIAL

## 2024-05-13 ENCOUNTER — TELEPHONE (OUTPATIENT)
Dept: BARIATRICS | Facility: CLINIC | Age: 49
End: 2024-05-13
Payer: COMMERCIAL

## 2024-05-21 ENCOUNTER — OFFICE VISIT (OUTPATIENT)
Dept: PRIMARY CARE CLINIC | Facility: CLINIC | Age: 49
End: 2024-05-21
Attending: FAMILY MEDICINE
Payer: COMMERCIAL

## 2024-05-21 VITALS — WEIGHT: 277 LBS | HEIGHT: 61 IN | BODY MASS INDEX: 52.3 KG/M2

## 2024-05-21 DIAGNOSIS — Z00.01 ENCOUNTER FOR GENERAL ADULT MEDICAL EXAMINATION WITH ABNORMAL FINDINGS: ICD-10-CM

## 2024-05-21 DIAGNOSIS — E06.3 HYPOTHYROIDISM DUE TO HASHIMOTO'S THYROIDITIS: Primary | ICD-10-CM

## 2024-05-21 DIAGNOSIS — E55.9 VITAMIN D DEFICIENCY: ICD-10-CM

## 2024-05-21 DIAGNOSIS — E03.8 HYPOTHYROIDISM DUE TO HASHIMOTO'S THYROIDITIS: Primary | ICD-10-CM

## 2024-05-21 PROCEDURE — 99214 OFFICE O/P EST MOD 30 MIN: CPT | Mod: 95,,, | Performed by: FAMILY MEDICINE

## 2024-05-21 PROCEDURE — 1160F RVW MEDS BY RX/DR IN RCRD: CPT | Mod: CPTII,95,, | Performed by: FAMILY MEDICINE

## 2024-05-21 PROCEDURE — 3044F HG A1C LEVEL LT 7.0%: CPT | Mod: CPTII,95,, | Performed by: FAMILY MEDICINE

## 2024-05-21 PROCEDURE — 3008F BODY MASS INDEX DOCD: CPT | Mod: CPTII,95,, | Performed by: FAMILY MEDICINE

## 2024-05-21 PROCEDURE — 1159F MED LIST DOCD IN RCRD: CPT | Mod: CPTII,95,, | Performed by: FAMILY MEDICINE

## 2024-05-21 NOTE — PROGRESS NOTES
VIRTUAL/TELEMEDICINE VISIT   FAMILY MEDICINE  [facility]    The patient location is: [place]  The chief complaint leading to consultation is:   Chief Complaint   Patient presents with    Follow-up    Thyroid Problem     Visit type: Virtual visit with synchronous audio and video   Total time spent: 30 minute  Each patient to whom he or she provides medical services by telemedicine is:  (1) informed of the relationship between the physician and patient and the respective role of any other health care provider with respect to management of the patient; and (2) notified that he or she may decline to receive medical services by telemedicine and may withdraw from such care at any time.    Reason for visit:   Chief Complaint   Patient presents with    Follow-up    Thyroid Problem       SUBJECTIVE: Amanda Navarrete is a 49 y.o. female  - with obesity, chronic low back pain with bilateral sciatica, migraine headaches, hypothyroidism, anxiety, hyperlipidemia, vitamin-D deficiency, GERD, and narcolepsy cataplexy syndrome presents for follow-up thyroid and vitamin D     Sleep Medicine: KIRK Reilly  Gynecology: Dr. Flores 8/5/2022   - mammograms [facility]   Ortho Dr. Najera  Pain Dr. Geoffrey Aleman  Urogyn Barb Baltazar, NP    1. Hypothyroidism     Prior evaluation by Endocrinologist: No  Prior thyroid US: no    Since last visit Amanda Navarrete increased  her levothyroxine 75 mcg to 88 mcg daily.  Her last TSH 03/19/2024 showed an elevated TSH of 5.85 and she also had complained of increased irritability and fatigue. She reports that she is doing well with the increased dose.     Current medication:   levothyroxine (SYNTHROID) 88 MCG tablet, Take 1 tablet (88 mcg total) by mouth daily before breakfast., Disp: 90 tablet, Rfl:     Side effects from medication: none  Scheduled for medication: AM fasting separate from other medications    Concerns: denies    Lab Results       Component                 Value               Date                       TSH                      3.672               2024                 FREET4                   0.79                2024                Lab Results       Component                Value               Date                       TSH                      5.850 (H)           2024                 FREET4                   0.79                2024 TSH  6.840 High                    Review of Systems   HENT:  Negative for hearing loss.    Eyes:  Negative for discharge.   Respiratory:  Negative for wheezing.    Cardiovascular:  Negative for chest pain and palpitations.   Gastrointestinal:  Positive for blood in stool. Negative for constipation, diarrhea and vomiting.   Genitourinary:  Negative for dysuria and hematuria.   Musculoskeletal:  Positive for neck pain.   Neurological:  Positive for headaches. Negative for weakness.   Endo/Heme/Allergies:  Negative for polydipsia.   All other systems reviewed and are negative.        HISTORY:   Past Medical History:   Diagnosis Date    Affective disorder     sses Dr. Dial, psychiatry    Back pain     GERD (gastroesophageal reflux disease)     Headache(784.0)     Hyperlipidemia, mixed 2019    Influenza A 2022    - supportive care - albuterol inhaler - discussed proceed to the ER if severe shortness of breath or chest pain. - Follow-up in office 2022 scheduled    Migraine headache     Narcolepsy without cataplexy(347.00)     Syncope 2024    - recommend Cardiology evaluation    Thyroid disease     hyperthyroidism    TMJ arthralgia        Past Surgical History:   Procedure Laterality Date    CAUDAL EPIDURAL STEROID INJECTION N/A 2018    Procedure: INJECTION, STEROID, CAUDAL, EPIDURAL;  Surgeon: Geoffrey Aleman MD;  Location: [facility] OR;  Service: Pain Management;  Laterality: N/A;     SECTION, CLASSIC      COSMETIC SURGERY      breast augmentation     INJECTION OF ANESTHETIC AGENT INTO SACROILIAC JOINT Bilateral 12/11/2020    Procedure: BLOCK, SACROILIAC JOINT;  Surgeon: Geoffrey Aleman MD;  Location: [facility];  Service: Pain Management;  Laterality: Bilateral;    SPINE SURGERY  11/19/2015    fusion L spine       Family History   Problem Relation Name Age of Onset    Hypertension Mother      Kidney disease Mother      Sleep apnea Mother      Narcolepsy Father      Cancer Maternal Aunt          colon cancer gene    Cancer Paternal Aunt          lung ca       Social History     Tobacco Use    Smoking status: Former     Current packs/day: 1.00     Average packs/day: 1 pack/day for 15.3 years (15.3 ttl pk-yrs)     Types: Cigarettes     Start date: 8/11/2014     Passive exposure: Never    Smokeless tobacco: Never   Substance Use Topics    Alcohol use: Yes     Comment: Social, rare    Drug use: No       Social History     Social History Narrative    She works at [employer]. She still has a daughter living at home while in college. The others are grown. She has 4 dogs.        ALLERGIES:   Review of patient's allergies indicates:   Allergen Reactions    Tamiflu [oseltamivir] Itching and Swelling       MEDS:   Current Outpatient Medications on File Prior to Visit   Medication Sig Dispense Refill Last Dose    albuterol (PROVENTIL/VENTOLIN HFA) 90 mcg/actuation inhaler Inhale 1-2 puffs into the lungs every 4 (four) hours as needed for Wheezing or Shortness of Breath. Rescue 18 g 5 Taking    calcium-vitamin D3 (OS-RHONDA 500 + D3) 500 mg-5 mcg (200 unit) per tablet Take 1 tablet by mouth once daily.   Taking    clonazePAM (KLONOPIN) 0.5 MG tablet    Taking    cyanocobalamin (VITAMIN B-12) 1000 MCG tablet Take 100 mcg by mouth once daily.   Taking    dextroamphetamine-amphetamine (ADDERALL XR) 20 MG 24 hr capsule Take 1 capsule (20 mg total) by mouth every morning. 30 capsule 0 Taking    dextroamphetamine-amphetamine (ADDERALL) 20 mg tablet Take 1 tablet by  "mouth 2 (two) times a day. 60 tablet 0 Taking    diclofenac sodium 1 % Gel APPLY 2 GRAMS TOPICALLY TO AFFECTED AREA(S) UP TO 5 TIMES DAILY (10 GRAMS PER DAY)  0 Taking    DULoxetine (CYMBALTA) 60 MG capsule Take 1 capsule by mouth once daily.   Taking    ergocalciferol (VITAMIN D2) 50,000 unit Cap Take 1 capsule (50,000 Units total) by mouth twice a week. 24 capsule 3 Taking    fluticasone propionate (FLONASE) 50 mcg/actuation nasal spray 1 spray (50 mcg total) by Each Nostril route once daily. 16 g 0 Taking    levothyroxine (SYNTHROID) 88 MCG tablet Take 1 tablet (88 mcg total) by mouth daily before breakfast. 90 tablet 0 Taking    meclizine (ANTIVERT) 25 mg tablet Take 1 tablet (25 mg total) by mouth 3 (three) times daily as needed for Dizziness. 30 tablet 5 Taking    methocarbamol (ROBAXIN) 500 MG Tab Take 500 mg by mouth every evening.  2 Taking    MIRENA 20 mcg/24 hours (6 yrs) 52 mg IUD    Taking    modafiniL (PROVIGIL) 200 MG Tab Take 1 tablet by mouth every morning and ½ tablet at noon. 45 tablet 5 Taking    oxybutynin (DITROPAN XL) 15 MG TR24 Take 1 tablet (15 mg total) by mouth once daily. 90 tablet 3 Taking    oxyCODONE-acetaminophen (PERCOCET) 5-325 mg per tablet Take 1 tablet by mouth 2 (two) times daily as needed.  0 Taking    rosuvastatin (CRESTOR) 10 MG tablet Take 1 tablet (10 mg total) by mouth once daily. 90 tablet 3 Taking    sodium,calcium,mag,pot oxybate (XYWAV) 0.5 gram/mL Soln Take 4.5 g by mouth As instructed. 540 mL 5 Taking    topiramate (TOPAMAX) 100 MG tablet Take 1 tablet by mouth once daily AND 1 ½ tablets every evening 225 tablet 3 Taking    valACYclovir (VALTREX) 1000 MG tablet Take 1 tablet by mouth every 12 hours as needed (cold sores). 60 tablet 3 Taking    vibegron (GEMTESA) 75 mg Tab Take 1 tablet (75 mg total) by mouth once daily. 30 tablet 11 Taking       Vital signs:   Vitals:    05/21/24 1459   Weight: 125.6 kg (277 lb)   Height: 5' 1" (1.549 m)     Body mass index is 52.34 " kg/m².    PHYSICAL EXAM:     Physical Exam  Constitutional:       General: She is not in acute distress.  Pulmonary:      Effort: Pulmonary effort is normal. No respiratory distress.   Neurological:      Mental Status: She is alert.   Psychiatric:         Speech: Speech normal.           PERTINENT RESULTS:   Lab Visit on 05/17/2024   Component Date Value Ref Range Status    Specimen UA 05/17/2024 Urine, Clean Catch   Final    Color, UA 05/17/2024 Yellow  Yellow, Straw, Susi Final    Appearance, UA 05/17/2024 Clear  Clear Final    pH, UA 05/17/2024 6.0  5.0 - 8.0 Final    Specific Gravity, UA 05/17/2024 >=1.030 (A)  1.005 - 1.030 Final    Protein, UA 05/17/2024 Negative  Negative Final    Comment: Recommend a 24 hour urine protein or a urine   protein/creatinine ratio if globulin induced proteinuria is  clinically suspected.      Glucose, UA 05/17/2024 Negative  Negative Final    Ketones, UA 05/17/2024 Negative  Negative Final    Bilirubin (UA) 05/17/2024 Negative  Negative Final    Occult Blood UA 05/17/2024 Negative  Negative Final    Nitrite, UA 05/17/2024 Negative  Negative Final    Urobilinogen, UA 05/17/2024 Negative  <2.0 EU/dL Final    Leukocytes, UA 05/17/2024 Negative  Negative Final   Lab Visit on 05/17/2024   Component Date Value Ref Range Status    TSH 05/17/2024 3.672  0.400 - 4.000 uIU/mL Final    Vit D, 25-Hydroxy 05/17/2024 34  30 - 96 ng/mL Final    Comment: Vitamin D deficiency.........<10 ng/mL                              Vitamin D insufficiency......10-29 ng/mL       Vitamin D sufficiency........> or equal to 30 ng/mL  Vitamin D toxicity............>100 ng/mL         ASSESSMENT/PLAN:    1. Hypothyroidism due to Hashimoto's thyroiditis  Overview:  Lab Results   Component Value Date    TSH 3.672 05/17/2024     - well controlled  - continue current management plan   - patient encouraged to notify me with any changes      2. Vitamin D deficiency  Overview:  - tried and failed vitamin-D 3 5000 units  daily  - D improved with D2 50,000 twice a week  - well controlled  - continue current management plan   - patient encouraged to notify me with any changes      ORDERS:   Orders Placed This Encounter    TSH    Lipid Panel    Hemoglobin A1C    Comprehensive Metabolic Panel    CBC Auto Differential    T4, Free         Vaccines recommended:  COVID-19    Follow up in about 9 months (around 2/21/2025) for Annual, Labs. or sooner with any concerns      This note is dictated using the M*Modal Fluency Direct word recognition program. There are word recognition mistakes that are occasionally missed on review.    Dr. Laya Jaffe D.O.   Family Medicine

## 2024-05-22 ENCOUNTER — TELEPHONE (OUTPATIENT)
Dept: PRIMARY CARE CLINIC | Facility: CLINIC | Age: 49
End: 2024-05-22
Payer: COMMERCIAL

## 2024-05-22 PROBLEM — R53.1 WEAKNESS: Status: ACTIVE | Noted: 2024-05-22

## 2024-05-22 NOTE — TELEPHONE ENCOUNTER
----- Message from Laya Jaffe DO sent at 5/21/2024  3:26 PM CDT -----  Please schedule annual with labs prior for 2/2025

## 2024-05-31 ENCOUNTER — PATIENT MESSAGE (OUTPATIENT)
Dept: SLEEP MEDICINE | Facility: CLINIC | Age: 49
End: 2024-05-31
Payer: COMMERCIAL

## 2024-05-31 DIAGNOSIS — G47.411 NARCOLEPSY WITH CATAPLEXY: Primary | ICD-10-CM

## 2024-05-31 RX ORDER — DEXTROAMPHETAMINE SACCHARATE, AMPHETAMINE ASPARTATE, DEXTROAMPHETAMINE SULFATE AND AMPHETAMINE SULFATE 5; 5; 5; 5 MG/1; MG/1; MG/1; MG/1
1 TABLET ORAL 2 TIMES DAILY
Qty: 60 TABLET | Refills: 0 | Status: SHIPPED | OUTPATIENT
Start: 2024-05-31

## 2024-05-31 RX ORDER — DEXTROAMPHETAMINE SACCHARATE, AMPHETAMINE ASPARTATE MONOHYDRATE, DEXTROAMPHETAMINE SULFATE AND AMPHETAMINE SULFATE 5; 5; 5; 5 MG/1; MG/1; MG/1; MG/1
20 CAPSULE, EXTENDED RELEASE ORAL EVERY MORNING
Qty: 30 CAPSULE | Refills: 0 | Status: SHIPPED | OUTPATIENT
Start: 2024-05-31

## 2024-06-05 ENCOUNTER — OFFICE VISIT (OUTPATIENT)
Dept: BARIATRICS | Facility: CLINIC | Age: 49
End: 2024-06-05
Payer: COMMERCIAL

## 2024-06-05 ENCOUNTER — HOSPITAL ENCOUNTER (OUTPATIENT)
Dept: RADIOLOGY | Facility: HOSPITAL | Age: 49
Discharge: HOME | End: 2024-06-05
Attending: NURSE PRACTITIONER
Payer: COMMERCIAL

## 2024-06-05 ENCOUNTER — HOSPITAL ENCOUNTER (OUTPATIENT)
Dept: CARDIOLOGY | Facility: CLINIC | Age: 49
Discharge: HOME | End: 2024-06-05
Payer: COMMERCIAL

## 2024-06-05 ENCOUNTER — CLINICAL SUPPORT (OUTPATIENT)
Dept: BARIATRICS | Facility: CLINIC | Age: 49
End: 2024-06-05
Payer: COMMERCIAL

## 2024-06-05 VITALS
BODY MASS INDEX: 51.57 KG/M2 | OXYGEN SATURATION: 98 % | WEIGHT: 273.13 LBS | SYSTOLIC BLOOD PRESSURE: 122 MMHG | DIASTOLIC BLOOD PRESSURE: 84 MMHG | HEIGHT: 61 IN | HEART RATE: 99 BPM

## 2024-06-05 VITALS — BODY MASS INDEX: 51.61 KG/M2 | WEIGHT: 273.13 LBS

## 2024-06-05 DIAGNOSIS — G47.33 OSA (OBSTRUCTIVE SLEEP APNEA): ICD-10-CM

## 2024-06-05 DIAGNOSIS — E06.3 HYPOTHYROIDISM DUE TO HASHIMOTO'S THYROIDITIS: ICD-10-CM

## 2024-06-05 DIAGNOSIS — E66.01 CLASS 3 SEVERE OBESITY WITH BODY MASS INDEX (BMI) OF 50.0 TO 59.9 IN ADULT, UNSPECIFIED OBESITY TYPE, UNSPECIFIED WHETHER SERIOUS COMORBIDITY PRESENT: ICD-10-CM

## 2024-06-05 DIAGNOSIS — E78.2 HYPERLIPIDEMIA, MIXED: ICD-10-CM

## 2024-06-05 DIAGNOSIS — E55.9 VITAMIN D DEFICIENCY: ICD-10-CM

## 2024-06-05 DIAGNOSIS — E03.8 HYPOTHYROIDISM DUE TO HASHIMOTO'S THYROIDITIS: ICD-10-CM

## 2024-06-05 DIAGNOSIS — G47.33 OSA (OBSTRUCTIVE SLEEP APNEA): Primary | ICD-10-CM

## 2024-06-05 DIAGNOSIS — E66.01 CLASS 3 SEVERE OBESITY WITH BODY MASS INDEX (BMI) OF 50.0 TO 59.9 IN ADULT, UNSPECIFIED OBESITY TYPE, UNSPECIFIED WHETHER SERIOUS COMORBIDITY PRESENT: Primary | ICD-10-CM

## 2024-06-05 DIAGNOSIS — G47.411 NARCOLEPSY CATAPLEXY SYNDROME: ICD-10-CM

## 2024-06-05 DIAGNOSIS — E66.01 MORBID OBESITY WITH BMI OF 50.0-59.9, ADULT: ICD-10-CM

## 2024-06-05 DIAGNOSIS — K21.9 GASTROESOPHAGEAL REFLUX DISEASE, UNSPECIFIED WHETHER ESOPHAGITIS PRESENT: ICD-10-CM

## 2024-06-05 DIAGNOSIS — Z71.3 DIETARY COUNSELING AND SURVEILLANCE: ICD-10-CM

## 2024-06-05 LAB
OHS QRS DURATION: 88 MS
OHS QTC CALCULATION: 418 MS

## 2024-06-05 PROCEDURE — 3074F SYST BP LT 130 MM HG: CPT | Mod: CPTII,S$GLB,, | Performed by: NURSE PRACTITIONER

## 2024-06-05 PROCEDURE — 93000 ELECTROCARDIOGRAM COMPLETE: CPT | Mod: S$GLB,ICN,, | Performed by: INTERNAL MEDICINE

## 2024-06-05 PROCEDURE — 3079F DIAST BP 80-89 MM HG: CPT | Mod: CPTII,S$GLB,, | Performed by: NURSE PRACTITIONER

## 2024-06-05 PROCEDURE — 3008F BODY MASS INDEX DOCD: CPT | Mod: CPTII,S$GLB,, | Performed by: NURSE PRACTITIONER

## 2024-06-05 PROCEDURE — 99205 OFFICE O/P NEW HI 60 MIN: CPT | Mod: S$GLB,,, | Performed by: NURSE PRACTITIONER

## 2024-06-05 PROCEDURE — 1159F MED LIST DOCD IN RCRD: CPT | Mod: CPTII,S$GLB,, | Performed by: NURSE PRACTITIONER

## 2024-06-05 PROCEDURE — 71046 X-RAY EXAM CHEST 2 VIEWS: CPT | Mod: TC

## 2024-06-05 PROCEDURE — 99499 UNLISTED E&M SERVICE: CPT | Mod: S$GLB,,, | Performed by: DIETITIAN, REGISTERED

## 2024-06-05 PROCEDURE — 99999 PR PBB SHADOW E&M-EST. PATIENT-LVL I: CPT | Mod: PBBFAC,,, | Performed by: DIETITIAN, REGISTERED

## 2024-06-05 PROCEDURE — 1160F RVW MEDS BY RX/DR IN RCRD: CPT | Mod: CPTII,S$GLB,, | Performed by: NURSE PRACTITIONER

## 2024-06-05 PROCEDURE — 3044F HG A1C LEVEL LT 7.0%: CPT | Mod: CPTII,S$GLB,, | Performed by: NURSE PRACTITIONER

## 2024-06-05 PROCEDURE — 99999 PR PBB SHADOW E&M-EST. PATIENT-LVL V: CPT | Mod: PBBFAC,,, | Performed by: NURSE PRACTITIONER

## 2024-06-05 PROCEDURE — 71046 X-RAY EXAM CHEST 2 VIEWS: CPT | Mod: 26,,, | Performed by: RADIOLOGY
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
## 2024-06-05 NOTE — PROGRESS NOTES
BARIATRIC NEW PATIENT EVALUATION    CHIEF COMPLAINT:   Morbid obesity, body mass index is 51.61 kg/m². and inability to lose weight.    HPI:  Amanda Navarrete is a 49 y.o. morbidly obese female. Her current body mass index is 51.61 kg/m². She has multiple associated comorbidities including hyperlipidemia, chronic back pain and EUNICE on CPAP.  She has struggled with excess weight since  [employer].  Her highest adult weight was 273 lbs at age 49, and her lowest adult weight was 108 lbs at age 19.  The patient has tried Weight Watchers, Atkins Diet, calorie counting, Keto diet, low-carb diet, ozempic and exercise.  The patient was most successful with low carb with a weight loss of 50 lbs.  Her current exercise includes none 0 times a week. She denies any history of eating disorder such as anorexia, bulimia, or taking laxatives for weight loss, and denies any addiction including illicit substances, alcohol, or gambling.  Patient states she has a good  support system.  She lives with family.  She is currently employed   .  She  denies a history of GERD.  The patient's goal is under 200 lbs. Uses cane to walk d/t lower back pain.     ESS: Score of 16, reviewed 06/05/2024.  Does not need Sleep Study. Pt has narcolepsy dx     Chronic pain management takes 1 Percocet daily.     Pre op weight-273  IBW-129    PAST MEDICAL HISTORY:  Past Medical History:   Diagnosis Date    Affective disorder     sses Dr. Dial, psychiatry    Back pain     GERD (gastroesophageal reflux disease)     Headache(784.0)     Hyperlipidemia, mixed 09/18/2019    Influenza A 12/01/2022    - supportive care - albuterol inhaler - discussed proceed to the ER if severe shortness of breath or chest pain. - Follow-up in office 12/05/2022 scheduled    Migraine headache     Narcolepsy without cataplexy(347.00)     Syncope 02/08/2024    - recommend Cardiology evaluation    Thyroid disease     hyperthyroidism    TMJ arthralgia        PAST SURGICAL  HISTORY:  Past Surgical History:   Procedure Laterality Date    CAUDAL EPIDURAL STEROID INJECTION N/A 2018    Procedure: INJECTION, STEROID, CAUDAL, EPIDURAL;  Surgeon: Geoffrey Aleman MD;  Location: [facility] OR;  Service: Pain Management;  Laterality: N/A;     SECTION, CLASSIC      COSMETIC SURGERY      breast augmentation    INJECTION OF ANESTHETIC AGENT INTO SACROILIAC JOINT Bilateral 2020    Procedure: BLOCK, SACROILIAC JOINT;  Surgeon: Geoffrey Aleman MD;  Location: [facility] OR;  Service: Pain Management;  Laterality: Bilateral;    SPINE SURGERY  2015    fusion L spine       FAMILY HISTORY:  Family History   Problem Relation Name Age of Onset    Hypertension Mother      Kidney disease Mother      Sleep apnea Mother      Narcolepsy Father      Cancer Maternal Aunt          colon cancer gene    Cancer Paternal Aunt          lung ca        SOCIAL HISTORY:  Social History     Socioeconomic History    Marital status: Single     Spouse name: John    Number of children: 3   Occupational History    Occupation: post master   Tobacco Use    Smoking status: Former     Current packs/day: 1.00     Average packs/day: 1 pack/day for 15.3 years (15.3 ttl pk-yrs)     Types: Cigarettes     Start date: 2014     Passive exposure: Never    Smokeless tobacco: Never   Substance and Sexual Activity    Alcohol use: Yes     Comment: Social, rare    Drug use: No    Sexual activity: Yes     Partners: Male   Social History Narrative    She works at [employer]. She still has a daughter living at home while in college. The others are grown. She has 4 dogs.      Social Determinants of Health     Financial Resource Strain: Medium Risk (3/21/2024)    Overall Financial Resource Strain (CARDIA)     Difficulty of Paying Living Expenses: Somewhat hard   Food Insecurity: Food Insecurity Present (3/21/2024)    Hunger Vital Sign     Worried About Running Out of Food in the Last Year: Sometimes true      Ran Out of Food in the Last Year: Patient declined   Transportation Needs: No Transportation Needs (3/21/2024)    PRAPARE - Transportation     Lack of Transportation (Medical): No     Lack of Transportation (Non-Medical): No   Physical Activity: Inactive (3/21/2024)    Exercise Vital Sign     Days of Exercise per Week: 0 days     Minutes of Exercise per Session: 0 min   Stress: Stress Concern Present (3/21/2024)    [employer] - Occupational Stress Questionnaire     Feeling of Stress : To some extent   Housing Stability: High Risk (3/21/2024)    Housing Stability Vital Sign     Unable to Pay for Housing in the Last Year: Yes     Number of Places Lived in the Last Year: 2     Unstable Housing in the Last Year: Yes       MEDICATIONS:    Current Outpatient Medications:     albuterol (PROVENTIL/VENTOLIN HFA) 90 mcg/actuation inhaler, Inhale 1-2 puffs into the lungs every 4 (four) hours as needed for Wheezing or Shortness of Breath. Rescue, Disp: 18 g, Rfl: 5    calcium-vitamin D3 (OS-RHONDA 500 + D3) 500 mg-5 mcg (200 unit) per tablet, Take 1 tablet by mouth once daily., Disp: , Rfl:     clonazePAM (KLONOPIN) 0.5 MG tablet, , Disp: , Rfl:     cyanocobalamin (VITAMIN B-12) 1000 MCG tablet, Take 100 mcg by mouth once daily., Disp: , Rfl:     dextroamphetamine-amphetamine (ADDERALL XR) 20 MG 24 hr capsule, Take 1 capsule (20 mg total) by mouth every morning., Disp: 30 capsule, Rfl: 0    dextroamphetamine-amphetamine (ADDERALL) 20 mg tablet, Take 1 tablet by mouth 2 (two) times a day., Disp: 60 tablet, Rfl: 0    diclofenac sodium 1 % Gel, APPLY 2 GRAMS TOPICALLY TO AFFECTED AREA(S) UP TO 5 TIMES DAILY (10 GRAMS PER DAY), Disp: , Rfl: 0    DULoxetine (CYMBALTA) 60 MG capsule, Take 1 capsule by mouth once daily., Disp: , Rfl:     ergocalciferol (VITAMIN D2) 50,000 unit Cap, Take 1 capsule (50,000 Units total) by mouth twice a week., Disp: 24 capsule, Rfl: 3    fluticasone propionate (FLONASE) 50  mcg/actuation nasal spray, 1 spray (50 mcg total) by Each Nostril route once daily., Disp: 16 g, Rfl: 0    levothyroxine (SYNTHROID) 88 MCG tablet, Take 1 tablet (88 mcg total) by mouth daily before breakfast., Disp: 90 tablet, Rfl: 0    meclizine (ANTIVERT) 25 mg tablet, Take 1 tablet (25 mg total) by mouth 3 (three) times daily as needed for Dizziness., Disp: 30 tablet, Rfl: 5    methocarbamol (ROBAXIN) 500 MG Tab, Take 500 mg by mouth every evening., Disp: , Rfl: 2    MIRENA 20 mcg/24 hours (6 yrs) 52 mg IUD, , Disp: , Rfl:     modafiniL (PROVIGIL) 200 MG Tab, Take 1 tablet by mouth every morning and ½ tablet at noon., Disp: 45 tablet, Rfl: 5    oxybutynin (DITROPAN XL) 15 MG TR24, Take 1 tablet (15 mg total) by mouth once daily., Disp: 90 tablet, Rfl: 3    oxyCODONE-acetaminophen (PERCOCET) 5-325 mg per tablet, Take 1 tablet by mouth 2 (two) times daily as needed., Disp: , Rfl: 0    rosuvastatin (CRESTOR) 10 MG tablet, Take 1 tablet (10 mg total) by mouth once daily., Disp: 90 tablet, Rfl: 3    sodium,calcium,mag,pot oxybate (XYWAV) 0.5 gram/mL Soln, Take 4.5 g by mouth As instructed., Disp: 540 mL, Rfl: 5    topiramate (TOPAMAX) 100 MG tablet, Take 1 tablet by mouth once daily AND 1 ½ tablets every evening, Disp: 225 tablet, Rfl: 3    valACYclovir (VALTREX) 1000 MG tablet, Take 1 tablet by mouth every 12 hours as needed (cold sores)., Disp: 60 tablet, Rfl: 3    vibegron (GEMTESA) 75 mg Tab, Take 1 tablet (75 mg total) by mouth once daily., Disp: 30 tablet, Rfl: 11    ALLERGIES:  Review of patient's allergies indicates:   Allergen Reactions    Tamiflu [oseltamivir] Itching and Swelling       Review of Systems   Constitutional:  Negative for chills and fever.   HENT:  Negative for ear pain, nosebleeds and sore throat.    Eyes:  Negative for blurred vision and double vision.   Respiratory:  Negative for cough and shortness of breath.    Cardiovascular:  Negative for chest pain, palpitations, orthopnea,  "claudication and leg swelling.   Gastrointestinal:  Negative for abdominal pain, constipation, diarrhea, heartburn, nausea and vomiting.   Genitourinary:  Negative for dysuria and urgency.   Musculoskeletal:  Positive for back pain and joint pain.   Skin:  Negative for rash.   Neurological:  Negative for dizziness, tingling, focal weakness and headaches.   Endo/Heme/Allergies:  Does not bruise/bleed easily.   Psychiatric/Behavioral:  Negative for depression and suicidal ideas.        Vitals:    06/05/24 1045   BP: 122/84   Pulse: 99   SpO2: 98%   Weight: 123.9 kg (273 lb 2.4 oz)   Height: 5' 1" (1.549 m)   PainSc:   6   PainLoc: Back       Physical Exam  Vitals and nursing note reviewed.   Constitutional:       Appearance: She is well-developed. She is morbidly obese.   HENT:      Head: Normocephalic.      Nose: Nose normal.      Mouth/Throat:      Mouth: Mucous membranes are moist.   Eyes:      Extraocular Movements: Extraocular movements intact.   Cardiovascular:      Rate and Rhythm: Normal rate and regular rhythm.      Heart sounds: Normal heart sounds.   Pulmonary:      Effort: Pulmonary effort is normal.      Breath sounds: Normal breath sounds.   Abdominal:      General: Bowel sounds are normal.      Palpations: Abdomen is soft.   Musculoskeletal:         General: Normal range of motion.      Cervical back: Normal range of motion.   Skin:     General: Skin is warm and dry.      Capillary Refill: Capillary refill takes less than 2 seconds.   Neurological:      Mental Status: She is alert and oriented to person, place, and time.   Psychiatric:         Mood and Affect: Mood normal.          DIAGNOSIS:  1. Morbid obesity, body mass index is 51.61 kg/m². and inability to lose weight.  2. Co-morbidities: hyperlipidemia, chronic back pain and EUNICE on CPAP    PLAN:  The patient is a good candidate for Bariatric Surgery. The patient is interested in duodenal switch with Dr. Morales. The surgery and post-op care was " discussed in detail with the patient. All questions were answered.    The patient understands that bariatric surgery is a tool to aid in weight loss and that they need to be committed to the diet and exercise post-operatively for successful weight loss. Discussed with patient that bariatric surgery is not the easy way out and that it will take plenty of dedication on the patient's part to be successful. Also discussed the possibility of weight regain if the patient strays from the diet guidelines or exercise requirements. Patient verbalized understanding and wishes to proceed with the work-up.    Estimated Goal weight is 200 lbs.    Discussed hyper fertility and no NSAIDS post op    ORDERS:  1. Chest X-Ray and EKG, Stress test, UGI,EGD  2. Psychological Consult, Bariatric Dietician Consult, and Cardiac Clearance  3. Bariatric Labs: Per orders.  4. Pain management plan for meds    PCP: Laya Jaffe,   RTC: As scheduled.    This includes 60 mins face to face time and non-face to face time preparing to see the patient (eg, review of tests), obtaining and/or reviewing separately obtained history, documenting clinical information in the electronic or other health record, independently interpreting results and communicating results to the patient/family/caregiver, or care coordinator.

## 2024-06-09 ENCOUNTER — PATIENT MESSAGE (OUTPATIENT)
Dept: BARIATRICS | Facility: CLINIC | Age: 49
End: 2024-06-09
Payer: COMMERCIAL

## 2024-06-11 ENCOUNTER — PATIENT MESSAGE (OUTPATIENT)
Dept: BARIATRICS | Facility: CLINIC | Age: 49
End: 2024-06-11
Payer: COMMERCIAL

## 2024-06-14 ENCOUNTER — PATIENT MESSAGE (OUTPATIENT)
Dept: BARIATRICS | Facility: CLINIC | Age: 49
End: 2024-06-14
Payer: COMMERCIAL

## 2024-06-14 ENCOUNTER — PATIENT MESSAGE (OUTPATIENT)
Dept: CARDIOLOGY | Facility: CLINIC | Age: 49
End: 2024-06-14
Payer: COMMERCIAL

## 2024-06-14 ENCOUNTER — TELEPHONE (OUTPATIENT)
Dept: BARIATRICS | Facility: CLINIC | Age: 49
End: 2024-06-14
Payer: COMMERCIAL

## 2024-06-14 NOTE — TELEPHONE ENCOUNTER
Sent portal message requiring missing testing (EGD, psych cl, diet cl, cards cl, pain management plan for meds). Introduced myself to pt via portal.   Dashboard, snapshot, and RN f/u updated.

## 2024-06-14 NOTE — TELEPHONE ENCOUNTER
Called and spoke with the pt.  Assisted scheduling her Cardiology referral to be assessed for clearance.  She will cancel her appt on a later date once the new one is confirmed.

## 2024-06-18 ENCOUNTER — OFFICE VISIT (OUTPATIENT)
Dept: PSYCHIATRY | Facility: CLINIC | Age: 49
End: 2024-06-18
Payer: COMMERCIAL

## 2024-06-18 DIAGNOSIS — G89.4 CHRONIC PAIN SYNDROME: ICD-10-CM

## 2024-06-18 DIAGNOSIS — E66.01 CLASS 3 SEVERE OBESITY WITH BODY MASS INDEX (BMI) OF 50.0 TO 59.9 IN ADULT, UNSPECIFIED OBESITY TYPE, UNSPECIFIED WHETHER SERIOUS COMORBIDITY PRESENT: ICD-10-CM

## 2024-06-18 DIAGNOSIS — E78.5 HYPERLIPIDEMIA, UNSPECIFIED HYPERLIPIDEMIA TYPE: ICD-10-CM

## 2024-06-18 DIAGNOSIS — G47.411 NARCOLEPSY CATAPLEXY SYNDROME: ICD-10-CM

## 2024-06-18 DIAGNOSIS — G47.33 OSA (OBSTRUCTIVE SLEEP APNEA): ICD-10-CM

## 2024-06-18 DIAGNOSIS — Z71.89 ENCOUNTER FOR PSYCHOLOGICAL ASSESSMENT PRIOR TO BARIATRIC SURGERY: Primary | ICD-10-CM

## 2024-06-18 PROCEDURE — 3044F HG A1C LEVEL LT 7.0%: CPT | Mod: CPTII,95,, | Performed by: PSYCHOLOGIST

## 2024-06-18 PROCEDURE — 96130 PSYCL TST EVAL PHYS/QHP 1ST: CPT | Mod: 95,,, | Performed by: PSYCHOLOGIST

## 2024-06-18 PROCEDURE — 96146 PSYCL/NRPSYC TST AUTO RESULT: CPT | Mod: 95,59,, | Performed by: PSYCHOLOGIST

## 2024-06-18 PROCEDURE — 90791 PSYCH DIAGNOSTIC EVALUATION: CPT | Mod: 95,,, | Performed by: PSYCHOLOGIST

## 2024-06-18 NOTE — PROGRESS NOTES
PRESURGICAL PSYCHOLOGICAL EVALUATION - BARIATRICS   Psychiatry Initial Visit (PhD/PsyD)    Psychological Intake and Assessment      Site:   The patient location is: home ([place])  The chief complaint leading to consultation is: presurgical eval    Visit type: audiovisual    Face to Face time with patient: 35  60 minutes of total time spent on the encounter, which includes face to face time and non-face to face time preparing to see the patient (eg, review of tests), Obtaining and/or reviewing separately obtained history, Documenting clinical information in the electronic or other health record, Independently interpreting results (not separately reported) and communicating results to the patient/family/caregiver, or Care coordination (not separately reported).         Each patient to whom he or she provides medical services by telemedicine is:  (1) informed of the relationship between the physician and patient and the respective role of any other health care provider with respect to management of the patient; and (2) notified that he or she may decline to receive medical services by telemedicine and may withdraw from such care at any time.    Notes:      CPT Codes:   06915 (1 hour): Psychiatric Diagnostic Evaluation   80128 (1 hour): Integration of patient data, interpretation of standardized test results and clinical data, clinical decision-making, treatment planning and report, and interactive feedback to the patient   58226 (1 hour): Psychological or neuropsychological test administration, with single automated instrument via electronic platform, with automated result only: ?[place] Multiphasic Personality Inventory -?3  (MMPI-3)       NAME:  Amanda Navarrete    MRN: [id number]    :  1975     Date:  2024    Referral source:  Chris Morales M.D.      Clinical status of patient: Outpatient      Met With: Patient      Chief complaint/reason for encounter: Routine psychological evaluation prior to bariatric  surgery.       Before this evaluation was initiated, the purposes and process of the assessment and the limits of confidentiality were discussed with the patient who expressed understanding of these issues and verbally consented to proceed with the evaluation.      Type of surgery sought: duodenal switch         History of present illness:    Ms. Amanda Navarrete is a 49-year-old White female who is pursuing bariatric surgery to improve her health and quality of life. She has a history of anxiety for which she takes cymbalta. She has never been hospitalized for psychiatric reasons.  She has begun making positive lifestyle changes in anticipation for surgery, with good benefit. The patient has a Body Mass Index of 51.61 as documented by the referring provider.      Ms. Navarrete has struggled with weight for most of her life. She states her richelle has been up and down he whole life until she had back surgery. Then, she struggled to get any weight off. Factors that have contributed to her weight gain over the years include back pain limiting exercise, portion control difficulties, thyroid issues.  She denied a history of emotional eating. She has tried many weight loss methods on her own (i.e., Weight Watchers, Atkins Diet, calorie counting, Keto diet, low-carb diet, ozempic and exercise) with little success, and she believes that her biggest weight loss challenge is limited exercise due to back pain.  Her motivation for seeking surgery now is to improve her health and quality of life.     Ms. Navarrete  has met with Ms. Nathaniel RD, bariatric dietician, and reports that she?demonstrated knowledge of healthy eating behaviors and exercise patterns?but?admits to not eating healthy and not exercising at this point. She must continue meeting with Ms. Armstrong to demonstrate the implementation of lifestyle changes prior to clearance for bariatric surgery.     Co-morbidities:  hyperlipidemia, chronic back pain and EUNICE on CPAP       Knowledge of surgery information:   - Basics of procedure: cut the stomach and also reroute the intestines   - Risks: leakage, GERD   - Basics of diet: small portions, liquids only at first, high protein      Current Psychiatric Symptoms:    Depression - Denied depressed mood, loss of interest in pleasurable activities, anhedonia, sleep changes, decreased motivation, decreased concentration, feelings of excessive or irrational guilt, helplessness, hopelessness, increased or decreased appetite, weight changes, increased or decreased motor activity, decreased energy,?suicidal ideations/thoughts of death.   Capri/Hypomania - Denied increased goal directed activity,?decreased need for sleep, pressured speech or increased talkative,?racing thoughts,?increased risk-taking behavior,?episodic elevated or irritable mood,?flight of ideas, distractibility, inflated self-esteem, grandiosity   Anxiety - Denied excessive worry, difficulty controlling worrying, feeling keyed up, easily fatigued, difficulty concentrating or mind going blank, irritability, muscle tension, sleep disturbance, racing thoughts, being unable to relax, specific phobia.   Panic Attacks: Denied palpitations, sweating, trembling, dyspnea, choking sensation, chest pain/discomfort, nausea, dizziness, chills or hot flashes, tingling, derealization, fear of losing control, fear of dying.   Thoughts - Endorsed/Denied any AVH, paranoia, delusions, ideas of reference, thought insertion or thought broadcasting   Suicidal thoughts/behaviors - denied passive or active SI, denied suicidal plans or intent   Self-injury - denied.   Substance abuse - denied abuse or dependence.    Sleep - Denied increased sleep latency, frequent nighttime awakenings, or early morning awakening with inability to return to sleep.      Current psychiatric treatment:   Medications: Cymbalta  Psychotherapy: Denied      Current Health Behaviors:   Compliant with medical regimens and  appointments: Yes  Prescription medication misuse: No   Exercise: No   Adequate cognitive functioning: Yes     Past Psychiatric history:    Previous diagnosis:   anxiety unspecified   Previous psychiatric hospitalizations/inpatient treatment: none   History of outpatient treatment: none   Previous suicide attempt: one in 2018 after divorce , took sleeping pills  Non-suicidal self-injury: none      Trauma history:   Denies.      PTSD: Denies re-experiencing trauma, nightmares, increased awareness of surroundings, hyperexcitability.      History of eating disorders:   History of bulimia: Denies recurrent episodes of eating then engaging in inappropriate compensatory behaviors. ?        History of binge-eating episodes: Denies eating excessive amounts of food within a discrete time period with a lack of control during eating. ?She denied eating more rapidly than normal, eating until uncomfortably full, eating large amounts of food when not physically hungry, eating alone due to embarrassment, or negative emotions (i.e., disgusted, guilty, depressed) afterwards.      Family history of psychiatric illness: None reported.       Social history (marriage, employment, etc.): Ms. Navarrete was born and raised in [place] .  She described her childhood as average  She denied childhood trauma, abuse, and neglect. She graduated high school and attended come college.  She is currently unemployed.  She is not on disability. She is .  She has 3 children (ages 25, 30, 32).  She currently lives with her daughter.        Current psychosocial stressors:  denies     Report of coping skills/recreational activities:  sit outside, meditation, listen to music, play with dogs     Support system:  daughter, family     Substance use:    Alcohol:   denies  Drugs: Denied current use; denied history of abuse or dependency.   Tobacco: None.    Caffeine:   none     Current medications and drug reactions (include OTC, herbal): see  medication list       PSYCHOLOGICAL ASSESSMENT/TESTING:    All tests were administered according to standardized procedures and were selected based on the reason for referral.   The MMPI-3 provides an assessment of personality and psychopathology with specific evaluation of psychosocial risk factors associated with outcomes of bariatric surgery.    Ms.  produced a valid and interpretable MMPI-3 protocol.   Her test results indicate she attempted to place herself in an overly positive light by minimizing faults and denying psychological problems. ?Of note, this profile is often seen in settings where testing is used to evaluate fitness for desired medical procedures.    TEST RESULTS. Ms. Navarrete  scores do not indicate any somatic, cognitive, emotional, thought, interpersonal, or behavioral dysfunction.  There are no specific treatment recommendations indicated by her profile.   GROUP COMPARISONS. Compared to other bariatric surgery candidates, Ms. Navarrete  reports lower levels of stress, worry, anxiety, and anger   ?      FEEDBACK. Ms. Navarrete was provided with test results, and offered the opportunity to respond to feedback and clarify results if needed. She acknowledged the results as being largely consistent with her overall functioning and well-being.         Mental Status Exam:    General Appearance:   age appropriate, well dressed, neatly groomed, overweight     Speech:   normal tone, normal rate, normal pitch, normal volume     Level of Cooperation:   cooperative     Thought Processes:   normal and logical     Mood:   euthymic     Thought Content:   normal, no suicidality, no homicidality, delusions, or paranoia     Affect:   congruent and appropriate     Orientation:   oriented x3     Memory:   recent memory intact; immediate and delayed word recall 3/3   remote memory intact; able to recall remote personal events    Attention Span & Concentration:   appropriate    Fund of General Knowledge:    appropriate for education     Abstract Reasoning:   Not directly assessed    Judgment & Insight:   good     Language   intact           SUMMARY AND RECOMMENDATIONS:   Ms. Amanda Navarrete is a 49-year-old female referred for presurgical psychological evaluation prior to bariatric surgery. Results of personality testing should be considered valid, and they indicate that she is experiencing no acute psychiatric symptoms or declines in functioning at this time. Test results do not reveal any evidence that psychological difficulties would play a role in her recovery from surgery. Ms. Navarrete's testing profile was largely consistent with her reports in the clinical interview. In the clinical interview, Ms. Navarrete reported a history of anxiety that is well controlled.   There are no overt psychological contraindications for proceeding with bariatric surgery. Overall, Ms. Navarrete is at low risk for adverse postsurgical outcomes based on the following considerations:   There are no indications of disabling psychopathology, substance use/abuse, cognitive problems, or disabilities that would prevent understanding and competence with medical treatment.  There are no reports or major psychosocial stressors that would interfere with her adherence to treatment recommendations.  There is no evidence of suicidality.   She exhibits medium social stability and  good social support.  She has adequate coping strategies to deal with stress and the demands of surgery and recovery.   She has good knowledge about the surgical procedure, good knowledge about the required dietary and lifestyle changes, and adequate understanding of possible risks of this treatment option. She reports adequate compliance with prior medical regimens.   There are no recommendations for psychological intervention at this time. This patient is a suitable candidate to proceed with bariatric surgery.    Diagnosis:     ICD-10-CM ICD-9-CM   1. Encounter for  psychological assessment prior to bariatric surgery  Z71.89 V65.49   2. Class 3 severe obesity with body mass index (BMI) of 50.0 to 59.9 in adult, unspecified obesity type, unspecified whether serious comorbidity present  E66.01 278.01    Z68.43 V85.43   3. Narcolepsy cataplexy syndrome  G47.411 347.01   4. EUNICE (obstructive sleep apnea)  G47.33 327.23   5. Chronic pain syndrome  G89.4 338.4   6. Hyperlipidemia, unspecified hyperlipidemia type  E78.5 272.4       Plan: This report will be sent to the referring provider with impressions and recommendations. It will be the referring team's decision whether the patient proceeds with surgery. Services related to the presurgical psychological evaluation are now concluded.       Evaluation Length (direct face-to-face time): 35  Total Time including report writing, test scoring, chart review, integration of data and feedback: 85          Cristina Gillespie, PhD  Licensed Clinical Psychologist ([id number])  [facility]

## 2024-06-21 ENCOUNTER — PATIENT MESSAGE (OUTPATIENT)
Dept: SLEEP MEDICINE | Facility: CLINIC | Age: 49
End: 2024-06-21
Payer: COMMERCIAL

## 2024-06-25 NOTE — PROGRESS NOTES
Subjective:   @Patient ID:  Amanda Navarrete is a 49 y.o. female who presents for evaluation of No chief complaint on file.      HPI:    Amanda Navarrete is a 49 y.o. morbidly obese female. Her current body mass index is 51.61 kg/m². She has multiple associated comorbidities including hyperlipidemia, chronic back pain and EUNICE on CPAP.     Stress echo 06/07/24      Left Ventricle: There is normal systolic function. Ejection fraction by visual approximation is 55%.    Stress Protocol: The patient exercised for 6 minutes 0 seconds on a Hiren protocol, corresponding to a functional capacity of 7METS, achieving a peak heart rate of 164 bpm, which is 101% of the age predicted maximum heart rate. Their exercise capacity was average. The patient reported no symptoms during the stress test. The test was stopped because the end of the protocol was reached.    Baseline ECG: The Baseline ECG reveals sinus rhythm.    Stress ECG: There is 0.5 mm of upward-sloping ST segment depression noted during stress. There are no arrhythmias during stress. There is normal blood pressure response with stress.    ECG Conclusion: The ECG portion of the study is negative for ischemia.    Post-stress Impression: The study is negative with no echocardiographic evidence of stress induced ischemia. Sensitivity limited due to sub optimal  image quality       Patient Active Problem List    Diagnosis Date Noted    Weakness 05/22/2024    Stress incontinence 03/21/2024    Impaired fasting glucose 12/22/2021     Lab Results   Component Value Date    HGBA1C 5.4 02/01/2024     - discussed recommendation for diet and cardiovascular exercise  - counseling on lifestyle modifications for risk factor reduction  - counseling on management options      Positive hepatitis C antibody test 06/09/2021     - 6/4/2021 hepatitis C antibody positive  - 6/11/2021 hepatitis C viral load not detectable  - 6/10/21 Liver US: Liver: Normal in size, measuring 15.3 cm.  Homogeneous echotexture. No focal hepatic lesions.  - no history of IV drug use  - positive tattoos      Chronic pain syndrome 06/09/2021    EUNICE (obstructive sleep apnea)      - followed by sleep medicine  - on CPAP      Other spondylosis, lumbar region 12/11/2020    History of herpes labialis 12/08/2020    Overactive bladder 12/08/2020     - well controlled  - worsening symptoms      Class 3 severe obesity due to excess calories with serious comorbidity and body mass index (BMI) of 40.0 to 44.9 in adult 12/07/2020     - discussed recommendation for diet and cardiovascular exercise  - counseling on lifestyle modifications for risk factor reduction      Hyperlipidemia, mixed 09/18/2019     Lab Results   Component Value Date    LDLCALC 93.2 02/01/2024     - LDL goal <100  - well controlled  - continue current management plan   - patient encouraged to notify me with any changes      Generalized anxiety disorder 07/10/2019    Vitamin D deficiency 07/10/2019     - tried and failed vitamin-D 3 5000 units daily  - D improved with D2 50,000 twice a week  - well controlled  - continue current management plan   - patient encouraged to notify me with any changes      Gait instability 07/10/2019    Lumbar radiculitis 07/13/2018    Hypothyroidism due to Hashimoto's thyroiditis 02/16/2017     Lab Results   Component Value Date    TSH 3.672 05/17/2024     - well controlled  - continue current management plan   - patient encouraged to notify me with any changes      Chronic bilateral low back pain with bilateral sciatica     Migraine headache      - well controlled  - continue current medication      GERD (gastroesophageal reflux disease)     Narcolepsy cataplexy syndrome      - followed by sleep medicine                      LABS  CBC  @LABRCNTIP(WBC:3,RBC:3,HGB:3,HCT:3,PLT:3,MCV:3,MCH:3,MCHC:3)@  BMP  @LABRCNTIP(NA:3,K:3,CO2:3,CL:3,BUN:3,Creatinine:3,GLU:3,GFR:3)@    POCT-Glucose  No results found for:  ""POCTGLUCOSE"    @LABRCNTIP(Calcium:3,MG:3,PHOS:3)@  LFT  @LABRCNTIP(PROT:3,Albumin:3,,Bilitot:3,AST:3,Alkphos:3,ALT:3)@  GFR     COAGS  @LABRCNTIP(PT:3,INR:3,APTT:3)@  CE  @LABRCNTIP(troponini:3,cktotal:3,ckmb:3)@  ABGs  @[id number](PH,PCO2,PO2,HCO3,POCSATURATED,BE)@  BNP  @LABRCNTIP(BNP:3)@    LAST HbA1c  Lab Results   Component Value Date    HGBA1C 5.5 06/05/2024       Lipid panel  Lab Results   Component Value Date    CHOL 170 06/05/2024    CHOL 160 02/01/2024    CHOL 154 12/16/2022     Lab Results   Component Value Date    HDL 50 06/05/2024    HDL 53 02/01/2024    HDL 47 12/16/2022     Lab Results   Component Value Date    LDLCALC 102.8 06/05/2024    LDLCALC 93.2 02/01/2024    LDLCALC 92.0 12/16/2022     Lab Results   Component Value Date    TRIG 86 06/05/2024    TRIG 69 02/01/2024    TRIG 75 12/16/2022     Lab Results   Component Value Date    CHOLHDL 29.4 06/05/2024    CHOLHDL 33.1 02/01/2024    CHOLHDL 30.5 12/16/2022            ROS    Objective:   Physical Exam    Assessment:     No diagnosis found.    Plan:     Target BP < 130/80 mmHg  Dietary changes to address TG  If no improvement consider vascepa or fenofibrate     Continue with current medical plan and lifestyle changes.  Compression stocking 20-30 for venous insufficiency if no improvement  Elevate legs while sitting  If there is no improvement she will have an insufficiency ultrasound for further evaluation     Weight loss  Exercise    Continue with current medical plan and lifestyle changes.  Return sooner for concerns or questions. If symptoms persist go to the ED  I have reviewed all pertinent data on this patient       She expressed verbal understanding and agreed with the plan      Follow up as scheduled. Return sooner for concerns or questions      I have reviewed the patient's medical history in detail and updated the computerized patient record.    No orders of the defined types were placed in this encounter.      Follow up as scheduled. " Return sooner for concerns or questions            She expressed verbal understanding and agreed with the plan        Patient's Medications   New Prescriptions    No medications on file   Previous Medications    ALBUTEROL (PROVENTIL/VENTOLIN HFA) 90 MCG/ACTUATION INHALER    Inhale 1-2 puffs into the lungs every 4 (four) hours as needed for Wheezing or Shortness of Breath. Rescue    CALCIUM-VITAMIN D3 (OS-RHONDA 500 + D3) 500 MG-5 MCG (200 UNIT) PER TABLET    Take 1 tablet by mouth once daily.    CLONAZEPAM (KLONOPIN) 0.5 MG TABLET        CYANOCOBALAMIN (VITAMIN B-12) 1000 MCG TABLET    Take 100 mcg by mouth once daily.    DEXTROAMPHETAMINE-AMPHETAMINE (ADDERALL XR) 20 MG 24 HR CAPSULE    Take 1 capsule (20 mg total) by mouth every morning.    DEXTROAMPHETAMINE-AMPHETAMINE (ADDERALL) 20 MG TABLET    Take 1 tablet by mouth 2 (two) times a day.    DICLOFENAC SODIUM 1 % GEL    APPLY 2 GRAMS TOPICALLY TO AFFECTED AREA(S) UP TO 5 TIMES DAILY (10 GRAMS PER DAY)    DULOXETINE (CYMBALTA) 60 MG CAPSULE    Take 1 capsule by mouth once daily.    ERGOCALCIFEROL (VITAMIN D2) 50,000 UNIT CAP    Take 1 capsule (50,000 Units total) by mouth twice a week.    FLUTICASONE PROPIONATE (FLONASE) 50 MCG/ACTUATION NASAL SPRAY    1 spray (50 mcg total) by Each Nostril route once daily.    LEVOTHYROXINE (SYNTHROID) 88 MCG TABLET    Take 1 tablet (88 mcg total) by mouth daily before breakfast.    MECLIZINE (ANTIVERT) 25 MG TABLET    Take 1 tablet (25 mg total) by mouth 3 (three) times daily as needed for Dizziness.    METHOCARBAMOL (ROBAXIN) 500 MG TAB    Take 500 mg by mouth every evening.    MIRENA 20 MCG/24 HOURS (6 YRS) 52 MG IUD        MODAFINIL (PROVIGIL) 200 MG TAB    Take 1 tablet by mouth every morning and ½ tablet at noon.    OXYBUTYNIN (DITROPAN XL) 15 MG TR24    Take 1 tablet (15 mg total) by mouth once daily.    OXYCODONE-ACETAMINOPHEN (PERCOCET) 5-325 MG PER TABLET    Take 1 tablet by mouth 2 (two) times daily as needed.     ROSUVASTATIN (CRESTOR) 10 MG TABLET    Take 1 tablet (10 mg total) by mouth once daily.    SODIUM,CALCIUM,MAG,POT OXYBATE (XYWAV) 0.5 GRAM/ML SOLN    Take 4.5 g by mouth As instructed.    TOPIRAMATE (TOPAMAX) 100 MG TABLET    Take 1 tablet by mouth once daily AND 1 ½ tablets every evening    VALACYCLOVIR (VALTREX) 1000 MG TABLET    Take 1 tablet by mouth every 12 hours as needed (cold sores).    VIBEGRON (GEMTESA) 75 MG TAB    Take 1 tablet (75 mg total) by mouth once daily.   Modified Medications    No medications on file   Discontinued Medications    No medications on file        Reynaldo Mcfadden MD  Cardiovascular Disease [facility]

## 2024-06-26 ENCOUNTER — OFFICE VISIT (OUTPATIENT)
Dept: CARDIOLOGY | Facility: CLINIC | Age: 49
End: 2024-06-26
Payer: COMMERCIAL

## 2024-06-26 VITALS
HEIGHT: 61 IN | WEIGHT: 265.63 LBS | SYSTOLIC BLOOD PRESSURE: 121 MMHG | DIASTOLIC BLOOD PRESSURE: 85 MMHG | BODY MASS INDEX: 50.15 KG/M2 | HEART RATE: 98 BPM

## 2024-06-26 DIAGNOSIS — G47.33 OSA (OBSTRUCTIVE SLEEP APNEA): ICD-10-CM

## 2024-06-26 DIAGNOSIS — E78.2 HYPERLIPIDEMIA, MIXED: ICD-10-CM

## 2024-06-26 DIAGNOSIS — E66.01 CLASS 3 SEVERE OBESITY WITH BODY MASS INDEX (BMI) OF 50.0 TO 59.9 IN ADULT, UNSPECIFIED OBESITY TYPE, UNSPECIFIED WHETHER SERIOUS COMORBIDITY PRESENT: ICD-10-CM

## 2024-06-26 PROCEDURE — 99999 PR PBB SHADOW E&M-EST. PATIENT-LVL V: CPT | Mod: PBBFAC,,,

## 2024-06-26 NOTE — PROGRESS NOTES
Cardiology Clinic note    Subjective:   Patient ID:  Amanda Navarrete is a 49 y.o. female who presents for evaluation of abnormal ECG    HPI    48 yo F with hx of narcolepsy, GERD. Referred by barriatrics for cardiac clearance for bariatric surgery work-up. Seen in clinic, reports overall doing well. Referred following abnormal ECG for bariatric work-up. Patient denies CP, SOB/DUBOIS, orthopnea, PND, palpitations, LE edema. Not as active as she would like to be given chronic pain. Reports compliance and tolerance with all medications. No known hx of CV disease.  -Stress Echo negative as below, METS > 4      Cardiac hx  -------------------------  SH  -former smoker, quit in 2014    FH  -no known FH of early CAD      Stress Echo 6/7/2024    Left Ventricle: There is normal systolic function. Ejection fraction by visual approximation is 55%.    Stress Protocol: The patient exercised for 6 minutes 0 seconds on a Hiren protocol, corresponding to a functional capacity of 7METS, achieving a peak heart rate of 164 bpm, which is 101% of the age predicted maximum heart rate. Their exercise capacity was average. The patient reported no symptoms during the stress test. The test was stopped because the end of the protocol was reached.    Baseline ECG: The Baseline ECG reveals sinus rhythm.    Stress ECG: There is 0.5 mm of upward-sloping ST segment depression noted during stress. There are no arrhythmias during stress. There is normal blood pressure response with stress.    ECG Conclusion: The ECG portion of the study is negative for ischemia.    Post-stress Impression: The study is negative with no echocardiographic evidence of stress induced ischemia. Sensitivity limited due to sub optimal  image quality      Past Medical History:   Diagnosis Date    Affective disorder     sses Dr. Dial, psychiatry    Back pain     GERD (gastroesophageal reflux disease)     Headache(784.0)     Hyperlipidemia, mixed 09/18/2019    Influenza A  12/01/2022    - supportive care - albuterol inhaler - discussed proceed to the ER if severe shortness of breath or chest pain. - Follow-up in office 12/05/2022 scheduled    Migraine headache     Narcolepsy without cataplexy(347.00)     Syncope 02/08/2024    - recommend Cardiology evaluation    Thyroid disease     hyperthyroidism    TMJ arthralgia           Patient Active Problem List    Diagnosis Date Noted    Weakness 05/22/2024    Stress incontinence 03/21/2024    Impaired fasting glucose 12/22/2021     Lab Results   Component Value Date    HGBA1C 5.4 02/01/2024     - discussed recommendation for diet and cardiovascular exercise  - counseling on lifestyle modifications for risk factor reduction  - counseling on management options      Positive hepatitis C antibody test 06/09/2021     - 6/4/2021 hepatitis C antibody positive  - 6/11/2021 hepatitis C viral load not detectable  - 6/10/21 Liver US: Liver: Normal in size, measuring 15.3 cm. Homogeneous echotexture. No focal hepatic lesions.  - no history of IV drug use  - positive tattoos      Chronic pain syndrome 06/09/2021    EUNICE (obstructive sleep apnea)      - followed by sleep medicine  - on CPAP      Other spondylosis, lumbar region 12/11/2020    History of herpes labialis 12/08/2020    Overactive bladder 12/08/2020     - well controlled  - worsening symptoms      Class 3 severe obesity due to excess calories with serious comorbidity and body mass index (BMI) of 40.0 to 44.9 in adult 12/07/2020     - discussed recommendation for diet and cardiovascular exercise  - counseling on lifestyle modifications for risk factor reduction      Hyperlipidemia, mixed 09/18/2019     Lab Results   Component Value Date    LDLCALC 93.2 02/01/2024     - LDL goal <100  - well controlled  - continue current management plan   - patient encouraged to notify me with any changes      Generalized anxiety disorder 07/10/2019    Vitamin D deficiency 07/10/2019     - tried and failed  vitamin-D 3 5000 units daily  - D improved with D2 50,000 twice a week  - well controlled  - continue current management plan   - patient encouraged to notify me with any changes      Gait instability 07/10/2019    Lumbar radiculitis 07/13/2018    Hypothyroidism due to Hashimoto's thyroiditis 02/16/2017     Lab Results   Component Value Date    TSH 3.672 05/17/2024     - well controlled  - continue current management plan   - patient encouraged to notify me with any changes      Chronic bilateral low back pain with bilateral sciatica     Migraine headache      - well controlled  - continue current medication      GERD (gastroesophageal reflux disease)     Narcolepsy cataplexy syndrome      - followed by sleep medicine         Patient's Medications   New Prescriptions    No medications on file   Previous Medications    ALBUTEROL (PROVENTIL/VENTOLIN HFA) 90 MCG/ACTUATION INHALER    Inhale 1-2 puffs into the lungs every 4 (four) hours as needed for Wheezing or Shortness of Breath. Rescue    CALCIUM-VITAMIN D3 (OS-RHONDA 500 + D3) 500 MG-5 MCG (200 UNIT) PER TABLET    Take 1 tablet by mouth once daily.    CLONAZEPAM (KLONOPIN) 0.5 MG TABLET        CYANOCOBALAMIN (VITAMIN B-12) 1000 MCG TABLET    Take 100 mcg by mouth once daily.    DEXTROAMPHETAMINE-AMPHETAMINE (ADDERALL XR) 20 MG 24 HR CAPSULE    Take 1 capsule (20 mg total) by mouth every morning.    DEXTROAMPHETAMINE-AMPHETAMINE (ADDERALL) 20 MG TABLET    Take 1 tablet by mouth 2 (two) times a day.    DICLOFENAC SODIUM 1 % GEL    APPLY 2 GRAMS TOPICALLY TO AFFECTED AREA(S) UP TO 5 TIMES DAILY (10 GRAMS PER DAY)    DULOXETINE (CYMBALTA) 60 MG CAPSULE    Take 1 capsule by mouth once daily.    ERGOCALCIFEROL (VITAMIN D2) 50,000 UNIT CAP    Take 1 capsule (50,000 Units total) by mouth twice a week.    FLUTICASONE PROPIONATE (FLONASE) 50 MCG/ACTUATION NASAL SPRAY    1 spray (50 mcg total) by Each Nostril route once daily.    LEVOTHYROXINE (SYNTHROID) 88 MCG TABLET    Take 1  tablet (88 mcg total) by mouth daily before breakfast.    MECLIZINE (ANTIVERT) 25 MG TABLET    Take 1 tablet (25 mg total) by mouth 3 (three) times daily as needed for Dizziness.    METHOCARBAMOL (ROBAXIN) 500 MG TAB    Take 500 mg by mouth every evening.    MIRENA 20 MCG/24 HOURS (6 YRS) 52 MG IUD        MODAFINIL (PROVIGIL) 200 MG TAB    Take 1 tablet by mouth every morning and ½ tablet at noon.    OXYBUTYNIN (DITROPAN XL) 15 MG TR24    Take 1 tablet (15 mg total) by mouth once daily.    OXYCODONE-ACETAMINOPHEN (PERCOCET) 5-325 MG PER TABLET    Take 1 tablet by mouth 2 (two) times daily as needed.    ROSUVASTATIN (CRESTOR) 10 MG TABLET    Take 1 tablet (10 mg total) by mouth once daily.    SODIUM,CALCIUM,MAG,POT OXYBATE (XYWAV) 0.5 GRAM/ML SOLN    Take 4.5 g by mouth As instructed.    TOPIRAMATE (TOPAMAX) 100 MG TABLET    Take 1 tablet by mouth once daily AND 1 ½ tablets every evening    VALACYCLOVIR (VALTREX) 1000 MG TABLET    Take 1 tablet by mouth every 12 hours as needed (cold sores).    VIBEGRON (GEMTESA) 75 MG TAB    Take 1 tablet (75 mg total) by mouth once daily.   Modified Medications    No medications on file   Discontinued Medications    No medications on file        Review of Systems   Constitutional: Negative for chills, decreased appetite, diaphoresis, malaise/fatigue, weight gain and weight loss.   Cardiovascular:  Negative for chest pain, claudication, dyspnea on exertion, irregular heartbeat, leg swelling, near-syncope, orthopnea, palpitations, paroxysmal nocturnal dyspnea and syncope.        Presyncope   Respiratory:  Negative for cough, hemoptysis, shortness of breath and snoring.    Gastrointestinal:  Negative for bloating, abdominal pain, nausea and vomiting.   Neurological:  Negative for light-headedness and weakness.     Family History   Problem Relation Name Age of Onset    Hypertension Mother      Kidney disease Mother      Sleep apnea Mother      Narcolepsy Father      Cancer Maternal  Aunt          colon cancer gene    Cancer Paternal Aunt          lung ca       Social History     Socioeconomic History    Marital status: Single     Spouse name: John    Number of children: 3   Occupational History    Occupation: post master   Tobacco Use    Smoking status: Former     Current packs/day: 1.00     Average packs/day: 1 pack/day for 15.4 years (15.4 ttl pk-yrs)     Types: Cigarettes     Start date: 8/11/2014     Passive exposure: Never    Smokeless tobacco: Never   Substance and Sexual Activity    Alcohol use: Yes     Comment: Social, rare    Drug use: No    Sexual activity: Yes     Partners: Male   Social History Narrative    She works at [employer]. She still has a daughter living at home while in college. The others are grown. She has 4 dogs.      Social Determinants of Health     Financial Resource Strain: Medium Risk (3/21/2024)    Overall Financial Resource Strain (CARDIA)     Difficulty of Paying Living Expenses: Somewhat hard   Food Insecurity: Food Insecurity Present (3/21/2024)    Hunger Vital Sign     Worried About Running Out of Food in the Last Year: Sometimes true     Ran Out of Food in the Last Year: Patient declined   Transportation Needs: No Transportation Needs (3/21/2024)    PRAPARE - Transportation     Lack of Transportation (Medical): No     Lack of Transportation (Non-Medical): No   Physical Activity: Inactive (3/21/2024)    Exercise Vital Sign     Days of Exercise per Week: 0 days     Minutes of Exercise per Session: 0 min   Stress: Stress Concern Present (3/21/2024)    [place] [facility] - Occupational Stress Questionnaire     Feeling of Stress : To some extent   Housing Stability: High Risk (3/21/2024)    Housing Stability Vital Sign     Unable to Pay for Housing in the Last Year: Yes     Number of Places Lived in the Last Year: 2     Unstable Housing in the Last Year: Yes            Objective:   Vitals  Vitals:    06/26/24 1320   BP:  "121/85   Pulse: 98   Weight: 120.5 kg (265 lb 10.5 oz)   Height: 5' 1" (1.549 m)          Physical Exam  Vitals and nursing note reviewed.   Constitutional:       Appearance: Normal appearance.   Cardiovascular:      Rate and Rhythm: Normal rate and regular rhythm.      Heart sounds: No murmur heard.     No gallop.   Pulmonary:      Effort: Pulmonary effort is normal.      Breath sounds: Normal breath sounds.   Abdominal:      General: Bowel sounds are normal. There is no distension.      Palpations: Abdomen is soft.      Tenderness: There is no abdominal tenderness.   Skin:     General: Skin is warm and dry.   Neurological:      Mental Status: She is alert and oriented to person, place, and time.       Lab Results    Lab Results   Component Value Date    WBC 9.46 06/05/2024    HGB 15.0 06/05/2024    HCT 46.5 06/05/2024     (H) 06/05/2024       Lab Results   Component Value Date     06/05/2024       Lab Results   Component Value Date    K 3.8 06/05/2024    MG 2.2 06/05/2024    BUN 15 06/05/2024    CREATININE 0.8 06/05/2024       Lab Results   Component Value Date    GLU 95 06/05/2024    HGBA1C 5.5 06/05/2024       Lab Results   Component Value Date    AST 15 06/05/2024    ALT 24 06/05/2024    ALBUMIN 3.9 06/05/2024    PROT 7.5 06/05/2024       Lab Results   Component Value Date    CHOL 170 06/05/2024    HDL 50 06/05/2024    LDLCALC 102.8 06/05/2024    TRIG 86 06/05/2024       No results found for: "CRP", "BNP"      Assessment:     1. Class 3 severe obesity with body mass index (BMI) of 50.0 to 59.9 in adult, unspecified obesity type, unspecified whether serious comorbidity present    2. Hyperlipidemia, mixed    3. EUNICE (obstructive sleep apnea)        Plan:     -ECG with NS STTWA, no hx of CV disease, no associated symptomology  -Stress Echo negative for ischemia    Perioperative Cardiovascular Risk Assessment and Management for Noncardiac Procedure/Surgery  - Planned/Proposed Procedure/Surgery: " Bariatric sleeve/bipass   - Risk of Procedure/Surgery: Intermediate  High, Intermediate, Low  - METS: >/=4  - Active Cardiac Conditions: None  - RCRI Score:0  - RCRI Risk Factors: Elevated-Risk Surgery, History of Ischemic Heart Disease,History of CHF, History of CVA, Pre-op Treatment with Insulin, Pre-op Creatinine >2, None  - Other CV Risk Factors: Inactivity  (Age, Sex, Family History of Premature CVD, HTN, HLD, DM, KEVIN, CKD, Stable Angina, Valvular Disease, Stable Arrhythmia, Obesity, Tobacco Use, Physical Inactivity, and Medical Noncompliance)  - The patient is a Low(Low, Moderate, High) risk for a Intermediate (High, Intermediate, Low) risk procedure    Pt has no active cardiac condition (ACS/USA, decompenstated CHF, significant arrhythmias or severe valvular disease). METS >4. Patient has acceptable risk for intermediate risk non cardiovascular surgery. No further cardiac work up required prior to  undergoing the surgical procedure.  These recommendations follow the 2014 ACC/AHA Guideline on Perioperative Cardiovascular Evaluation and Management of Patients Undergoing Noncardiac Surgery. (JACC Vol. 64 No. 22, Dec 9, 2014, pp i84-a235).      The 10-year ASCVD risk score (Lizbet ROSARIO, et al., 2019) is: 1%    Values used to calculate the score:      Age: 49 years      Sex: Female      Is Non- : No      Diabetic: No      Tobacco smoker: No      Systolic Blood Pressure: 121 mmHg      Is BP treated: No      HDL Cholesterol: 50 mg/dL      Total Cholesterol: 170 mg/dL    No orders of the defined types were placed in this encounter.      She expressed verbal understanding and agreed with the plan    Follow up in 6m    Pertinent cardiac images and EKG reviewed independently.    Continue with current medical plan and lifestyle changes.  Return sooner for concerns or questions. If symptoms persist go to the ED.  I have reviewed all pertinent data including patient's medical history in detail and  updated the computerized patient record.     Counseling included discussion regarding imaging findings, diagnosis, possibilities, treatment options, risks and benefits.    Thank you for the opportunity to care for this patient. Will be available for questions if needed.        Signed:  Galindo Abdalla DNP  06/26/2024

## 2024-07-01 ENCOUNTER — PATIENT MESSAGE (OUTPATIENT)
Dept: SLEEP MEDICINE | Facility: CLINIC | Age: 49
End: 2024-07-01
Payer: COMMERCIAL

## 2024-07-03 ENCOUNTER — PATIENT MESSAGE (OUTPATIENT)
Dept: BARIATRICS | Facility: CLINIC | Age: 49
End: 2024-07-03
Payer: COMMERCIAL

## 2024-07-05 LAB
OHS QRS DURATION: 88 MS
OHS QTC CALCULATION: 390 MS

## 2024-07-07 ENCOUNTER — PATIENT MESSAGE (OUTPATIENT)
Dept: PRIMARY CARE CLINIC | Facility: CLINIC | Age: 49
End: 2024-07-07
Payer: COMMERCIAL

## 2024-07-08 ENCOUNTER — TELEPHONE (OUTPATIENT)
Dept: ENDOSCOPY | Facility: HOSPITAL | Age: 49
End: 2024-07-08
Payer: COMMERCIAL

## 2024-07-08 ENCOUNTER — CLINICAL SUPPORT (OUTPATIENT)
Dept: BARIATRICS | Facility: CLINIC | Age: 49
End: 2024-07-08
Payer: COMMERCIAL

## 2024-07-08 VITALS — WEIGHT: 258.81 LBS | BODY MASS INDEX: 48.9 KG/M2

## 2024-07-08 DIAGNOSIS — K21.9 GASTROESOPHAGEAL REFLUX DISEASE, UNSPECIFIED WHETHER ESOPHAGITIS PRESENT: ICD-10-CM

## 2024-07-08 DIAGNOSIS — Z71.3 DIETARY COUNSELING AND SURVEILLANCE: ICD-10-CM

## 2024-07-08 DIAGNOSIS — G47.33 OSA (OBSTRUCTIVE SLEEP APNEA): Primary | ICD-10-CM

## 2024-07-08 DIAGNOSIS — E66.01 MORBID OBESITY WITH BMI OF 45.0-49.9, ADULT: ICD-10-CM

## 2024-07-08 DIAGNOSIS — K21.00 GASTROESOPHAGEAL REFLUX DISEASE WITH ESOPHAGITIS WITHOUT HEMORRHAGE: Primary | ICD-10-CM

## 2024-07-08 PROCEDURE — 99999 PR PBB SHADOW E&M-EST. PATIENT-LVL I: CPT | Mod: PBBFAC,,, | Performed by: DIETITIAN, REGISTERED

## 2024-07-08 NOTE — PROGRESS NOTES
NUTRITION NOTE    Referring Physician: Chris Morales M.D.   Reason for MNT Referral: 3 months Medically Supervised Diet pending Gastric  CHRISTOPHE-S    Patient presents for follow-up visit for MSD with weight loss over the past month; 15 lbs total weight loss by making numerous dietary and lifestyle changes.    CLINICAL DATA:  49 y.o. female.    Initial weight: 273 lbs  Current Weight: 258 lbs  Weight Change Since Initial Visit: -15 lbs  Ideal Body Weight: 129 lbs  BMI: 48.90    DAILY NUTRITIONAL NEEDS: pre-op nutritional bariatric guidelines to promote weight loss  9492-9652 Calories    Grams Protein    CURRENT DIET:  Regular diet.  Diet Recall: Food records are present.  Greatest challenge: starchy CHO and portion control  Progress: tracking with [employer], increased water to about 70 oz, practicing not eating and drinking at the same time, not using a straw, taking small sips  Current diet recall:      B: Protein shake or protein coffee with egg, protein pancake, and chicken sausage link  S: Protein chips, or mini cucumbers and cheese or cheese or P3  L: [place] (ham or turkey, lettuce, white or whole wheat) or salad (lettuce, cucumbers, carrots, cheese) or frozen meal (Healthy Choice)  D: Christina's chili    Diet Includes: 3 meals  Meal Pattern: Same.  Protein Supplements: 1 per day. Premier Protein, Fairlife Nutrition Plan  Snacking: Adequate. Snacks include healthy choices. Protein chips or cucumbers with Laughing Cow or cheese stick or P3  Vegetables: Likes a few. Eats daily. Salads, cucumbers, broccoli, cauliflower mix  Fruits: Likes a few. Eats daily. Apples, oranges, grapes, strawberries, cherry  Beverages: water and coffee, unsweetened tea, sodas when eating out  Dining out: twice in last month. Mostly take out. [employer] or [employer] (eats half of the bread)  Cooking at home: Almost daily.  Mostly crock pot, baked, grilled  meat, starchy CHO, and vegetables. Chicken, shrimp, frozen vegetables, high protein  pasta, quesadilla    Food Allergies:   None reported     Vitamins / Minerals / Herbs:   Multivitamin  Prescribed Vit D 100,000 IUs weekly  B-12  Magnesium  Emergen-C     Labs:   Reviewed.     Exercise:  Current exercise:  PT for bladder/pelvis once/week     Restrictions to exercise: Back surgery in 2015, uses cane. Cannot lift weight, nerve pain, swollen ankles     Social:  On disability due to back pain ().   Lives with adult daughter.  Grocery shopping and food prep: PT - delivery.  Patient believes the household will be supportive after surgery.  Alcohol: None.  Smoking: None. Quit in 2014    ASSESSMENT:  Patient demonstrates some willingness/progress to change lifestyle habits as evidenced by weight loss, daily food logs, including protein drinks, reduced dining out, and better choices when dining out.    Doing well with working on greatest challenges ( starchy CHO and portion control ).    Barriers to Education:  none  Stage of Change:  action    NUTRITION DIAGNOSIS:  Morbid Obesity related to Physical inactivity as evidence by BMI.  Obesity Status: Improved    BARIATRIC DIET DISCUSSION/PLAN:  Discussed diet after surgery and related to patient's food record.  Reviewed nutrition guidelines for before and after surgery.  Answered all questions.  Discussed keeping protein between  g/day  Discussed spreading protein throughout day  Continue to review Bariatric Nutrition Guidebook at home and call with any questions.    RECOMMENDATIONS:  Pt is potential candidate for surgery.    Diet: Maintain diet plan.  Continue to review Bariatric Nutrition Guidebook at home and call with any questions.    Exercise: Increase.    Behavior Modification: Continue to document food & activity logs daily.    Return to clinic in one month.    Needs additional visits with RD.    Communicated nutrition plan with bariatric team.    SESSION TIME:  30 minutes

## 2024-07-08 NOTE — TELEPHONE ENCOUNTER
Spoke to pt to schedule procedure(s) Upper Endoscopy (EGD)       Physician to perform procedure(s) Dr. Garcia  Date of Procedure (s) 7/24/24  Arrival Time 12:50 PM  Time of Procedure(s) 1:50 PM   Location of Procedure(s) [address]  Type of Rx Prep sent to patient: N/A  Instructions provided to patient via [facility]    Patient was informed on the following information and verbalized understanding. Screening questionnaire reviewed with patient and complete. If procedure requires anesthesia, a responsible adult needs to be present to accompany the patient home, patient cannot drive after receiving anesthesia. Appointment details are tentative, especially check-in time. Patient will receive a prep-op call 7 days prior to confirm check-in time for procedure. If applicable the patient should contact their pharmacy to verify Rx for procedure prep is ready for pick-up. Patient was advised to call the scheduling department at [phone] if pharmacy states no Rx is available. Patient was advised to call the endoscopy scheduling department if any questions or concerns arise.      [facility] Scheduling Department

## 2024-07-09 ENCOUNTER — PATIENT MESSAGE (OUTPATIENT)
Dept: BARIATRICS | Facility: CLINIC | Age: 49
End: 2024-07-09
Payer: COMMERCIAL

## 2024-07-09 ENCOUNTER — OFFICE VISIT (OUTPATIENT)
Dept: UROGYNECOLOGY | Facility: CLINIC | Age: 49
End: 2024-07-09
Payer: COMMERCIAL

## 2024-07-09 VITALS
DIASTOLIC BLOOD PRESSURE: 65 MMHG | HEART RATE: 102 BPM | BODY MASS INDEX: 50.41 KG/M2 | WEIGHT: 267 LBS | HEIGHT: 61 IN | SYSTOLIC BLOOD PRESSURE: 142 MMHG

## 2024-07-09 DIAGNOSIS — N39.41 URGE INCONTINENCE: Primary | ICD-10-CM

## 2024-07-09 DIAGNOSIS — G47.411 NARCOLEPSY WITH CATAPLEXY: ICD-10-CM

## 2024-07-09 PROCEDURE — 1159F MED LIST DOCD IN RCRD: CPT | Mod: CPTII,S$GLB,, | Performed by: NURSE PRACTITIONER

## 2024-07-09 PROCEDURE — 99213 OFFICE O/P EST LOW 20 MIN: CPT | Mod: S$GLB,,, | Performed by: NURSE PRACTITIONER

## 2024-07-09 PROCEDURE — 3078F DIAST BP <80 MM HG: CPT | Mod: CPTII,S$GLB,, | Performed by: NURSE PRACTITIONER

## 2024-07-09 PROCEDURE — 3044F HG A1C LEVEL LT 7.0%: CPT | Mod: CPTII,S$GLB,, | Performed by: NURSE PRACTITIONER

## 2024-07-09 PROCEDURE — 99999 PR PBB SHADOW E&M-EST. PATIENT-LVL V: CPT | Mod: PBBFAC,,, | Performed by: NURSE PRACTITIONER

## 2024-07-09 PROCEDURE — 3077F SYST BP >= 140 MM HG: CPT | Mod: CPTII,S$GLB,, | Performed by: NURSE PRACTITIONER

## 2024-07-09 PROCEDURE — 3008F BODY MASS INDEX DOCD: CPT | Mod: CPTII,S$GLB,, | Performed by: NURSE PRACTITIONER

## 2024-07-09 PROCEDURE — 1160F RVW MEDS BY RX/DR IN RCRD: CPT | Mod: CPTII,S$GLB,, | Performed by: NURSE PRACTITIONER

## 2024-07-09 RX ORDER — DEXTROAMPHETAMINE SACCHARATE, AMPHETAMINE ASPARTATE, DEXTROAMPHETAMINE SULFATE AND AMPHETAMINE SULFATE 5; 5; 5; 5 MG/1; MG/1; MG/1; MG/1
1 TABLET ORAL 2 TIMES DAILY
Qty: 60 TABLET | Refills: 0 | Status: SHIPPED | OUTPATIENT
Start: 2024-07-09

## 2024-07-09 RX ORDER — DEXTROAMPHETAMINE SACCHARATE, AMPHETAMINE ASPARTATE MONOHYDRATE, DEXTROAMPHETAMINE SULFATE AND AMPHETAMINE SULFATE 5; 5; 5; 5 MG/1; MG/1; MG/1; MG/1
20 CAPSULE, EXTENDED RELEASE ORAL EVERY MORNING
Qty: 30 CAPSULE | Refills: 0 | Status: SHIPPED | OUTPATIENT
Start: 2024-07-09

## 2024-07-09 RX ORDER — DARIFENACIN 7.5 MG/1
7.5 TABLET, EXTENDED RELEASE ORAL DAILY
Qty: 30 TABLET | Refills: 11 | Status: SHIPPED | OUTPATIENT
Start: 2024-07-09 | End: 2024-08-09

## 2024-07-09 NOTE — PATIENT INSTRUCTIONS
1)  Mixed urinary incontinence, urge > stress:    --Empty bladder every 3 hours.  Empty well: wait a minute, lean forward on toilet.    --Avoid dietary irritants (see sheet).  Keep diary x 3-5 days to determine your irritants.  --continue pelvic floor PT     --URGE: trial darifenacin -- let me know if it is not affordable--when you start stop the oxybutynin Takes 2-4 weeks to see if will have effect.  For dry mouth: get sour, sugar free lozenge or gum.    --STRESS:  Pessary vs. Sling. --trial PT     2)irregular bowel habits  --hydrate well  --continue fiber      3)obesity  --continue weight loss plan     4)RTC 6 months for follow up

## 2024-07-09 NOTE — PROGRESS NOTES
Urogyn follow up  07/09/2024  .  [facility] - UROGYNECOLOGY  [address] [address]    Amanda Navarrete  [id number]  1975      Amanda Navarrete is a 49 y.o.  here for a urogyn follow up for mixed urinary incontinence.    Last HPI from 05/02/2024  1)  UI:  (+) ABHISHEK = (+) UUI  X 9 years. Has worsened since 2015 back surgery (+) pads:1/day, usually moderate wetness.  Daytime frequency: Q 1 hours.  Nocturia: Yes: 2-3/night. Limits fluids prior to bedtime  (--) dysuria,  (--) hematuria,  (--) frequent UTIs.  (+) complete bladder emptying. Taking oxybutynin xl 15 mg --no longer working     2)  POP:  Absent..  Symptoms:(--)  .  (--) vaginal bleeding. (--) vaginal discharge. (+) sexually active.  (--) dyspareunia.   (--)  Vaginal dryness.  (--) vaginal estrogen use.    no pop  Pvr 20 mL    3)  BM:  (+) constipation/straining.  (+) chronic diarrhea. (+) hematochezia--brb on stool.  (--) fecal incontinence.  (--) fecal smearing/urgency.  (+) complete evacuation.      Changes from last visit:  1)  Mixed urinary incontinence, urge > stress:    --going to pelvic floor PT--doing well.  -- gemtesa was not approved. Taking oxybutynin xl 10 mg   --using 2 pads/ day with moderate wetness  --Has better days since starting pt.     2)irregular bowel habits  --still irregular  --taking fiber supplement.       3)obesity  --doing well on weight loss       Past Medical History:   Diagnosis Date    Affective disorder     sses Dr. Dial, psychiatry    Back pain     GERD (gastroesophageal reflux disease)     Headache(784.0)     Hyperlipidemia, mixed 09/18/2019    Influenza A 12/01/2022    - supportive care - albuterol inhaler - discussed proceed to the ER if severe shortness of breath or chest pain. - Follow-up in office 12/05/2022 scheduled    Migraine headache     Narcolepsy without cataplexy(347.00)     Syncope 02/08/2024    - recommend Cardiology evaluation    Thyroid disease     hyperthyroidism    TMJ  arthralgia        Past Surgical History:   Procedure Laterality Date    CAUDAL EPIDURAL STEROID INJECTION N/A 2018    Procedure: INJECTION, STEROID, CAUDAL, EPIDURAL;  Surgeon: Geoffrey Aleman MD;  Location: [facility];  Service: Pain Management;  Laterality: N/A;     SECTION, CLASSIC      COSMETIC SURGERY      breast augmentation    INJECTION OF ANESTHETIC AGENT INTO SACROILIAC JOINT Bilateral 2020    Procedure: BLOCK, SACROILIAC JOINT;  Surgeon: Geoffrey Aleman MD;  Location: [facility] OR;  Service: Pain Management;  Laterality: Bilateral;    SPINE SURGERY  2015    fusion L spine       Family History   Problem Relation Name Age of Onset    Hypertension Mother      Kidney disease Mother      Sleep apnea Mother      Narcolepsy Father      Cancer Maternal Aunt          colon cancer gene    Cancer Paternal Aunt          lung ca       Social History     Socioeconomic History    Marital status: Single     Spouse name: John    Number of children: 3   Occupational History    Occupation: post master   Tobacco Use    Smoking status: Former     Current packs/day: 1.00     Average packs/day: 1 pack/day for 15.4 years (15.4 ttl pk-yrs)     Types: Cigarettes     Start date: 2014     Passive exposure: Never    Smokeless tobacco: Never   Substance and Sexual Activity    Alcohol use: Yes     Comment: Social, rare    Drug use: No    Sexual activity: Yes     Partners: Male   Social History Narrative    She works at "[employer]. She still has a daughter living at home while in college. The others are grown. She has 4 dogs.      Social Determinants of Health     Financial Resource Strain: Medium Risk (3/21/2024)    Overall Financial Resource Strain (CARDIA)     Difficulty of Paying Living Expenses: Somewhat hard   Food Insecurity: Food Insecurity Present (3/21/2024)    Hunger Vital Sign     Worried About Running Out of Food in the Last Year: Sometimes true     Ran Out of Food in the Last  Year: Patient declined   Transportation Needs: No Transportation Needs (3/21/2024)    PRAPARE - Transportation     Lack of Transportation (Medical): No     Lack of Transportation (Non-Medical): No   Physical Activity: Inactive (3/21/2024)    Exercise Vital Sign     Days of Exercise per Week: 0 days     Minutes of Exercise per Session: 0 min   Stress: Stress Concern Present (3/21/2024)    [employer] - Occupational Stress Questionnaire     Feeling of Stress : To some extent   Housing Stability: High Risk (3/21/2024)    Housing Stability Vital Sign     Unable to Pay for Housing in the Last Year: Yes     Number of Places Lived in the Last Year: 2     Unstable Housing in the Last Year: Yes       Current Outpatient Medications   Medication Sig Dispense Refill    albuterol (PROVENTIL/VENTOLIN HFA) 90 mcg/actuation inhaler Inhale 1-2 puffs into the lungs every 4 (four) hours as needed for Wheezing or Shortness of Breath. Rescue 18 g 5    calcium-vitamin D3 (OS-RHONDA 500 + D3) 500 mg-5 mcg (200 unit) per tablet Take 1 tablet by mouth once daily.      clonazePAM (KLONOPIN) 0.5 MG tablet       cyanocobalamin (VITAMIN B-12) 1000 MCG tablet Take 100 mcg by mouth once daily.      dextroamphetamine-amphetamine (ADDERALL XR) 20 MG 24 hr capsule Take 1 capsule (20 mg total) by mouth every morning. 30 capsule 0    dextroamphetamine-amphetamine (ADDERALL) 20 mg tablet Take 1 tablet by mouth 2 (two) times a day. 60 tablet 0    diclofenac sodium 1 % Gel APPLY 2 GRAMS TOPICALLY TO AFFECTED AREA(S) UP TO 5 TIMES DAILY (10 GRAMS PER DAY)  0    DULoxetine (CYMBALTA) 60 MG capsule Take 1 capsule by mouth once daily.      ergocalciferol (VITAMIN D2) 50,000 unit Cap Take 1 capsule (50,000 Units total) by mouth twice a week. 24 capsule 3    fluticasone propionate (FLONASE) 50 mcg/actuation nasal spray 1 spray (50 mcg total) by Each Nostril route once daily. 16 g 0    meclizine (ANTIVERT) 25 mg tablet Take 1 tablet  (25 mg total) by mouth 3 (three) times daily as needed for Dizziness. 30 tablet 5    methocarbamol (ROBAXIN) 500 MG Tab Take 500 mg by mouth every evening.  2    MIRENA 20 mcg/24 hours (6 yrs) 52 mg IUD       modafiniL (PROVIGIL) 200 MG Tab Take 1 tablet by mouth every morning and ½ tablet at noon. 45 tablet 5    oxybutynin (DITROPAN XL) 15 MG TR24 Take 1 tablet (15 mg total) by mouth once daily. 90 tablet 3    oxyCODONE-acetaminophen (PERCOCET) 5-325 mg per tablet Take 1 tablet by mouth 2 (two) times daily as needed.  0    rosuvastatin (CRESTOR) 10 MG tablet Take 1 tablet (10 mg total) by mouth once daily. 90 tablet 3    sodium,calcium,mag,pot oxybate (XYWAV) 0.5 gram/mL Soln Take 4.5 g by mouth As instructed. 540 mL 5    valACYclovir (VALTREX) 1000 MG tablet Take 1 tablet by mouth every 12 hours as needed (cold sores). 60 tablet 3    vibegron (GEMTESA) 75 mg Tab Take 1 tablet (75 mg total) by mouth once daily. 30 tablet 11    levothyroxine (SYNTHROID) 88 MCG tablet Take 1 tablet (88 mcg total) by mouth daily before breakfast. 90 tablet 0    topiramate (TOPAMAX) 100 MG tablet Take 1 tablet by mouth once daily AND 1 ½ tablets every evening 225 tablet 3     No current facility-administered medications for this visit.       Review of patient's allergies indicates:   Allergen Reactions    Tamiflu [oseltamivir] Itching and Swelling       Well woman:  Pap test: 08/2022. normal  History of abnormal paps: No.  History of STIs:  Hepatitis C  Mammogram: Date of last: 11/2023.    Result: Abnormal - 1. Masslike density measuring 6 mm in the posterior depth right breast 6   o'clock position just anterior to the implant.   2. Multiple left breast circumscribed masses including dominant 2:00 2.1   cm mass, likely cysts.   BIRADS: 0-Need Additional Imaging Evaluation   RECOMMENDATIONS:   Ultrasound is recommended.   Recommend bilateral complete breast ultrasound evaluation.   Calculated Tyrer-Cuzick lifetime risk of breast cancer  "in this patient is   10.5%.   12/2023  Multiple bilateral cysts and clusters of cysts are probably benign.   3-Probably Benign   The patient was notified of the results and recommendations prior to discharge   RECOMMENDATIONS:   Ultrasound in 6 months is recommended.   Colonoscopy:01/2024 ifobt negative-- did have colonoscopy years ago-- reports normal   DEXA: n/a       ROS:  As per HPI.      Exam  BP (!) 142/65 (BP Location: Left arm, Patient Position: Sitting, BP Method: Large (Automatic))   Pulse 102   Ht 5' 1" (1.549 m)   Wt 121.1 kg (266 lb 15.6 oz)   BMI 50.44 kg/m²   General: alert and oriented, no acute distress  Respiratory: normal respiratory effort  Abd: soft, non-tender, non-distended    Pelvic--deferred    Impression  No diagnosis found.  We reviewed the above issues and discussed options for short-term versus long-term management of her problems.   Plan:   1)  Mixed urinary incontinence, urge > stress:    --Empty bladder every 3 hours.  Empty well: wait a minute, lean forward on toilet.    --Avoid dietary irritants (see sheet).  Keep diary x 3-5 days to determine your irritants.  --continue pelvic floor PT     --URGE: trial darifenacin -- let me know if it is not affordable--when you start stop the oxybutynin Takes 2-4 weeks to see if will have effect.  For dry mouth: get sour, sugar free lozenge or gum.    --STRESS:  Pessary vs. Sling. --trial PT     2)irregular bowel habits  --hydrate well  --continue fiber      3)obesity  --continue weight loss plan     4)RTC 6 months for follow up     I spent a total of 20 minutes on the day of the visit.  This includes face to face time and non-face to face time preparing to see the patient (eg, review of tests), obtaining and/or reviewing separately obtained history, documenting clinical information in the electronic or other health record, independently interpreting results and communicating results to the patient/family/caregiver, or care coordinator. "     Barb Baltazar, FNP-BC [facility]  Division of Female Pelvic Medicine and Reconstructive Surgery  Department of Obstetrics & Gynecology

## 2024-07-17 ENCOUNTER — PATIENT MESSAGE (OUTPATIENT)
Dept: ENDOSCOPY | Facility: HOSPITAL | Age: 49
End: 2024-07-17
Payer: COMMERCIAL

## 2024-07-17 ENCOUNTER — TELEPHONE (OUTPATIENT)
Dept: ENDOSCOPY | Facility: HOSPITAL | Age: 49
End: 2024-07-17
Payer: COMMERCIAL

## 2024-07-17 NOTE — TELEPHONE ENCOUNTER
Confirmed  egd appt for 7/24/24 with pt. New set of  prep instructions placed in portal. Reviewed instructions pt denies taking blood thinning or glp1 medications.Confirmed ride home after procedure.Pt verbalized understanding

## 2024-07-22 ENCOUNTER — OFFICE VISIT (OUTPATIENT)
Dept: URGENT CARE | Facility: CLINIC | Age: 49
End: 2024-07-22
Payer: COMMERCIAL

## 2024-07-22 VITALS
WEIGHT: 266 LBS | HEIGHT: 61 IN | RESPIRATION RATE: 18 BRPM | BODY MASS INDEX: 50.22 KG/M2 | OXYGEN SATURATION: 97 % | SYSTOLIC BLOOD PRESSURE: 132 MMHG | DIASTOLIC BLOOD PRESSURE: 87 MMHG | HEART RATE: 66 BPM | TEMPERATURE: 98 F

## 2024-07-22 DIAGNOSIS — M79.642 LEFT HAND PAIN: Primary | ICD-10-CM

## 2024-07-22 PROCEDURE — 73130 X-RAY EXAM OF HAND: CPT | Mod: LT,S$GLB,, | Performed by: RADIOLOGY

## 2024-07-22 PROCEDURE — 99213 OFFICE O/P EST LOW 20 MIN: CPT | Mod: S$GLB,,,

## 2024-07-22 RX ORDER — DEXTROMETHORPHAN HYDROBROMIDE, GUAIFENESIN 5; 100 MG/5ML; MG/5ML
650 LIQUID ORAL EVERY 8 HOURS PRN
Qty: 20 TABLET | Refills: 0 | Status: SHIPPED | OUTPATIENT
Start: 2024-07-22

## 2024-07-22 RX ORDER — DICLOFENAC SODIUM 10 MG/G
2 GEL TOPICAL 4 TIMES DAILY PRN
Qty: 100 G | Refills: 0 | Status: SHIPPED | OUTPATIENT
Start: 2024-07-22

## 2024-07-22 NOTE — PATIENT INSTRUCTIONS
You can take Tylenol as needed for pain.  Can apply Voltaren gel as needed for pain.    - Follow up with your PCP or specialty clinic as directed in the next 1-2 weeks if not improved or as needed.  You can call [phone] to schedule an appointment with the appropriate provider.    - Go to the ER or seek medical attention immediately if you develop new or worsening symptoms.    - You must understand that you have received an Urgent Care treatment only and that you may be released before all of your medical problems are known or treated.   - You, the patient, will arrange for follow up care as instructed.   - If your condition worsens or fails to improve we recommend that you receive another evaluation at the ER immediately or contact your PCP to discuss your concerns or return here.

## 2024-07-22 NOTE — PROGRESS NOTES
"Subjective:      Patient ID: Amanda Navarrete is a 49 y.o. female.    Vitals:  height is 5' 1" (1.549 m) and weight is 120.7 kg (266 lb). Her oral temperature is 98.2 °F (36.8 °C). Her blood pressure is 132/87 and her pulse is 66. Her respiration is 18 and oxygen saturation is 97%.     Chief Complaint: Hand Pain    49-year-old female here for left hand pain for the past 5 days.  Pain between her second and 3rd knuckles.  Tender to touch.  Pain worse with gripping.  Has been applying ice and taking Aleve.  Denies any recent injury or trauma.  There is no erythema, rash, bruising.  No numbness or tingling.    Hand Pain   Her dominant hand is their left hand. The incident occurred 3 to 5 days ago. The incident occurred at home. There was no injury mechanism. The pain is present in the left hand. The quality of the pain is described as aching. The pain does not radiate. The pain is at a severity of 5/10. The pain has been Intermittent since the incident. Pertinent negatives include no numbness. The symptoms are aggravated by movement (touching). She has tried ice and NSAIDs for the symptoms. The treatment provided mild relief.       Constitution: Negative for chills and fever.   Musculoskeletal:  Positive for pain, joint pain and joint swelling. Negative for abnormal ROM of joint.   Skin:  Negative for erythema.   Neurological:  Negative for numbness and tingling.      Objective:     Physical Exam   Constitutional: She is oriented to person, place, and time. She appears well-developed.   HENT:   Head: Normocephalic and atraumatic.   Ears:   Right Ear: External ear normal.   Left Ear: External ear normal.   Nose: Nose normal.   Mouth/Throat: Oropharynx is clear and moist.   Eyes: Conjunctivae, EOM and lids are normal. Pupils are equal, round, and reactive to light.   Neck: Trachea normal and phonation normal. Neck supple.   Cardiovascular: Normal rate, regular rhythm, normal heart sounds and normal pulses. "   Pulmonary/Chest: Effort normal and breath sounds normal.   Musculoskeletal: Normal range of motion.         General: Normal range of motion.        Hands:    Neurological: no focal deficit. She is alert and oriented to person, place, and time.   Skin: Skin is warm, dry and intact. Capillary refill takes less than 2 seconds. No erythema   Psychiatric: Her speech is normal and behavior is normal. Judgment and thought content normal.   Nursing note and vitals reviewed.    XR HAND COMPLETE 3 VIEW LEFT    Result Date: 7/22/2024  EXAMINATION: XR HAND COMPLETE 3 VIEW LEFT CLINICAL HISTORY: . Pain in left hand TECHNIQUE: PA, lateral, and oblique views of the left hand were performed. COMPARISON: None FINDINGS: Benign endosteal sclerotic change shaft distal phalanx 4th digit.  No fracture dislocation or other unusual finding.     See results above. Electronically signed by: Espinoza Bruno MD Date:    07/22/2024 Time:    13:37       Assessment:     1. Left hand pain        Plan:       Left hand pain  -     XR HAND COMPLETE 3 VIEW LEFT; Future; Expected date: 07/22/2024  -     diclofenac sodium (VOLTAREN) 1 % Gel; Apply 2 g topically 4 (four) times daily as needed (pain).  Dispense: 100 g; Refill: 0  -     acetaminophen (TYLENOL) 650 MG TbSR; Take 1 tablet (650 mg total) by mouth every 8 (eight) hours as needed (pain).  Dispense: 20 tablet; Refill: 0              Patient Instructions   You can take Tylenol as needed for pain.  Can apply Voltaren gel as needed for pain.    - Follow up with your PCP or specialty clinic as directed in the next 1-2 weeks if not improved or as needed.  You can call [phone] to schedule an appointment with the appropriate provider.    - Go to the ER or seek medical attention immediately if you develop new or worsening symptoms.    - You must understand that you have received an Urgent Care treatment only and that you may be released before all of your medical problems are known or  treated.   - You, the patient, will arrange for follow up care as instructed.   - If your condition worsens or fails to improve we recommend that you receive another evaluation at the ER immediately or contact your PCP to discuss your concerns or return here.

## 2024-07-24 ENCOUNTER — HOSPITAL ENCOUNTER (OUTPATIENT)
Facility: HOSPITAL | Age: 49
Discharge: HOME | End: 2024-07-24
Attending: SURGERY | Admitting: SURGERY
Payer: COMMERCIAL

## 2024-07-24 ENCOUNTER — ANESTHESIA (OUTPATIENT)
Dept: ENDOSCOPY | Facility: HOSPITAL | Age: 49
End: 2024-07-24
Payer: COMMERCIAL

## 2024-07-24 ENCOUNTER — ANESTHESIA EVENT (OUTPATIENT)
Dept: ENDOSCOPY | Facility: HOSPITAL | Age: 49
End: 2024-07-24
Payer: COMMERCIAL

## 2024-07-24 VITALS
HEART RATE: 83 BPM | TEMPERATURE: 98 F | SYSTOLIC BLOOD PRESSURE: 102 MMHG | OXYGEN SATURATION: 98 % | HEIGHT: 61 IN | WEIGHT: 256 LBS | DIASTOLIC BLOOD PRESSURE: 64 MMHG | BODY MASS INDEX: 48.33 KG/M2 | RESPIRATION RATE: 18 BRPM

## 2024-07-24 LAB
B-HCG UR QL: NEGATIVE
CTP QC/QA: YES

## 2024-07-24 PROCEDURE — 25000003 PHARM REV CODE 250: Performed by: NURSE ANESTHETIST, CERTIFIED REGISTERED

## 2024-07-24 PROCEDURE — 88305 TISSUE EXAM BY PATHOLOGIST: CPT | Performed by: STUDENT IN AN ORGANIZED HEALTH CARE EDUCATION/TRAINING PROGRAM

## 2024-07-24 PROCEDURE — 88342 IMHCHEM/IMCYTCHM 1ST ANTB: CPT | Mod: 26,,, | Performed by: STUDENT IN AN ORGANIZED HEALTH CARE EDUCATION/TRAINING PROGRAM

## 2024-07-24 PROCEDURE — 37000009 HC ANESTHESIA EA ADD 15 MINS: Performed by: SURGERY

## 2024-07-24 PROCEDURE — 27201012 HC FORCEPS, HOT/COLD, DISP: Performed by: SURGERY

## 2024-07-24 PROCEDURE — 88305 TISSUE EXAM BY PATHOLOGIST: CPT | Mod: 26,,, | Performed by: STUDENT IN AN ORGANIZED HEALTH CARE EDUCATION/TRAINING PROGRAM

## 2024-07-24 PROCEDURE — 81025 URINE PREGNANCY TEST: CPT

## 2024-07-24 PROCEDURE — 63600175 PHARM REV CODE 636 W HCPCS: Performed by: NURSE ANESTHETIST, CERTIFIED REGISTERED

## 2024-07-24 PROCEDURE — 88342 IMHCHEM/IMCYTCHM 1ST ANTB: CPT | Performed by: STUDENT IN AN ORGANIZED HEALTH CARE EDUCATION/TRAINING PROGRAM

## 2024-07-24 PROCEDURE — 43239 EGD BIOPSY SINGLE/MULTIPLE: CPT | Mod: ,,, | Performed by: SURGERY

## 2024-07-24 PROCEDURE — 37000008 HC ANESTHESIA 1ST 15 MINUTES: Performed by: SURGERY

## 2024-07-24 PROCEDURE — 43239 EGD BIOPSY SINGLE/MULTIPLE: CPT | Performed by: SURGERY

## 2024-07-24 RX ORDER — GLUCAGON 1 MG
1 KIT INJECTION
Status: DISCONTINUED | OUTPATIENT
Start: 2024-07-24 | End: 2024-07-24 | Stop reason: HOSPADM

## 2024-07-24 RX ORDER — LIDOCAINE HYDROCHLORIDE 20 MG/ML
INJECTION, SOLUTION EPIDURAL; INFILTRATION; INTRACAUDAL; PERINEURAL
Status: DISCONTINUED | OUTPATIENT
Start: 2024-07-24 | End: 2024-07-24

## 2024-07-24 RX ORDER — ONDANSETRON HYDROCHLORIDE 2 MG/ML
4 INJECTION, SOLUTION INTRAVENOUS DAILY PRN
Status: DISCONTINUED | OUTPATIENT
Start: 2024-07-24 | End: 2024-07-24 | Stop reason: HOSPADM

## 2024-07-24 RX ORDER — PROCHLORPERAZINE EDISYLATE 5 MG/ML
5 INJECTION INTRAMUSCULAR; INTRAVENOUS EVERY 30 MIN PRN
Status: DISCONTINUED | OUTPATIENT
Start: 2024-07-24 | End: 2024-07-24 | Stop reason: HOSPADM

## 2024-07-24 RX ORDER — FENTANYL CITRATE 50 UG/ML
INJECTION, SOLUTION INTRAMUSCULAR; INTRAVENOUS
Status: DISCONTINUED | OUTPATIENT
Start: 2024-07-24 | End: 2024-07-24

## 2024-07-24 RX ORDER — OMEPRAZOLE 40 MG/1
40 CAPSULE, DELAYED RELEASE ORAL EVERY MORNING
Qty: 30 CAPSULE | Refills: 0 | Status: SHIPPED | OUTPATIENT
Start: 2024-07-24 | End: 2024-08-23

## 2024-07-24 RX ORDER — PROPOFOL 10 MG/ML
VIAL (ML) INTRAVENOUS
Status: DISCONTINUED | OUTPATIENT
Start: 2024-07-24 | End: 2024-07-24

## 2024-07-24 RX ORDER — SODIUM CHLORIDE 9 MG/ML
INJECTION, SOLUTION INTRAVENOUS CONTINUOUS
Status: DISCONTINUED | OUTPATIENT
Start: 2024-07-24 | End: 2024-07-24 | Stop reason: HOSPADM

## 2024-07-24 RX ADMIN — FENTANYL CITRATE 50 MCG: 50 INJECTION, SOLUTION INTRAMUSCULAR; INTRAVENOUS at 02:07

## 2024-07-24 RX ADMIN — SODIUM CHLORIDE: 0.9 INJECTION, SOLUTION INTRAVENOUS at 02:07

## 2024-07-24 RX ADMIN — PROPOFOL 50 MG: 10 INJECTION, EMULSION INTRAVENOUS at 02:07

## 2024-07-24 RX ADMIN — LIDOCAINE HYDROCHLORIDE 100 MG: 20 INJECTION, SOLUTION EPIDURAL; INFILTRATION; INTRACAUDAL; PERINEURAL at 02:07

## 2024-07-24 RX ADMIN — PROPOFOL 150 MCG/KG/MIN: 10 INJECTION, EMULSION INTRAVENOUS at 02:07

## 2024-07-24 NOTE — [employer] PATIENT INSTRUCTIONS
Discharge Summary/Instructions after an Endoscopic Procedure  Patient Name: Amanda Navarrete  Patient MRN: [id number]  Patient YOB: 1975 Wednesday, July 24, 2024  Chris Morales MD  Dear patient,  As a result of recent federal legislation (The Federal Cures Act), you may   receive lab or pathology results from your procedure in your [facility]   account before your physician is able to contact you. Your physician or   their representative will relay the results to you with their   recommendations at their soonest availability.  Thank you,  RESTRICTIONS:  During your procedure today, you received medications for sedation.  These   medications may affect your judgment, balance and coordination.  Therefore,   for 24 hours, you have the following restrictions:   - DO NOT drive a car, operate machinery, make legal/financial decisions,   sign important papers or drink alcohol.    ACTIVITY:  Today: no heavy lifting, straining or running due to procedural   sedation/anesthesia.  The following day: return to full activity including work.  DIET:  Eat and drink normally unless instructed otherwise.     TREATMENT FOR COMMON SIDE EFFECTS:  - Mild abdominal pain, nausea, belching, bloating or excessive gas:  rest,   eat lightly and use a heating pad.  - Sore Throat: treat with throat lozenges and/or gargle with warm salt   water.  - Because air was used during the procedure, expelling large amounts of air   from your rectum or belching is normal.  - If a bowel prep was taken, you may not have a bowel movement for 1-3 days.    This is normal.  SYMPTOMS TO WATCH FOR AND REPORT TO YOUR PHYSICIAN:  1. Abdominal pain or bloating, other than gas cramps.  2. Chest pain.  3. Back pain.  4. Signs of infection such as: chills or fever occurring within 24 hours   after the procedure.  5. Rectal bleeding, which would show as bright red, maroon, or black stools.   (A tablespoon of blood from the rectum is not serious, especially if    hemorrhoids are present.)  6. Vomiting.  7. Weakness or dizziness.  GO DIRECTLY TO THE NEAREST EMERGENCY ROOM IF YOU HAVE ANY OF THE FOLLOWING:      Difficulty breathing              Chills and/or fever over 101 F   Persistent vomiting and/or vomiting blood   Severe abdominal pain   Severe chest pain   Black, tarry stools   Bleeding- more than one tablespoon   Any other symptom or condition that you feel may need urgent attention  Your doctor recommends these additional instructions:  If any biopsies were taken, your doctors clinic will contact you in 1 to 2   weeks with any results.  - Discharge patient to home (ambulatory).   - Resume previous diet.   - Continue present medications.   - Await pathology results.   - Return to Bariatric clinic as previously scheduled.  For questions, problems or results please call your physician - Chris Morales MD at Work:  [phone].  [facility], EMERGENCY ROOM PHONE NUMBER: [phone]  IF A COMPLICATION OR EMERGENCY SITUATION ARISES AND YOU ARE UNABLE TO REACH   YOUR PHYSICIAN - GO DIRECTLY TO THE EMERGENCY ROOM.  MD Chris Delacruz MD  7/24/2024 2:50:24 PM  This report has been verified and signed electronically.  Dear patient,  As a result of recent federal legislation (The Federal Cures Act), you may   receive lab or pathology results from your procedure in your [facility]   account before your physician is able to contact you. Your physician or   their representative will relay the results to you with their   recommendations at their soonest availability.  Thank you,  [employer]ATION

## 2024-07-24 NOTE — ANESTHESIA POSTPROCEDURE EVALUATION
Anesthesia Post Evaluation    Patient: Amanda Navarrete    Procedure(s) Performed: Procedure(s) (LRB):  EGD (ESOPHAGOGASTRODUODENOSCOPY) (N/A)    Final Anesthesia Type: general      Patient location during evaluation: [facility]  Patient participation: Yes- Able to Participate  Level of consciousness: awake and alert  Post-procedure vital signs: reviewed and stable  Pain management: adequate  Airway patency: patent  EUNICE mitigation strategies: Multimodal analgesia  PONV status at discharge: No PONV  Anesthetic complications: no      Cardiovascular status: stable  Respiratory status: unassisted and spontaneous ventilation  Hydration status: euvolemic  Follow-up not needed.              Vitals Value Taken Time   /64 07/24/24 1530   Temp 36.8 °C (98.2 °F) 07/24/24 1530   Pulse 86 07/24/24 1531   Resp 62 07/24/24 1531   SpO2 100 % 07/24/24 1531   Vitals shown include unfiled device data.      No case tracking events are documented in the log.      Pain/Jo Score: Jo Score: 10 (7/24/2024  3:15 PM)

## 2024-07-24 NOTE — ANESTHESIA PREPROCEDURE EVALUATION
07/24/2024  Amanda Navarrete is a 49 y.o., female.    Pre-operative evaluation for Procedure(s) (LRB):  EGD (ESOPHAGOGASTRODUODENOSCOPY) (N/A)    Amanda Navarrete is a 49 y.o. female     Patient Active Problem List   Diagnosis    Chronic bilateral low back pain with bilateral sciatica    Migraine headache    GERD (gastroesophageal reflux disease)    Narcolepsy cataplexy syndrome    Hypothyroidism due to Hashimoto's thyroiditis    Lumbar radiculitis    Generalized anxiety disorder    Vitamin D deficiency    Gait instability    Hyperlipidemia, mixed    Class 3 severe obesity due to excess calories with serious comorbidity and body mass index (BMI) of 40.0 to 44.9 in adult    History of herpes labialis    Overactive bladder    Other spondylosis, lumbar region    EUNICE (obstructive sleep apnea)    Positive hepatitis C antibody test    Chronic pain syndrome    Impaired fasting glucose    Stress incontinence    Weakness       Review of patient's allergies indicates:   Allergen Reactions    Tamiflu [oseltamivir] Itching and Swelling       No current facility-administered medications on file prior to encounter.     Current Outpatient Medications on File Prior to Encounter   Medication Sig Dispense Refill    calcium-vitamin D3 (OS-RHONDA 500 + D3) 500 mg-5 mcg (200 unit) per tablet Take 1 tablet by mouth once daily.      clonazePAM (KLONOPIN) 0.5 MG tablet       cyanocobalamin (VITAMIN B-12) 1000 MCG tablet Take 100 mcg by mouth once daily.      DULoxetine (CYMBALTA) 60 MG capsule Take 1 capsule by mouth once daily.      ergocalciferol (VITAMIN D2) 50,000 unit Cap Take 1 capsule (50,000 Units total) by mouth twice a week. 24 capsule 3    fluticasone propionate (FLONASE) 50 mcg/actuation nasal spray 1 spray (50 mcg total) by Each Nostril route once daily. 16 g 0    methocarbamol (ROBAXIN) 500 MG Tab Take 500 mg by mouth  every evening.  2    oxyCODONE-acetaminophen (PERCOCET) 5-325 mg per tablet Take 1 tablet by mouth 2 (two) times daily as needed.  0    rosuvastatin (CRESTOR) 10 MG tablet Take 1 tablet (10 mg total) by mouth once daily. 90 tablet 3    albuterol (PROVENTIL/VENTOLIN HFA) 90 mcg/actuation inhaler Inhale 1-2 puffs into the lungs every 4 (four) hours as needed for Wheezing or Shortness of Breath. Rescue 18 g 5    levothyroxine (SYNTHROID) 88 MCG tablet Take 1 tablet (88 mcg total) by mouth daily before breakfast. 90 tablet 0    meclizine (ANTIVERT) 25 mg tablet Take 1 tablet (25 mg total) by mouth 3 (three) times daily as needed for Dizziness. 30 tablet 5    MIRENA 20 mcg/24 hours (6 yrs) 52 mg IUD       modafiniL (PROVIGIL) 200 MG Tab Take 1 tablet by mouth every morning and ½ tablet at noon. 45 tablet 5    sodium,calcium,mag,pot oxybate (XYWAV) 0.5 gram/mL Soln Take 4.5 g by mouth As instructed. 540 mL 5    topiramate (TOPAMAX) 100 MG tablet Take 1 tablet by mouth once daily AND 1 ½ tablets every evening 225 tablet 3    valACYclovir (VALTREX) 1000 MG tablet Take 1 tablet by mouth every 12 hours as needed (cold sores). 60 tablet 3       Past Surgical History:   Procedure Laterality Date    CAUDAL EPIDURAL STEROID INJECTION N/A 2018    Procedure: INJECTION, STEROID, CAUDAL, EPIDURAL;  Surgeon: Geoffrey Aleman MD;  Location: [facility] OR;  Service: Pain Management;  Laterality: N/A;     SECTION, CLASSIC      COSMETIC SURGERY      breast augmentation    INJECTION OF ANESTHETIC AGENT INTO SACROILIAC JOINT Bilateral 2020    Procedure: BLOCK, SACROILIAC JOINT;  Surgeon: Geoffrey Aleman MD;  Location: [facility] OR;  Service: Pain Management;  Laterality: Bilateral;    SPINE SURGERY  2015    fusion L spine       Social History     Socioeconomic History    Marital status: Single     Spouse name: John    Number of children: 3   Occupational History    Occupation: post master   Tobacco Use    Smoking  "status: Former     Current packs/day: 1.00     Average packs/day: 1 pack/day for 15.5 years (15.5 ttl pk-yrs)     Types: Cigarettes     Start date: 8/11/2014     Passive exposure: Never    Smokeless tobacco: Never   Substance and Sexual Activity    Alcohol use: Yes     Comment: Social, rare    Drug use: No    Sexual activity: Yes     Partners: Male   Social History Narrative    She works at [employer]. She still has a daughter living at home while in college. The others are grown. She has 4 dogs.      Social Determinants of Health     Financial Resource Strain: Medium Risk (3/21/2024)    Overall Financial Resource Strain (CARDIA)     Difficulty of Paying Living Expenses: Somewhat hard   Food Insecurity: Food Insecurity Present (3/21/2024)    Hunger Vital Sign     Worried About Running Out of Food in the Last Year: Sometimes true     Ran Out of Food in the Last Year: Patient declined   Transportation Needs: No Transportation Needs (3/21/2024)    PRAPARE - Transportation     Lack of Transportation (Medical): No     Lack of Transportation (Non-Medical): No   Physical Activity: Inactive (3/21/2024)    Exercise Vital Sign     Days of Exercise per Week: 0 days     Minutes of Exercise per Session: 0 min   Stress: Stress Concern Present (3/21/2024)    Citizen of the [place] [employer] Occupational Health - Occupational Stress Questionnaire     Feeling of Stress : To some extent   Housing Stability: High Risk (3/21/2024)    Housing Stability Vital Sign     Unable to Pay for Housing in the Last Year: Yes     Number of Places Lived in the Last Year: 2     Unstable Housing in the Last Year: Yes         CBC: No results for input(s): "WBC", "RBC", "HGB", "HCT", "PLT", "MCV", "MCH", "MCHC" in the last 72 hours.    CMP: No results for input(s): "NA", "K", "CL", "CO2", "BUN", "CREATININE", "GLU", "MG", "PHOS", "CALCIUM", "ALBUMIN", "PROT", "ALKPHOS", "ALT", "AST", "BILITOT" in the last 72 hours.    INR  No results for input(s): " ""PT", "INR", "PROTIME", "APTT" in the last 72 hours.        Diagnostic Studies:      EKD Echo:  No results found for this or any previous visit.        Pre-op Assessment    I have reviewed the Patient Summary Reports.     I have reviewed the Nursing Notes. I have reviewed the NPO Status.   I have reviewed the Medications.     Review of Systems  Anesthesia Hx:  No problems with previous Anesthesia                Cardiovascular:  Exercise tolerance: good                                           Pulmonary:        Sleep Apnea, CPAP                Hepatic/GI:     GERD, well controlled             Endocrine:        Morbid Obesity / BMI > 40  Psych:  Psychiatric History anxiety                 Physical Exam  General: Well nourished, Cooperative and Alert    Airway:  Mallampati: II   Mouth Opening: Normal  TM Distance: Normal  Tongue: Normal  Neck ROM: Normal ROM    Dental:  Intact    Chest/Lungs:  Normal Respiratory Rate    Heart:  Rate: Normal        Anesthesia Plan  Type of Anesthesia, risks & benefits discussed:    Anesthesia Type: Gen ETT, Gen Natural Airway  Intra-op Monitoring Plan: Standard ASA Monitors  Post Op Pain Control Plan: multimodal analgesia and IV/PO Opioids PRN  Induction:  IV  Airway Plan: Direct, Post-Induction  Informed Consent: Informed consent signed with the Patient and all parties understand the risks and agree with anesthesia plan.  All questions answered.   ASA Score: 3  Day of Surgery Review of History & Physical: H&P Update referred to the surgeon/provider.    Ready For Surgery From Anesthesia Perspective.     .      "

## 2024-07-24 NOTE — TRANSFER OF CARE
"Anesthesia Transfer of Care Note    Patient: Amanda Navarrete    Procedure(s) Performed: Procedure(s) (LRB):  EGD (ESOPHAGOGASTRODUODENOSCOPY) (N/A)    Patient location: [facility]    Anesthesia Type: general    Transport from OR: Transported from OR on room air with adequate spontaneous ventilation    Post pain: adequate analgesia    Post assessment: no apparent anesthetic complications and tolerated procedure well    Post vital signs: stable    Level of consciousness: awake, alert and oriented    Nausea/Vomiting: no nausea/vomiting    Complications: none    Transfer of care protocol was followed      Last vitals: Visit Vitals  BP (!) 140/74 (BP Location: Left arm, Patient Position: Lying)   Pulse 78   Temp 36.8 °C (98.3 °F)   Resp 16   Ht 5' 1" (1.549 m)   Wt 116.1 kg (256 lb)   SpO2 98%   Breastfeeding No   BMI 48.37 kg/m²     "

## 2024-07-24 NOTE — H&P
FOCUSED SURGICAL H&P    Amanda Navarrete is a 49 y.o. female. MRN is [id number].    CC: Here today for the following surgical procedure(s):  EGD (ESOPHAGOGASTRODUODENOSCOPY) (Abdomen)    HPI: For a detailed history of the patients history of present illness please refer to the last progress note. In brief, this is a 49 y.o. female with a known history of GERD, here today for endoscopy for bariatric evaluation. There has been no recent changes in the patients health, including fevers, chest pain, or shortness of breath, and no new medications have been started. The patient has not had anything to eat or drink for the last 8 hours.       Past Medical History:   Past Medical History:   Diagnosis Date    Affective disorder     sses Dr. Dial, psychiatry    Back pain     GERD (gastroesophageal reflux disease)     Headache(784.0)     Hyperlipidemia, mixed 2019    Influenza A 2022    - supportive care - albuterol inhaler - discussed proceed to the ER if severe shortness of breath or chest pain. - Follow-up in office 2022 scheduled    Migraine headache     Narcolepsy without cataplexy(347.00)     Syncope 2024    - recommend Cardiology evaluation    Thyroid disease     hyperthyroidism    TMJ arthralgia        Past Surgical History:   Past Surgical History:   Procedure Laterality Date    CAUDAL EPIDURAL STEROID INJECTION N/A 2018    Procedure: INJECTION, STEROID, CAUDAL, EPIDURAL;  Surgeon: Geoffrey Aleman MD;  Location: [facility] OR;  Service: Pain Management;  Laterality: N/A;     SECTION, CLASSIC      COSMETIC SURGERY      breast augmentation    INJECTION OF ANESTHETIC AGENT INTO SACROILIAC JOINT Bilateral 2020    Procedure: BLOCK, SACROILIAC JOINT;  Surgeon: Geoffrey Aleman MD;  Location: [facility] OR;  Service: Pain Management;  Laterality: Bilateral;    SPINE SURGERY  2015    fusion L spine       Social History:   Social History     Socioeconomic History    Marital status:  Single     Spouse name: John    Number of children: 3   Occupational History    Occupation: post master   Tobacco Use    Smoking status: Former     Current packs/day: 1.00     Average packs/day: 1 pack/day for 15.5 years (15.5 ttl pk-yrs)     Types: Cigarettes     Start date: 8/11/2014     Passive exposure: Never    Smokeless tobacco: Never   Substance and Sexual Activity    Alcohol use: Yes     Comment: Social, rare    Drug use: No    Sexual activity: Yes     Partners: Male   Social History Narrative    She works at [employer]. She still has a daughter living at home while in college. The others are grown. She has 4 dogs.      Family History:   Family History   Problem Relation Name Age of Onset    Hypertension Mother      Kidney disease Mother      Sleep apnea Mother      Narcolepsy Father      Cancer Maternal Aunt          colon cancer gene    Cancer Paternal Aunt          lung ca          Allergies:  Review of patient's allergies indicates:   Allergen Reactions    Tamiflu [oseltamivir] Itching and Swelling                   Vital Signs:  There were no vitals filed for this visit.      Physical Exam:  Neuro: awake, alert, no acute distress.  HEENT: PERRLA, neck supple, no lymphadenopathy.  Heart: regular rate/rhythm  Lungs: equal chest expansion bilaterally, no increased work of breathing on RA  Abdomen: soft, non-distended, non-tender to palpation.  Extremities: warm, well-perfused       Labs:  Lab Results   Component Value Date/Time    WBC 9.46 06/05/2024 11:35 AM    HGB 15.0 06/05/2024 11:35 AM    HCT 46.5 06/05/2024 11:35 AM     06/05/2024 11:35 AM    BAND 1 03/19/2012 03:38 PM     (H) 06/05/2024 11:35 AM     Lab Results   Component Value Date/Time     06/05/2024 11:35 AM    K 3.8 06/05/2024 11:35 AM     (H) 06/05/2024 11:35 AM    CO2 20 (L) 06/05/2024 11:35 AM    BUN 15 06/05/2024 11:35 AM    GLU 95 06/05/2024 11:35 AM    MG 2.2 06/05/2024 11:35 AM    PHOS 2.8  "06/05/2024 11:35 AM     No results found for: "INR", "PT", "PTT"  No components found for: "TROPI"  Lab Results   Component Value Date/Time    ALT 24 06/05/2024 11:35 AM    AST 15 06/05/2024 11:35 AM          Assessment/Plan:  49 y.o. female here today for the following surgical procedure:    EGD (ESOPHAGOGASTRODUODENOSCOPY) (Abdomen)       The indications for EGD, highlighting the risks and benefits of the procedure were discussed with the patient. These included but are not limited to swelling, bleeding, pain, infection, and adverse anesthesia-related event. I also discussed risk of injury to nearby structures . The patient seems to understand the risks, as well as the alternatives including nonoperative observation/survellience and wishes to proceed with the surgical intervention.    Patient has been examined, consented.    Plan discussed with and agreed by Dr. Morales.      Aletha Rajput, DO  PGY-1    "

## 2024-07-25 ENCOUNTER — PATIENT MESSAGE (OUTPATIENT)
Dept: PRIMARY CARE CLINIC | Facility: CLINIC | Age: 49
End: 2024-07-25
Payer: COMMERCIAL

## 2024-07-26 ENCOUNTER — TELEPHONE (OUTPATIENT)
Dept: BARIATRICS | Facility: CLINIC | Age: 49
End: 2024-07-26
Payer: COMMERCIAL

## 2024-07-26 LAB
FINAL PATHOLOGIC DIAGNOSIS: NORMAL
GROSS: NORMAL
Lab: NORMAL
MICROSCOPIC EXAM: NORMAL

## 2024-08-01 DIAGNOSIS — E03.8 HYPOTHYROIDISM DUE TO HASHIMOTO'S THYROIDITIS: ICD-10-CM

## 2024-08-01 DIAGNOSIS — E06.3 HYPOTHYROIDISM DUE TO HASHIMOTO'S THYROIDITIS: ICD-10-CM

## 2024-08-01 DIAGNOSIS — G43.019 INTRACTABLE MIGRAINE WITHOUT AURA AND WITHOUT STATUS MIGRAINOSUS: ICD-10-CM

## 2024-08-01 DIAGNOSIS — E78.2 HYPERLIPIDEMIA, MIXED: ICD-10-CM

## 2024-08-01 RX ORDER — LEVOTHYROXINE SODIUM 88 UG/1
88 TABLET ORAL
Qty: 90 TABLET | Refills: 3 | Status: SHIPPED | OUTPATIENT
Start: 2024-08-01 | End: 2025-07-27

## 2024-08-01 RX ORDER — ROSUVASTATIN CALCIUM 10 MG/1
10 TABLET, COATED ORAL DAILY
Qty: 90 TABLET | Refills: 3 | Status: SHIPPED | OUTPATIENT
Start: 2024-08-01

## 2024-08-01 NOTE — TELEPHONE ENCOUNTER
No care due was identified.  [facility] Embedded Care Due Messages. Reference number: [id number].   8/01/2024 3:28:49 PM CDT

## 2024-08-01 NOTE — TELEPHONE ENCOUNTER
No care due was identified.  [facility] Embedded Care Due Messages. Reference number: [id number].   8/01/2024 3:29:51 PM CDT

## 2024-08-02 RX ORDER — TOPIRAMATE 100 MG/1
TABLET, FILM COATED ORAL
Qty: 225 TABLET | Refills: 3 | Status: SHIPPED | OUTPATIENT
Start: 2024-08-02 | End: 2025-08-02

## 2024-08-02 NOTE — TELEPHONE ENCOUNTER
Refill Decision Note   Amanda Ladnon  is requesting a refill authorization.  Brief Assessment and Rationale for Refill:  Approve     Medication Therapy Plan:         Comments:     Note composed:9:27 PM 08/01/2024             Appointments     Last Visit   5/21/2024 Laya Jaffe, DO   Next Visit   Visit date not found Laya Jaffe, DO

## 2024-08-02 NOTE — TELEPHONE ENCOUNTER
Refill Routing Note   Medication(s) are not appropriate for processing by [facility] for the following reason(s):        Outside of protocol    [facility] action(s):  Approve  Route               Appointments  past 12m or future 3m with PCP    Date Provider   Last Visit   5/21/2024 Laya Jaffe, DO   Next Visit   8/1/2024 Laya Jaffe, DO   ED visits in past 90 days: 0        Note composed:9:26 PM 08/01/2024

## 2024-08-05 ENCOUNTER — TELEPHONE (OUTPATIENT)
Dept: BARIATRICS | Facility: CLINIC | Age: 49
End: 2024-08-05

## 2024-08-05 ENCOUNTER — CLINICAL SUPPORT (OUTPATIENT)
Dept: BARIATRICS | Facility: CLINIC | Age: 49
End: 2024-08-05
Payer: COMMERCIAL

## 2024-08-05 ENCOUNTER — PATIENT MESSAGE (OUTPATIENT)
Dept: BARIATRICS | Facility: CLINIC | Age: 49
End: 2024-08-05

## 2024-08-05 VITALS — WEIGHT: 251.31 LBS | BODY MASS INDEX: 47.49 KG/M2

## 2024-08-05 DIAGNOSIS — K21.9 GASTROESOPHAGEAL REFLUX DISEASE, UNSPECIFIED WHETHER ESOPHAGITIS PRESENT: ICD-10-CM

## 2024-08-05 DIAGNOSIS — Z71.3 DIETARY COUNSELING AND SURVEILLANCE: Primary | ICD-10-CM

## 2024-08-05 DIAGNOSIS — G47.33 OSA (OBSTRUCTIVE SLEEP APNEA): ICD-10-CM

## 2024-08-05 DIAGNOSIS — E66.01 MORBID OBESITY WITH BMI OF 45.0-49.9, ADULT: ICD-10-CM

## 2024-08-05 DIAGNOSIS — E78.2 HYPERLIPIDEMIA, MIXED: ICD-10-CM

## 2024-08-05 PROCEDURE — 97803 MED NUTRITION INDIV SUBSEQ: CPT | Mod: S$GLB,,, | Performed by: DIETITIAN, REGISTERED

## 2024-08-05 PROCEDURE — 99999 PR PBB SHADOW E&M-EST. PATIENT-LVL I: CPT | Mod: PBBFAC,,, | Performed by: DIETITIAN, REGISTERED

## 2024-08-09 ENCOUNTER — PATIENT MESSAGE (OUTPATIENT)
Dept: SLEEP MEDICINE | Facility: CLINIC | Age: 49
End: 2024-08-09
Payer: COMMERCIAL

## 2024-08-12 ENCOUNTER — PATIENT MESSAGE (OUTPATIENT)
Dept: BARIATRICS | Facility: CLINIC | Age: 49
End: 2024-08-12
Payer: COMMERCIAL

## 2024-08-15 DIAGNOSIS — G47.411 NARCOLEPSY WITH CATAPLEXY: ICD-10-CM

## 2024-08-15 RX ORDER — DEXTROAMPHETAMINE SACCHARATE, AMPHETAMINE ASPARTATE MONOHYDRATE, DEXTROAMPHETAMINE SULFATE AND AMPHETAMINE SULFATE 5; 5; 5; 5 MG/1; MG/1; MG/1; MG/1
20 CAPSULE, EXTENDED RELEASE ORAL EVERY MORNING
Qty: 30 CAPSULE | Refills: 0 | Status: SHIPPED | OUTPATIENT
Start: 2024-08-15

## 2024-08-15 RX ORDER — DEXTROAMPHETAMINE SACCHARATE, AMPHETAMINE ASPARTATE, DEXTROAMPHETAMINE SULFATE AND AMPHETAMINE SULFATE 5; 5; 5; 5 MG/1; MG/1; MG/1; MG/1
1 TABLET ORAL 2 TIMES DAILY
Qty: 60 TABLET | Refills: 0 | Status: SHIPPED | OUTPATIENT
Start: 2024-08-15

## 2024-09-03 ENCOUNTER — PATIENT MESSAGE (OUTPATIENT)
Dept: BARIATRICS | Facility: CLINIC | Age: 49
End: 2024-09-03
Payer: COMMERCIAL

## 2024-09-05 ENCOUNTER — TELEPHONE (OUTPATIENT)
Dept: BARIATRICS | Facility: CLINIC | Age: 49
End: 2024-09-05

## 2024-09-05 ENCOUNTER — CLINICAL SUPPORT (OUTPATIENT)
Dept: BARIATRICS | Facility: CLINIC | Age: 49
End: 2024-09-05
Payer: COMMERCIAL

## 2024-09-05 VITALS — WEIGHT: 246.25 LBS | BODY MASS INDEX: 46.53 KG/M2

## 2024-09-05 DIAGNOSIS — G47.33 OSA (OBSTRUCTIVE SLEEP APNEA): ICD-10-CM

## 2024-09-05 DIAGNOSIS — E78.2 HYPERLIPIDEMIA, MIXED: Primary | ICD-10-CM

## 2024-09-05 DIAGNOSIS — Z98.84 S/P BARIATRIC SURGERY: ICD-10-CM

## 2024-09-05 DIAGNOSIS — E03.8 HYPOTHYROIDISM DUE TO HASHIMOTO'S THYROIDITIS: ICD-10-CM

## 2024-09-05 DIAGNOSIS — E06.3 HYPOTHYROIDISM DUE TO HASHIMOTO'S THYROIDITIS: ICD-10-CM

## 2024-09-05 DIAGNOSIS — E66.01 MORBID OBESITY WITH BMI OF 45.0-49.9, ADULT: ICD-10-CM

## 2024-09-05 DIAGNOSIS — Z71.3 DIETARY COUNSELING AND SURVEILLANCE: Primary | ICD-10-CM

## 2024-09-05 DIAGNOSIS — K21.9 GASTROESOPHAGEAL REFLUX DISEASE, UNSPECIFIED WHETHER ESOPHAGITIS PRESENT: ICD-10-CM

## 2024-09-05 NOTE — TELEPHONE ENCOUNTER
Called pt to discuss beatrice sx date. Pt stated she has lose 27 lbs on her own by making changes and is not sure if a CHRISTOPHE would be the best option for her. Pt stated she and Dr. Morales spoke about the sleeve gastrectomy as an option. Pt stated she would like Dr. Morales's opinion before scheduling sx. Informed pt that I would reach out once I spoke to Dr. Morales. Understanding stated. All questions and concerns addressed.   Message sent to Dr. Morales, awaiting a response.  Pt called back and stated she would like to proceed with a sleeve gastrectomy. Dr. Morales notified.

## 2024-09-05 NOTE — PROGRESS NOTES
NUTRITION NOTE    Referring Physician: Chris Morales M.D.   Reason for MNT Referral: 3 months medically supervised diet counseling and surveillance pending laproscopic CHRISTOPHE-S    Patient presents for follow-up visit for MSD with weight loss over the past month; -27 lbs total weight loss by making numerous dietary and lifestyle changes.    CLINICAL DATA:  49 y.o. female.    Initial weight: 273 lbs  Current weight: 246 lbs  Weight change since last visit: -5 lbs  BMI: 46.53    DAILY NUTRITIONAL NEEDS: pre-op nutritional bariatric guidelines to promote weight loss  6545-6644 Calories    Grams Protein    CURRENT DIET:  Regular diet.  Diet Recall: Food records are present.  Greatest challenge: starchy CHO and portion control  Update: PT has purchased vitamins: Barimelt multi with iron, EZ melt B-1, 5000 mcg B-12, 26378 IUs vit A; making protein puddings; bought a walking pad to exercise at home    Current diet recall:      Proteins: 100-120 g per day; Calories 1426-8314 per day     B: Ham and eggs  L: Greek yogurt mixed with SF Chilean vanilla syrup  S: Protein shake  D: Pickles, olives, [place] hardwick, cheese in a lettuce wrap, baby carrots and yogurt on the side  S: Protein pudding    Diet Includes: 3 meals  Meal Pattern: Same.  Protein Supplements: 1 per day. Premier Protein, ProfStream Nutrition Plan, [id number] protein powder  Snacking: Adequate. Snacks include healthy choices. Protein chips, protein puffs, P3 cucumbers with Laughing Cow, cheese stick, P3  Vegetables: Likes a few. Eats daily. Salads, cucumbers, broccoli, cauliflower mix  Fruits: Likes a few. Eats daily. Apples, oranges, grapes, strawberries, cherry  Beverages: water and coffee, Ryze coffee, unsweetened tea  Dining out: Twice in last month. Mostly take out. Christina's, PJ's protein velvet ice   Cooking at home: Almost daily.  Mostly crock pot, baked, grilled  meat, starchy CHO, and vegetables. Chicken, shrimp, frozen vegetables, high protein pasta,  [place]     Food Allergies:   None reported     Vitamins / Minerals / Herbs:   Multivitamin  Prescribed Vit D 100,000 IUs weekly  B-12  Magnesium  Emergen-C     Labs:   Reviewed.     Exercise:  PT for bladder/pelvis once/week     Restrictions to exercise: Back surgery in 2015, uses cane. Cannot lift weight, nerve pain, swollen ankles     Social:  On disability due to back pain ().   Lives with adult daughter.  Grocery shopping and food prep: PT - delivery.  Patient believes the household will be supportive after surgery.  Alcohol: None.  Smoking: None. Quit in 2014     ASSESSMENT:  Patient demonstrates some willingness/progress to change lifestyle habits as evidenced by weight loss, daily food logs, including protein drinks, and reduced eating out/delivery    Doing well with working on greatest challenges (  starchy CHO and portion control ).    Barriers to Education:  none  Stage of Change:  action    NUTRITION DIAGNOSIS:  Morbid Obesity related to Physical inactivity as evidence by BMI.  Obesity Status: Decreased since initial assessment    BARIATRIC DIET DISCUSSION:  Discussed diet after surgery and related to patient's food record.  Reviewed nutrition guidelines for before and after surgery.  Answered all questions.  Reviewed pre- and post-op liquid diet, CHRISTOPHE-S vitamins and minerals    BARIATRIC PLAN/RECOMMENDATIONS:  Pt has completed required medically supervised diet counseling and surveillance and is a good candidate for bariatric surgery    Diet: Maintain diet plan.  Start shopping for bariatric vitamins & minerals    Exercise: Increase.    Behavior Modification: Continue to document food & activity logs daily.    Communicated nutrition plan with bariatric team.    SESSION TIME:  30 minutes

## 2024-09-05 NOTE — LETTER
"  [facility] - Bariatric Surg 2nd Fl  [address]  [facility] [address]  Phone: [phone]  Fax: [phone] 2024       Attn: Pre-determination Dept.    RE:  Amanda Navarrete          OC #: [id number]          : 1975    To Whom It May Concern:  I am sending this letter on behalf of Amanda Navarrete (a 49 y.o. female) for pre-approval for Bariatric Surgery; specifically the robotic laparoscopic sleeve gastrectomy. Amanda  has a past medical history of morbid obesity, BMI 45-49.9,  EUNICE on CPAP, GERD (gastroesophageal reflux disease), Hyperlipidemia, mixed (2019), Narcolepsy without cataplexy, Syncope (2024), chronic back pain, and Thyroid disease, . Amanda Navarrete  has made numerous attempts at dieting and exercise programs, and has failed to maintain any sustained weight loss.  Weight/Height/BMI  Estimated body mass index is 46.53 kg/m² as calculated from the following:  Height as of 24: 5' 1" (1.549 m).  Weight as of an earlier encounter on 24: 111.7 kg (246 lb 4.1 oz).  Amanda Navarrete has been evaluated in our bariatric program by myself, the , and the program dietician and is felt to be an excellent candidate for surgery.  In addition, she has undergone a psychological evaluation, from which a letter is enclosed.  Amanda Navarrete has undergone extensive pre-operative education and understands all the risks, benefits, and possible complications of surgery.  She has also undergone dietary education and thorough nutritional evaluation via a registered dietician.  Our program provides long term nutritional counseling with unlimited consults with the dietician.    Our team is sending this letter to receive pre-approval for the indicated procedure.  Please let us know if you have any questions or require any further information.  Sincerely,    Chris Morales MD FACS  Minimally Invasive General & Bariatric Surgery  [facility]" [place], [place]

## 2024-09-06 ENCOUNTER — PATIENT MESSAGE (OUTPATIENT)
Dept: BARIATRICS | Facility: CLINIC | Age: 49
End: 2024-09-06
Payer: COMMERCIAL

## 2024-09-06 ENCOUNTER — TELEPHONE (OUTPATIENT)
Dept: BARIATRICS | Facility: CLINIC | Age: 49
End: 2024-09-06
Payer: COMMERCIAL

## 2024-09-06 NOTE — TELEPHONE ENCOUNTER
----- Message from Marilu May sent at 9/6/2024 10:54 AM CDT -----  Regarding: Missed call  Contact: [phone]  Caller is returning a missed called from Maria L Capone in the office. Please call back as soon as possible.

## 2024-09-06 NOTE — TELEPHONE ENCOUNTER
----- Message from Tonya Kerr sent at 9/6/2024 10:39 AM CDT -----  Regarding: PT ADVICE  Contact: PT@  Pt is returning a missed call from someone in the office and is asking for a return call back soon. Thanks.     Reason for call: MISS CALL      Patient's DX:     Patient requesting call back or [facility] msg: PT@ [phone]

## 2024-09-06 NOTE — TELEPHONE ENCOUNTER
"Spoke with patient and confirmed the surgical procedure of robotic laparoscopic sleeve gastrectomy with Dr Morales on 10-.  Scheduled preop appts/surgery date/2 week and 8 week post op appts. All dates and times agreed upon. Pt aware that if they are required to have PCP clearance, it must be within 6 months of surgery, unless their medical history has changed, it should be dated, signed and in chart for preop appointment. All medications have been reviewed regarding the necessity to be crushed or broken into pieces smaller that the tip of a pencil eraser for 2 weeks following gastric sleeve surgery and 4 weeks following gastric bypass surgery. Pt instructed to stop taking all NSAIDS 1 week before surgery and for life after surgery. Pt aware that protein liquid diet start date is 10-9-2024. Patient is doing well with their diet. Patient was instructed about the progression of the diet phases. The patient's current weight is 246 lbs, height is 5'1", and BMI is 46.53. Refer to medical letter of necessity from Surgeon. Discussed the importance of increased physical activity and dieting lifestyle changes to improve weight loss and meet goals. Screened patient for history of UTI per protocol. Discussed with patient to avoid antibiotics and elective procedures involving sedation/anesthesia within 30 days of surgery unless cleared by the bariatric department. Patient instructed to call the bariatric clinic post op for any s/s of UTI. Patient's mailing address confirmed and informed to expect a manilla envelop containing bariatric surgery pre op booklet, appointment reminders, protein and fluid log sheets, and liquid diet and vitamin information sheets. Pt aware that all appts can be seen in my [facility] patient portal at this time. Confirmed email address and informed patient that they will be enrolled in the Patient Reported Outcomes program to track their progress and successes. The first email will be sent 2-3 weeks " before surgery and then every year on your surgery anniversary date. Office phone and fax number given to patient for any future questions/concerns. Discussed the pre-surgery complex carbohydrate beverage to purchase in [facility] pharmacy to drink 30 minutes before the surgery arrival time. Reviewed the policy of scheduling a covid test 72 hours prior to surgery if necessary.

## 2024-09-09 ENCOUNTER — TELEPHONE (OUTPATIENT)
Dept: BARIATRICS | Facility: CLINIC | Age: 49
End: 2024-09-09
Payer: COMMERCIAL

## 2024-09-09 NOTE — TELEPHONE ENCOUNTER
Portal message sent to pt regarding discontinuation of omeprazole. Pt notified via portal that she no longer needs to continue this medication.

## 2024-09-10 ENCOUNTER — PATIENT MESSAGE (OUTPATIENT)
Dept: BARIATRICS | Facility: CLINIC | Age: 49
End: 2024-09-10
Payer: COMMERCIAL

## 2024-09-19 ENCOUNTER — PATIENT MESSAGE (OUTPATIENT)
Dept: SLEEP MEDICINE | Facility: CLINIC | Age: 49
End: 2024-09-19
Payer: COMMERCIAL

## 2024-09-19 DIAGNOSIS — G47.411 NARCOLEPSY WITH CATAPLEXY: ICD-10-CM

## 2024-09-19 RX ORDER — DEXTROAMPHETAMINE SACCHARATE, AMPHETAMINE ASPARTATE MONOHYDRATE, DEXTROAMPHETAMINE SULFATE AND AMPHETAMINE SULFATE 5; 5; 5; 5 MG/1; MG/1; MG/1; MG/1
20 CAPSULE, EXTENDED RELEASE ORAL EVERY MORNING
Qty: 30 CAPSULE | Refills: 0 | Status: SHIPPED | OUTPATIENT
Start: 2024-09-19

## 2024-09-19 RX ORDER — DEXTROAMPHETAMINE SACCHARATE, AMPHETAMINE ASPARTATE, DEXTROAMPHETAMINE SULFATE AND AMPHETAMINE SULFATE 5; 5; 5; 5 MG/1; MG/1; MG/1; MG/1
1 TABLET ORAL 2 TIMES DAILY
Qty: 60 TABLET | Refills: 0 | Status: SHIPPED | OUTPATIENT
Start: 2024-09-19

## 2024-09-24 DIAGNOSIS — G47.411 NARCOLEPSY WITH CATAPLEXY: ICD-10-CM

## 2024-09-24 RX ORDER — (CALCIUM, MAGNESIUM, POTASSIUM, AND SODIUM OXYBATES) .5; .5; .5; .5 G/ML; G/ML; G/ML; G/ML
4.5 SOLUTION ORAL SEE ADMIN INSTRUCTIONS
Qty: 540 ML | Refills: 5 | Status: ACTIVE | OUTPATIENT
Start: 2024-09-24

## 2024-10-01 ENCOUNTER — PATIENT MESSAGE (OUTPATIENT)
Dept: BARIATRICS | Facility: CLINIC | Age: 49
End: 2024-10-01
Payer: COMMERCIAL

## 2024-10-01 ENCOUNTER — TELEPHONE (OUTPATIENT)
Dept: PRIMARY CARE CLINIC | Facility: CLINIC | Age: 49
End: 2024-10-01
Payer: COMMERCIAL

## 2024-10-01 NOTE — TELEPHONE ENCOUNTER
----- Message from Candida sent at 10/1/2024 10:25 AM CDT -----  Regarding: Case Notes  Name of Who is Calling:  Barbara calling from [facility] [place].          What is the request in detail:  Barbara asking if the patient last visit notes be faxed to her             Can the clinic reply by [facility]: No            What Number to Call Back if not in [facility]: Fax  [phone]      Phone  Number [phone]

## 2024-10-07 ENCOUNTER — LAB VISIT (OUTPATIENT)
Dept: LAB | Facility: HOSPITAL | Age: 49
End: 2024-10-07
Attending: SURGERY
Payer: COMMERCIAL

## 2024-10-07 ENCOUNTER — OFFICE VISIT (OUTPATIENT)
Dept: BARIATRICS | Facility: CLINIC | Age: 49
End: 2024-10-07
Payer: COMMERCIAL

## 2024-10-07 VITALS
DIASTOLIC BLOOD PRESSURE: 74 MMHG | WEIGHT: 238.75 LBS | OXYGEN SATURATION: 96 % | SYSTOLIC BLOOD PRESSURE: 128 MMHG | HEIGHT: 61 IN | HEART RATE: 89 BPM | BODY MASS INDEX: 45.08 KG/M2

## 2024-10-07 DIAGNOSIS — N39.3 STRESS INCONTINENCE: ICD-10-CM

## 2024-10-07 DIAGNOSIS — E66.01 CLASS 3 SEVERE OBESITY DUE TO EXCESS CALORIES WITH SERIOUS COMORBIDITY AND BODY MASS INDEX (BMI) OF 45.0 TO 49.9 IN ADULT: Primary | ICD-10-CM

## 2024-10-07 DIAGNOSIS — Z98.84 S/P BARIATRIC SURGERY: ICD-10-CM

## 2024-10-07 DIAGNOSIS — G47.33 OSA (OBSTRUCTIVE SLEEP APNEA): ICD-10-CM

## 2024-10-07 DIAGNOSIS — E66.813 CLASS 3 SEVERE OBESITY DUE TO EXCESS CALORIES WITH SERIOUS COMORBIDITY AND BODY MASS INDEX (BMI) OF 45.0 TO 49.9 IN ADULT: Primary | ICD-10-CM

## 2024-10-07 DIAGNOSIS — E78.2 HYPERLIPIDEMIA, MIXED: ICD-10-CM

## 2024-10-07 DIAGNOSIS — K21.9 GASTROESOPHAGEAL REFLUX DISEASE WITHOUT ESOPHAGITIS: ICD-10-CM

## 2024-10-07 DIAGNOSIS — E06.3 HYPOTHYROIDISM DUE TO HASHIMOTO'S THYROIDITIS: ICD-10-CM

## 2024-10-07 DIAGNOSIS — K21.9 GASTROESOPHAGEAL REFLUX DISEASE, UNSPECIFIED WHETHER ESOPHAGITIS PRESENT: ICD-10-CM

## 2024-10-07 LAB
ALBUMIN SERPL BCP-MCNC: 3.9 G/DL (ref 3.5–5.2)
ALP SERPL-CCNC: 101 U/L (ref 55–135)
ALT SERPL W/O P-5'-P-CCNC: 27 U/L (ref 10–44)
ANION GAP SERPL CALC-SCNC: 10 MMOL/L (ref 8–16)
AST SERPL-CCNC: 19 U/L (ref 10–40)
BASOPHILS # BLD AUTO: 0.06 K/UL (ref 0–0.2)
BASOPHILS NFR BLD: 0.6 % (ref 0–1.9)
BILIRUB SERPL-MCNC: 0.5 MG/DL (ref 0.1–1)
BUN SERPL-MCNC: 19 MG/DL (ref 6–20)
CALCIUM SERPL-MCNC: 9.8 MG/DL (ref 8.7–10.5)
CHLORIDE SERPL-SCNC: 109 MMOL/L (ref 95–110)
CO2 SERPL-SCNC: 19 MMOL/L (ref 23–29)
CREAT SERPL-MCNC: 0.7 MG/DL (ref 0.5–1.4)
DIFFERENTIAL METHOD BLD: ABNORMAL
EOSINOPHIL # BLD AUTO: 0.1 K/UL (ref 0–0.5)
EOSINOPHIL NFR BLD: 0.8 % (ref 0–8)
ERYTHROCYTE [DISTWIDTH] IN BLOOD BY AUTOMATED COUNT: 13.6 % (ref 11.5–14.5)
EST. GFR  (NO RACE VARIABLE): >60 ML/MIN/1.73 M^2
GLUCOSE SERPL-MCNC: 98 MG/DL (ref 70–110)
HCT VFR BLD AUTO: 42.4 % (ref 37–48.5)
HGB BLD-MCNC: 14.4 G/DL (ref 12–16)
IMM GRANULOCYTES # BLD AUTO: 0.04 K/UL (ref 0–0.04)
IMM GRANULOCYTES NFR BLD AUTO: 0.4 % (ref 0–0.5)
LYMPHOCYTES # BLD AUTO: 2 K/UL (ref 1–4.8)
LYMPHOCYTES NFR BLD: 19.6 % (ref 18–48)
MCH RBC QN AUTO: 32.7 PG (ref 27–31)
MCHC RBC AUTO-ENTMCNC: 34 G/DL (ref 32–36)
MCV RBC AUTO: 96 FL (ref 82–98)
MONOCYTES # BLD AUTO: 0.7 K/UL (ref 0.3–1)
MONOCYTES NFR BLD: 6.7 % (ref 4–15)
NEUTROPHILS # BLD AUTO: 7.3 K/UL (ref 1.8–7.7)
NEUTROPHILS NFR BLD: 71.9 % (ref 38–73)
NRBC BLD-RTO: 0 /100 WBC
PLATELET # BLD AUTO: 254 K/UL (ref 150–450)
PMV BLD AUTO: 12.1 FL (ref 9.2–12.9)
POTASSIUM SERPL-SCNC: 3.8 MMOL/L (ref 3.5–5.1)
PROT SERPL-MCNC: 7.2 G/DL (ref 6–8.4)
RBC # BLD AUTO: 4.41 M/UL (ref 4–5.4)
SODIUM SERPL-SCNC: 138 MMOL/L (ref 136–145)
WBC # BLD AUTO: 10.18 K/UL (ref 3.9–12.7)

## 2024-10-07 PROCEDURE — 3008F BODY MASS INDEX DOCD: CPT | Mod: CPTII,S$GLB,, | Performed by: SURGERY

## 2024-10-07 PROCEDURE — 80053 COMPREHEN METABOLIC PANEL: CPT | Performed by: SURGERY

## 2024-10-07 PROCEDURE — 99999 PR PBB SHADOW E&M-EST. PATIENT-LVL IV: CPT | Mod: PBBFAC,,, | Performed by: SURGERY

## 2024-10-07 PROCEDURE — 3044F HG A1C LEVEL LT 7.0%: CPT | Mod: CPTII,S$GLB,, | Performed by: SURGERY

## 2024-10-07 PROCEDURE — 36415 COLL VENOUS BLD VENIPUNCTURE: CPT | Performed by: SURGERY

## 2024-10-07 PROCEDURE — 1159F MED LIST DOCD IN RCRD: CPT | Mod: CPTII,S$GLB,, | Performed by: SURGERY

## 2024-10-07 PROCEDURE — 85025 COMPLETE CBC W/AUTO DIFF WBC: CPT | Performed by: SURGERY

## 2024-10-07 PROCEDURE — 3074F SYST BP LT 130 MM HG: CPT | Mod: CPTII,S$GLB,, | Performed by: SURGERY

## 2024-10-07 PROCEDURE — 99215 OFFICE O/P EST HI 40 MIN: CPT | Mod: S$GLB,,, | Performed by: SURGERY

## 2024-10-07 PROCEDURE — 3078F DIAST BP <80 MM HG: CPT | Mod: CPTII,S$GLB,, | Performed by: SURGERY

## 2024-10-07 RX ORDER — ONDANSETRON 8 MG/1
8 TABLET, ORALLY DISINTEGRATING ORAL EVERY 6 HOURS PRN
Qty: 30 TABLET | Refills: 0 | Status: SHIPPED | OUTPATIENT
Start: 2024-10-07

## 2024-10-07 RX ORDER — URSODIOL 500 MG/1
500 TABLET, FILM COATED ORAL DAILY
Qty: 180 TABLET | Refills: 0 | Status: SHIPPED | OUTPATIENT
Start: 2024-10-07 | End: 2025-04-05

## 2024-10-07 RX ORDER — GABAPENTIN 300 MG/1
CAPSULE ORAL
Qty: 11 CAPSULE | Refills: 0 | Status: SHIPPED | OUTPATIENT
Start: 2024-10-07

## 2024-10-07 RX ORDER — POLYETHYLENE GLYCOL 3350 17 G/17G
17 POWDER, FOR SOLUTION ORAL DAILY
Qty: 510 G | Refills: 0 | Status: SHIPPED | OUTPATIENT
Start: 2024-10-07 | End: 2024-11-06

## 2024-10-07 RX ORDER — OMEPRAZOLE 40 MG/1
40 CAPSULE, DELAYED RELEASE ORAL EVERY MORNING
Qty: 30 CAPSULE | Refills: 2 | Status: SHIPPED | OUTPATIENT
Start: 2024-10-07

## 2024-10-07 NOTE — PROGRESS NOTES
Subjective:  The patient is a 49 y.o. obese female who presents for pre op for Robotic-assisted Laparoscopic Sleeve Gastrectomy.      EGD: Impression:  - Normal esophagus. - Z-line regular, 37 cm from the incisors. No hiatal hernia.     UGI: Esophagus: Normal caliber, small sliding hiatal hernia, limited GE reflux.  Stomach: Normal. Duodenum: Normal.    EUNICE - is on CPAP therapy.    GERD - denies.    Migraines - continues on topamax. Has occasional breakthrough.    All workup has been reviewed in clinic today and there is nothing on the review that would prevent us from proceeding with surgery.    All questions were answered in clinic today prior to leaving.    Body mass index is 45.11 kg/m².       Patient Active Problem List    Diagnosis Date Noted    Weakness 05/22/2024    Stress incontinence 03/21/2024    Impaired fasting glucose 12/22/2021    Positive hepatitis C antibody test 06/09/2021    Chronic pain syndrome 06/09/2021    EUNICE (obstructive sleep apnea)     Other spondylosis, lumbar region 12/11/2020    History of herpes labialis 12/08/2020    Overactive bladder 12/08/2020    Class 3 severe obesity due to excess calories with serious comorbidity and body mass index (BMI) of 45.0 to 49.9 in adult 12/07/2020    Hyperlipidemia, mixed 09/18/2019    Generalized anxiety disorder 07/10/2019    Vitamin D deficiency 07/10/2019    Gait instability 07/10/2019    Lumbar radiculitis 07/13/2018    Hypothyroidism due to Hashimoto's thyroiditis 02/16/2017    Chronic bilateral low back pain with bilateral sciatica     Migraine headache     GERD (gastroesophageal reflux disease)     Narcolepsy cataplexy syndrome      Past Medical History:   Diagnosis Date    Affective disorder     sses Dr. Dial, psychiatry    Back pain     GERD (gastroesophageal reflux disease)     Headache(784.0)     Hyperlipidemia, mixed 09/18/2019    Influenza A 12/01/2022    - supportive care - albuterol inhaler - discussed proceed to the ER if severe  shortness of breath or chest pain. - Follow-up in office 2022 scheduled    Migraine headache     Narcolepsy without cataplexy(347.00)     Syncope 2024    - recommend Cardiology evaluation    Thyroid disease     hyperthyroidism    TMJ arthralgia       Past Surgical History:   Procedure Laterality Date    CAUDAL EPIDURAL STEROID INJECTION N/A 2018    Procedure: INJECTION, STEROID, CAUDAL, EPIDURAL;  Surgeon: Geoffrey Aleman MD;  Location: [facility] OR;  Service: Pain Management;  Laterality: N/A;     SECTION, CLASSIC      COSMETIC SURGERY      breast augmentation    ESOPHAGOGASTRODUODENOSCOPY N/A 2024    Procedure: EGD (ESOPHAGOGASTRODUODENOSCOPY);  Surgeon: Chris Morales MD;  [facility]  Service: Endoscopy;  Laterality: N/A;  referred by Lorene Jones np bmi 49, instructions sent to pt portal.  -pr call complete-tb    INJECTION OF ANESTHETIC AGENT INTO SACROILIAC JOINT Bilateral 2020    Procedure: BLOCK, SACROILIAC JOINT;  Surgeon: Geoffrey Aleman MD;  Location: [facility] OR;  Service: Pain Management;  Laterality: Bilateral;    SPINE SURGERY  2015    fusion L spine      (Not in a hospital admission)    Review of patient's allergies indicates:   Allergen Reactions    Tamiflu [oseltamivir] Itching and Swelling      Social History     Tobacco Use    Smoking status: Former     Current packs/day: 1.00     Average packs/day: 1 pack/day for 15.7 years (15.7 ttl pk-yrs)     Types: Cigarettes     Start date: 2014     Passive exposure: Never    Smokeless tobacco: Never   Substance Use Topics    Alcohol use: Yes     Comment: Social, rare      Family History   Problem Relation Name Age of Onset    Hypertension Mother      Kidney disease Mother      Sleep apnea Mother      Narcolepsy Father      Cancer Maternal Aunt          colon cancer gene    Cancer Paternal Aunt          lung ca        Review of Systems  Constitutional: negative for anorexia, chills  "and fatigue  Eyes: negative for icterus, irritation and redness  Respiratory: negative for cough and dyspnea on exertion  Cardiovascular: negative for chest pain and chest pressure/discomfort  Gastrointestinal: negative for abdominal pain, change in bowel habits, constipation and diarrhea  Musculoskeletal:negative for arthralgias and back pain  Neurological: negative for coordination problems and dizziness  Behavioral/Psych: negative for anxiety and bad mood    Objective:  Vital signs in last 24 hours:  Vitals:    10/07/24 1343   BP: 128/74   BP Location: Left arm   Patient Position: Sitting   Pulse: 89   SpO2: 96%   Weight: 108.3 kg (238 lb 12.1 oz)   Height: 5' 1" (1.549 m)          Weight History Current Weight Total Weight Loss   6/5/2024  10:39  lbs -273 lbs       General appearance: alert, appears stated age and cooperative  Head: Normocephalic, without obvious abnormality, atraumatic  Eyes: negative findings: lids and lashes normal and conjunctivae and sclerae normal  Lungs: non labored breathing  Heart: regular rate and rhythm  Abdomen: soft, non-tender  Extremities: extremities normal, atraumatic, no cyanosis or edema  Skin: Skin color, texture, turgor normal. No rashes or lesions  Neurologic: Grossly normal    Data Review:  Psych: Cleared  Nutrition: Cleared  PCP: Cleared  CXR: WNL  EKG: WNL  Labs: No abnormalities that would prevent proceeding with surgery.    Assessment/Plan:  Amanda was seen today for pre-op exam.    Diagnoses and all orders for this visit:    Class 3 severe obesity due to excess calories with serious comorbidity and body mass index (BMI) of 45.0 to 49.9 in adult    EUNICE (obstructive sleep apnea)    Hyperlipidemia, mixed    Stress incontinence      Morbid obesity with failure of conservative therapy.    I have personally discussed, in appropriate language, the potential complications of a Robotic-assisted Laparoscopic Sleeve Gastrectomy with possible hiatal hernia repair with Amanda " WENDI Navarrete. She is well aware of the serious complications of the surgery that include but are not limited to: bleeding (including possible need for transfusions), deep venous thrombosis, pulmonary embolism, pulmonary complications such as pneumonia, cardiac events, temporary or permanent stroke, damage to the spleen, bowel or other organs during surgery and the risk of death. Amanda is also aware of specific complications which apply in particular to bariatric procedures and can include but are not limited to: severe post-operative nausea, dysphagia, the leakage of gastric/ intestinal contents at the staple line and the development of an intra-abdominal abscess requiring drainage, need for additional surgery, severe intra-abdominal infection leading to sepsis and even death, wound complications such as hernias and wound infections, strictures, small bowel obstruction, disfiguring scars and failure to lose weight. Further, once again, it was reinforced that her bariatric procedure is a weight loss tool but ultimate success will rest on lifelong dietary and lifestyle changes. Inadequate weight loss and weight regain are absolutely possible and therefore she will need to treat obesity as the chronic disease it is and continue to optimize her efforts.    We discussed that our goal is to ameliorate her medical problems and not to obtain a specific body mass index. She understands the risks and benefits and wishes to proceed with the procedure. She has signed a consent form.     Will evaluate for a hiatal hernia as suggested by UGI and repair if indicated. She has no GERD but we discussed the up to 30% risk of new denovo GERD symptoms following sleeve gastrectomy that may require medications, lifestyle changes and even additional interventions. All questions answered.    I spent 45 minutes on this patient encounter. This includes times spent with the patient involving coordination of care and counseling, discussion of  the patient's condition, delineating the plan of management above, discussing the rationale / recommendations for treatment, chart review and documentation. All time accounted for was spent on the day of service.      Chris Morales MD FACS  Minimally Invasive General & Bariatric Surgery

## 2024-10-08 ENCOUNTER — PATIENT MESSAGE (OUTPATIENT)
Dept: BARIATRICS | Facility: CLINIC | Age: 49
End: 2024-10-08
Payer: COMMERCIAL

## 2024-10-09 ENCOUNTER — PATIENT MESSAGE (OUTPATIENT)
Dept: BARIATRICS | Facility: CLINIC | Age: 49
End: 2024-10-09

## 2024-10-09 ENCOUNTER — CLINICAL SUPPORT (OUTPATIENT)
Dept: BARIATRICS | Facility: CLINIC | Age: 49
End: 2024-10-09
Payer: COMMERCIAL

## 2024-10-09 DIAGNOSIS — E66.01 MORBID OBESITY WITH BMI OF 45.0-49.9, ADULT: ICD-10-CM

## 2024-10-09 DIAGNOSIS — Z71.3 DIETARY COUNSELING AND SURVEILLANCE: Primary | ICD-10-CM

## 2024-10-09 DIAGNOSIS — G47.33 OSA (OBSTRUCTIVE SLEEP APNEA): ICD-10-CM

## 2024-10-09 DIAGNOSIS — E78.2 HYPERLIPIDEMIA, MIXED: ICD-10-CM

## 2024-10-09 RX ORDER — GABAPENTIN 250 MG/5ML
300 SOLUTION ORAL 2 TIMES DAILY
OUTPATIENT
Start: 2024-10-09

## 2024-10-09 RX ORDER — ACETAMINOPHEN 10 MG/ML
1000 INJECTION, SOLUTION INTRAVENOUS ONCE
OUTPATIENT
Start: 2024-10-09 | End: 2024-10-09

## 2024-10-09 RX ORDER — SODIUM CHLORIDE 9 MG/ML
INJECTION, SOLUTION INTRAVENOUS CONTINUOUS
OUTPATIENT
Start: 2024-10-09

## 2024-10-09 RX ORDER — KETOROLAC TROMETHAMINE 15 MG/ML
15 INJECTION, SOLUTION INTRAMUSCULAR; INTRAVENOUS ONCE
OUTPATIENT
Start: 2024-10-09 | End: 2024-10-09

## 2024-10-09 RX ORDER — DEXTROMETHORPHAN/PSEUDOEPHED 2.5-7.5/.8
40 DROPS ORAL 4 TIMES DAILY PRN
OUTPATIENT
Start: 2024-10-09

## 2024-10-09 RX ORDER — ACETAMINOPHEN 650 MG/20.3ML
500 LIQUID ORAL
OUTPATIENT
Start: 2024-10-09

## 2024-10-09 RX ORDER — PROCHLORPERAZINE EDISYLATE 5 MG/ML
5 INJECTION INTRAMUSCULAR; INTRAVENOUS EVERY 6 HOURS PRN
OUTPATIENT
Start: 2024-10-09

## 2024-10-09 RX ORDER — SODIUM CHLORIDE, SODIUM LACTATE, POTASSIUM CHLORIDE, CALCIUM CHLORIDE 600; 310; 30; 20 MG/100ML; MG/100ML; MG/100ML; MG/100ML
INJECTION, SOLUTION INTRAVENOUS CONTINUOUS
OUTPATIENT
Start: 2024-10-09

## 2024-10-09 RX ORDER — ENOXAPARIN SODIUM 100 MG/ML
40 INJECTION SUBCUTANEOUS EVERY 12 HOURS
OUTPATIENT
Start: 2024-10-09

## 2024-10-09 RX ORDER — OXYCODONE HCL 5 MG/5 ML
5 SOLUTION, ORAL ORAL EVERY 6 HOURS PRN
OUTPATIENT
Start: 2024-10-09

## 2024-10-09 RX ORDER — MUPIROCIN 20 MG/G
OINTMENT TOPICAL
OUTPATIENT
Start: 2024-10-09

## 2024-10-09 RX ORDER — ONDANSETRON HYDROCHLORIDE 2 MG/ML
8 INJECTION, SOLUTION INTRAVENOUS EVERY 6 HOURS
OUTPATIENT
Start: 2024-10-09

## 2024-10-09 RX ORDER — ACETAMINOPHEN 650 MG/20.3ML
1000 LIQUID ORAL EVERY 8 HOURS
OUTPATIENT
Start: 2024-10-09

## 2024-10-09 RX ORDER — SCOLOPAMINE TRANSDERMAL SYSTEM 1 MG/1
1 PATCH, EXTENDED RELEASE TRANSDERMAL ONCE
OUTPATIENT
Start: 2024-10-09 | End: 2024-10-09

## 2024-10-09 RX ORDER — PANTOPRAZOLE SODIUM 40 MG/10ML
40 INJECTION, POWDER, LYOPHILIZED, FOR SOLUTION INTRAVENOUS 2 TIMES DAILY
OUTPATIENT
Start: 2024-10-09

## 2024-10-09 RX ORDER — HEPARIN SODIUM 5000 [USP'U]/ML
5000 INJECTION, SOLUTION INTRAVENOUS; SUBCUTANEOUS ONCE
OUTPATIENT
Start: 2024-10-09 | End: 2024-10-09

## 2024-10-09 RX ORDER — LIDOCAINE HYDROCHLORIDE 10 MG/ML
1 INJECTION, SOLUTION EPIDURAL; INFILTRATION; INTRACAUDAL; PERINEURAL ONCE
OUTPATIENT
Start: 2024-10-09 | End: 2024-10-09

## 2024-10-09 RX ORDER — FAMOTIDINE 10 MG/ML
20 INJECTION INTRAVENOUS ONCE
OUTPATIENT
Start: 2024-10-09 | End: 2024-10-09

## 2024-10-09 NOTE — PROGRESS NOTES
Audio Only Telehealth Visit     The patient location is: home ([place])  The chief complaint leading to consultation is: Pre-op liquid diet and nutrition instructions  Visit type: Virtual visit with audio only (telephone)  Total time spent with patient: 20 mins     The reason for the audio only service rather than synchronous audio and video virtual visit was related to technical difficulties or patient preference/necessity.     Each patient to whom I provide medical services by telemedicine is:  (1) informed of the relationship between the physician and patient and the respective role of any other health care provider with respect to management of the patient; and (2) notified that they may decline to receive medical services by telemedicine and may withdraw from such care at any time. Patient verbally consented to receive this service via voice-only telephone call.    This service was not originating from a related E/M service provided within the previous 7 days nor will  to an E/M service or procedure within the next 24 hours or my soonest available appointment.  Prevailing standard of care was able to be met in this audio-only visit.      NUTRITION NOTE    Bariatric Surgeon: Chris Morales M.D.  Reason for MNT Referral: Pre-op liquid diet and nutrition instructions  Procedure: sleeve gastrectomy  Date of Surgery:     Pre-op liquid diet  Pt using Premier Protein, Fairlife Nutrition Plan, ISOpure protein powder for preop liquid diet  Fluids include: Water, SF drink powder, and Gatorade Zero, bone broth    Discussion:  -  gms of protein per day  - 600-800 calories per day   - No more than 4 g sugar per protein shake/water/powder  - Sugar-free, decaffeinated, non-carbonated fluids  - No fruits, juices, yogurt or pudding on liquid diet  - No herbal supplements including green tea and fish oils for 2 weeks prior to surgery   - No vitamins for 1 week prior to surgery  - Stop GLP-1 1 week prior to  surgery    Post-op instructions  - Reviewed nutritional guidelines for post-surgery.    - 1 ounce medicine cups and tracking sheet provided for patient to measure and track fluid intake after surgery.  - Start with 1 oz clear liquids every 15 minutes following surgery, and to increase as tolerated. Aim for 48 fl oz clear fluids daily (includes clear protein drinks and protein soups).  - May begin sipping on protein shakes, as long as maintaining 48 fl oz non-caffeinated clear liquids per day.  - Reviewed bariatric vitamin/mineral regimen, starts 1 week after surgery as tolerated.  - Reviewed common nutritional concerns and prevention tips after bariatric surgery.  - Reminded not to lift anything greater than 10 lbs for 6 week post-surgery   - Encouraged PT to walk as much as tolerable after surgery.     Pt has the following vitamins and minerals to start taking one week after being discharged from hospital:    Bariatric Pal one a day multivitamin with 45 mg iron  Calcium citrate chews     Reviewed dosage and timing of vitamin/mineral guidelines.    Remind pt per nursing and medical team to inform our department if taking antibiotics within the 30 days post bariatric surgery or it any other surgeries/procedures are scheduled within 30 days after bariatric surgery.    Pt verbalized understanding of information provided with appropriate questions and comments.     SESSION TIME: 20 minutes

## 2024-10-18 ENCOUNTER — PATIENT MESSAGE (OUTPATIENT)
Dept: SLEEP MEDICINE | Facility: CLINIC | Age: 49
End: 2024-10-18
Payer: COMMERCIAL

## 2024-10-22 ENCOUNTER — ANESTHESIA EVENT (OUTPATIENT)
Dept: SURGERY | Facility: HOSPITAL | Age: 49
End: 2024-10-22
Payer: COMMERCIAL

## 2024-10-22 ENCOUNTER — TELEPHONE (OUTPATIENT)
Dept: BARIATRICS | Facility: CLINIC | Age: 49
End: 2024-10-22
Payer: COMMERCIAL

## 2024-10-22 NOTE — ANESTHESIA PREPROCEDURE EVALUATION
[facility]  Anesthesia Pre-Operative Evaluation   10/22/2024        Amanda Navarrete, 1975  [id number]  Procedure(s) (LRB):  XI ROBOTIC SLEEVE GASTRECTOMY (N/A)    Subjective    Amanda Navarrete is a 49 y.o. female w/ a significant PMHx of GERD, EUNICE on CPAP, obesity BMI 45, chronic migraines, hypothyroidism, small sliding hiatal hernia.    Patient now presents for above procedure(s).     Prev Airway: None documented.      Patient Active Problem List   Diagnosis    Chronic bilateral low back pain with bilateral sciatica    Migraine headache    GERD (gastroesophageal reflux disease)    Narcolepsy cataplexy syndrome    Hypothyroidism due to Hashimoto's thyroiditis    Lumbar radiculitis    Generalized anxiety disorder    Vitamin D deficiency    Gait instability    Hyperlipidemia, mixed    Class 3 severe obesity due to excess calories with serious comorbidity and body mass index (BMI) of 45.0 to 49.9 in adult    History of herpes labialis    Overactive bladder    Other spondylosis, lumbar region    EUNICE (obstructive sleep apnea)    Positive hepatitis C antibody test    Chronic pain syndrome    Impaired fasting glucose    Stress incontinence    Weakness       Review of patient's allergies indicates:   Allergen Reactions    Tamiflu [oseltamivir] Itching and Swelling       Current Inpatient Medications:       No current facility-administered medications on file prior to encounter.     Current Outpatient Medications on File Prior to Encounter   Medication Sig Dispense Refill    albuterol (PROVENTIL/VENTOLIN HFA) 90 mcg/actuation inhaler Inhale 1-2 puffs into the lungs every 4 (four) hours as needed for Wheezing or Shortness of Breath. Rescue 18 g 5    calcium-vitamin D3 (OS-RHONDA 500 + D3) 500 mg-5 mcg (200 unit) per tablet Take 1 tablet by mouth once daily.      clonazePAM (KLONOPIN) 0.5 MG tablet       cyanocobalamin (VITAMIN B-12) 1000 MCG tablet Take 100 mcg by mouth once  daily.      darifenacin (ENABLEX) 7.5 MG Tb24 Take 1 tablet (7.5 mg total) by mouth once daily. 30 tablet 11    diclofenac sodium (VOLTAREN) 1 % Gel Apply 2 g topically 4 (four) times daily as needed (pain). 100 g 0    DULoxetine (CYMBALTA) 60 MG capsule Take 1 capsule by mouth once daily.      ergocalciferol (VITAMIN D2) 50,000 unit Cap Take 1 capsule (50,000 Units total) by mouth twice a week. 24 capsule 3    fluticasone propionate (FLONASE) 50 mcg/actuation nasal spray 1 spray (50 mcg total) by Each Nostril route once daily. 16 g 0    levothyroxine (SYNTHROID) 88 MCG tablet Take 1 tablet (88 mcg total) by mouth daily before breakfast. 90 tablet 3    meclizine (ANTIVERT) 25 mg tablet Take 1 tablet (25 mg total) by mouth 3 (three) times daily as needed for Dizziness. 30 tablet 5    methocarbamol (ROBAXIN) 500 MG Tab Take 500 mg by mouth every evening.  2    MIRENA 20 mcg/24 hours (6 yrs) 52 mg IUD       modafiniL (PROVIGIL) 200 MG Tab Take 1 tablet by mouth every morning and ½ tablet at noon. 45 tablet 5    oxyCODONE-acetaminophen (PERCOCET) 5-325 mg per tablet Take 1 tablet by mouth 2 (two) times daily as needed.  0    rosuvastatin (CRESTOR) 10 MG tablet Take 1 tablet (10 mg total) by mouth once daily. 90 tablet 3    topiramate (TOPAMAX) 100 MG tablet Take 1 tablet by mouth once daily AND 1 ½ tablets every evening 225 tablet 3    valACYclovir (VALTREX) 1000 MG tablet Take 1 tablet by mouth every 12 hours as needed (cold sores). 60 tablet 3       Past Surgical History:   Procedure Laterality Date    CAUDAL EPIDURAL STEROID INJECTION N/A 2018    Procedure: INJECTION, STEROID, CAUDAL, EPIDURAL;  Surgeon: Geoffrey Aleman MD;  [facility];  Service: Pain Management;  Laterality: N/A;     SECTION, CLASSIC      COSMETIC SURGERY      breast augmentation    ESOPHAGOGASTRODUODENOSCOPY N/A 2024    Procedure: EGD (ESOPHAGOGASTRODUODENOSCOPY);  Surgeon: Chris Morales MD;   Location: [facility] ENDO (2ND FLR);  Service: Endoscopy;  Laterality: N/A;  referred by Lorene Jones np bmi 49, instructions sent to pt portal.cf  7/17-pr call complete-tb    INJECTION OF ANESTHETIC AGENT INTO SACROILIAC JOINT Bilateral 12/11/2020    Procedure: BLOCK, SACROILIAC JOINT;  Surgeon: Geoffrey Aleman MD;  Location: [facility] OR;  Service: Pain Management;  Laterality: Bilateral;    SPINE SURGERY  11/19/2015    fusion L spine       Social History:  Tobacco Use: Medium Risk (10/7/2024)    Patient History     Smoking Tobacco Use: Former     Smokeless Tobacco Use: Never     Passive Exposure: Never       Alcohol Use: Not At Risk (3/21/2024)    AUDIT-C     Frequency of Alcohol Consumption: Never     Average Number of Drinks: Patient does not drink     Frequency of Binge Drinking: Never       Objective    Vital Signs Range:  BMI Readings from Last 1 Encounters:   10/07/24 45.11 kg/m²               Significant Labs:        Component Value Date/Time    WBC 10.18 10/07/2024 1235    HGB 14.4 10/07/2024 1235    HCT 42.4 10/07/2024 1235     10/07/2024 1235     10/07/2024 1235    K 3.8 10/07/2024 1235     10/07/2024 1235    CO2 19 (L) 10/07/2024 1235    GLU 98 10/07/2024 1235    BUN 19 10/07/2024 1235    CREATININE 0.7 10/07/2024 1235    MG 2.2 06/05/2024 1135    PHOS 2.8 06/05/2024 1135    CALCIUM 9.8 10/07/2024 1235    ALBUMIN 3.9 10/07/2024 1235    PROT 7.2 10/07/2024 1235    ALKPHOS 101 10/07/2024 1235    BILITOT 0.5 10/07/2024 1235    AST 19 10/07/2024 1235    ALT 27 10/07/2024 1235    HGBA1C 5.5 06/05/2024 1135        Please see Results Review for additional labs.     Diagnostic Studies: All relevant studies, reviewed.      EKG:   Results for orders placed or performed in visit on 06/26/24   EKG 12-lead    Collection Time: 06/26/24 12:38 PM   Result Value Ref Range    QRS Duration 88 ms    [facility] QTC Calculation 390 ms    Narrative    Test Reason :     Vent. Rate : 083 BPM     Atrial Rate :  083 BPM     P-R Int : 150 ms          QRS Dur : 088 ms      QT Int : 332 ms       P-R-T Axes : 032 -06 038 degrees     QTc Int : 390 ms    Normal sinus rhythm  Nonspecific ST and T wave abnormality  Abnormal ECG  When compared with ECG of 05-JUN-2024 11:57,  No significant change was found  Confirmed by Yanick Osuna MD ([id number]) on 7/5/2024 12:36:19 PM    Referred By: AMARILIS LORENZO           Confirmed By:Yanick Osuna MD       ECHO:  No results found for this or any previous visit.            Pre-op Assessment    I have reviewed the Patient Summary Reports.     I have reviewed the Nursing Notes. I have reviewed the NPO Status.   I have reviewed the Medications.     Review of Systems  Anesthesia Hx:  No problems with previous Anesthesia             Denies Family Hx of Anesthesia complications.    Denies Personal Hx of Anesthesia complications.                    Cardiovascular:  Exercise tolerance: good                                             Pulmonary:        Sleep Apnea, CPAP                Hepatic/GI:     GERD, well controlled                Endocrine:        Morbid Obesity / BMI > 40  Psych:  Psychiatric History anxiety               Physical Exam  General: Alert and Oriented    Airway:  Mallampati: II / II  Mouth Opening: Normal  TM Distance: Normal  Tongue: Normal  Neck ROM: Normal ROM    Dental:  Intact    Chest/Lungs:  Clear to auscultation, Normal Respiratory Rate    Heart:  Rate: Normal  Rhythm: Regular Rhythm  Sounds: Normal    Anesthesia Plan  Type of Anesthesia, risks & benefits discussed:    Anesthesia Type: Gen ETT  Intra-op Monitoring Plan: Standard ASA Monitors  Post Op Pain Control Plan: multimodal analgesia and IV/PO Opioids PRN  Induction:  IV  Airway Plan: Video, Post-Induction  Informed Consent: Informed consent signed with the Patient and all parties understand the risks and agree with anesthesia plan.  All questions answered.   ASA Score: 2  Day of Surgery Review of History & Physical:  H&P Update referred to the surgeon/provider.    Ready For Surgery From Anesthesia Perspective.     .

## 2024-10-22 NOTE — TELEPHONE ENCOUNTER
Notified patient of arrival time to the [facility] 2[facility] floor [facility] at 0600 with expected surgery start time 0800 on 10/23/2024. Instructed patient regarding pre-op instructions including no protein shakes or sugar free clear liquids after midnight but can have a rare sip of water for comfort, showering and preop medications to hold/take per anesthesia/preop. Reminded pt to drink pre-surgery beverage or 8 oz water and take one dose of Gabapentin at 0530. Instructed patient on the s/s of dehydration and for patient to call at the first sign of dehydration. Informed patient that someone from bariatrics will call them 1 week post op to review diet/fluid intake and to ensure adequate hydration. Reminded patient not to take antibiotics for 30 days following surgery or schedule elective procedures involving anesthesia/sedation for 30 days following surgery unless checking with the bariatric clinic first. Pt verbalized understanding. Pt given office phone number for any additional questions/concerns.

## 2024-10-23 ENCOUNTER — ANESTHESIA (OUTPATIENT)
Dept: SURGERY | Facility: HOSPITAL | Age: 49
End: 2024-10-23
Payer: COMMERCIAL

## 2024-10-23 ENCOUNTER — HOSPITAL ENCOUNTER (INPATIENT)
Facility: HOSPITAL | Age: 49
LOS: 1 days | Discharge: HOME | DRG: 621 | End: 2024-10-24
Attending: SURGERY | Admitting: SURGERY
Payer: COMMERCIAL

## 2024-10-23 DIAGNOSIS — E66.813 CLASS 3 SEVERE OBESITY DUE TO EXCESS CALORIES WITH SERIOUS COMORBIDITY AND BODY MASS INDEX (BMI) OF 45.0 TO 49.9 IN ADULT: Primary | ICD-10-CM

## 2024-10-23 DIAGNOSIS — E78.2 HYPERLIPIDEMIA, MIXED: ICD-10-CM

## 2024-10-23 DIAGNOSIS — G47.411 NARCOLEPSY WITH CATAPLEXY: ICD-10-CM

## 2024-10-23 DIAGNOSIS — K21.9 GASTROESOPHAGEAL REFLUX DISEASE WITHOUT ESOPHAGITIS: ICD-10-CM

## 2024-10-23 DIAGNOSIS — E66.01 CLASS 3 SEVERE OBESITY DUE TO EXCESS CALORIES WITH SERIOUS COMORBIDITY AND BODY MASS INDEX (BMI) OF 45.0 TO 49.9 IN ADULT: Primary | ICD-10-CM

## 2024-10-23 DIAGNOSIS — E66.9 OBESITY, UNSPECIFIED CLASS, UNSPECIFIED OBESITY TYPE, UNSPECIFIED WHETHER SERIOUS COMORBIDITY PRESENT: ICD-10-CM

## 2024-10-23 DIAGNOSIS — G47.33 OSA (OBSTRUCTIVE SLEEP APNEA): ICD-10-CM

## 2024-10-23 LAB
B-HCG UR QL: NEGATIVE
B-HCG UR QL: NEGATIVE
CTP QC/QA: YES
CTP QC/QA: YES

## 2024-10-23 PROCEDURE — 63600175 PHARM REV CODE 636 W HCPCS

## 2024-10-23 PROCEDURE — 36000712 HC OR TIME LEV V 1ST 15 MIN: Performed by: SURGERY

## 2024-10-23 PROCEDURE — 37000009 HC ANESTHESIA EA ADD 15 MINS: Performed by: SURGERY

## 2024-10-23 PROCEDURE — 71000016 HC POSTOP RECOV ADDL HR: Performed by: SURGERY

## 2024-10-23 PROCEDURE — 25000003 PHARM REV CODE 250

## 2024-10-23 PROCEDURE — 37000008 HC ANESTHESIA 1ST 15 MINUTES: Performed by: SURGERY

## 2024-10-23 PROCEDURE — 25000003 PHARM REV CODE 250: Performed by: STUDENT IN AN ORGANIZED HEALTH CARE EDUCATION/TRAINING PROGRAM

## 2024-10-23 PROCEDURE — 71000033 HC RECOVERY, INTIAL HOUR: Performed by: SURGERY

## 2024-10-23 PROCEDURE — 88307 TISSUE EXAM BY PATHOLOGIST: CPT | Mod: 26,,, | Performed by: PATHOLOGY

## 2024-10-23 PROCEDURE — 71000015 HC POSTOP RECOV 1ST HR: Performed by: SURGERY

## 2024-10-23 PROCEDURE — 27201423 OPTIME MED/SURG SUP & DEVICES STERILE SUPPLY: Performed by: SURGERY

## 2024-10-23 PROCEDURE — 81025 URINE PREGNANCY TEST: CPT | Performed by: SURGERY

## 2024-10-23 PROCEDURE — 8E0W4CZ ROBOTIC ASSISTED PROCEDURE OF TRUNK REGION, PERCUTANEOUS ENDOSCOPIC APPROACH: ICD-10-PCS | Performed by: SURGERY

## 2024-10-23 PROCEDURE — 0DB64Z3 EXCISION OF STOMACH, PERCUTANEOUS ENDOSCOPIC APPROACH, VERTICAL: ICD-10-PCS | Performed by: SURGERY

## 2024-10-23 PROCEDURE — 94761 N-INVAS EAR/PLS OXIMETRY MLT: CPT

## 2024-10-23 PROCEDURE — 43775 LAP SLEEVE GASTRECTOMY: CPT | Mod: ,,, | Performed by: SURGERY

## 2024-10-23 PROCEDURE — 25000003 PHARM REV CODE 250: Performed by: SURGERY

## 2024-10-23 PROCEDURE — 99900035 HC TECH TIME PER 15 MIN (STAT)

## 2024-10-23 PROCEDURE — 88307 TISSUE EXAM BY PATHOLOGIST: CPT | Performed by: PATHOLOGY

## 2024-10-23 PROCEDURE — C1781 MESH (IMPLANTABLE): HCPCS | Performed by: SURGERY

## 2024-10-23 PROCEDURE — 94799 UNLISTED PULMONARY SVC/PX: CPT

## 2024-10-23 PROCEDURE — 36000713 HC OR TIME LEV V EA ADD 15 MIN: Performed by: SURGERY

## 2024-10-23 PROCEDURE — 11000001 HC ACUTE MED/SURG PRIVATE ROOM

## 2024-10-23 PROCEDURE — 63600175 PHARM REV CODE 636 W HCPCS: Performed by: SURGERY

## 2024-10-23 DEVICE — SEAMGUARD ESCHELON 60 MM.: Type: IMPLANTABLE DEVICE | Site: STOMACH | Status: FUNCTIONAL

## 2024-10-23 RX ORDER — SODIUM CHLORIDE 0.9 % (FLUSH) 0.9 %
10 SYRINGE (ML) INJECTION EVERY 6 HOURS PRN
Status: DISCONTINUED | OUTPATIENT
Start: 2024-10-23 | End: 2024-10-23 | Stop reason: HOSPADM

## 2024-10-23 RX ORDER — ENOXAPARIN SODIUM 100 MG/ML
40 INJECTION SUBCUTANEOUS EVERY 12 HOURS
Status: DISCONTINUED | OUTPATIENT
Start: 2024-10-23 | End: 2024-10-24 | Stop reason: HOSPADM

## 2024-10-23 RX ORDER — TOPIRAMATE 25 MG/1
100 TABLET ORAL DAILY
Status: DISCONTINUED | OUTPATIENT
Start: 2024-10-23 | End: 2024-10-24 | Stop reason: HOSPADM

## 2024-10-23 RX ORDER — CEFAZOLIN SODIUM 1 G/3ML
INJECTION, POWDER, FOR SOLUTION INTRAMUSCULAR; INTRAVENOUS
Status: DISCONTINUED | OUTPATIENT
Start: 2024-10-23 | End: 2024-10-23

## 2024-10-23 RX ORDER — DEXTROAMPHETAMINE SACCHARATE, AMPHETAMINE ASPARTATE MONOHYDRATE, DEXTROAMPHETAMINE SULFATE AND AMPHETAMINE SULFATE 5; 5; 5; 5 MG/1; MG/1; MG/1; MG/1
20 CAPSULE, EXTENDED RELEASE ORAL EVERY MORNING
Qty: 30 CAPSULE | Refills: 0 | Status: SHIPPED | OUTPATIENT
Start: 2024-10-23

## 2024-10-23 RX ORDER — ONDANSETRON HYDROCHLORIDE 2 MG/ML
INJECTION, SOLUTION INTRAVENOUS
Status: DISCONTINUED | OUTPATIENT
Start: 2024-10-23 | End: 2024-10-23

## 2024-10-23 RX ORDER — ROCURONIUM BROMIDE 10 MG/ML
INJECTION, SOLUTION INTRAVENOUS
Status: DISCONTINUED | OUTPATIENT
Start: 2024-10-23 | End: 2024-10-23

## 2024-10-23 RX ORDER — INDOCYANINE GREEN AND WATER 25 MG
5 KIT INJECTION
Status: DISCONTINUED | OUTPATIENT
Start: 2024-10-23 | End: 2024-10-23 | Stop reason: HOSPADM

## 2024-10-23 RX ORDER — SCOLOPAMINE TRANSDERMAL SYSTEM 1 MG/1
1 PATCH, EXTENDED RELEASE TRANSDERMAL ONCE
Status: DISCONTINUED | OUTPATIENT
Start: 2024-10-23 | End: 2024-10-24 | Stop reason: HOSPADM

## 2024-10-23 RX ORDER — ACETAMINOPHEN 650 MG/20.3ML
1000 LIQUID ORAL EVERY 8 HOURS
Status: DISCONTINUED | OUTPATIENT
Start: 2024-10-23 | End: 2024-10-24 | Stop reason: HOSPADM

## 2024-10-23 RX ORDER — PROPOFOL 10 MG/ML
VIAL (ML) INTRAVENOUS
Status: DISCONTINUED | OUTPATIENT
Start: 2024-10-23 | End: 2024-10-23

## 2024-10-23 RX ORDER — SODIUM CHLORIDE, SODIUM LACTATE, POTASSIUM CHLORIDE, CALCIUM CHLORIDE 600; 310; 30; 20 MG/100ML; MG/100ML; MG/100ML; MG/100ML
INJECTION, SOLUTION INTRAVENOUS CONTINUOUS
Status: DISCONTINUED | OUTPATIENT
Start: 2024-10-23 | End: 2024-10-24

## 2024-10-23 RX ORDER — ONDANSETRON HYDROCHLORIDE 2 MG/ML
8 INJECTION, SOLUTION INTRAVENOUS EVERY 6 HOURS
Status: DISCONTINUED | OUTPATIENT
Start: 2024-10-23 | End: 2024-10-24 | Stop reason: HOSPADM

## 2024-10-23 RX ORDER — MIDAZOLAM HYDROCHLORIDE 1 MG/ML
INJECTION INTRAMUSCULAR; INTRAVENOUS
Status: DISCONTINUED | OUTPATIENT
Start: 2024-10-23 | End: 2024-10-23

## 2024-10-23 RX ORDER — LIDOCAINE HYDROCHLORIDE 10 MG/ML
1 INJECTION, SOLUTION EPIDURAL; INFILTRATION; INTRACAUDAL; PERINEURAL ONCE
Status: COMPLETED | OUTPATIENT
Start: 2024-10-23 | End: 2024-10-23

## 2024-10-23 RX ORDER — ACETAMINOPHEN 650 MG/20.3ML
500 LIQUID ORAL
Status: DISCONTINUED | OUTPATIENT
Start: 2024-10-23 | End: 2024-10-24 | Stop reason: HOSPADM

## 2024-10-23 RX ORDER — HEPARIN SODIUM 5000 [USP'U]/ML
5000 INJECTION, SOLUTION INTRAVENOUS; SUBCUTANEOUS ONCE
Status: COMPLETED | OUTPATIENT
Start: 2024-10-23 | End: 2024-10-23

## 2024-10-23 RX ORDER — GABAPENTIN 250 MG/5ML
300 SOLUTION ORAL 2 TIMES DAILY
Status: DISCONTINUED | OUTPATIENT
Start: 2024-10-23 | End: 2024-10-24 | Stop reason: HOSPADM

## 2024-10-23 RX ORDER — SODIUM CHLORIDE 9 MG/ML
INJECTION, SOLUTION INTRAVENOUS CONTINUOUS
Status: DISCONTINUED | OUTPATIENT
Start: 2024-10-23 | End: 2024-10-24

## 2024-10-23 RX ORDER — DEXTROMETHORPHAN/PSEUDOEPHED 2.5-7.5/.8
40 DROPS ORAL 4 TIMES DAILY PRN
Status: DISCONTINUED | OUTPATIENT
Start: 2024-10-23 | End: 2024-10-24 | Stop reason: HOSPADM

## 2024-10-23 RX ORDER — DULOXETIN HYDROCHLORIDE 60 MG/1
60 CAPSULE, DELAYED RELEASE ORAL DAILY
Status: DISCONTINUED | OUTPATIENT
Start: 2024-10-24 | End: 2024-10-24 | Stop reason: HOSPADM

## 2024-10-23 RX ORDER — HYDROMORPHONE HYDROCHLORIDE 1 MG/ML
0.2 INJECTION, SOLUTION INTRAMUSCULAR; INTRAVENOUS; SUBCUTANEOUS EVERY 5 MIN PRN
Status: DISCONTINUED | OUTPATIENT
Start: 2024-10-23 | End: 2024-10-23 | Stop reason: HOSPADM

## 2024-10-23 RX ORDER — PANTOPRAZOLE SODIUM 40 MG/10ML
40 INJECTION, POWDER, LYOPHILIZED, FOR SOLUTION INTRAVENOUS 2 TIMES DAILY
Status: DISCONTINUED | OUTPATIENT
Start: 2024-10-23 | End: 2024-10-24 | Stop reason: HOSPADM

## 2024-10-23 RX ORDER — FENTANYL CITRATE 50 UG/ML
INJECTION, SOLUTION INTRAMUSCULAR; INTRAVENOUS
Status: DISCONTINUED | OUTPATIENT
Start: 2024-10-23 | End: 2024-10-23

## 2024-10-23 RX ORDER — DEXAMETHASONE SODIUM PHOSPHATE 4 MG/ML
INJECTION, SOLUTION INTRA-ARTICULAR; INTRALESIONAL; INTRAMUSCULAR; INTRAVENOUS; SOFT TISSUE
Status: DISCONTINUED | OUTPATIENT
Start: 2024-10-23 | End: 2024-10-23

## 2024-10-23 RX ORDER — PROCHLORPERAZINE EDISYLATE 5 MG/ML
5 INJECTION INTRAMUSCULAR; INTRAVENOUS EVERY 6 HOURS PRN
Status: DISCONTINUED | OUTPATIENT
Start: 2024-10-23 | End: 2024-10-24 | Stop reason: HOSPADM

## 2024-10-23 RX ORDER — OXYCODONE HCL 5 MG/5 ML
5 SOLUTION, ORAL ORAL EVERY 6 HOURS PRN
Status: DISCONTINUED | OUTPATIENT
Start: 2024-10-23 | End: 2024-10-24 | Stop reason: HOSPADM

## 2024-10-23 RX ORDER — MUPIROCIN 20 MG/G
OINTMENT TOPICAL
Status: DISCONTINUED | OUTPATIENT
Start: 2024-10-23 | End: 2024-10-23 | Stop reason: HOSPADM

## 2024-10-23 RX ORDER — LEVOTHYROXINE SODIUM 88 UG/1
88 TABLET ORAL
Status: DISCONTINUED | OUTPATIENT
Start: 2024-10-24 | End: 2024-10-24 | Stop reason: HOSPADM

## 2024-10-23 RX ORDER — ACETAMINOPHEN 10 MG/ML
1000 INJECTION, SOLUTION INTRAVENOUS ONCE
Status: COMPLETED | OUTPATIENT
Start: 2024-10-23 | End: 2024-10-23

## 2024-10-23 RX ORDER — FAMOTIDINE 10 MG/ML
20 INJECTION INTRAVENOUS ONCE
Status: COMPLETED | OUTPATIENT
Start: 2024-10-23 | End: 2024-10-23

## 2024-10-23 RX ORDER — LIDOCAINE HYDROCHLORIDE 20 MG/ML
INJECTION, SOLUTION EPIDURAL; INFILTRATION; INTRACAUDAL; PERINEURAL
Status: DISCONTINUED | OUTPATIENT
Start: 2024-10-23 | End: 2024-10-23

## 2024-10-23 RX ORDER — KETAMINE HCL IN 0.9 % NACL 50 MG/5 ML
SYRINGE (ML) INTRAVENOUS
Status: DISCONTINUED | OUTPATIENT
Start: 2024-10-23 | End: 2024-10-23

## 2024-10-23 RX ORDER — PHENYLEPHRINE HCL IN 0.9% NACL 1 MG/10 ML
SYRINGE (ML) INTRAVENOUS
Status: DISCONTINUED | OUTPATIENT
Start: 2024-10-23 | End: 2024-10-23

## 2024-10-23 RX ORDER — HALOPERIDOL 5 MG/ML
0.5 INJECTION INTRAMUSCULAR EVERY 10 MIN PRN
Status: COMPLETED | OUTPATIENT
Start: 2024-10-23 | End: 2024-10-23

## 2024-10-23 RX ORDER — DEXTROAMPHETAMINE SACCHARATE, AMPHETAMINE ASPARTATE, DEXTROAMPHETAMINE SULFATE AND AMPHETAMINE SULFATE 5; 5; 5; 5 MG/1; MG/1; MG/1; MG/1
1 TABLET ORAL 2 TIMES DAILY
Qty: 60 TABLET | Refills: 0 | Status: SHIPPED | OUTPATIENT
Start: 2024-10-23

## 2024-10-23 RX ORDER — BUPIVACAINE HYDROCHLORIDE AND EPINEPHRINE 2.5; 5 MG/ML; UG/ML
INJECTION, SOLUTION EPIDURAL; INFILTRATION; INTRACAUDAL; PERINEURAL
Status: DISCONTINUED | OUTPATIENT
Start: 2024-10-23 | End: 2024-10-23 | Stop reason: HOSPADM

## 2024-10-23 RX ORDER — LIDOCAINE HYDROCHLORIDE 10 MG/ML
INJECTION, SOLUTION EPIDURAL; INFILTRATION; INTRACAUDAL; PERINEURAL
Status: DISCONTINUED | OUTPATIENT
Start: 2024-10-23 | End: 2024-10-23 | Stop reason: HOSPADM

## 2024-10-23 RX ORDER — KETOROLAC TROMETHAMINE 15 MG/ML
15 INJECTION, SOLUTION INTRAMUSCULAR; INTRAVENOUS ONCE
Status: COMPLETED | OUTPATIENT
Start: 2024-10-23 | End: 2024-10-23

## 2024-10-23 RX ORDER — GLUCAGON 1 MG
1 KIT INJECTION
Status: DISCONTINUED | OUTPATIENT
Start: 2024-10-23 | End: 2024-10-23 | Stop reason: HOSPADM

## 2024-10-23 RX ADMIN — ACETAMINOPHEN 999.01 MG: 650 SOLUTION ORAL at 09:10

## 2024-10-23 RX ADMIN — DEXAMETHASONE SODIUM PHOSPHATE 8 MG: 4 INJECTION INTRA-ARTICULAR; INTRALESIONAL; INTRAMUSCULAR; INTRAVENOUS; SOFT TISSUE at 08:10

## 2024-10-23 RX ADMIN — Medication 100 MCG: at 08:10

## 2024-10-23 RX ADMIN — ACETAMINOPHEN 1000 MG: 10 INJECTION, SOLUTION INTRAVENOUS at 07:10

## 2024-10-23 RX ADMIN — FAMOTIDINE 20 MG: 10 INJECTION, SOLUTION INTRAVENOUS at 07:10

## 2024-10-23 RX ADMIN — ENOXAPARIN SODIUM 40 MG: 40 INJECTION SUBCUTANEOUS at 09:10

## 2024-10-23 RX ADMIN — MIDAZOLAM 2 MG: 1 INJECTION INTRAMUSCULAR; INTRAVENOUS at 07:10

## 2024-10-23 RX ADMIN — HALOPERIDOL LACTATE 0.5 MG: 5 INJECTION, SOLUTION INTRAMUSCULAR at 10:10

## 2024-10-23 RX ADMIN — KETOROLAC TROMETHAMINE 15 MG: 15 INJECTION, SOLUTION INTRAMUSCULAR; INTRAVENOUS at 07:10

## 2024-10-23 RX ADMIN — FENTANYL CITRATE 25 MCG: 50 INJECTION, SOLUTION INTRAMUSCULAR; INTRAVENOUS at 09:10

## 2024-10-23 RX ADMIN — ONDANSETRON 8 MG: 2 INJECTION INTRAMUSCULAR; INTRAVENOUS at 11:10

## 2024-10-23 RX ADMIN — Medication 200 MCG: at 08:10

## 2024-10-23 RX ADMIN — ONDANSETRON 8 MG: 2 INJECTION INTRAMUSCULAR; INTRAVENOUS at 05:10

## 2024-10-23 RX ADMIN — Medication 15 MG: at 09:10

## 2024-10-23 RX ADMIN — SODIUM CHLORIDE, POTASSIUM CHLORIDE, SODIUM LACTATE AND CALCIUM CHLORIDE: 600; 310; 30; 20 INJECTION, SOLUTION INTRAVENOUS at 10:10

## 2024-10-23 RX ADMIN — TOPIRAMATE 150 MG: 100 TABLET, FILM COATED ORAL at 09:10

## 2024-10-23 RX ADMIN — GABAPENTIN 300 MG: 250 SOLUTION ORAL at 10:10

## 2024-10-23 RX ADMIN — PROPOFOL 150 MG: 10 INJECTION, EMULSION INTRAVENOUS at 08:10

## 2024-10-23 RX ADMIN — SUGAMMADEX 200 MG: 100 INJECTION, SOLUTION INTRAVENOUS at 09:10

## 2024-10-23 RX ADMIN — SCOPALAMINE 1 PATCH: 1 PATCH, EXTENDED RELEASE TRANSDERMAL at 07:10

## 2024-10-23 RX ADMIN — CEFAZOLIN 2 G: 330 INJECTION, POWDER, FOR SOLUTION INTRAMUSCULAR; INTRAVENOUS at 08:10

## 2024-10-23 RX ADMIN — ROCURONIUM BROMIDE 60 MG: 10 INJECTION INTRAVENOUS at 08:10

## 2024-10-23 RX ADMIN — GABAPENTIN 300 MG: 250 SOLUTION ORAL at 09:10

## 2024-10-23 RX ADMIN — FENTANYL CITRATE 50 MCG: 50 INJECTION, SOLUTION INTRAMUSCULAR; INTRAVENOUS at 08:10

## 2024-10-23 RX ADMIN — PANTOPRAZOLE SODIUM 40 MG: 40 INJECTION, POWDER, LYOPHILIZED, FOR SOLUTION INTRAVENOUS at 09:10

## 2024-10-23 RX ADMIN — Medication 25 MG: at 08:10

## 2024-10-23 RX ADMIN — MUPIROCIN: 20 OINTMENT TOPICAL at 07:10

## 2024-10-23 RX ADMIN — SODIUM CHLORIDE, SODIUM GLUCONATE, SODIUM ACETATE, POTASSIUM CHLORIDE, MAGNESIUM CHLORIDE, SODIUM PHOSPHATE, DIBASIC, AND POTASSIUM PHOSPHATE: .53; .5; .37; .037; .03; .012; .00082 INJECTION, SOLUTION INTRAVENOUS at 07:10

## 2024-10-23 RX ADMIN — HYDROMORPHONE HYDROCHLORIDE 0.2 MG: 1 INJECTION, SOLUTION INTRAMUSCULAR; INTRAVENOUS; SUBCUTANEOUS at 10:10

## 2024-10-23 RX ADMIN — ONDANSETRON 4 MG: 2 INJECTION INTRAMUSCULAR; INTRAVENOUS at 09:10

## 2024-10-23 RX ADMIN — GLYCOPYRROLATE 0.2 MG: 0.2 INJECTION, SOLUTION INTRAMUSCULAR; INTRAVENOUS at 08:10

## 2024-10-23 RX ADMIN — Medication 200 MCG: at 09:10

## 2024-10-23 RX ADMIN — HEPARIN SODIUM 5000 UNITS: 5000 INJECTION INTRAVENOUS; SUBCUTANEOUS at 07:10

## 2024-10-23 RX ADMIN — PANTOPRAZOLE SODIUM 40 MG: 40 INJECTION, POWDER, LYOPHILIZED, FOR SOLUTION INTRAVENOUS at 10:10

## 2024-10-23 RX ADMIN — ROCURONIUM BROMIDE 20 MG: 10 INJECTION INTRAVENOUS at 08:10

## 2024-10-23 RX ADMIN — LIDOCAINE HYDROCHLORIDE 80 MG: 20 INJECTION, SOLUTION EPIDURAL; INFILTRATION; INTRACAUDAL at 08:10

## 2024-10-23 RX ADMIN — LIDOCAINE HYDROCHLORIDE 10 MG: 10 INJECTION, SOLUTION EPIDURAL; INFILTRATION; INTRACAUDAL; PERINEURAL at 07:10

## 2024-10-23 NOTE — ANESTHESIA PROCEDURE NOTES
Intubation    Date/Time: 10/23/2024 8:06 AM    Performed by: Black Castaneda DO  Authorized by: Olivier Sequeira MD    Intubation:     Induction:  Intravenous    Intubated:  Postinduction    Mask Ventilation:  Easy mask    Attempts:  1    Attempted By:  Student    Method of Intubation:  Video laryngoscopy    Blade:  Young 3    Laryngeal View Grade: Grade I - full view of cords      Difficult Airway Encountered?: No      Complications:  None    Airway Device:  Oral endotracheal tube    Airway Device Size:  7.0    Tube secured:  20    Secured at:  The lips    Placement Verified By:  Capnometry    Complicating Factors:  Obesity and short neck    Findings Post-Intubation:  BS equal bilateral and atraumatic/condition of teeth unchanged

## 2024-10-23 NOTE — TRANSFER OF CARE
"Anesthesia Transfer of Care Note    Patient: Amanda Navarrete    Procedure(s) Performed: Procedure(s) (LRB):  XI ROBOTIC SLEEVE GASTRECTOMY (N/A)    Patient location: PACU    Anesthesia Type: general    Transport from OR: Transported from OR on 6-10 L/min O2 by face mask with adequate spontaneous ventilation    Post pain: adequate analgesia    Post assessment: no apparent anesthetic complications and tolerated procedure well    Post vital signs: stable    Level of consciousness: awake    Nausea/Vomiting: no nausea/vomiting    Complications: none    Transfer of care protocol was followed      Last vitals: Visit Vitals  BP (!) 110/56 (BP Location: Right arm, Patient Position: Lying)   Pulse 85   Temp 36.6 °C (97.9 °F) (Temporal)   Resp 17   Ht 5' 1" (1.549 m)   Wt 104.9 kg (231 lb 4.2 oz)   SpO2 98%   Breastfeeding No   BMI 43.70 kg/m²     "

## 2024-10-24 ENCOUNTER — DOCUMENTATION ONLY (OUTPATIENT)
Dept: BARIATRICS | Facility: CLINIC | Age: 49
End: 2024-10-24
Payer: COMMERCIAL

## 2024-10-24 VITALS
HEIGHT: 61 IN | WEIGHT: 231.25 LBS | TEMPERATURE: 98 F | RESPIRATION RATE: 16 BRPM | BODY MASS INDEX: 43.66 KG/M2 | OXYGEN SATURATION: 98 % | DIASTOLIC BLOOD PRESSURE: 76 MMHG | HEART RATE: 71 BPM | SYSTOLIC BLOOD PRESSURE: 127 MMHG

## 2024-10-24 LAB
ANION GAP SERPL CALC-SCNC: 10 MMOL/L (ref 8–16)
BASOPHILS # BLD AUTO: 0.03 K/UL (ref 0–0.2)
BASOPHILS NFR BLD: 0.3 % (ref 0–1.9)
BUN SERPL-MCNC: 7 MG/DL (ref 6–20)
CALCIUM SERPL-MCNC: 8.3 MG/DL (ref 8.7–10.5)
CHLORIDE SERPL-SCNC: 113 MMOL/L (ref 95–110)
CO2 SERPL-SCNC: 18 MMOL/L (ref 23–29)
CREAT SERPL-MCNC: 0.7 MG/DL (ref 0.5–1.4)
DIFFERENTIAL METHOD BLD: ABNORMAL
EOSINOPHIL # BLD AUTO: 0 K/UL (ref 0–0.5)
EOSINOPHIL NFR BLD: 0.1 % (ref 0–8)
ERYTHROCYTE [DISTWIDTH] IN BLOOD BY AUTOMATED COUNT: 14.6 % (ref 11.5–14.5)
EST. GFR  (NO RACE VARIABLE): >60 ML/MIN/1.73 M^2
GLUCOSE SERPL-MCNC: 69 MG/DL (ref 70–110)
HCT VFR BLD AUTO: 38.2 % (ref 37–48.5)
HGB BLD-MCNC: 12.6 G/DL (ref 12–16)
IMM GRANULOCYTES # BLD AUTO: 0.03 K/UL (ref 0–0.04)
IMM GRANULOCYTES NFR BLD AUTO: 0.3 % (ref 0–0.5)
LYMPHOCYTES # BLD AUTO: 1.8 K/UL (ref 1–4.8)
LYMPHOCYTES NFR BLD: 16.9 % (ref 18–48)
MAGNESIUM SERPL-MCNC: 2 MG/DL (ref 1.6–2.6)
MCH RBC QN AUTO: 32.9 PG (ref 27–31)
MCHC RBC AUTO-ENTMCNC: 33 G/DL (ref 32–36)
MCV RBC AUTO: 100 FL (ref 82–98)
MONOCYTES # BLD AUTO: 1 K/UL (ref 0.3–1)
MONOCYTES NFR BLD: 9.6 % (ref 4–15)
NEUTROPHILS # BLD AUTO: 7.7 K/UL (ref 1.8–7.7)
NEUTROPHILS NFR BLD: 72.8 % (ref 38–73)
NRBC BLD-RTO: 0 /100 WBC
PHOSPHATE SERPL-MCNC: 2.1 MG/DL (ref 2.7–4.5)
PLATELET # BLD AUTO: 216 K/UL (ref 150–450)
PMV BLD AUTO: 12.7 FL (ref 9.2–12.9)
POTASSIUM SERPL-SCNC: 3 MMOL/L (ref 3.5–5.1)
RBC # BLD AUTO: 3.83 M/UL (ref 4–5.4)
SODIUM SERPL-SCNC: 141 MMOL/L (ref 136–145)
WBC # BLD AUTO: 10.6 K/UL (ref 3.9–12.7)

## 2024-10-24 PROCEDURE — 83735 ASSAY OF MAGNESIUM: CPT

## 2024-10-24 PROCEDURE — 80048 BASIC METABOLIC PNL TOTAL CA: CPT

## 2024-10-24 PROCEDURE — 85025 COMPLETE CBC W/AUTO DIFF WBC: CPT

## 2024-10-24 PROCEDURE — 84100 ASSAY OF PHOSPHORUS: CPT

## 2024-10-24 PROCEDURE — 36415 COLL VENOUS BLD VENIPUNCTURE: CPT

## 2024-10-24 PROCEDURE — 25000003 PHARM REV CODE 250: Performed by: STUDENT IN AN ORGANIZED HEALTH CARE EDUCATION/TRAINING PROGRAM

## 2024-10-24 PROCEDURE — 25000003 PHARM REV CODE 250: Performed by: SURGERY

## 2024-10-24 PROCEDURE — 99024 POSTOP FOLLOW-UP VISIT: CPT | Mod: ,,, | Performed by: SURGERY

## 2024-10-24 PROCEDURE — 63600175 PHARM REV CODE 636 W HCPCS

## 2024-10-24 PROCEDURE — 25000003 PHARM REV CODE 250

## 2024-10-24 RX ORDER — OXYCODONE HCL 5 MG/5 ML
5 SOLUTION, ORAL ORAL EVERY 4 HOURS PRN
Qty: 25 ML | Refills: 0 | Status: SHIPPED | OUTPATIENT
Start: 2024-10-24

## 2024-10-24 RX ORDER — SODIUM,POTASSIUM PHOSPHATES 280-250MG
1 POWDER IN PACKET (EA) ORAL ONCE
Status: COMPLETED | OUTPATIENT
Start: 2024-10-24 | End: 2024-10-24

## 2024-10-24 RX ADMIN — ONDANSETRON 8 MG: 2 INJECTION INTRAMUSCULAR; INTRAVENOUS at 02:10

## 2024-10-24 RX ADMIN — GABAPENTIN 300 MG: 250 SOLUTION ORAL at 09:10

## 2024-10-24 RX ADMIN — LEVOTHYROXINE SODIUM 88 MCG: 88 TABLET ORAL at 05:10

## 2024-10-24 RX ADMIN — ENOXAPARIN SODIUM 40 MG: 40 INJECTION SUBCUTANEOUS at 09:10

## 2024-10-24 RX ADMIN — ONDANSETRON 8 MG: 2 INJECTION INTRAMUSCULAR; INTRAVENOUS at 05:10

## 2024-10-24 RX ADMIN — POTASSIUM PHOSPHATE, MONOBASIC AND POTASSIUM PHOSPHATE, DIBASIC 30 MMOL: 224; 236 INJECTION, SOLUTION, CONCENTRATE INTRAVENOUS at 09:10

## 2024-10-24 RX ADMIN — ACETAMINOPHEN 999.01 MG: 650 SOLUTION ORAL at 05:10

## 2024-10-24 RX ADMIN — POTASSIUM & SODIUM PHOSPHATES POWDER PACK 280-160-250 MG 1 PACKET: 280-160-250 PACK at 09:10

## 2024-10-24 RX ADMIN — PANTOPRAZOLE SODIUM 40 MG: 40 INJECTION, POWDER, LYOPHILIZED, FOR SOLUTION INTRAVENOUS at 10:10

## 2024-10-24 RX ADMIN — ACETAMINOPHEN 999.01 MG: 650 SOLUTION ORAL at 02:10

## 2024-10-24 RX ADMIN — TOPIRAMATE 100 MG: 25 TABLET, FILM COATED ORAL at 10:10

## 2024-10-24 NOTE — ANESTHESIA POSTPROCEDURE EVALUATION
Anesthesia Post Evaluation    Patient: Amanda Navarrete    Procedure(s) Performed: Procedure(s) (LRB):  XI ROBOTIC SLEEVE GASTRECTOMY (N/A)    Final Anesthesia Type: general      Patient location during evaluation: PACU  Patient participation: Yes- Able to Participate  Level of consciousness: awake and alert  Post-procedure vital signs: reviewed and stable  Pain management: adequate  Airway patency: patent    PONV status: None or treated.  Anesthetic complications: no      Cardiovascular status: hemodynamically stable  Respiratory status: spontaneous ventilation  Hydration status: euvolemic  Follow-up not needed.          Vitals Value Taken Time   /80 10/24/24 1132   Temp 37 °C (98.6 °F) 10/24/24 1132   Pulse 72 10/24/24 1132   Resp 16 10/24/24 1132   SpO2 95 % 10/24/24 1132         Event Time   Out of Recovery 10:15:00         Pain/Jo Score: Pain Rating Prior to Med Admin: 2 (10/24/2024 10:00 AM)  Pain Rating Post Med Admin: 1 (10/24/2024  6:06 AM)  Jo Score: 10 (10/23/2024 10:15 AM)

## 2024-10-24 NOTE — PROGRESS NOTES
Monitor. Rounded on pt s/p bariatric surgery. Provided pt postooperative review material, Immediate Post Bariatric Surgery Highlights. All information reviewed and questions answered.  Pt displayed underatanding.

## 2024-10-25 ENCOUNTER — PATIENT OUTREACH (OUTPATIENT)
Dept: ADMINISTRATIVE | Facility: CLINIC | Age: 49
End: 2024-10-25
Payer: COMMERCIAL

## 2024-10-25 LAB
FINAL PATHOLOGIC DIAGNOSIS: NORMAL
GROSS: NORMAL
Lab: NORMAL

## 2024-10-27 ENCOUNTER — NURSE TRIAGE (OUTPATIENT)
Dept: ADMINISTRATIVE | Facility: CLINIC | Age: 49
End: 2024-10-27
Payer: COMMERCIAL

## 2024-10-28 ENCOUNTER — PATIENT MESSAGE (OUTPATIENT)
Dept: BARIATRICS | Facility: CLINIC | Age: 49
End: 2024-10-28
Payer: COMMERCIAL

## 2024-10-31 ENCOUNTER — CLINICAL SUPPORT (OUTPATIENT)
Dept: BARIATRICS | Facility: CLINIC | Age: 49
End: 2024-10-31
Payer: COMMERCIAL

## 2024-10-31 DIAGNOSIS — Z71.3 DIETARY COUNSELING AND SURVEILLANCE: Primary | ICD-10-CM

## 2024-10-31 DIAGNOSIS — G47.411 NARCOLEPSY WITH CATAPLEXY: ICD-10-CM

## 2024-10-31 PROCEDURE — 99499 UNLISTED E&M SERVICE: CPT | Mod: 95,,, | Performed by: DIETITIAN, REGISTERED

## 2024-10-31 RX ORDER — DEXTROAMPHETAMINE SACCHARATE, AMPHETAMINE ASPARTATE MONOHYDRATE, DEXTROAMPHETAMINE SULFATE AND AMPHETAMINE SULFATE 5; 5; 5; 5 MG/1; MG/1; MG/1; MG/1
20 CAPSULE, EXTENDED RELEASE ORAL EVERY MORNING
Qty: 30 CAPSULE | Refills: 0 | Status: SHIPPED | OUTPATIENT
Start: 2024-10-31

## 2024-10-31 RX ORDER — DEXTROAMPHETAMINE SACCHARATE, AMPHETAMINE ASPARTATE, DEXTROAMPHETAMINE SULFATE AND AMPHETAMINE SULFATE 5; 5; 5; 5 MG/1; MG/1; MG/1; MG/1
1 TABLET ORAL 2 TIMES DAILY
Qty: 60 TABLET | Refills: 0 | Status: SHIPPED | OUTPATIENT
Start: 2024-10-31

## 2024-11-02 ENCOUNTER — PATIENT MESSAGE (OUTPATIENT)
Dept: BARIATRICS | Facility: CLINIC | Age: 49
End: 2024-11-02
Payer: COMMERCIAL

## 2024-11-05 NOTE — PROGRESS NOTES
NUTRITION NOTE    Referring Physician: Chris Morales M.D.   Reason for MNT Referral: Follow-up 2 Weeks s/p laparoscopic sleeve gastrectomy    PAST MEDICAL HISTORY:  Denies nausea, vomiting, and diarrhea.  Reports problems, including constipation . Takes Miralax    Past Medical History:   Diagnosis Date    Affective disorder     sses Dr. Dial, psychiatry    Back pain     GERD (gastroesophageal reflux disease)     Headache(784.0)     Hyperlipidemia, mixed 09/18/2019    Influenza A 12/01/2022    - supportive care - albuterol inhaler - discussed proceed to the ER if severe shortness of breath or chest pain. - Follow-up in office 12/05/2022 scheduled    Migraine headache     Narcolepsy without cataplexy(347.00)     Syncope 02/08/2024    - recommend Cardiology evaluation    Thyroid disease     hyperthyroidism    TMJ arthralgia      CLINICAL DATA:  49 y.o.-year-old White female.    Current Weight: 218 lbs  BMI: 41.36  Total Weight Loss: 20 lbs    Excess Weight Loss: 18%      CURRENT DIET:  Bariatric Liquid Diet    Diet Recall: 60 grams of protein/day; 64 oz of fluids/day    Diet Includes:  Fluids: Water, Gatorade Zero, SF popsicles, SF Jell-O, bone broth  Protein Supplements: Premier Protein, Fairlife, IHF772 prot powder + water, Fairlife fat free milk     EXERCISE:  Walking dogs 3 x day     LABS:  None available at time of visit    VITAMINS/MINERALS:  Bariatric One Multivitamin with 45 mg iron, 20mg thiamine, 1,000 mcg B12  Calcium Citrate 500 mg with vitamin D, 3 per day  Vit D Rx    ASSESSMENT:  Doing well overall.  Adequate protein intake.  Adequate fluid intake.    BARIATRIC DIET DISCUSSION:  Instructed and provided written materials on bariatric puree diet plan   Bariatric soft diet plan to start in 2 weeks as cristobal  Pt may swallow tablets and pills at 2 week post op   Reinforced post-op nutrition guidelines.    PLAN/RECOMMONDATIONS:  Advance to bariatric puree diet  Increase protein intake.  Maintain fluid  intake.  Continue light exercise.  Continue appropriate vitamins & minerals.    Return to clinic in 6 weeks.    SESSION TIME: 30 minutes

## 2024-11-06 ENCOUNTER — OFFICE VISIT (OUTPATIENT)
Dept: BARIATRICS | Facility: CLINIC | Age: 49
End: 2024-11-06
Payer: COMMERCIAL

## 2024-11-06 ENCOUNTER — CLINICAL SUPPORT (OUTPATIENT)
Dept: BARIATRICS | Facility: CLINIC | Age: 49
End: 2024-11-06
Payer: COMMERCIAL

## 2024-11-06 ENCOUNTER — LAB VISIT (OUTPATIENT)
Dept: LAB | Facility: HOSPITAL | Age: 49
End: 2024-11-06
Payer: COMMERCIAL

## 2024-11-06 VITALS
BODY MASS INDEX: 41.34 KG/M2 | OXYGEN SATURATION: 98 % | SYSTOLIC BLOOD PRESSURE: 130 MMHG | TEMPERATURE: 98 F | DIASTOLIC BLOOD PRESSURE: 72 MMHG | HEIGHT: 61 IN | WEIGHT: 218.94 LBS | HEART RATE: 82 BPM

## 2024-11-06 DIAGNOSIS — Z71.3 DIETARY COUNSELING AND SURVEILLANCE: Primary | ICD-10-CM

## 2024-11-06 DIAGNOSIS — K21.9 GASTROESOPHAGEAL REFLUX DISEASE, UNSPECIFIED WHETHER ESOPHAGITIS PRESENT: ICD-10-CM

## 2024-11-06 DIAGNOSIS — R63.4 WEIGHT LOSS: ICD-10-CM

## 2024-11-06 DIAGNOSIS — E78.2 HYPERLIPIDEMIA, MIXED: ICD-10-CM

## 2024-11-06 DIAGNOSIS — E66.813 CLASS 3 SEVERE OBESITY DUE TO EXCESS CALORIES WITH SERIOUS COMORBIDITY AND BODY MASS INDEX (BMI) OF 45.0 TO 49.9 IN ADULT: ICD-10-CM

## 2024-11-06 DIAGNOSIS — E06.3 HYPOTHYROIDISM DUE TO HASHIMOTO'S THYROIDITIS: ICD-10-CM

## 2024-11-06 DIAGNOSIS — E66.01 MORBID OBESITY WITH BMI OF 40.0-44.9, ADULT: ICD-10-CM

## 2024-11-06 DIAGNOSIS — Z98.84 S/P BARIATRIC SURGERY: ICD-10-CM

## 2024-11-06 DIAGNOSIS — G47.33 OSA (OBSTRUCTIVE SLEEP APNEA): ICD-10-CM

## 2024-11-06 DIAGNOSIS — E66.01 CLASS 3 SEVERE OBESITY DUE TO EXCESS CALORIES WITH SERIOUS COMORBIDITY AND BODY MASS INDEX (BMI) OF 45.0 TO 49.9 IN ADULT: ICD-10-CM

## 2024-11-06 DIAGNOSIS — Z98.890 POST-OPERATIVE STATE: Primary | ICD-10-CM

## 2024-11-06 LAB
ALBUMIN SERPL BCP-MCNC: 3.7 G/DL (ref 3.5–5.2)
ALP SERPL-CCNC: 107 U/L (ref 40–150)
ALT SERPL W/O P-5'-P-CCNC: 20 U/L (ref 10–44)
ANION GAP SERPL CALC-SCNC: 12 MMOL/L (ref 8–16)
AST SERPL-CCNC: 17 U/L (ref 10–40)
BASOPHILS # BLD AUTO: 0.06 K/UL (ref 0–0.2)
BASOPHILS NFR BLD: 0.8 % (ref 0–1.9)
BILIRUB SERPL-MCNC: 0.4 MG/DL (ref 0.1–1)
BUN SERPL-MCNC: 15 MG/DL (ref 6–20)
CALCIUM SERPL-MCNC: 9.5 MG/DL (ref 8.7–10.5)
CHLORIDE SERPL-SCNC: 108 MMOL/L (ref 95–110)
CO2 SERPL-SCNC: 23 MMOL/L (ref 23–29)
CREAT SERPL-MCNC: 0.7 MG/DL (ref 0.5–1.4)
DIFFERENTIAL METHOD BLD: ABNORMAL
EOSINOPHIL # BLD AUTO: 0.1 K/UL (ref 0–0.5)
EOSINOPHIL NFR BLD: 1.1 % (ref 0–8)
ERYTHROCYTE [DISTWIDTH] IN BLOOD BY AUTOMATED COUNT: 14 % (ref 11.5–14.5)
EST. GFR  (NO RACE VARIABLE): >60 ML/MIN/1.73 M^2
GLUCOSE SERPL-MCNC: 86 MG/DL (ref 70–110)
HCT VFR BLD AUTO: 43.3 % (ref 37–48.5)
HGB BLD-MCNC: 14.1 G/DL (ref 12–16)
IMM GRANULOCYTES # BLD AUTO: 0.02 K/UL (ref 0–0.04)
IMM GRANULOCYTES NFR BLD AUTO: 0.3 % (ref 0–0.5)
LYMPHOCYTES # BLD AUTO: 1.3 K/UL (ref 1–4.8)
LYMPHOCYTES NFR BLD: 17.4 % (ref 18–48)
MCH RBC QN AUTO: 32.8 PG (ref 27–31)
MCHC RBC AUTO-ENTMCNC: 32.6 G/DL (ref 32–36)
MCV RBC AUTO: 101 FL (ref 82–98)
MONOCYTES # BLD AUTO: 0.6 K/UL (ref 0.3–1)
MONOCYTES NFR BLD: 8.6 % (ref 4–15)
NEUTROPHILS # BLD AUTO: 5.2 K/UL (ref 1.8–7.7)
NEUTROPHILS NFR BLD: 71.8 % (ref 38–73)
NRBC BLD-RTO: 0 /100 WBC
PLATELET # BLD AUTO: 244 K/UL (ref 150–450)
PMV BLD AUTO: 12.4 FL (ref 9.2–12.9)
POTASSIUM SERPL-SCNC: 3.6 MMOL/L (ref 3.5–5.1)
PROT SERPL-MCNC: 6.9 G/DL (ref 6–8.4)
RBC # BLD AUTO: 4.3 M/UL (ref 4–5.4)
SODIUM SERPL-SCNC: 143 MMOL/L (ref 136–145)
VIT B12 SERPL-MCNC: >2000 PG/ML (ref 210–950)
WBC # BLD AUTO: 7.18 K/UL (ref 3.9–12.7)

## 2024-11-06 PROCEDURE — 84425 ASSAY OF VITAMIN B-1: CPT | Performed by: PHYSICIAN ASSISTANT

## 2024-11-06 PROCEDURE — 99999 PR PBB SHADOW E&M-EST. PATIENT-LVL I: CPT | Mod: PBBFAC,,, | Performed by: DIETITIAN, REGISTERED

## 2024-11-06 PROCEDURE — 80053 COMPREHEN METABOLIC PANEL: CPT | Performed by: PHYSICIAN ASSISTANT

## 2024-11-06 PROCEDURE — 36415 COLL VENOUS BLD VENIPUNCTURE: CPT | Performed by: PHYSICIAN ASSISTANT

## 2024-11-06 PROCEDURE — 82607 VITAMIN B-12: CPT | Performed by: PHYSICIAN ASSISTANT

## 2024-11-06 PROCEDURE — 99999 PR PBB SHADOW E&M-EST. PATIENT-LVL V: CPT | Mod: PBBFAC,,, | Performed by: PHYSICIAN ASSISTANT

## 2024-11-06 PROCEDURE — 85025 COMPLETE CBC W/AUTO DIFF WBC: CPT | Performed by: PHYSICIAN ASSISTANT

## 2024-11-06 NOTE — PATIENT INSTRUCTIONS
High Protein Pureed Diet    2 weeks after gastric bypass and sleeve you may be ready to add pureed food to your diet.  All food should be the consistency of baby food, or thinner.  Follow pureed diet for the next 2 weeks.    Protein - It is very important to pay attention to protein intake during this time.      Inadequate protein intake can cause:  Delayed Wound Healing  Hair Loss  Muscle Breakdown    Meal Plan - Eat 3-4 meals per day (2-4 tbsp each), with protein supplements in between to meet protein needs.  Meeting protein needs daily will help increase healing, decrease muscle loss, and increase weight loss.  Your goal is  grams of protein a day.    Protein First - Always eat the foods with the highest protein first.  Foods high in protein include milk, yogurt, cheese, egg whites, and blenderized meat, seafood, and beans.    Fluids - Keep track in your journal of how much you are drinking; you should try to drink at least 64oz of fluids every day.      Foods allowed: Portion size Protein (g)   Sugar-free clear liquids As desired 0   Skim or 1% milk ½ cup 4   Sugar free pudding, light yogurt, custard (use skim or 1% milk in preparation) 3 oz 2.5   Strained baby food meats, or home-made pureed lean meats and shrimp 1 oz 7   Beans (red, white, black, lima, olmedo, fat free refried, hummus) and lentils ¼ cup 4   Low-fat/fat free cheese.(cottage cheese, mozzarella string cheese, ricotta cheese, Laughing Cow, Baby Bell, cheddar, etc) ¼ cup 7-8   Scrambled eggs or Egg Beaters 1 or ¼ cup 6   Edamame or Tofu, mashed ¼ cup 5   Unflavored protein powder (add to 1 scoop to  98% fat free soups or SF pudding) 3 Tbsp 9   *PB2: peanut powder (45 calories) 2 Tbsp 5     *PB2 powdered peanut butter: 45 calories vs. 190 calories in 2 tbsp of regular peanut butter. Purchase online at [employer], or  at various [employer], [employer], [employer], [employer] and [employer].        Bariatric Liquid/Pureed Sample Menu    3-4 small meals  plus 2-3 protein drinks per day.    8am 1 egg or ¼ cup Egg Beaters   9am 1 cup water, or decaf coffee or tea   10am Protein drink, 30g protein   11am 2 tbsp low-fat cottage cheese, and 1 tbsp pureed peaches   12pm 1 cup water, or sugar-free lemonade    1pm 2 tbsp pureed chicken, and 1 tbsp pureed carrots    2pm 1 cup water, or sugar-free lemonade   3pm Protein drink, 30g protein   5pm 1 cup water    6pm 1 cup hi-protein creamy chicken soup 14g protein (see Recipe below)   7pm 1 cup water, or sugar-free fruit punch    8pm 1 cup water     This sample menu provides approx. 80g protein and 64oz fluids.  Liquid protein supplements should contain 20-30g protein and less than 4 grams of sugar each.    Sip fluids continuously in between meals.  Drink at least ¼ cup every 15 minutes.  For fluids: ¼ cup = 2 oz = 4 tbsp       RECIPE IDEAS for Bariatric Pureed Diet:    Hi-Protein Creamy Chicken Soup: (10g protein per 1 cup serving)  Empty 1 can of 98% fat free cream of chicken soup into saucepan. Then  blend 1 scoop of unflavored protein powder with 1 can of skim milk until smooth.  Add protein milk to saucepan and heat to warm. (Note: Do NOT boil. Protein powder may clump if heated too hot).     Hi-Protein Pudding: (14g protein per ½ cup serving)  Add 2 scoops protein powder to 2 cups cold skim milk and mix well.  Stir in dry Jell-O Sugar-Free Instant Pudding mix.  Chill and Enjoy!    Tuna Mousse (12g protein per ¼ cup serving) Page 135 in book Eating Well After Weight Loss Surgery.  In a  or , combine all ingredients and pulse until smooth.  2 6-ounce cans tuna packed in water, drained  2 tbsp low-fat mayonnaise  2 tbsp fat-free sour cream  2 tbsp fat-free cream cheese, softened  ½ cup shallots, finely chopped  1 tbsp lemon juice  ¼ tsp ground pepper  ½ tsp celery seed    Chocolate Peanut Butter Mousse  (28g protein total)  6oz plain Greek yogurt  4 tbsp chocolate PB2

## 2024-11-06 NOTE — PROGRESS NOTES
BARIATRIC POST-OPERATIVE VISIT:    HPI:  Amanda Navarrete is a 49 y.o. year old female presents for 2 week post op visit following s/p sleeve.  she is doing well and tolerating the diet without difficulty.  she has no complaints.  Thought that she was supposed to get 48 g of protein note 60-80 grams started yesterday   States that she has been constipated, tried miralax and had some bloody diarrhea   States that she is back constipated but afraid of bloody diarrhea  Denies: nausea, vomiting, abdominal pain, changes in bowel movement pattern, fever, chills, dysphagia, chest pain, and shortness of breath.    Review of Systems   Constitutional:  Negative for fatigue and fever.   HENT:  Negative for tinnitus and trouble swallowing.    Eyes:  Negative for visual disturbance.   Respiratory:  Negative for cough, chest tightness and shortness of breath.    Cardiovascular:  Negative for chest pain, palpitations and leg swelling.   Gastrointestinal:  Positive for blood in stool (diarrhea (resolved)), constipation, diarrhea and nausea. Negative for abdominal pain, anal bleeding and vomiting.   Genitourinary:  Negative for decreased urine volume.   Musculoskeletal:  Positive for back pain.   Skin:  Negative for rash.   Neurological:  Negative for dizziness, light-headedness and headaches.   Psychiatric/Behavioral:  Negative for dysphoric mood, self-injury, sleep disturbance and suicidal ideas. The patient is not nervous/anxious.        Post Discharge     2 Weeks     Total Opioids Used (Outpatient): 15 tabsml: mLs  Unused Opioids Returned: No Educated on safe opioid disposal location [facility].   Additional Opioids Provided: yes ( in pain management for her back)     EXERCISE & VITAMINS:  See Bariatric Assessment  Good vitamin regimen   MEDICATIONS/ALLERGIES:  Have been reviewed.    DIET: Liquid Bariatric Diet.  1-2 protein shakes daily, ~48 grams protein.  48-52 fl oz SF clear beverage.  See Dietician  note from today for a more detailed assessment.    1 shake a day with bone broth   Jello   Water   80 g  Physical Exam  Vitals reviewed.   Constitutional:       Appearance: She is obese.   HENT:      Head: Normocephalic and atraumatic.   Eyes:      Extraocular Movements: Extraocular movements intact.      Conjunctiva/sclera: Conjunctivae normal.      Pupils: Pupils are equal, round, and reactive to light.   Cardiovascular:      Rate and Rhythm: Normal rate and regular rhythm.      Pulses: Normal pulses.      Heart sounds: Normal heart sounds. No murmur heard.  Pulmonary:      Effort: Pulmonary effort is normal. No respiratory distress.      Breath sounds: Normal breath sounds.   Abdominal:      General: Bowel sounds are normal.      Palpations: Abdomen is soft.      Tenderness: There is no abdominal tenderness.   Musculoskeletal:         General: No swelling. Normal range of motion.      Cervical back: Normal range of motion.   Skin:     General: Skin is warm and dry.      Capillary Refill: Capillary refill takes less than 2 seconds.   Neurological:      General: No focal deficit present.      Mental Status: She is alert and oriented to person, place, and time.   Psychiatric:         Mood and Affect: Mood normal.         Behavior: Behavior normal.         Thought Content: Thought content normal.         Judgment: Judgment normal.         ASSESSMENT:  - Morbid obesity s/p sleeve gastrectomy  - Co-morbidities: dyslipidemia, GERD, obstructive sleep apnea, and anxiety  - Good Weight loss, 20#'s and 18% EWL  - Good Exercise routine  - Fair Diet  - Good Vitamin regimen    PLAN:  - Ursodiol 500 mg daily for 6 months  - Anti-Acid medication, PPI daily for 3 months  - No lifting more than 10 lbs for 6 weeks  - Miralax daily for constipation  - Emphasized the importance of regular exercise and adherence to bariatric diet to achieve maximum weight loss.  - Encouraged patient to start/continue regular exercise.  - Follow-up  with dietician to advance diet.  - Continue daily vitamins and medications.  - RTC per schedule  - Call the office for any issues.  - Check labs today.

## 2024-11-08 DIAGNOSIS — Z98.84 S/P BARIATRIC SURGERY: ICD-10-CM

## 2024-11-08 DIAGNOSIS — E66.813 CLASS 3 SEVERE OBESITY DUE TO EXCESS CALORIES WITH SERIOUS COMORBIDITY AND BODY MASS INDEX (BMI) OF 45.0 TO 49.9 IN ADULT: ICD-10-CM

## 2024-11-08 DIAGNOSIS — E66.01 CLASS 3 SEVERE OBESITY DUE TO EXCESS CALORIES WITH SERIOUS COMORBIDITY AND BODY MASS INDEX (BMI) OF 45.0 TO 49.9 IN ADULT: ICD-10-CM

## 2024-11-08 RX ORDER — ONDANSETRON 8 MG/1
8 TABLET, ORALLY DISINTEGRATING ORAL EVERY 6 HOURS PRN
Qty: 30 TABLET | Refills: 0 | Status: CANCELLED | OUTPATIENT
Start: 2024-11-08

## 2024-11-11 LAB — VIT B1 BLD-MCNC: 66 UG/L (ref 38–122)

## 2024-11-12 ENCOUNTER — PATIENT MESSAGE (OUTPATIENT)
Dept: BARIATRICS | Facility: CLINIC | Age: 49
End: 2024-11-12
Payer: COMMERCIAL

## 2024-11-14 ENCOUNTER — TELEPHONE (OUTPATIENT)
Dept: BARIATRICS | Facility: CLINIC | Age: 49
End: 2024-11-14
Payer: COMMERCIAL

## 2024-11-14 ENCOUNTER — PATIENT MESSAGE (OUTPATIENT)
Dept: SLEEP MEDICINE | Facility: CLINIC | Age: 49
End: 2024-11-14
Payer: COMMERCIAL

## 2024-11-14 DIAGNOSIS — G43.009 MIGRAINE WITHOUT AURA AND WITHOUT STATUS MIGRAINOSUS, NOT INTRACTABLE: ICD-10-CM

## 2024-11-14 DIAGNOSIS — G43.009 MIGRAINE WITHOUT AURA AND WITHOUT STATUS MIGRAINOSUS, NOT INTRACTABLE: Primary | ICD-10-CM

## 2024-11-14 RX ORDER — UBROGEPANT 100 MG/1
100 TABLET ORAL
Qty: 16 TABLET | Refills: 5 | Status: SHIPPED | OUTPATIENT
Start: 2024-11-14 | End: 2024-11-15

## 2024-11-14 NOTE — TELEPHONE ENCOUNTER
----- Message from Stephanie sent at 11/14/2024  2:40 PM CST -----  Regarding: refill  Contact: [phone]  Patient is calling to have her (   ondansetron (ZOFRAN-ODT) 8 MG TbDL )  prescription refilled  If there are any questions please contact patient at [phone]    [facility]/pharmacy #[id number] - [place] - [address] [place] [address]  [address]  [place] [address]  Phone: [phone] Fax: [phone]

## 2024-11-15 DIAGNOSIS — Z98.84 S/P BARIATRIC SURGERY: ICD-10-CM

## 2024-11-15 DIAGNOSIS — E66.01 CLASS 3 SEVERE OBESITY DUE TO EXCESS CALORIES WITH SERIOUS COMORBIDITY AND BODY MASS INDEX (BMI) OF 45.0 TO 49.9 IN ADULT: ICD-10-CM

## 2024-11-15 DIAGNOSIS — E66.813 CLASS 3 SEVERE OBESITY DUE TO EXCESS CALORIES WITH SERIOUS COMORBIDITY AND BODY MASS INDEX (BMI) OF 45.0 TO 49.9 IN ADULT: ICD-10-CM

## 2024-11-15 DIAGNOSIS — G43.009 MIGRAINE WITHOUT AURA AND WITHOUT STATUS MIGRAINOSUS, NOT INTRACTABLE: ICD-10-CM

## 2024-11-15 RX ORDER — UBROGEPANT 100 MG/1
100 TABLET ORAL
Qty: 16 TABLET | Refills: 5 | Status: SHIPPED | OUTPATIENT
Start: 2024-11-15

## 2024-11-15 RX ORDER — ONDANSETRON 8 MG/1
8 TABLET, ORALLY DISINTEGRATING ORAL EVERY 6 HOURS PRN
Qty: 30 TABLET | Refills: 0 | Status: SHIPPED | OUTPATIENT
Start: 2024-11-15

## 2024-11-15 RX ORDER — UBROGEPANT 100 MG/1
100 TABLET ORAL
Qty: 16 TABLET | Refills: 5 | Status: SHIPPED | OUTPATIENT
Start: 2024-11-15 | End: 2024-11-15 | Stop reason: SDUPTHER

## 2024-11-15 NOTE — TELEPHONE ENCOUNTER
Anastacia Lemus PA-C sent in refill for Zofran to pt's preferred location.     Called pt and updated her.

## 2024-11-15 NOTE — TELEPHONE ENCOUNTER
Requested Prescriptions     Pending Prescriptions Disp Refills    UBRELVY 100 mg tablet [Pharmacy Med Name: UBRELVY 100 MG TABLET] 16 tablet 5     Sig: TAKE 1 TABLET (100 MG TOTAL) BY MOUTH EVERY 2 (TWO) HOURS AS NEEDED FOR MIGRAINE. IF SYMPTOMS PERSIST OR RETURN, MAY REPEAT DOSE AFTER 2 HOURS. MAXIMUM: 200 MG PER 24 HOURS     Lov 1/11/24

## 2024-11-26 ENCOUNTER — PATIENT MESSAGE (OUTPATIENT)
Dept: SLEEP MEDICINE | Facility: CLINIC | Age: 49
End: 2024-11-26
Payer: COMMERCIAL

## 2024-11-26 DIAGNOSIS — G47.411 NARCOLEPSY WITH CATAPLEXY: ICD-10-CM

## 2024-11-26 RX ORDER — DEXTROAMPHETAMINE SACCHARATE, AMPHETAMINE ASPARTATE MONOHYDRATE, DEXTROAMPHETAMINE SULFATE AND AMPHETAMINE SULFATE 5; 5; 5; 5 MG/1; MG/1; MG/1; MG/1
20 CAPSULE, EXTENDED RELEASE ORAL EVERY MORNING
Qty: 30 CAPSULE | Refills: 0 | Status: SHIPPED | OUTPATIENT
Start: 2024-12-01

## 2024-11-26 RX ORDER — DEXTROAMPHETAMINE SACCHARATE, AMPHETAMINE ASPARTATE, DEXTROAMPHETAMINE SULFATE AND AMPHETAMINE SULFATE 5; 5; 5; 5 MG/1; MG/1; MG/1; MG/1
1 TABLET ORAL 2 TIMES DAILY
Qty: 60 TABLET | Refills: 0 | Status: SHIPPED | OUTPATIENT
Start: 2024-12-01

## 2024-12-03 ENCOUNTER — PATIENT MESSAGE (OUTPATIENT)
Dept: BARIATRICS | Facility: CLINIC | Age: 49
End: 2024-12-03
Payer: COMMERCIAL

## 2024-12-04 ENCOUNTER — PATIENT MESSAGE (OUTPATIENT)
Dept: INTERNAL MEDICINE | Facility: CLINIC | Age: 49
End: 2024-12-04
Payer: COMMERCIAL

## 2024-12-04 DIAGNOSIS — Z12.31 OTHER SCREENING MAMMOGRAM: ICD-10-CM

## 2024-12-10 ENCOUNTER — PATIENT MESSAGE (OUTPATIENT)
Dept: BARIATRICS | Facility: CLINIC | Age: 49
End: 2024-12-10
Payer: COMMERCIAL

## 2024-12-16 ENCOUNTER — PATIENT MESSAGE (OUTPATIENT)
Dept: BARIATRICS | Facility: CLINIC | Age: 49
End: 2024-12-16
Payer: COMMERCIAL

## 2024-12-18 ENCOUNTER — CLINICAL SUPPORT (OUTPATIENT)
Dept: BARIATRICS | Facility: CLINIC | Age: 49
End: 2024-12-18
Payer: COMMERCIAL

## 2024-12-18 ENCOUNTER — LAB VISIT (OUTPATIENT)
Dept: LAB | Facility: HOSPITAL | Age: 49
End: 2024-12-18
Payer: COMMERCIAL

## 2024-12-18 ENCOUNTER — OFFICE VISIT (OUTPATIENT)
Dept: BARIATRICS | Facility: CLINIC | Age: 49
End: 2024-12-18
Payer: COMMERCIAL

## 2024-12-18 VITALS
BODY MASS INDEX: 38.95 KG/M2 | WEIGHT: 206.13 LBS | SYSTOLIC BLOOD PRESSURE: 121 MMHG | TEMPERATURE: 99 F | BODY MASS INDEX: 38.95 KG/M2 | OXYGEN SATURATION: 99 % | HEART RATE: 86 BPM | DIASTOLIC BLOOD PRESSURE: 72 MMHG | WEIGHT: 206.13 LBS

## 2024-12-18 DIAGNOSIS — Z98.890 POST-OPERATIVE STATE: Primary | ICD-10-CM

## 2024-12-18 DIAGNOSIS — G47.33 OSA (OBSTRUCTIVE SLEEP APNEA): ICD-10-CM

## 2024-12-18 DIAGNOSIS — E66.9 OBESITY (BMI 30-39.9): ICD-10-CM

## 2024-12-18 DIAGNOSIS — Z98.84 S/P BARIATRIC SURGERY: ICD-10-CM

## 2024-12-18 DIAGNOSIS — E06.3 HYPOTHYROIDISM DUE TO HASHIMOTO'S THYROIDITIS: ICD-10-CM

## 2024-12-18 DIAGNOSIS — Z71.3 DIETARY COUNSELING AND SURVEILLANCE: Primary | ICD-10-CM

## 2024-12-18 DIAGNOSIS — R63.4 WEIGHT LOSS: ICD-10-CM

## 2024-12-18 DIAGNOSIS — E66.813 CLASS 3 SEVERE OBESITY DUE TO EXCESS CALORIES WITH SERIOUS COMORBIDITY AND BODY MASS INDEX (BMI) OF 45.0 TO 49.9 IN ADULT: ICD-10-CM

## 2024-12-18 DIAGNOSIS — E66.01 CLASS 3 SEVERE OBESITY DUE TO EXCESS CALORIES WITH SERIOUS COMORBIDITY AND BODY MASS INDEX (BMI) OF 45.0 TO 49.9 IN ADULT: ICD-10-CM

## 2024-12-18 DIAGNOSIS — E78.2 HYPERLIPIDEMIA, MIXED: ICD-10-CM

## 2024-12-18 DIAGNOSIS — Z90.3 S/P GASTRIC SLEEVE PROCEDURE: ICD-10-CM

## 2024-12-18 DIAGNOSIS — K21.9 GASTROESOPHAGEAL REFLUX DISEASE, UNSPECIFIED WHETHER ESOPHAGITIS PRESENT: ICD-10-CM

## 2024-12-18 DIAGNOSIS — K21.9 GASTROESOPHAGEAL REFLUX DISEASE WITHOUT ESOPHAGITIS: ICD-10-CM

## 2024-12-18 LAB
25(OH)D3+25(OH)D2 SERPL-MCNC: 75 NG/ML (ref 30–96)
ALBUMIN SERPL BCP-MCNC: 3.7 G/DL (ref 3.5–5.2)
ALP SERPL-CCNC: 111 U/L (ref 40–150)
ALT SERPL W/O P-5'-P-CCNC: 24 U/L (ref 10–44)
ANION GAP SERPL CALC-SCNC: 6 MMOL/L (ref 8–16)
AST SERPL-CCNC: 16 U/L (ref 10–40)
BASOPHILS # BLD AUTO: 0.06 K/UL (ref 0–0.2)
BASOPHILS NFR BLD: 0.7 % (ref 0–1.9)
BILIRUB SERPL-MCNC: 0.4 MG/DL (ref 0.1–1)
BUN SERPL-MCNC: 19 MG/DL (ref 6–20)
CALCIUM SERPL-MCNC: 9.3 MG/DL (ref 8.7–10.5)
CHLORIDE SERPL-SCNC: 111 MMOL/L (ref 95–110)
CHOLEST SERPL-MCNC: 143 MG/DL (ref 120–199)
CHOLEST/HDLC SERPL: 3.5 {RATIO} (ref 2–5)
CO2 SERPL-SCNC: 23 MMOL/L (ref 23–29)
CREAT SERPL-MCNC: 0.7 MG/DL (ref 0.5–1.4)
DIFFERENTIAL METHOD BLD: ABNORMAL
EOSINOPHIL # BLD AUTO: 0.1 K/UL (ref 0–0.5)
EOSINOPHIL NFR BLD: 0.7 % (ref 0–8)
ERYTHROCYTE [DISTWIDTH] IN BLOOD BY AUTOMATED COUNT: 13.9 % (ref 11.5–14.5)
EST. GFR  (NO RACE VARIABLE): >60 ML/MIN/1.73 M^2
GLUCOSE SERPL-MCNC: 73 MG/DL (ref 70–110)
HCT VFR BLD AUTO: 41.8 % (ref 37–48.5)
HDLC SERPL-MCNC: 41 MG/DL (ref 40–75)
HDLC SERPL: 28.7 % (ref 20–50)
HGB BLD-MCNC: 13.5 G/DL (ref 12–16)
IMM GRANULOCYTES # BLD AUTO: 0.03 K/UL (ref 0–0.04)
IMM GRANULOCYTES NFR BLD AUTO: 0.4 % (ref 0–0.5)
IRON SERPL-MCNC: 71 UG/DL (ref 30–160)
LDLC SERPL CALC-MCNC: 88.6 MG/DL (ref 63–159)
LYMPHOCYTES # BLD AUTO: 1.4 K/UL (ref 1–4.8)
LYMPHOCYTES NFR BLD: 16.9 % (ref 18–48)
MCH RBC QN AUTO: 32.5 PG (ref 27–31)
MCHC RBC AUTO-ENTMCNC: 32.3 G/DL (ref 32–36)
MCV RBC AUTO: 101 FL (ref 82–98)
MONOCYTES # BLD AUTO: 0.7 K/UL (ref 0.3–1)
MONOCYTES NFR BLD: 8.5 % (ref 4–15)
NEUTROPHILS # BLD AUTO: 5.9 K/UL (ref 1.8–7.7)
NEUTROPHILS NFR BLD: 72.8 % (ref 38–73)
NONHDLC SERPL-MCNC: 102 MG/DL
NRBC BLD-RTO: 0 /100 WBC
PLATELET # BLD AUTO: 243 K/UL (ref 150–450)
PMV BLD AUTO: 12.2 FL (ref 9.2–12.9)
POTASSIUM SERPL-SCNC: 4 MMOL/L (ref 3.5–5.1)
PROT SERPL-MCNC: 6.7 G/DL (ref 6–8.4)
RBC # BLD AUTO: 4.16 M/UL (ref 4–5.4)
SATURATED IRON: 23 % (ref 20–50)
SODIUM SERPL-SCNC: 140 MMOL/L (ref 136–145)
TOTAL IRON BINDING CAPACITY: 303 UG/DL (ref 250–450)
TRANSFERRIN SERPL-MCNC: 205 MG/DL (ref 200–375)
TRIGL SERPL-MCNC: 67 MG/DL (ref 30–150)
VIT B12 SERPL-MCNC: 1170 PG/ML (ref 210–950)
WBC # BLD AUTO: 8.16 K/UL (ref 3.9–12.7)

## 2024-12-18 PROCEDURE — 83540 ASSAY OF IRON: CPT | Performed by: PHYSICIAN ASSISTANT

## 2024-12-18 PROCEDURE — 84425 ASSAY OF VITAMIN B-1: CPT | Performed by: PHYSICIAN ASSISTANT

## 2024-12-18 PROCEDURE — 80053 COMPREHEN METABOLIC PANEL: CPT | Performed by: PHYSICIAN ASSISTANT

## 2024-12-18 PROCEDURE — 99999 PR PBB SHADOW E&M-EST. PATIENT-LVL IV: CPT | Mod: PBBFAC,,, | Performed by: PHYSICIAN ASSISTANT

## 2024-12-18 PROCEDURE — 82607 VITAMIN B-12: CPT | Performed by: PHYSICIAN ASSISTANT

## 2024-12-18 PROCEDURE — 80061 LIPID PANEL: CPT | Performed by: PHYSICIAN ASSISTANT

## 2024-12-18 PROCEDURE — 82306 VITAMIN D 25 HYDROXY: CPT | Performed by: PHYSICIAN ASSISTANT

## 2024-12-18 PROCEDURE — 36415 COLL VENOUS BLD VENIPUNCTURE: CPT | Performed by: PHYSICIAN ASSISTANT

## 2024-12-18 PROCEDURE — 99999 PR PBB SHADOW E&M-EST. PATIENT-LVL I: CPT | Mod: PBBFAC,,, | Performed by: DIETITIAN, REGISTERED

## 2024-12-18 PROCEDURE — 85025 COMPLETE CBC W/AUTO DIFF WBC: CPT | Performed by: PHYSICIAN ASSISTANT

## 2024-12-18 NOTE — PROGRESS NOTES
NUTRITION NOTE  Referring Physician: Chris Morales M.D.   Reason for MNT Referral: Follow-up 8 Weeks s/p laparoscopic sleeve gastrectomy    PAST MEDICAL HISTORY:  Denies vomiting.  Reports problems, including nausea, constipation and diarrhea .     Past Medical History:   Diagnosis Date    Affective disorder     sses Dr. Dial, psychiatry    Back pain     GERD (gastroesophageal reflux disease)     Headache(784.0)     Hyperlipidemia, mixed 09/18/2019    Influenza A 12/01/2022    - supportive care - albuterol inhaler - discussed proceed to the ER if severe shortness of breath or chest pain. - Follow-up in office 12/05/2022 scheduled    Migraine headache     Narcolepsy without cataplexy(347.00)     Syncope 02/08/2024    - recommend Cardiology evaluation    Thyroid disease     hyperthyroidism    TMJ arthralgia      CLINICAL DATA:  49 y.o.-year-old White female.    Current Weight: 206 lbs  BMI: 38.95  Total Weight Loss: 32 lbs    Excess Weight Loss: 29%      CURRENT DIET:  Bariatric Soft Diet    PT tracking with [employer] doug    Diet Recall: 100 grams of protein/day; 64 oz of fluids/day    Protein shake with decaf coffee  B: Scrambled egg white  L: 2 oz chicken salad with egg white with cucumber  1/2 protein shake or turkey pepperoni or string cheese  D: Shredded rotisserie chicken (2 oz) and shredded lettuce    Diet Includes:   Meal Pattern: 3 meal(s) + 1 snack(s) + 2 protein supplement(s)  Adequate protein supplement intake. Premier Protein, Fairlife  Adequate dairy intake. Fairlife milk (8 oz per day)  Adequate vegetable intake. Cucumbers, lettuce, cauliflower  Adequate fruit intake. Apples, bananas  Starchy CHO: None  Beverages: Water, Gatorade Zero, Body Armor Lyte    EXERCISE:  Walking in backyard with dogs     Restrictions to Exercise:  Uses cane, back pain    LABS:  None available at time of visit    VITAMINS/MINERALS:  Bariatric One Multivitamin with 45 mg iron, 20mg thiamine, 1,000 mcg B12  Calcium Citrate 500  mg with vitamin D, 3 per day  Vit D Rx    ASSESSMENT:  Doing well overall.  Adequate protein intake.  Adequate fluid intake.  Advancing diet appropriately.  Exercising.  Adequate vitamins & minerals.    BARIATRIC DIET DISCUSSION:  Instructed and provided written materials on bariatric regular diet plan.  Reinforced post-op nutrition guidelines.  Reminded PT no starchy CHO until goal weight  Discussed tracking intake to ensure protein goals and calorie range are met    PLAN / RECOMMENDATIONS:  Advance to bariatric regular diet.  Maintain protein intake.  Maintain fluid intake.  Increase light exercise.  Continue appropriate vitamins & minerals.    Return to clinic in 4 months.    SESSION TIME: 30 minutes

## 2024-12-18 NOTE — PROGRESS NOTES
BARIATRIC POST-OPERATIVE VISIT:    HPI:  Amanda Navarrete is a 49 y.o. year old female presents for 2 week post op visit following s/p sleeve.  she is doing well and tolerating the diet without difficulty.  she has no complaints.  Pt doing ok   States that her bowels goes between constipation and diarrhea   When constipated states that it can last for days   Sometimes miralax works and then sometimes it does not work   States that if she takes it one day then it does not work but if she its more than once then it becomes diarrhea       Denies: nausea, vomiting, abdominal pain, changes in bowel movement pattern, fever, chills, dysphagia, chest pain, and shortness of breath.    Review of Systems   Constitutional:  Negative for fatigue and fever.   HENT:  Negative for tinnitus and trouble swallowing.    Eyes:  Negative for visual disturbance.   Respiratory:  Negative for cough, chest tightness and shortness of breath.    Cardiovascular:  Negative for chest pain, palpitations and leg swelling.   Gastrointestinal:  Positive for constipation and diarrhea. Negative for abdominal pain, anal bleeding, blood in stool (diarrhea (resolved)), nausea and vomiting.   Genitourinary:  Negative for decreased urine volume.   Skin:  Negative for rash.   Neurological:  Negative for dizziness, light-headedness and headaches.   Psychiatric/Behavioral:  Negative for dysphoric mood, self-injury, sleep disturbance and suicidal ideas. The patient is not nervous/anxious.        Post Discharge     2 Weeks     Total Opioids Used (Outpatient): 15 tabsml: mLs  Unused Opioids Returned: No Educated on safe opioid disposal location at [facility].   Additional Opioids Provided: yes ( in pain management for her back)     EXERCISE & VITAMINS:  See Bariatric Assessment  Good vitamin regimen   MEDICATIONS/ALLERGIES:  Have been reviewed.    DIET: Liquid Bariatric Diet.  1-2 protein shakes daily, ~48 grams protein.  48-52 fl oz SF  clear beverage.  See Dietician note from today for a more detailed assessment.    1 shake a day with bone broth   Jello   Water   80 g  Physical Exam  Vitals reviewed.   Constitutional:       Appearance: She is obese.   HENT:      Head: Normocephalic and atraumatic.   Eyes:      Extraocular Movements: Extraocular movements intact.      Conjunctiva/sclera: Conjunctivae normal.      Pupils: Pupils are equal, round, and reactive to light.   Cardiovascular:      Rate and Rhythm: Normal rate and regular rhythm.      Pulses: Normal pulses.      Heart sounds: Normal heart sounds. No murmur heard.  Pulmonary:      Effort: Pulmonary effort is normal. No respiratory distress.      Breath sounds: Normal breath sounds.   Abdominal:      General: Bowel sounds are normal.      Palpations: Abdomen is soft.      Tenderness: There is no abdominal tenderness.   Musculoskeletal:         General: No swelling. Normal range of motion.      Cervical back: Normal range of motion.   Skin:     General: Skin is warm and dry.      Capillary Refill: Capillary refill takes less than 2 seconds.   Neurological:      General: No focal deficit present.      Mental Status: She is alert and oriented to person, place, and time.   Psychiatric:         Mood and Affect: Mood normal.         Behavior: Behavior normal.         Thought Content: Thought content normal.         Judgment: Judgment normal.         ASSESSMENT:  - Morbid obesity s/p sleeve gastrectomy  - Co-morbidities: dyslipidemia, GERD, obstructive sleep apnea, and anxiety  - Good Weight loss, 32#'s and 29% EWL  - Good Exercise routine  - Fair Diet  - Good Vitamin regimen    PLAN:  - Ursodiol 500 mg daily for 6 months  - Anti-Acid medication, PPI daily for 3 months  - No lifting more than 10 lbs for 6 weeks  - Miralax daily for constipation  - Emphasized the importance of regular exercise and adherence to bariatric diet to achieve maximum weight loss.  - Encouraged patient to start/continue  regular exercise.  - Follow-up with dietician to advance diet.  - Continue daily vitamins and medications.  - RTC per schedule  - Call the office for any issues.  - Check labs today.

## 2024-12-20 DIAGNOSIS — Z86.19 HISTORY OF HERPES LABIALIS: ICD-10-CM

## 2024-12-20 DIAGNOSIS — G47.411 NARCOLEPSY CATAPLEXY SYNDROME: ICD-10-CM

## 2024-12-20 RX ORDER — MODAFINIL 200 MG/1
TABLET ORAL
Qty: 45 TABLET | Refills: 5 | Status: SHIPPED | OUTPATIENT
Start: 2024-12-20

## 2024-12-20 RX ORDER — VALACYCLOVIR HYDROCHLORIDE 1 G/1
1000 TABLET, FILM COATED ORAL EVERY 12 HOURS PRN
Qty: 60 TABLET | Refills: 3 | Status: SHIPPED | OUTPATIENT
Start: 2024-12-20

## 2024-12-20 NOTE — TELEPHONE ENCOUNTER
No care due was identified.  [facility] Embedded Care Due Messages. Reference number: [id number].   12/20/2024 10:16:06 AM CST

## 2024-12-23 ENCOUNTER — PATIENT MESSAGE (OUTPATIENT)
Dept: INTERNAL MEDICINE | Facility: CLINIC | Age: 49
End: 2024-12-23
Payer: COMMERCIAL

## 2024-12-24 LAB — VIT B1 BLD-MCNC: 82 UG/L (ref 38–122)

## 2024-12-26 ENCOUNTER — PATIENT MESSAGE (OUTPATIENT)
Dept: SLEEP MEDICINE | Facility: CLINIC | Age: 49
End: 2024-12-26
Payer: COMMERCIAL

## 2024-12-29 DIAGNOSIS — G43.009 MIGRAINE WITHOUT AURA AND WITHOUT STATUS MIGRAINOSUS, NOT INTRACTABLE: ICD-10-CM

## 2024-12-30 DIAGNOSIS — E66.01 CLASS 3 SEVERE OBESITY DUE TO EXCESS CALORIES WITH SERIOUS COMORBIDITY AND BODY MASS INDEX (BMI) OF 45.0 TO 49.9 IN ADULT: ICD-10-CM

## 2024-12-30 DIAGNOSIS — E66.813 CLASS 3 SEVERE OBESITY DUE TO EXCESS CALORIES WITH SERIOUS COMORBIDITY AND BODY MASS INDEX (BMI) OF 45.0 TO 49.9 IN ADULT: ICD-10-CM

## 2024-12-30 DIAGNOSIS — Z98.84 S/P BARIATRIC SURGERY: ICD-10-CM

## 2024-12-30 RX ORDER — ONDANSETRON 8 MG/1
8 TABLET, ORALLY DISINTEGRATING ORAL EVERY 6 HOURS PRN
Qty: 30 TABLET | Refills: 0 | Status: SHIPPED | OUTPATIENT
Start: 2024-12-30

## 2024-12-31 DIAGNOSIS — G43.009 MIGRAINE WITHOUT AURA AND WITHOUT STATUS MIGRAINOSUS, NOT INTRACTABLE: ICD-10-CM

## 2024-12-31 RX ORDER — UBROGEPANT 100 MG/1
TABLET ORAL
Qty: 16 TABLET | Refills: 5 | Status: SHIPPED | OUTPATIENT
Start: 2024-12-31

## 2024-12-31 RX ORDER — UBROGEPANT 100 MG/1
100 TABLET ORAL
Qty: 16 TABLET | Refills: 5 | Status: SHIPPED | OUTPATIENT
Start: 2024-12-31 | End: 2024-12-31

## 2024-12-31 NOTE — TELEPHONE ENCOUNTER
Requested Prescriptions     Pending Prescriptions Disp Refills    ubrogepant (UBRELVY) 100 mg tablet 16 tablet 5     Sig: Take 1 tablet (100 mg total) by mouth every 2 (two) hours as needed for Migraine. Maximum: 200 mg per 24 hours     Lov 1/11/24

## 2025-01-02 DIAGNOSIS — G47.411 NARCOLEPSY WITH CATAPLEXY: ICD-10-CM

## 2025-01-02 RX ORDER — DEXTROAMPHETAMINE SACCHARATE, AMPHETAMINE ASPARTATE MONOHYDRATE, DEXTROAMPHETAMINE SULFATE AND AMPHETAMINE SULFATE 5; 5; 5; 5 MG/1; MG/1; MG/1; MG/1
20 CAPSULE, EXTENDED RELEASE ORAL EVERY MORNING
Qty: 30 CAPSULE | Refills: 0 | Status: SHIPPED | OUTPATIENT
Start: 2025-01-02

## 2025-01-02 RX ORDER — DEXTROAMPHETAMINE SACCHARATE, AMPHETAMINE ASPARTATE, DEXTROAMPHETAMINE SULFATE AND AMPHETAMINE SULFATE 5; 5; 5; 5 MG/1; MG/1; MG/1; MG/1
1 TABLET ORAL 2 TIMES DAILY
Qty: 60 TABLET | Refills: 0 | Status: SHIPPED | OUTPATIENT
Start: 2025-01-02

## 2025-01-06 ENCOUNTER — PATIENT MESSAGE (OUTPATIENT)
Dept: BARIATRICS | Facility: CLINIC | Age: 50
End: 2025-01-06
Payer: COMMERCIAL

## 2025-01-13 ENCOUNTER — PATIENT MESSAGE (OUTPATIENT)
Dept: PRIMARY CARE CLINIC | Facility: CLINIC | Age: 50
End: 2025-01-13
Payer: COMMERCIAL

## 2025-01-13 ENCOUNTER — OFFICE VISIT (OUTPATIENT)
Dept: UROGYNECOLOGY | Facility: CLINIC | Age: 50
End: 2025-01-13
Payer: COMMERCIAL

## 2025-01-13 DIAGNOSIS — R19.8 IRREGULAR BOWEL HABITS: ICD-10-CM

## 2025-01-13 DIAGNOSIS — N81.89 PELVIC FLOOR WEAKNESS: ICD-10-CM

## 2025-01-13 DIAGNOSIS — N39.41 URGE INCONTINENCE: Primary | ICD-10-CM

## 2025-01-13 PROCEDURE — 1159F MED LIST DOCD IN RCRD: CPT | Mod: CPTII,95,, | Performed by: NURSE PRACTITIONER

## 2025-01-13 PROCEDURE — 1160F RVW MEDS BY RX/DR IN RCRD: CPT | Mod: CPTII,95,, | Performed by: NURSE PRACTITIONER

## 2025-01-13 PROCEDURE — 98004 SYNCH AUDIO-VIDEO EST SF 10: CPT | Mod: 95,,, | Performed by: NURSE PRACTITIONER

## 2025-01-13 NOTE — PROGRESS NOTES
Urogyn follow up  01/13/2025  .  [facility] - UROGYNECOLOGY  [address] [address]    Amanda Navarrete  [id number]  1975      Amanda Navarrete is a 49 y.o.  here for a urogyn follow up for mixed urinary incontinence.    The patient location is: [place]  The chief complaint leading to consultation is: urge incontinence    Visit type: audiovisual      Each patient to whom he or she provides medical services by telemedicine is:  (1) informed of the relationship between the physician and patient and the respective role of any other health care provider with respect to management of the patient; and (2) notified that he or she may decline to receive medical services by telemedicine and may withdraw from such care at any time.    Notes:       Last HPI from 05/02/2024  1)  UI:  (+) ABHISHEK = (+) UUI  X 9 years. Has worsened since 2015 back surgery (+) pads:1/day, usually moderate wetness.  Daytime frequency: Q 1 hours.  Nocturia: Yes: 2-3/night. Limits fluids prior to bedtime  (--) dysuria,  (--) hematuria,  (--) frequent UTIs.  (+) complete bladder emptying. Taking oxybutynin xl 15 mg --no longer working     2)  POP:  Absent..  Symptoms:(--)  .  (--) vaginal bleeding. (--) vaginal discharge. (+) sexually active.  (--) dyspareunia.   (--)  Vaginal dryness.  (--) vaginal estrogen use.    no pop  Pvr 20 mL    3)  BM:  (+) constipation/straining.  (+) chronic diarrhea. (+) hematochezia--brb on stool.  (--) fecal incontinence.  (--) fecal smearing/urgency.  (+) complete evacuation.      07/09/2024  1)  Mixed urinary incontinence, urge > stress:    --going to pelvic floor PT--doing well.  -- gemtesa was not approved. Taking oxybutynin xl 10 mg   --using 2 pads/ day with moderate wetness  --Has better days since starting pt.     2)irregular bowel habits  --still irregular  --taking fiber supplement.       3)obesity  --doing well on weight loss    Changes since last visit  1)  Mixed urinary  incontinence, urge > stress:    --started pelvic floor PT--went to several sessions prior to gastric sleeve.  --taking darifenacin 7.5 mg daily. Voiding every 1-2 hours during the day-- denies UUI     2)irregular bowel habits  --having intermittent constipation with high protein diet.  --not using fiber suppplement               Past Medical History:   Diagnosis Date    Affective disorder     sses Dr. Dial, psychiatry    Back pain     GERD (gastroesophageal reflux disease)     Headache(784.0)     Hyperlipidemia, mixed 2019    Influenza A 2022    - supportive care - albuterol inhaler - discussed proceed to the ER if severe shortness of breath or chest pain. - Follow-up in office 2022 scheduled    Migraine headache     Narcolepsy without cataplexy(347.00)     Syncope 2024    - recommend Cardiology evaluation    Thyroid disease     hyperthyroidism    TMJ arthralgia        Past Surgical History:   Procedure Laterality Date    CAUDAL EPIDURAL STEROID INJECTION N/A 2018    Procedure: INJECTION, STEROID, CAUDAL, EPIDURAL;  Surgeon: Geoffrey Aleman MD;  Location: [facility];  Service: Pain Management;  Laterality: N/A;     SECTION, CLASSIC      COSMETIC SURGERY      breast augmentation    ESOPHAGOGASTRODUODENOSCOPY N/A 2024    Procedure: EGD (ESOPHAGOGASTRODUODENOSCOPY);  Surgeon: Chris Morales MD;  Location: [facility] ([address] [place] [address]);  Service: Endoscopy;  Laterality: N/A;  referred by Lorene Jones np bmi 48, instructions sent to pt portal.  -pr call complete-tb    INJECTION OF ANESTHETIC AGENT INTO SACROILIAC JOINT Bilateral 2020    Procedure: BLOCK, SACROILIAC JOINT;  Surgeon: Geoffrey Aleman MD;  Location: [facility];  Service: Pain Management;  Laterality: Bilateral;    ROBOT-ASSISTED LAPAROSCOPIC SLEEVE GASTRECTOMY USING DA PRAVEEN XI N/A 10/23/2024    Procedure: XI ROBOTIC SLEEVE GASTRECTOMY;  Surgeon: Chris Morales MD;  Location: [address];  Service:  General;  Laterality: N/A;    SPINE SURGERY  11/19/2015    fusion L spine       Family History   Problem Relation Name Age of Onset    Hypertension Mother      Kidney disease Mother      Sleep apnea Mother      Narcolepsy Father      Cancer Maternal Aunt          colon cancer gene    Cancer Paternal Aunt          lung ca       Social History     Socioeconomic History    Marital status: Single     Spouse name: John    Number of children: 3   Occupational History    Occupation: post master   Tobacco Use    Smoking status: Former     Current packs/day: 1.00     Average packs/day: 1 pack/day for 15.9 years (15.9 ttl pk-yrs)     Types: Cigarettes     Start date: 8/11/2014     Passive exposure: Never    Smokeless tobacco: Never   Substance and Sexual Activity    Alcohol use: Yes     Comment: Social, rare    Drug use: No    Sexual activity: Yes     Partners: Male   Social History Narrative    She works at [employer]. She still has a daughter living at home while in college. The others are grown. She has 4 dogs.      Social Drivers of Health     Financial Resource Strain: Medium Risk (3/21/2024)    Overall Financial Resource Strain (CARDIA)     Difficulty of Paying Living Expenses: Somewhat hard   Food Insecurity: Food Insecurity Present (3/21/2024)    Hunger Vital Sign     Worried About Running Out of Food in the Last Year: Sometimes true     Ran Out of Food in the Last Year: Patient declined   Transportation Needs: No Transportation Needs (3/21/2024)    PRAPARE - Transportation     Lack of Transportation (Medical): No     Lack of Transportation (Non-Medical): No   Physical Activity: Inactive (3/21/2024)    Exercise Vital Sign     Days of Exercise per Week: 0 days     Minutes of Exercise per Session: 0 min   Stress: Stress Concern Present (3/21/2024)    [employer] - Occupational Stress Questionnaire     Feeling of Stress : To some extent   Housing Stability: High Risk  (3/21/2024)    Housing Stability Vital Sign     Unable to Pay for Housing in the Last Year: Yes     Number of Places Lived in the Last Year: 2     Unstable Housing in the Last Year: Yes       Current Outpatient Medications   Medication Sig Dispense Refill    albuterol (PROVENTIL/VENTOLIN HFA) 90 mcg/actuation inhaler Inhale 1-2 puffs into the lungs every 4 (four) hours as needed for Wheezing or Shortness of Breath. Rescue 18 g 5    calcium-vitamin D3 (OS-RHONDA 500 + D3) 500 mg-5 mcg (200 unit) per tablet Take 1 tablet by mouth once daily.      clonazePAM (KLONOPIN) 0.5 MG tablet       cyanocobalamin (VITAMIN B-12) 1000 MCG tablet Take 100 mcg by mouth once daily.      darifenacin (ENABLEX) 7.5 MG Tb24 Take 1 tablet (7.5 mg total) by mouth once daily. 30 tablet 11    dextroamphetamine-amphetamine (ADDERALL XR) 20 MG 24 hr capsule Take 1 capsule (20 mg total) by mouth every morning. 30 capsule 0    dextroamphetamine-amphetamine (ADDERALL) 20 mg tablet Take 1 tablet by mouth 2 (two) times a day. 60 tablet 0    diclofenac sodium (VOLTAREN) 1 % Gel Apply 2 g topically 4 (four) times daily as needed (pain). 100 g 0    DULoxetine (CYMBALTA) 60 MG capsule Take 1 capsule by mouth once daily.      ergocalciferol (VITAMIN D2) 50,000 unit Cap Take 1 capsule (50,000 Units total) by mouth twice a week. 24 capsule 3    fluticasone propionate (FLONASE) 50 mcg/actuation nasal spray 1 spray (50 mcg total) by Each Nostril route once daily. 16 g 0    gabapentin (NEURONTIN) 300 MG capsule OPEN capsule into a tablespoon and consume with sip of liquid. Take once the MORNING OF SURGERY. After surgery: take one capsule TWICE DAILY for at least 3 days and up to 5 days as needed for pain. (Patient not taking: Reported on 12/18/2024) 11 capsule 0    levothyroxine (SYNTHROID) 88 MCG tablet Take 1 tablet (88 mcg total) by mouth daily before breakfast. 90 tablet 3    meclizine (ANTIVERT) 25 mg tablet Take 1 tablet (25 mg total) by mouth 3 (three)  times daily as needed for Dizziness. 30 tablet 5    methocarbamol (ROBAXIN) 500 MG Tab Take 500 mg by mouth every evening.  2    MIRENA 20 mcg/24 hours (6 yrs) 52 mg IUD       modafiniL (PROVIGIL) 200 MG Tab Take 1 tablet by mouth every morning and ½ tablet at noon. 45 tablet 5    omeprazole (PRILOSEC) 40 MG capsule Take 1 capsule (40 mg total) by mouth every morning. Open capsule and take with apple sauce 30 capsule 2    ondansetron (ZOFRAN-ODT) 8 MG TbDL Dissolve 1 tablet (8 mg total) by mouth every 6 (six) hours as needed (Nausea). 30 tablet 0    oxyCODONE (ROXICODONE) 5 mg/5 mL Soln Take 5 mLs (5 mg total) by mouth every 4 (four) hours as needed. 25 mL 0    oxyCODONE-acetaminophen (PERCOCET) 5-325 mg per tablet Take 1 tablet by mouth 2 (two) times daily as needed.  0    rosuvastatin (CRESTOR) 10 MG tablet Take 1 tablet (10 mg total) by mouth once daily. 90 tablet 3    sodium,calcium,mag,pot oxybate (XYWAV) 0.5 gram/mL Soln Take 4.5 g by mouth As instructed. 540 mL 5    topiramate (TOPAMAX) 100 MG tablet Take 1 tablet by mouth once daily AND 1 ½ tablets every evening 225 tablet 3    UBRELVY 100 mg tablet TAKE 1 TABLET BY MOUTH EVERY 2 (TWO) HOURS AS NEEDED FOR MIGRAINE. MAXIMUM: 200 MG PER 24 HOURS 16 tablet 5    ursodioL (ACTIGALL) 500 MG tablet Take 1 tablet (500 mg total) by mouth once daily. For gallstone prevention. 180 tablet 0    valACYclovir (VALTREX) 1000 MG tablet Take 1 tablet by mouth every 12 hours as needed (cold sores). 60 tablet 3     No current facility-administered medications for this visit.       Review of patient's allergies indicates:   Allergen Reactions    Tamiflu [oseltamivir] Itching and Swelling       Well woman:  Pap test: 08/2022. normal  History of abnormal paps: No.  History of STIs:  Hepatitis C  Mammogram: Date of last:   12/2024 normal    Colonoscopy:01/2024 ifobt negative-- did have colonoscopy years ago-- reports normal   DEXA: n/a       ROS:  As per HPI.      Exam  There were  no vitals taken for this visit.  General: alert and oriented, no acute distress  Respiratory: normal respiratory effort  Abd: soft, non-tender, non-distended    Pelvic--deferred    Impression  1. Urge incontinence        2. Pelvic floor weakness        3. Irregular bowel habits          We reviewed the above issues and discussed options for short-term versus long-term management of her problems.   Plan:   1)  Mixed urinary incontinence, urge > stress:    --Empty bladder every 3 hours.  Empty well: wait a minute, lean forward on toilet.    --Avoid dietary irritants (see sheet).  Keep diary x 3-5 days to determine your irritants.  --continue pelvic floor PT  home exercise program  --URGE: trial darifenacin -- let me know if it is not affordable--when you start stop the oxybutynin Takes 2-4 weeks to see if will have effect.  For dry mouth: get sour, sugar free lozenge or gum.    --STRESS:  Pessary vs. Sling.     2)irregular bowel habits  --hydrate well  -- Start daily fiber.  Take 1 tsp of fiber powder (psyllium or other sugar-free powder).  Mix in 8 oz of water.  Take x 3-5 days.  Then, increase fiber by 1 tsp every 3-5 days until stool is easy to pass.  Stop and continue at that dose.   Do not exceed 6 tsps/day.  May also use over the counter stool softener 1-2 x/day.  AVOID laxatives.        3)RTC 1 year for follow up       Face to Face time with patient: 6  10 minutes of total time spent on the encounter, which includes face to face time and non-face to face time preparing to see the patient (eg, review of tests), Obtaining and/or reviewing separately obtained history, Documenting clinical information in the electronic or other health record, Independently interpreting results (not separately reported) and communicating results to the patient/family/caregiver, or Care coordination (not separately reported).     Barb Baltazar, [facility]  Division of Female Pelvic Medicine and Reconstructive  Surgery  Department of Obstetrics & Gynecology

## 2025-01-13 NOTE — PROGRESS NOTES
Cardiology Clinic note    Subjective:   Patient ID:  Amanda Navarrete is a 49 y.o. female who presents for evaluation of abnormal ECG    HPI    1/15/2025: Here for F/U. Seen in clinic unaccompanied reports overall doing well. Has been doing great with weight loss since bariatric surgery. Not as active as she'd like to be with chronic pain. Does report intermittent dizziness, long-standing; reports at a previous urgent care visit was recommended to see cardiology for possible POTS. No new CV s/s, never with noted significant tachycardia/ palpitations. Patient denies CP, SOB/DUBOIS, orthopnea, PND, syncope, palpitations, LE edema. Reports compliance and tolerance with all medications.     Orthostatics 1/15/2025:    Layin/68, 104/69     HR 81    Sittin/73, 114/74     HR 79    Standing 106/69, 116/75   HR 85    2024:   50 yo F with hx of narcolepsy, GERD. Referred by barriatrics for cardiac clearance for bariatric surgery work-up. Seen in clinic, reports overall doing well. Referred following abnormal ECG for bariatric work-up. Patient denies CP, SOB/DUBOIS, orthopnea, PND, palpitations, LE edema. Not as active as she would like to be given chronic pain. Reports compliance and tolerance with all medications. No known hx of CV disease.  -Stress Echo negative as below, METS > 4      Cardiac hx  -------------------------  SH  -former smoker, quit in     FH  -no known FH of early CAD      Stress Echo 2024    Left Ventricle: There is normal systolic function. Ejection fraction by visual approximation is 55%.    Stress Protocol: The patient exercised for 6 minutes 0 seconds on a Hiren protocol, corresponding to a functional capacity of 7METS, achieving a peak heart rate of 164 bpm, which is 101% of the age predicted maximum heart rate. Their exercise capacity was average. The patient reported no symptoms during the stress test. The test was stopped because the end of the protocol was reached.     Baseline ECG: The Baseline ECG reveals sinus rhythm.    Stress ECG: There is 0.5 mm of upward-sloping ST segment depression noted during stress. There are no arrhythmias during stress. There is normal blood pressure response with stress.    ECG Conclusion: The ECG portion of the study is negative for ischemia.    Post-stress Impression: The study is negative with no echocardiographic evidence of stress induced ischemia. Sensitivity limited due to sub optimal  image quality      Past Medical History:   Diagnosis Date    Affective disorder     sses Dr. Dial, psychiatry    Back pain     GERD (gastroesophageal reflux disease)     Headache(784.0)     Hyperlipidemia, mixed 09/18/2019    Influenza A 12/01/2022    - supportive care - albuterol inhaler - discussed proceed to the ER if severe shortness of breath or chest pain. - Follow-up in office 12/05/2022 scheduled    Migraine headache     Narcolepsy without cataplexy(347.00)     Syncope 02/08/2024    - recommend Cardiology evaluation    Thyroid disease     hyperthyroidism    TMJ arthralgia           Patient Active Problem List    Diagnosis Date Noted    Dizziness 01/15/2025    Nonspecific abnormal electrocardiogram (ECG) (EKG) 01/15/2025    Obesity 10/23/2024    Weakness 05/22/2024    Stress incontinence 03/21/2024    Pre-syncope 02/08/2024     - recommend Cardiology evaluation      Impaired fasting glucose 12/22/2021     Lab Results   Component Value Date    HGBA1C 5.4 02/01/2024     - discussed recommendation for diet and cardiovascular exercise  - counseling on lifestyle modifications for risk factor reduction  - counseling on management options      Positive hepatitis C antibody test 06/09/2021     - 6/4/2021 hepatitis C antibody positive  - 6/11/2021 hepatitis C viral load not detectable  - 6/10/21 Liver US: Liver: Normal in size, measuring 15.3 cm. Homogeneous echotexture. No focal hepatic lesions.  - no history of IV drug use  - positive tattoos      Chronic pain  syndrome 06/09/2021    EUNICE (obstructive sleep apnea)      - followed by sleep medicine  - on CPAP      Other spondylosis, lumbar region 12/11/2020    History of herpes labialis 12/08/2020    Overactive bladder 12/08/2020     - well controlled  - worsening symptoms      Class 3 severe obesity due to excess calories with serious comorbidity and body mass index (BMI) of 45.0 to 49.9 in adult 12/07/2020     - discussed recommendation for diet and cardiovascular exercise  - counseling on lifestyle modifications for risk factor reduction      Hyperlipidemia, mixed 09/18/2019     Lab Results   Component Value Date    LDLCALC 93.2 02/01/2024     - LDL goal <100  - well controlled  - continue current management plan   - patient encouraged to notify me with any changes      Generalized anxiety disorder 07/10/2019    Vitamin D deficiency 07/10/2019     - tried and failed vitamin-D 3 5000 units daily  - D improved with D2 50,000 twice a week  - well controlled  - continue current management plan   - patient encouraged to notify me with any changes      Gait instability 07/10/2019    Lumbar radiculitis 07/13/2018    Hypothyroidism due to Hashimoto's thyroiditis 02/16/2017     Lab Results   Component Value Date    TSH 3.672 05/17/2024     - well controlled  - continue current management plan   - patient encouraged to notify me with any changes      Chronic bilateral low back pain with bilateral sciatica     Migraine headache      - well controlled  - continue current medication      GERD (gastroesophageal reflux disease)     Narcolepsy cataplexy syndrome      - followed by sleep medicine         Patient's Medications   New Prescriptions    No medications on file   Previous Medications    ALBUTEROL (PROVENTIL/VENTOLIN HFA) 90 MCG/ACTUATION INHALER    Inhale 1-2 puffs into the lungs every 4 (four) hours as needed for Wheezing or Shortness of Breath. Rescue    CALCIUM-VITAMIN D3 (OS-RHONDA 500 + D3) 500 MG-5 MCG (200 UNIT) PER TABLET     Take 1 tablet by mouth once daily.    CLONAZEPAM (KLONOPIN) 0.5 MG TABLET        CYANOCOBALAMIN (VITAMIN B-12) 1000 MCG TABLET    Take 100 mcg by mouth once daily.    DARIFENACIN (ENABLEX) 7.5 MG TB24    Take 1 tablet (7.5 mg total) by mouth once daily.    DEXTROAMPHETAMINE-AMPHETAMINE (ADDERALL XR) 20 MG 24 HR CAPSULE    Take 1 capsule (20 mg total) by mouth every morning.    DEXTROAMPHETAMINE-AMPHETAMINE (ADDERALL) 20 MG TABLET    Take 1 tablet by mouth 2 (two) times a day.    DICLOFENAC SODIUM (VOLTAREN) 1 % GEL    Apply 2 g topically 4 (four) times daily as needed (pain).    DULOXETINE (CYMBALTA) 60 MG CAPSULE    Take 1 capsule by mouth once daily.    ERGOCALCIFEROL (VITAMIN D2) 50,000 UNIT CAP    Take 1 capsule (50,000 Units total) by mouth twice a week.    FLUTICASONE PROPIONATE (FLONASE) 50 MCG/ACTUATION NASAL SPRAY    1 spray (50 mcg total) by Each Nostril route once daily.    GABAPENTIN (NEURONTIN) 300 MG CAPSULE    OPEN capsule into a tablespoon and consume with sip of liquid. Take once the MORNING OF SURGERY. After surgery: take one capsule TWICE DAILY for at least 3 days and up to 5 days as needed for pain.    LEVOTHYROXINE (SYNTHROID) 88 MCG TABLET    Take 1 tablet (88 mcg total) by mouth daily before breakfast.    MECLIZINE (ANTIVERT) 25 MG TABLET    Take 1 tablet (25 mg total) by mouth 3 (three) times daily as needed for Dizziness.    METHOCARBAMOL (ROBAXIN) 500 MG TAB    Take 500 mg by mouth every evening.    MIRENA 20 MCG/24 HOURS (6 YRS) 52 MG IUD        MODAFINIL (PROVIGIL) 200 MG TAB    Take 1 tablet by mouth every morning and ½ tablet at noon.    OMEPRAZOLE (PRILOSEC) 40 MG CAPSULE    Take 1 capsule (40 mg total) by mouth every morning. Open capsule and take with apple sauce    ONDANSETRON (ZOFRAN-ODT) 8 MG TBDL    Dissolve 1 tablet (8 mg total) by mouth every 6 (six) hours as needed (Nausea).    OXYCODONE (ROXICODONE) 5 MG/5 ML SOLN    Take 5 mLs (5 mg total) by mouth every 4 (four) hours as  needed.    OXYCODONE-ACETAMINOPHEN (PERCOCET) 5-325 MG PER TABLET    Take 1 tablet by mouth 2 (two) times daily as needed.    ROSUVASTATIN (CRESTOR) 10 MG TABLET    Take 1 tablet (10 mg total) by mouth once daily.    SODIUM,CALCIUM,MAG,POT OXYBATE (XYWAV) 0.5 GRAM/ML SOLN    Take 4.5 g by mouth As instructed.    TOPIRAMATE (TOPAMAX) 100 MG TABLET    Take 1 tablet by mouth once daily AND 1 ½ tablets every evening    UBRELVY 100 MG TABLET    TAKE 1 TABLET BY MOUTH EVERY 2 (TWO) HOURS AS NEEDED FOR MIGRAINE. MAXIMUM: 200 MG PER 24 HOURS    URSODIOL (ACTIGALL) 500 MG TABLET    Take 1 tablet (500 mg total) by mouth once daily. For gallstone prevention.    VALACYCLOVIR (VALTREX) 1000 MG TABLET    Take 1 tablet by mouth every 12 hours as needed (cold sores).   Modified Medications    No medications on file   Discontinued Medications    No medications on file        Review of Systems   Constitutional: Negative for chills, decreased appetite, diaphoresis, malaise/fatigue, weight gain and weight loss.   Cardiovascular:  Negative for chest pain, claudication, dyspnea on exertion, irregular heartbeat, leg swelling, near-syncope, orthopnea, palpitations, paroxysmal nocturnal dyspnea and syncope.        Presyncope   Respiratory:  Negative for cough, hemoptysis, shortness of breath and snoring.    Gastrointestinal:  Negative for bloating, abdominal pain, nausea and vomiting.   Neurological:  Positive for dizziness. Negative for light-headedness and weakness.       Family History   Problem Relation Name Age of Onset    Hypertension Mother      Kidney disease Mother      Sleep apnea Mother      Narcolepsy Father      Cancer Maternal Aunt          colon cancer gene    Cancer Paternal Aunt          lung ca       Social History     Socioeconomic History    Marital status: Single     Spouse name: John    Number of children: 3   Occupational History    Occupation: post master   Tobacco Use    Smoking status: Former     Current  "packs/day: 1.00     Average packs/day: 1 pack/day for 15.9 years (15.9 ttl pk-yrs)     Types: Cigarettes     Start date: 8/11/2014     Passive exposure: Never    Smokeless tobacco: Never   Substance and Sexual Activity    Alcohol use: Yes     Comment: Social, rare    Drug use: No    Sexual activity: Yes     Partners: Male   Social History Narrative    She works at [employer]. She still has a daughter living at home while in college. The others are grown. She has 4 dogs.      Social Drivers of Health     Financial Resource Strain: Medium Risk (3/21/2024)    Overall Financial Resource Strain (CARDIA)     Difficulty of Paying Living Expenses: Somewhat hard   Food Insecurity: Food Insecurity Present (3/21/2024)    Hunger Vital Sign     Worried About Running Out of Food in the Last Year: Sometimes true     Ran Out of Food in the Last Year: Patient declined   Transportation Needs: No Transportation Needs (3/21/2024)    PRAPARE - Transportation     Lack of Transportation (Medical): No     Lack of Transportation (Non-Medical): No   Physical Activity: Inactive (3/21/2024)    Exercise Vital Sign     Days of Exercise per Week: 0 days     Minutes of Exercise per Session: 0 min   Stress: Stress Concern Present (3/21/2024)    [employer] - Occupational Stress Questionnaire     Feeling of Stress : To some extent   Housing Stability: High Risk (3/21/2024)    Housing Stability Vital Sign     Unable to Pay for Housing in the Last Year: Yes     Number of Places Lived in the Last Year: 2     Unstable Housing in the Last Year: Yes            Objective:   Vitals  Vitals:    01/15/25 0856 01/15/25 0903   BP: 101/67 94/63   Pulse: 78    SpO2: 99%    Weight: 89.8 kg (197 lb 15.6 oz)    Height: 5' 1" (1.549 m)             Physical Exam  Vitals and nursing note reviewed.   Constitutional:       Appearance: Normal appearance.   Cardiovascular:      Rate and Rhythm: Normal rate and regular rhythm.     " " Heart sounds: No murmur heard.     No gallop.   Pulmonary:      Effort: Pulmonary effort is normal.      Breath sounds: Normal breath sounds. No wheezing or rales.   Abdominal:      General: Bowel sounds are normal. There is no distension.      Palpations: Abdomen is soft.      Tenderness: There is no abdominal tenderness.   Musculoskeletal:      Right lower leg: Edema present.      Left lower leg: Edema present.      Comments: trace   Skin:     General: Skin is warm and dry.   Neurological:      Mental Status: She is alert and oriented to person, place, and time.         Lab Results    Lab Results   Component Value Date    WBC 8.16 12/18/2024    HGB 13.5 12/18/2024    HCT 41.8 12/18/2024     (H) 12/18/2024       Lab Results   Component Value Date     12/18/2024       Lab Results   Component Value Date    K 4.0 12/18/2024    MG 2.0 10/24/2024    BUN 19 12/18/2024    CREATININE 0.7 12/18/2024       Lab Results   Component Value Date    GLU 73 12/18/2024    HGBA1C 5.5 06/05/2024       Lab Results   Component Value Date    AST 16 12/18/2024    ALT 24 12/18/2024    ALBUMIN 3.7 12/18/2024    PROT 6.7 12/18/2024       Lab Results   Component Value Date    CHOL 143 12/18/2024    HDL 41 12/18/2024    LDLCALC 88.6 12/18/2024    TRIG 67 12/18/2024       No results found for: "CRP", "BNP"      Assessment:     1. Dizziness    2. Nonspecific abnormal electrocardiogram (ECG) (EKG)    3. Hyperlipidemia, mixed    4. Pre-syncope          Plan:     Dizziness, pre-syncope  -likely 2/2 diagnosed narcolepsy   -patient concerned for POTS with previously noted orthostatic hypotension in the setting of viral illness  -orthostatics today WNL  -48-hour holter for eval  -can consider tilt table/ EP referral if indicated    2. HLD  -continue statin      -continue weight loss, applauded  -low sodium, heart healthy diet; mod intensity exercise 30m/day 3-4x/wk      -F/U 6m      The 10-year ASCVD risk score (Lizbet ROSARIO, et al., 2019) " is: 0.6%    Values used to calculate the score:      Age: 49 years      Sex: Female      Is Non- : No      Diabetic: No      Tobacco smoker: No      Systolic Blood Pressure: 94 mmHg      Is BP treated: No      HDL Cholesterol: 41 mg/dL      Total Cholesterol: 143 mg/dL    Orders Placed This Encounter   Procedures    Holter monitor - 48 hour     Standing Status:   Future     Standing Expiration Date:   1/15/2026     Order Specific Question:   Release to patient     Answer:   Immediate       She expressed verbal understanding and agreed with the plan    Follow up in 6m    Pertinent cardiac images and EKG reviewed independently.    Continue with current medical plan and lifestyle changes.  Return sooner for concerns or questions. If symptoms persist go to the ED.  I have reviewed all pertinent data including patient's medical history in detail and updated the computerized patient record.     Counseling included discussion regarding imaging findings, diagnosis, possibilities, treatment options, risks and benefits.    Thank you for the opportunity to care for this patient. Will be available for questions if needed.        Signed:  Galindo Abdalla DNP  01/15/2025

## 2025-01-13 NOTE — PATIENT INSTRUCTIONS
1)  Mixed urinary incontinence, urge > stress:    --Empty bladder every 3 hours.  Empty well: wait a minute, lean forward on toilet.    --Avoid dietary irritants (see sheet).  Keep diary x 3-5 days to determine your irritants.  --continue pelvic floor PT  home exercise program  --URGE: trial darifenacin -- let me know if it is not affordable--when you start stop the oxybutynin Takes 2-4 weeks to see if will have effect.  For dry mouth: get sour, sugar free lozenge or gum.    --STRESS:  Pessary vs. Sling.     2)irregular bowel habits  --hydrate well  -- Start daily fiber.  Take 1 tsp of fiber powder (psyllium or other sugar-free powder).  Mix in 8 oz of water.  Take x 3-5 days.  Then, increase fiber by 1 tsp every 3-5 days until stool is easy to pass.  Stop and continue at that dose.   Do not exceed 6 tsps/day.  May also use over the counter stool softener 1-2 x/day.  AVOID laxatives.        3)RTC 1 year for follow up

## 2025-01-14 ENCOUNTER — PATIENT MESSAGE (OUTPATIENT)
Dept: BARIATRICS | Facility: CLINIC | Age: 50
End: 2025-01-14
Payer: COMMERCIAL

## 2025-01-15 ENCOUNTER — OFFICE VISIT (OUTPATIENT)
Dept: CARDIOLOGY | Facility: CLINIC | Age: 50
End: 2025-01-15
Payer: COMMERCIAL

## 2025-01-15 VITALS
SYSTOLIC BLOOD PRESSURE: 94 MMHG | BODY MASS INDEX: 37.38 KG/M2 | DIASTOLIC BLOOD PRESSURE: 63 MMHG | HEART RATE: 78 BPM | WEIGHT: 198 LBS | OXYGEN SATURATION: 99 % | HEIGHT: 61 IN

## 2025-01-15 DIAGNOSIS — R94.31 NONSPECIFIC ABNORMAL ELECTROCARDIOGRAM (ECG) (EKG): Chronic | ICD-10-CM

## 2025-01-15 DIAGNOSIS — E78.2 HYPERLIPIDEMIA, MIXED: ICD-10-CM

## 2025-01-15 DIAGNOSIS — R55 PRE-SYNCOPE: ICD-10-CM

## 2025-01-15 DIAGNOSIS — R42 DIZZINESS: Primary | ICD-10-CM

## 2025-01-15 PROCEDURE — 3078F DIAST BP <80 MM HG: CPT | Mod: CPTII,S$GLB,,

## 2025-01-15 PROCEDURE — 1159F MED LIST DOCD IN RCRD: CPT | Mod: CPTII,S$GLB,,

## 2025-01-15 PROCEDURE — 3008F BODY MASS INDEX DOCD: CPT | Mod: CPTII,S$GLB,,

## 2025-01-15 PROCEDURE — 99214 OFFICE O/P EST MOD 30 MIN: CPT | Mod: S$GLB,,,

## 2025-01-15 PROCEDURE — 3074F SYST BP LT 130 MM HG: CPT | Mod: CPTII,S$GLB,,

## 2025-01-15 PROCEDURE — 99999 PR PBB SHADOW E&M-EST. PATIENT-LVL V: CPT | Mod: PBBFAC,,,

## 2025-01-18 ENCOUNTER — PATIENT MESSAGE (OUTPATIENT)
Dept: BARIATRICS | Facility: CLINIC | Age: 50
End: 2025-01-18
Payer: COMMERCIAL

## 2025-01-20 ENCOUNTER — PATIENT MESSAGE (OUTPATIENT)
Dept: CARDIOLOGY | Facility: CLINIC | Age: 50
End: 2025-01-20
Payer: COMMERCIAL

## 2025-01-21 ENCOUNTER — PATIENT MESSAGE (OUTPATIENT)
Dept: CARDIOLOGY | Facility: HOSPITAL | Age: 50
End: 2025-01-21
Payer: COMMERCIAL

## 2025-01-24 ENCOUNTER — PATIENT MESSAGE (OUTPATIENT)
Dept: BARIATRICS | Facility: CLINIC | Age: 50
End: 2025-01-24
Payer: COMMERCIAL

## 2025-01-24 DIAGNOSIS — E66.813 CLASS 3 SEVERE OBESITY DUE TO EXCESS CALORIES WITH SERIOUS COMORBIDITY AND BODY MASS INDEX (BMI) OF 45.0 TO 49.9 IN ADULT: ICD-10-CM

## 2025-01-24 DIAGNOSIS — Z98.84 S/P BARIATRIC SURGERY: ICD-10-CM

## 2025-01-24 DIAGNOSIS — G43.009 MIGRAINE WITHOUT AURA AND WITHOUT STATUS MIGRAINOSUS, NOT INTRACTABLE: ICD-10-CM

## 2025-01-24 DIAGNOSIS — E66.01 CLASS 3 SEVERE OBESITY DUE TO EXCESS CALORIES WITH SERIOUS COMORBIDITY AND BODY MASS INDEX (BMI) OF 45.0 TO 49.9 IN ADULT: ICD-10-CM

## 2025-01-24 RX ORDER — UBROGEPANT 100 MG/1
100 TABLET ORAL
Qty: 16 TABLET | Refills: 5 | Status: SHIPPED | OUTPATIENT
Start: 2025-01-24

## 2025-01-27 ENCOUNTER — HOSPITAL ENCOUNTER (OUTPATIENT)
Dept: CARDIOLOGY | Facility: HOSPITAL | Age: 50
Discharge: HOME | End: 2025-01-27
Payer: COMMERCIAL

## 2025-01-27 DIAGNOSIS — R42 DIZZINESS: ICD-10-CM

## 2025-01-27 PROCEDURE — 93226 XTRNL ECG REC<48 HR SCAN A/R: CPT

## 2025-01-27 PROCEDURE — 93227 XTRNL ECG REC<48 HR R&I: CPT | Mod: ,,, | Performed by: INTERNAL MEDICINE

## 2025-01-28 ENCOUNTER — PATIENT MESSAGE (OUTPATIENT)
Dept: SLEEP MEDICINE | Facility: CLINIC | Age: 50
End: 2025-01-28
Payer: COMMERCIAL

## 2025-01-29 PROBLEM — R55 PRE-SYNCOPE: Status: RESOLVED | Noted: 2024-02-08 | Resolved: 2025-01-29

## 2025-01-29 PROBLEM — R42 DIZZINESS: Status: RESOLVED | Noted: 2025-01-15 | Resolved: 2025-01-29

## 2025-01-29 PROBLEM — E66.9 OBESITY: Status: RESOLVED | Noted: 2024-10-23 | Resolved: 2025-01-29

## 2025-01-29 PROBLEM — R53.1 WEAKNESS: Status: RESOLVED | Noted: 2024-05-22 | Resolved: 2025-01-29

## 2025-01-29 PROBLEM — R26.81 GAIT INSTABILITY: Status: RESOLVED | Noted: 2019-07-10 | Resolved: 2025-01-29

## 2025-01-29 NOTE — PROGRESS NOTES
FAMILY MEDICINE  [facility]    Reason for visit:   Chief Complaint   Patient presents with    Annual Exam         SUBJECTIVE: Amanda Navarrete is a 49 y.o. female  - with obesity, chronic low back pain with bilateral sciatica, migraine headaches, hypothyroidism, anxiety, hyperlipidemia, vitamin-D deficiency, GERD, narcolepsy cataplexy syndrome, and s/p gastric sleeve (10/2024) presents for her routine annual physical and follow up thyroid     Sleep Medicine: KIRK Reilly  Gynecology: Dr. Flores  - mammograms [facility]   Ortho Dr. Najera  Pain Dr. Geoffrey Aleman  Urogyn Barb Baltazar, NP  Cardiology Galindo Abdalla, SOLANGE  Bariatric Anastacia Lemus PA-C    Since her last visit she reports she had a gastric sleeve.  She has lost >50 lbs.  She is very pleased.     1. Hypothyroidism     Prior evaluation by Endocrinologist: No  Prior thyroid US: no    Amanda Navarrete reports that she has been feeling much better on levothyroxine 88 mcg daily.  She may have 1 dose since her last visit.  She is also taking supplemental selenium.  She does admit to taking supplemental biotin    Current medication:   levothyroxine (SYNTHROID) 88 MCG tablet, Take 1 tablet (88 mcg total) by mouth daily before breakfast., Disp: 90 tablet, Rfl:       Side effects from medication: none  Scheduled for medication: AM fasting separate from other medications    Lab Results       Component                Value               Date                       TSH                      4.049 (H)           01/31/2025                 G1HNYCQ                  93                  06/05/2024                 G6ZHAHQ                  6.8                 06/05/2024                 FREET4                   0.87                01/31/2025                Lab Results       Component                Value               Date                       TSH                      1.937               06/05/2024                 U9IARYY                   93                  06/05/2024                 H4VMMSM                  6.8                 06/05/2024                 FREET4                   0.90                06/05/2024                Lab Results       Component                Value               Date                       TSH                      3.672               05/17/2024                 FREET4                   0.79                03/19/2024                Lab Results       Component                Value               Date                       TSH                      5.850 (H)           03/19/2024                 FREET4                   0.79                03/19/2024 02/01/2024 TSH  6.840 High          2. Hyperlipidemia    Current treatment:  rosuvastatin (CRESTOR) 10 MG tablet, Take 1 tablet (10 mg total) by mouth once daily., Disp: 90 tablet, Rfl: 3    Side effects from current treatment: none      Lab Results       Component                Value               Date                       CHOL                     170                 01/31/2025                 CHOL                     143                 12/18/2024                 CHOL                     170                 06/05/2024            Lab Results       Component                Value               Date                       HDL                      46                  01/31/2025                 HDL                      41                  12/18/2024                 HDL                      50                  06/05/2024            Lab Results       Component                Value               Date                       LDLCALC                  110.2               01/31/2025                 LDLCALC                  88.6                12/18/2024                 LDLCALC                  102.8               06/05/2024            Lab Results       Component                Value               Date                       TRIG                     69                  01/31/2025                  TRIG                     67                  12/18/2024                 TRIG                     86                  06/05/2024            Lab Results       Component                Value               Date                       CHOLHDL                  27.1                01/31/2025                 CHOLHDL                  28.7                12/18/2024                 CHOLHDL                  29.4                06/05/2024                          Review of Systems   All other systems reviewed and are negative.      HEALTH MAINTENANCE:   Health Maintenance   Topic Date Due    Pneumococcal Vaccines (Age 50+) (2 of 2 - PCV) 04/20/2024    COVID-19 Vaccine (4 - 2024-25 season) 09/01/2024    Colorectal Cancer Screening  01/24/2025    Shingles Vaccine (2 of 2) 01/28/2025    Mammogram  12/09/2025    Hemoglobin A1c (Diabetic Prevention Screening)  01/31/2028    TETANUS VACCINE  11/17/2028    Cervical Cancer Screening  01/17/2030    Lipid Panel  01/31/2030    RSV Vaccine (Age 60+ and Pregnant patients) (1 - 1-dose 75+ series) 04/20/2049    Hepatitis C Screening  Completed    Influenza Vaccine  Completed    HIV Screening  Completed     Health Maintenance Topics with due status: Not Due       Topic Last Completion Date    TETANUS VACCINE 11/17/2018    Mammogram 12/09/2024    Cervical Cancer Screening 01/17/2025    Hemoglobin A1c (Diabetic Prevention Screening) 01/31/2025    Lipid Panel 01/31/2025    RSV Vaccine (Age 60+ and Pregnant patients) Not Due     Health Maintenance Due   Topic Date Due    Pneumococcal Vaccines (Age 50+) (2 of 2 - PCV) 04/20/2024    COVID-19 Vaccine (4 - 2024-25 season) 09/01/2024    Colorectal Cancer Screening  01/24/2025    Shingles Vaccine (2 of 2) 01/28/2025       HISTORY:   Past Medical History:   Diagnosis Date    Affective disorder     sses Dr. Dial, psychiatry    Back pain     GERD (gastroesophageal reflux disease)     Headache(784.0)     Hyperlipidemia, mixed 09/18/2019    Impaired  fasting glucose 2021    Lab Results      Component    Value    Date           HGBA1C    4.9    2025     - discussed recommendation for diet and cardiovascular exercise  - counseling on lifestyle modifications for risk factor reduction  - counseling on management options      Influenza A 2022    - supportive care - albuterol inhaler - discussed proceed to the ER if severe shortness of breath or chest pain. - Follow-up in office 2022 scheduled    Migraine headache     Narcolepsy without cataplexy(347.00)     Pre-syncope 2024    - recommend Cardiology evaluation      Syncope 2024    - recommend Cardiology evaluation    Thyroid disease     hyperthyroidism    TMJ arthralgia        Past Surgical History:   Procedure Laterality Date    CAUDAL EPIDURAL STEROID INJECTION N/A 2018    Procedure: INJECTION, STEROID, CAUDAL, EPIDURAL;  Surgeon: Geoffrey Aleman MD;  Location: [facility] OR;  Service: Pain Management;  Laterality: N/A;     SECTION, CLASSIC      COSMETIC SURGERY      breast augmentation    ESOPHAGOGASTRODUODENOSCOPY N/A 2024    Procedure: EGD (ESOPHAGOGASTRODUODENOSCOPY);  Surgeon: Chris Morales MD;  Location: [facility] (2ND FLR);  Service: Endoscopy;  Laterality: N/A;  referred by Lorene Jones np bmi 49, instructions sent to pt portal.  -pr call complete-tb    INJECTION OF ANESTHETIC AGENT INTO SACROILIAC JOINT Bilateral 2020    Procedure: BLOCK, SACROILIAC JOINT;  Surgeon: Geoffrey Aleman MD;  Location: [facility] OR;  Service: Pain Management;  Laterality: Bilateral;    ROBOT-ASSISTED LAPAROSCOPIC SLEEVE GASTRECTOMY USING DA PRAVEEN XI N/A 10/23/2024    Procedure: XI ROBOTIC SLEEVE GASTRECTOMY;  Surgeon: Chris Morales MD;  Location: [facility] OR 2ND FLR;  Service: General;  Laterality: N/A;    SPINE SURGERY  2015    fusion L spine       Family History   Problem Relation Name Age of Onset    Hypertension Mother      Kidney disease Mother      Sleep  apnea Mother      Narcolepsy Father      Cancer Maternal Aunt          colon cancer gene    Cancer Paternal Aunt          lung ca       Social History     Tobacco Use    Smoking status: Former     Current packs/day: 1.00     Average packs/day: 1 pack/day for 16.0 years (16.0 ttl pk-yrs)     Types: Cigarettes     Start date: 8/11/2014     Passive exposure: Never    Smokeless tobacco: Never   Substance Use Topics    Alcohol use: Yes     Comment: Social, rare    Drug use: No       Social History     Social History Narrative    She works at [employer]. She still has a daughter living at home while in college. The others are grown. She has 4 dogs.        ALLERGIES:   Review of patient's allergies indicates:   Allergen Reactions    Tamiflu [oseltamivir] Itching and Swelling       MEDS:   Current Outpatient Medications on File Prior to Visit   Medication Sig Dispense Refill Last Dose/Taking    albuterol (PROVENTIL/VENTOLIN HFA) 90 mcg/actuation inhaler Inhale 1-2 puffs into the lungs every 4 (four) hours as needed for Wheezing or Shortness of Breath. Rescue 18 g 5 Taking As Needed    calcium-vitamin D3 (OS-RHONDA 500 + D3) 500 mg-5 mcg (200 unit) per tablet Take 1 tablet by mouth once daily.   Taking    clonazePAM (KLONOPIN) 0.5 MG tablet    Taking    cyanocobalamin (VITAMIN B-12) 1000 MCG tablet Take 100 mcg by mouth once daily.   Taking    darifenacin (ENABLEX) 7.5 MG Tb24 Take 1 tablet (7.5 mg total) by mouth once daily. 30 tablet 11 Taking    dextroamphetamine-amphetamine (ADDERALL XR) 20 MG 24 hr capsule Take 1 capsule (20 mg total) by mouth every morning. 30 capsule 0 Taking    dextroamphetamine-amphetamine (ADDERALL) 20 mg tablet Take 1 tablet by mouth 2 (two) times a day. 60 tablet 0 Taking    diclofenac sodium (VOLTAREN) 1 % Gel Apply 2 g topically 4 (four) times daily as needed (pain). 100 g 0 Taking As Needed    DULoxetine (CYMBALTA) 60 MG capsule Take 1 capsule by mouth once daily.   Taking     ergocalciferol (VITAMIN D2) 50,000 unit Cap Take 1 capsule (50,000 Units total) by mouth twice a week. 24 capsule 3 Taking    fluticasone propionate (FLONASE) 50 mcg/actuation nasal spray 1 spray (50 mcg total) by Each Nostril route once daily. 16 g 0 Taking    gabapentin (NEURONTIN) 300 MG capsule OPEN capsule into a tablespoon and consume with sip of liquid. Take once the MORNING OF SURGERY. After surgery: take one capsule TWICE DAILY for at least 3 days and up to 5 days as needed for pain. 11 capsule 0 Taking    levothyroxine (SYNTHROID) 88 MCG tablet Take 1 tablet (88 mcg total) by mouth daily before breakfast. 90 tablet 3 Taking    meclizine (ANTIVERT) 25 mg tablet Take 1 tablet (25 mg total) by mouth 3 (three) times daily as needed for Dizziness. 30 tablet 5 Taking As Needed    methocarbamol (ROBAXIN) 500 MG Tab Take 500 mg by mouth every evening.  2 Taking    MIRENA 20 mcg/24 hours (6 yrs) 52 mg IUD    Taking    modafiniL (PROVIGIL) 200 MG Tab Take 1 tablet by mouth every morning and ½ tablet at noon. 45 tablet 5 Taking    omeprazole (PRILOSEC) 40 MG capsule Take 1 capsule (40 mg total) by mouth every morning. Open capsule and take with apple sauce 30 capsule 2 Taking    ondansetron (ZOFRAN-ODT) 8 MG TbDL Dissolve 1 tablet (8 mg total) by mouth every 6 (six) hours as needed (Nausea). 30 tablet 0 Taking As Needed    oxyCODONE (ROXICODONE) 5 mg/5 mL Soln Take 5 mLs (5 mg total) by mouth every 4 (four) hours as needed. 25 mL 0 Taking As Needed    oxyCODONE-acetaminophen (PERCOCET) 5-325 mg per tablet Take 1 tablet by mouth 2 (two) times daily as needed.  0 Taking As Needed    rosuvastatin (CRESTOR) 10 MG tablet Take 1 tablet (10 mg total) by mouth once daily. 90 tablet 3 Taking    sodium,calcium,mag,pot oxybate (XYWAV) 0.5 gram/mL Soln Take 4.5 g by mouth As instructed. 540 mL 5 Taking As Needed    topiramate (TOPAMAX) 100 MG tablet Take 1 tablet by mouth once daily AND 1 ½ tablets every evening 225 tablet 3  "Taking    ubrogepant (UBRELVY) 100 mg tablet Take 1 tablet (100 mg total) by mouth every 2 (two) hours as needed for Migraine. Maximum: 200 mg per 24 hours 16 tablet 5 Taking As Needed    ursodioL (GEOVANNY FORTE) 500 MG tablet Take 1 tablet (500 mg total) by mouth once daily. For gallstone prevention. 180 tablet 0 Taking    valACYclovir (VALTREX) 1000 MG tablet Take 1 tablet by mouth every 12 hours as needed (cold sores). 60 tablet 3 Taking As Needed         Vital signs:   Vitals:    02/03/25 1028   BP: 108/75   Patient Position: Sitting   Pulse: 106   SpO2: 100%   Weight: 87.1 kg (191 lb 14.6 oz)   Height: 5' 1" (1.549 m)     Body mass index is 36.26 kg/m².    PHYSICAL EXAM:     Physical Exam  Vitals reviewed.   Constitutional:       General: She is not in acute distress.  HENT:      Head: Normocephalic and atraumatic.      Right Ear: Tympanic membrane and ear canal normal.      Left Ear: Tympanic membrane and ear canal normal.   Eyes:      General: No scleral icterus.     Conjunctiva/sclera: Conjunctivae normal.   Neck:      Thyroid: No thyromegaly.      Vascular: No carotid bruit.   Cardiovascular:      Rate and Rhythm: Normal rate and regular rhythm.      Heart sounds: Normal heart sounds. No murmur heard.     No friction rub. No gallop.   Pulmonary:      Effort: Pulmonary effort is normal.      Breath sounds: Normal breath sounds. No wheezing, rhonchi or rales.   Abdominal:      General: Bowel sounds are normal. There is no distension.      Palpations: Abdomen is soft.      Tenderness: There is no abdominal tenderness.   Musculoskeletal:      Cervical back: Normal range of motion and neck supple.      Right lower leg: No edema.      Left lower leg: No edema.   Lymphadenopathy:      Cervical: No cervical adenopathy.   Skin:     General: Skin is warm.      Capillary Refill: Capillary refill takes less than 2 seconds.   Neurological:      Mental Status: She is alert.             PERTINENT RESULTS:   Lab Visit on " 01/31/2025   Component Date Value Ref Range Status    TSH 01/31/2025 4.049 (H)  0.400 - 4.000 uIU/mL Final    Cholesterol 01/31/2025 170  120 - 199 mg/dL Final    Comment: The National Cholesterol Education Program (NCEP) has set the  following guidelines (reference ranges) for Cholesterol:  Optimal.....................<200 mg/dL  Borderline High.............200-239 mg/dL  High........................> or = 240 mg/dL      Triglycerides 01/31/2025 69  30 - 150 mg/dL Final    Comment: The National Cholesterol Education Program (NCEP) has set the  following guidelines (reference values) for triglycerides:  Normal......................<150 mg/dL  Borderline High.............150-199 mg/dL  High........................200-499 mg/dL      HDL 01/31/2025 46  40 - 75 mg/dL Final    Comment: The National Cholesterol Education Program (NCEP) has set the  following guidelines (reference values) for HDL Cholesterol:  Low...............<40 mg/dL  Optimal...........>60 mg/dL      LDL Cholesterol 01/31/2025 110.2  63.0 - 159.0 mg/dL Final    Comment: The National Cholesterol Education Program (NCEP) has set the  following guidelines (reference values) for LDL Cholesterol:  Optimal.......................<130 mg/dL  Borderline High...............130-159 mg/dL  High..........................160-189 mg/dL  Very High.....................>190 mg/dL      HDL/Cholesterol Ratio 01/31/2025 27.1  20.0 - 50.0 % Final    Total Cholesterol/HDL Ratio 01/31/2025 3.7  2.0 - 5.0 Final    Non-HDL Cholesterol 01/31/2025 124  mg/dL Final    Comment: Risk category and Non-HDL cholesterol goals:  Coronary heart disease (CHD)or equivalent (10-year risk of CHD >20%):  Non-HDL cholesterol goal     <130 mg/dL  Two or more CHD risk factors and 10-year risk of CHD <= 20%:  Non-HDL cholesterol goal     <160 mg/dL  0 to 1 CHD risk factor:  Non-HDL cholesterol goal     <190 mg/dL      Hemoglobin A1C 01/31/2025 4.9  4.0 - 5.6 % Final    Comment: ADA Screening  Guidelines:  5.7-6.4%  Consistent with prediabetes  >or=6.5%  Consistent with diabetes    High levels of fetal hemoglobin interfere with the HbA1C  assay. Heterozygous hemoglobin variants (HbS, HgC, etc)do  not significantly interfere with this assay.   However, presence of multiple variants may affect accuracy.      Estimated Avg Glucose 01/31/2025 94  68 - 131 mg/dL Final    Sodium 01/31/2025 141  136 - 145 mmol/L Final    Potassium 01/31/2025 3.8  3.5 - 5.1 mmol/L Final    Chloride 01/31/2025 114 (H)  95 - 110 mmol/L Final    CO2 01/31/2025 18 (L)  23 - 29 mmol/L Final    Glucose 01/31/2025 89  70 - 110 mg/dL Final    BUN 01/31/2025 21 (H)  6 - 20 mg/dL Final    Creatinine 01/31/2025 0.7  0.5 - 1.4 mg/dL Final    Calcium 01/31/2025 9.2  8.7 - 10.5 mg/dL Final    Total Protein 01/31/2025 7.2  6.0 - 8.4 g/dL Final    Albumin 01/31/2025 3.7  3.5 - 5.2 g/dL Final    Total Bilirubin 01/31/2025 0.4  0.1 - 1.0 mg/dL Final    Comment: For infants and newborns, interpretation of results should be based  on gestational age, weight and in agreement with clinical  observations.    Premature Infant recommended reference ranges:  Up to 24 hours.............<8.0 mg/dL  Up to 48 hours............<12.0 mg/dL  3-5 days..................<15.0 mg/dL  6-29 days.................<15.0 mg/dL      Alkaline Phosphatase 01/31/2025 114  40 - 150 U/L Final    AST 01/31/2025 14  10 - 40 U/L Final    ALT 01/31/2025 17  10 - 44 U/L Final    eGFR 01/31/2025 >60.0  >60 mL/min/1.73 m^2 Final    Anion Gap 01/31/2025 9  8 - 16 mmol/L Final    WBC 01/31/2025 8.10  3.90 - 12.70 K/uL Final    RBC 01/31/2025 4.49  4.00 - 5.40 M/uL Final    Hemoglobin 01/31/2025 14.9  12.0 - 16.0 g/dL Final    Hematocrit 01/31/2025 43.4  37.0 - 48.5 % Final    MCV 01/31/2025 97  82 - 98 fL Final    MCH 01/31/2025 33.2 (H)  27.0 - 31.0 pg Final    MCHC 01/31/2025 34.3  32.0 - 36.0 g/dL Final    RDW 01/31/2025 13.1  11.5 - 14.5 % Final    Platelets 01/31/2025 261  150 -  450 K/uL Final    MPV 01/31/2025 11.6  9.2 - 12.9 fL Final    Immature Granulocytes 01/31/2025 0.2  0.0 - 0.5 % Final    Gran # (ANC) 01/31/2025 5.6  1.8 - 7.7 K/uL Final    Immature Grans (Abs) 01/31/2025 0.02  0.00 - 0.04 K/uL Final    Comment: Mild elevation in immature granulocytes is non specific and   can be seen in a variety of conditions including stress response,   acute inflammation, trauma and pregnancy. Correlation with other   laboratory and clinical findings is essential.      Lymph # 01/31/2025 1.7  1.0 - 4.8 K/uL Final    Mono # 01/31/2025 0.7  0.3 - 1.0 K/uL Final    Eos # 01/31/2025 0.1  0.0 - 0.5 K/uL Final    Baso # 01/31/2025 0.07  0.00 - 0.20 K/uL Final    nRBC 01/31/2025 0  0 /100 WBC Final    Gran % 01/31/2025 69.1  38.0 - 73.0 % Final    Lymph % 01/31/2025 21.0  18.0 - 48.0 % Final    Mono % 01/31/2025 8.1  4.0 - 15.0 % Final    Eosinophil % 01/31/2025 0.9  0.0 - 8.0 % Final    Basophil % 01/31/2025 0.9  0.0 - 1.9 % Final    Differential Method 01/31/2025 Automated   Final    Free T4 01/31/2025 0.87  0.71 - 1.51 ng/dL Final       ThinPrep Img Pap/HPV mRNA E6/E7 Saira  Order: [id number]  Status: Final result            Component Ref Range & Units 12 d ago 2 yr ago   Clinical Information:   CM   Comment: None given   LMP:   CM   Comment: NONE GIVEN   Previous PAP   CM   Comment: NEGATIVE   Previous Bx   CM   Comment: NONE GIVEN   Source: - [facility]   CM   Comment: Cervix   Statement of Adequacy:   CM   Comment: Satisfactory for evaluation.  Endocervical/transformation zone component  present.  Age and/or menstrual status not provided   Interpretation/Result:   CM   Comment: Cytology Results: Negative for intraepithelial  lesion or malignancy.   Comment   CM   Comment: This Pap test has been evaluated with computer  assisted technology.   Cytotechnologist:   ALONSO   Comment: [facility], CT(ASCP)  CT screening location: [address], [place]  [address] CLIA ID NUMBER  [id number]  Slide Preparation at [facility] [address] [place], [place]  [address] and [place] No.:[id number]   Comment   CM   Comment: EXPLANATORY NOTE:    The Pap is a screening test for cervical cancer. It is  not a diagnostic test and is subject to false negative  and false positive results. It is most reliable when a  satisfactory sample, regularly obtained, is submitted  with relevant clinical findings and history, and when  the Pap result is evaluated along with historic and  current clinical information.   HPV Mrna E6/E7 Not Detected Not Detected    Comment: Methodology: Transcription-Mediated Amplification    This assay detects E6/E7 viral messenger RNA (mRNA) from 14  high-risk HPV types (16,18,31,33,35,39,45,51,52,56,58,59,66,68).      Cervical sources are required for HPV testing.  If a vaginal source from a patient who has had a  total hysterectomy with removal of cervix was  submitted, please contact the testing laboratory  for alternative testing options.    For additional information, please refer to  http://[employer].ipadio/faq/WME985x9  (This link if provided for information/  educational purposes only.)   Chlamydia Trachomatis RNA, TMA - [facility] NOT DETECTED NOT DETECTED    Neisseria Gonorrhoeae RNA, TMA - [facility] NOT DETECTED NOT DETECTED    (Always Message)      Comment: The analytical performance characteristics of this  assay, when used to test SurePath(TM) specimens have been  determined by [facility]. The modifications have  not been cleared or approved by the FDA. This assay has  been validated pursuant to the CLIA regulations and is  used for clinical purposes.    For additional information, please refer to  https://[employer].ipadio/faq/TYK758  (This link is being provided for information/  educational purposes only.)   [facility]  [facility]  [facility]             Specimen  Collected: 01/17/25 13:52 CST    Performed By: SHERRY Last Resulted: 01/22/25 21:27 CST   [facility] From: [facility] [place] [facility]  Result Received: 01/27/25 10:03     Fecal Globin, Insure Negative Negative   Resulting Agency  [facility]              Narrative  Performed by: [facility]  Performed at:  01 - [facility] [address]  [id number]  : Diamond Acosta MD, Phone:  [phone]   Specimen Collected: 01/24/24 23:00 CST Last Resulted: 01/30/24 16:09 CST     [facility] [place] [facility] MAMMO TOMOSYNTHESIS SCREENING BILATERAL:  12/9/2024 10:36 AM CST    DATE: 12/9/2024 10:36 AM CST    INDICATION: 49 years of age, Female, asymptomatic, referred for screening mammogram.    COMPARISON: 11/27/2023, 11/3/2021 and 7/29/2020    PROCEDURE COMMENTS: 3D digital tomosynthesis was performed in the craniocaudal and mediolateral oblique projections with synthesized C-view images. Additionally, full-field 2D digital mammography was performed with computer-aided detection to assist in the interpretation of the study.    FINDINGS:    DENSITY: C. The breasts are heterogeneously dense which may obscure small masses.      There is no suspicious mass, architectural distortion, or calcification.  Waxing and waning circumscribed masses are again seen in both breasts although the largest in the left breast in the 3 o'clock position is no longer identified.  Exam End: 12/09/24 10:51    Specimen Collected: 12/09/24 15:22 Last Resulted: 12/09/24 15:29     ASSESSMENT/PLAN:    1. Encounter for general adult medical examination with abnormal findings  Overview:  - preventative health counseling  - reviewed on current recommendations for breast cancer screening.  12/2024 mammogram negative.  - reviewed on current recommendations for cervical cancer screening.  01/17/2025 Pap smear negative with negative HPV.  Repeat recommended me 30  - reviewed on current recommendations for colon cancer screening.  01/25/2024 fit kit negative.  Opted colonoscopy  this year      Orders:  -     T4, Free; Future; Expected date: 03/03/2025  -     TSH; Future; Expected date: 03/03/2025    2. Hyperlipidemia, mixed  Overview:  Lab Results   Component Value Date    LDLCALC 110.2 01/31/2025     - well controlled  - continue current management plan   - patient encouraged to notify me with any changes      3. Hypothyroidism due to Hashimoto's thyroiditis  Overview:  Lab Results   Component Value Date    TSH 4.049 (H) 01/31/2025     - TSH above goal  - asymptomatic  - continue levothyroxine 88 mcg daily, hold biotin and repeat in 1 month    Orders:  -     T4, Free; Future; Expected date: 03/03/2025  -     TSH; Future; Expected date: 03/03/2025    4. Vitamin D deficiency  Overview:  - tried and failed vitamin-D 3 5000 units daily  - D improved with D2 50,000 twice a week  - well controlled  - continue current management plan   - patient encouraged to notify me with any changes      5. Class 2 severe obesity due to excess calories with serious comorbidity and body mass index (BMI) of 36.0 to 36.9 in adult  Overview:  - s/p gastric sleeve 10/2024  Wt Readings from Last 10 Encounters:   02/03/25 87.1 kg (191 lb 14.6 oz)   01/15/25 89.8 kg (197 lb 15.6 oz)   12/18/24 93.5 kg (206 lb 2.1 oz)   12/18/24 93.5 kg (206 lb 2.1 oz)   11/06/24 99.3 kg (218 lb 14.7 oz)   10/23/24 104.9 kg (231 lb 4.2 oz)   10/07/24 108.3 kg (238 lb 12.1 oz)   09/05/24 111.7 kg (246 lb 4.1 oz)   08/05/24 114 kg (251 lb 5.2 oz)   07/24/24 116.1 kg (256 lb)   - great job with weight loss journey        6. Screen for colon cancer  -     Ambulatory referral/consult to Endo Procedure ; Future; Expected date: 02/04/2025          ORDERS:   Orders Placed This Encounter    T4, Free    TSH    Ambulatory referral/consult to Endo Procedure        Vaccines recommended:  Shingles 2., COVID-19    Follow up for 1 month TSH and 1 year annual. or sooner with any concerns        This note is dictated using the M*Modal  Fluency Direct word recognition program. There are word recognition mistakes that are occasionally missed on review.    Dr. Laya Jaffe D.O.   [facility]

## 2025-01-31 ENCOUNTER — PATIENT MESSAGE (OUTPATIENT)
Dept: CARDIOLOGY | Facility: CLINIC | Age: 50
End: 2025-01-31
Payer: COMMERCIAL

## 2025-01-31 DIAGNOSIS — G47.411 NARCOLEPSY WITH CATAPLEXY: ICD-10-CM

## 2025-01-31 LAB
OHS CV EVENT MONITOR DAY: 0
OHS CV HOLTER LENGTH DECIMAL HOURS: 47.98
OHS CV HOLTER LENGTH HOURS: 47
OHS CV HOLTER LENGTH MINUTES: 59
OHS CV HOLTER SINUS AVERAGE HR: 85
OHS CV HOLTER SINUS MAX HR: 140
OHS CV HOLTER SINUS MIN HR: 54

## 2025-01-31 RX ORDER — DEXTROAMPHETAMINE SACCHARATE, AMPHETAMINE ASPARTATE MONOHYDRATE, DEXTROAMPHETAMINE SULFATE AND AMPHETAMINE SULFATE 5; 5; 5; 5 MG/1; MG/1; MG/1; MG/1
20 CAPSULE, EXTENDED RELEASE ORAL EVERY MORNING
Qty: 30 CAPSULE | Refills: 0 | Status: SHIPPED | OUTPATIENT
Start: 2025-01-31

## 2025-01-31 RX ORDER — DEXTROAMPHETAMINE SACCHARATE, AMPHETAMINE ASPARTATE, DEXTROAMPHETAMINE SULFATE AND AMPHETAMINE SULFATE 5; 5; 5; 5 MG/1; MG/1; MG/1; MG/1
1 TABLET ORAL 2 TIMES DAILY
Qty: 60 TABLET | Refills: 0 | Status: SHIPPED | OUTPATIENT
Start: 2025-01-31

## 2025-02-03 ENCOUNTER — OFFICE VISIT (OUTPATIENT)
Dept: PRIMARY CARE CLINIC | Facility: CLINIC | Age: 50
End: 2025-02-03
Attending: FAMILY MEDICINE
Payer: COMMERCIAL

## 2025-02-03 VITALS
OXYGEN SATURATION: 100 % | HEIGHT: 61 IN | SYSTOLIC BLOOD PRESSURE: 108 MMHG | HEART RATE: 106 BPM | BODY MASS INDEX: 36.24 KG/M2 | WEIGHT: 191.94 LBS | DIASTOLIC BLOOD PRESSURE: 75 MMHG

## 2025-02-03 DIAGNOSIS — Z00.01 ENCOUNTER FOR GENERAL ADULT MEDICAL EXAMINATION WITH ABNORMAL FINDINGS: Primary | ICD-10-CM

## 2025-02-03 DIAGNOSIS — E66.01 CLASS 2 SEVERE OBESITY DUE TO EXCESS CALORIES WITH SERIOUS COMORBIDITY AND BODY MASS INDEX (BMI) OF 36.0 TO 36.9 IN ADULT: ICD-10-CM

## 2025-02-03 DIAGNOSIS — Z12.11 SCREEN FOR COLON CANCER: ICD-10-CM

## 2025-02-03 DIAGNOSIS — E66.812 CLASS 2 SEVERE OBESITY DUE TO EXCESS CALORIES WITH SERIOUS COMORBIDITY AND BODY MASS INDEX (BMI) OF 36.0 TO 36.9 IN ADULT: ICD-10-CM

## 2025-02-03 DIAGNOSIS — E06.3 HYPOTHYROIDISM DUE TO HASHIMOTO'S THYROIDITIS: ICD-10-CM

## 2025-02-03 DIAGNOSIS — E55.9 VITAMIN D DEFICIENCY: ICD-10-CM

## 2025-02-03 DIAGNOSIS — E78.2 HYPERLIPIDEMIA, MIXED: ICD-10-CM

## 2025-02-03 PROBLEM — R73.01 IMPAIRED FASTING GLUCOSE: Status: RESOLVED | Noted: 2021-12-22 | Resolved: 2025-02-03

## 2025-02-03 PROBLEM — R94.31 NONSPECIFIC ABNORMAL ELECTROCARDIOGRAM (ECG) (EKG): Chronic | Status: RESOLVED | Noted: 2025-01-15 | Resolved: 2025-02-03

## 2025-02-03 PROCEDURE — 3074F SYST BP LT 130 MM HG: CPT | Mod: CPTII,S$GLB,, | Performed by: FAMILY MEDICINE

## 2025-02-03 PROCEDURE — 99396 PREV VISIT EST AGE 40-64: CPT | Mod: S$GLB,,, | Performed by: FAMILY MEDICINE

## 2025-02-03 PROCEDURE — 3008F BODY MASS INDEX DOCD: CPT | Mod: CPTII,S$GLB,, | Performed by: FAMILY MEDICINE

## 2025-02-03 PROCEDURE — 3078F DIAST BP <80 MM HG: CPT | Mod: CPTII,S$GLB,, | Performed by: FAMILY MEDICINE

## 2025-02-03 PROCEDURE — 3044F HG A1C LEVEL LT 7.0%: CPT | Mod: CPTII,S$GLB,, | Performed by: FAMILY MEDICINE

## 2025-02-03 PROCEDURE — 1159F MED LIST DOCD IN RCRD: CPT | Mod: CPTII,S$GLB,, | Performed by: FAMILY MEDICINE

## 2025-02-03 PROCEDURE — 99999 PR PBB SHADOW E&M-EST. PATIENT-LVL V: CPT | Mod: PBBFAC,,, | Performed by: FAMILY MEDICINE

## 2025-02-03 RX ORDER — ZOSTER VACCINE RECOMBINANT, ADJUVANTED 50 MCG/0.5
KIT INTRAMUSCULAR
Qty: 1 EACH | Refills: 1 | Status: CANCELLED | OUTPATIENT
Start: 2025-02-03

## 2025-02-04 DIAGNOSIS — G43.009 MIGRAINE WITHOUT AURA AND WITHOUT STATUS MIGRAINOSUS, NOT INTRACTABLE: ICD-10-CM

## 2025-02-04 RX ORDER — UBROGEPANT 100 MG/1
100 TABLET ORAL
Qty: 16 TABLET | Refills: 5 | Status: SHIPPED | OUTPATIENT
Start: 2025-02-04 | End: 2025-02-20 | Stop reason: SDUPTHER

## 2025-02-04 NOTE — TELEPHONE ENCOUNTER
Requested Prescriptions     Pending Prescriptions Disp Refills    UBRELVY 100 mg tablet [Pharmacy Med Name: UBRELVY 100 MG TABLET] 16 tablet 5     Sig: TAKE 1 TABLET (100 MG TOTAL) BY MOUTH EVERY 2 (TWO) HOURS AS NEEDED FOR MIGRAINE. MAXIMUM: 200 MG PER 24 HOURS    Lov 01/11/24

## 2025-02-10 ENCOUNTER — PATIENT MESSAGE (OUTPATIENT)
Dept: BARIATRICS | Facility: CLINIC | Age: 50
End: 2025-02-10
Payer: COMMERCIAL

## 2025-02-17 ENCOUNTER — TELEPHONE (OUTPATIENT)
Dept: ENDOSCOPY | Facility: HOSPITAL | Age: 50
End: 2025-02-17

## 2025-02-17 ENCOUNTER — TELEPHONE (OUTPATIENT)
Dept: ENDOSCOPY | Facility: HOSPITAL | Age: 50
End: 2025-02-17
Payer: COMMERCIAL

## 2025-02-17 ENCOUNTER — CLINICAL SUPPORT (OUTPATIENT)
Dept: ENDOSCOPY | Facility: HOSPITAL | Age: 50
End: 2025-02-17
Attending: FAMILY MEDICINE
Payer: COMMERCIAL

## 2025-02-17 VITALS — BODY MASS INDEX: 36.06 KG/M2 | WEIGHT: 191 LBS | HEIGHT: 61 IN

## 2025-02-17 DIAGNOSIS — Z12.11 SCREEN FOR COLON CANCER: ICD-10-CM

## 2025-02-17 NOTE — TELEPHONE ENCOUNTER
Referral for procedure from PAT appointment      Spoke to patient to schedule procedure(s) Colonoscopy       Physician to perform procedure(s) Dr. FRANKI Rawls  Date of Procedure (s) 2/19/25  Arrival Time 7:00 AM  Time of Procedure(s) 8:00 AM   Location of Procedure(s) [facility]  Type of Rx Prep sent to patient: Miralax  Instructions provided to patient via [facility]    Patient was informed on the following information and verbalized understanding. Screening questionnaire reviewed with patient and complete. If procedure requires anesthesia, a responsible adult needs to be present to accompany the patient home, patient cannot drive after receiving anesthesia. Appointment details are tentative, especially check-in time. Patient will receive a prep-op call 7 days prior to confirm check-in time for procedure. If applicable the patient should contact their pharmacy to verify Rx for procedure prep is ready for pick-up. Patient was advised to call the scheduling department at [phone] if pharmacy states no Rx is available. Patient was advised to call the endoscopy scheduling department if any questions or concerns arise.      [facility] Scheduling Department

## 2025-02-19 ENCOUNTER — ANESTHESIA EVENT (OUTPATIENT)
Dept: ENDOSCOPY | Facility: HOSPITAL | Age: 50
End: 2025-02-19
Payer: COMMERCIAL

## 2025-02-19 ENCOUNTER — ANESTHESIA (OUTPATIENT)
Dept: ENDOSCOPY | Facility: HOSPITAL | Age: 50
End: 2025-02-19
Payer: COMMERCIAL

## 2025-02-19 ENCOUNTER — HOSPITAL ENCOUNTER (OUTPATIENT)
Facility: HOSPITAL | Age: 50
Discharge: HOME | End: 2025-02-19
Attending: INTERNAL MEDICINE | Admitting: INTERNAL MEDICINE
Payer: COMMERCIAL

## 2025-02-19 VITALS
OXYGEN SATURATION: 97 % | BODY MASS INDEX: 34.93 KG/M2 | WEIGHT: 185 LBS | TEMPERATURE: 98 F | HEART RATE: 68 BPM | HEIGHT: 61 IN | RESPIRATION RATE: 19 BRPM | SYSTOLIC BLOOD PRESSURE: 104 MMHG | DIASTOLIC BLOOD PRESSURE: 60 MMHG

## 2025-02-19 DIAGNOSIS — G47.411 NARCOLEPSY WITH CATAPLEXY: ICD-10-CM

## 2025-02-19 DIAGNOSIS — Z12.11 SCREEN FOR COLON CANCER: ICD-10-CM

## 2025-02-19 LAB
B-HCG UR QL: NEGATIVE
CTP QC/QA: YES

## 2025-02-19 PROCEDURE — 88305 TISSUE EXAM BY PATHOLOGIST: CPT | Mod: 59 | Performed by: PATHOLOGY

## 2025-02-19 PROCEDURE — 45385 COLONOSCOPY W/LESION REMOVAL: CPT | Mod: 33 | Performed by: INTERNAL MEDICINE

## 2025-02-19 PROCEDURE — 37000008 HC ANESTHESIA 1ST 15 MINUTES: Performed by: INTERNAL MEDICINE

## 2025-02-19 PROCEDURE — 25000003 PHARM REV CODE 250: Performed by: NURSE ANESTHETIST, CERTIFIED REGISTERED

## 2025-02-19 PROCEDURE — 45385 COLONOSCOPY W/LESION REMOVAL: CPT | Mod: 33,,, | Performed by: INTERNAL MEDICINE

## 2025-02-19 PROCEDURE — 27201089 HC SNARE, DISP (ANY): Performed by: INTERNAL MEDICINE

## 2025-02-19 PROCEDURE — 81025 URINE PREGNANCY TEST: CPT | Performed by: INTERNAL MEDICINE

## 2025-02-19 PROCEDURE — 37000009 HC ANESTHESIA EA ADD 15 MINS: Performed by: INTERNAL MEDICINE

## 2025-02-19 PROCEDURE — 63600175 PHARM REV CODE 636 W HCPCS: Performed by: NURSE ANESTHETIST, CERTIFIED REGISTERED

## 2025-02-19 PROCEDURE — 88305 TISSUE EXAM BY PATHOLOGIST: CPT | Mod: 26,,, | Performed by: PATHOLOGY

## 2025-02-19 PROCEDURE — 25000003 PHARM REV CODE 250: Performed by: INTERNAL MEDICINE

## 2025-02-19 RX ORDER — PROPOFOL 10 MG/ML
VIAL (ML) INTRAVENOUS CONTINUOUS PRN
Status: DISCONTINUED | OUTPATIENT
Start: 2025-02-19 | End: 2025-02-19

## 2025-02-19 RX ORDER — DEXTROMETHORPHAN/PSEUDOEPHED 2.5-7.5/.8
DROPS ORAL
Status: COMPLETED | OUTPATIENT
Start: 2025-02-19 | End: 2025-02-19

## 2025-02-19 RX ORDER — LIDOCAINE HYDROCHLORIDE 20 MG/ML
INJECTION INTRAVENOUS
Status: DISCONTINUED | OUTPATIENT
Start: 2025-02-19 | End: 2025-02-19

## 2025-02-19 RX ORDER — SODIUM CHLORIDE 0.9 % (FLUSH) 0.9 %
10 SYRINGE (ML) INJECTION
Status: DISCONTINUED | OUTPATIENT
Start: 2025-02-19 | End: 2025-02-19 | Stop reason: HOSPADM

## 2025-02-19 RX ORDER — (CALCIUM, MAGNESIUM, POTASSIUM, AND SODIUM OXYBATES) .5; .5; .5; .5 G/ML; G/ML; G/ML; G/ML
4.5 SOLUTION ORAL SEE ADMIN INSTRUCTIONS
Qty: 540 ML | Refills: 5 | Status: SHIPPED | OUTPATIENT
Start: 2025-02-19

## 2025-02-19 RX ORDER — SODIUM CHLORIDE 9 MG/ML
INJECTION, SOLUTION INTRAVENOUS CONTINUOUS
Status: DISCONTINUED | OUTPATIENT
Start: 2025-02-19 | End: 2025-02-19 | Stop reason: HOSPADM

## 2025-02-19 RX ADMIN — LIDOCAINE HYDROCHLORIDE 100 MG: 20 INJECTION, SOLUTION INTRAVENOUS at 08:02

## 2025-02-19 RX ADMIN — SODIUM CHLORIDE: 0.9 INJECTION, SOLUTION INTRAVENOUS at 08:02

## 2025-02-19 RX ADMIN — PROPOFOL 50 MG: 10 INJECTION, EMULSION INTRAVENOUS at 08:02

## 2025-02-19 RX ADMIN — PROPOFOL 150 MCG/KG/MIN: 10 INJECTION, EMULSION INTRAVENOUS at 08:02

## 2025-02-19 NOTE — PROVATION PATIENT INSTRUCTIONS
Discharge Summary/Instructions after an Endoscopic Procedure  Patient Name: Amanda Navarrete  Patient MRN: [id number]  Patient YOB: 1975 Wednesday, February 19, 2025  Franki Rawls MD  Dear patient,  As a result of recent federal legislation (The Federal Cures Act), you may   receive lab or pathology results from your procedure in your [facility]   account before your physician is able to contact you. Your physician or   their representative will relay the results to you with their   recommendations at their soonest availability.  Thank you,  Your health is very important to us during the Covid Crisis. Following your   procedure today, you will receive a daily text for 2 weeks asking about   signs or symptoms of Covid 19.  Please respond to this text when you   receive it so we can follow up and keep you as safe as possible.   RESTRICTIONS:  During your procedure today, you received medications for sedation.  These   medications may affect your judgment, balance and coordination.  Therefore,   for 24 hours, you have the following restrictions:   - DO NOT drive a car, operate machinery, make legal/financial decisions,   sign important papers or drink alcohol.    ACTIVITY:  Today: no heavy lifting, straining or running due to procedural   sedation/anesthesia.  The following day: return to full activity including work.  DIET:  Eat and drink normally unless instructed otherwise.     TREATMENT FOR COMMON SIDE EFFECTS:  - Mild abdominal pain, nausea, belching, bloating or excessive gas:  rest,   eat lightly and use a heating pad.  - Sore Throat: treat with throat lozenges and/or gargle with warm salt   water.  - Because air was used during the procedure, expelling large amounts of air   from your rectum or belching is normal.  - If a bowel prep was taken, you may not have a bowel movement for 1-3 days.    This is normal.  SYMPTOMS TO WATCH FOR AND REPORT TO YOUR PHYSICIAN:  1. Abdominal pain or bloating, other  than gas cramps.  2. Chest pain.  3. Back pain.  4. Signs of infection such as: chills or fever occurring within 24 hours   after the procedure.  5. Rectal bleeding, which would show as bright red, maroon, or black stools.   (A tablespoon of blood from the rectum is not serious, especially if   hemorrhoids are present.)  6. Vomiting.  7. Weakness or dizziness.  GO DIRECTLY TO THE NEAREST EMERGENCY ROOM IF YOU HAVE ANY OF THE FOLLOWING:      Difficulty breathing              Chills and/or fever over 101 F   Persistent vomiting and/or vomiting blood   Severe abdominal pain   Severe chest pain   Black, tarry stools   Bleeding- more than one tablespoon   Any other symptom or condition that you feel may need urgent attention  Your doctor recommends these additional instructions:  If any biopsies were taken, your doctors clinic will contact you in 1 to 2   weeks with any results.  - Discharge patient to home (ambulatory).   - Patient has a contact number available for emergencies.  The signs and   symptoms of potential delayed complications were discussed with the   patient.  Return to normal activities tomorrow.  Written discharge   instructions were provided to the patient.   - Resume previous diet.   - Continue present medications.   - Return to primary care physician as previously scheduled.   - Repeat colonoscopy in 7 years for surveillance.   - Await pathology results.   - Use preparation H suppository for treatment of hemorrhoids.  For questions, problems or results please call your physician - Franki Rawls MD.  EMERGENCY PHONE NUMBER: [phone],  LAB RESULTS: [phone]  IF A COMPLICATION OR EMERGENCY SITUATION ARISES AND YOU ARE UNABLE TO REACH   YOUR PHYSICIAN - GO DIRECTLY TO THE EMERGENCY ROOM.  Franki Rawls MD  2/19/2025 8:32:58 AM  This report has been verified and signed electronically.  Dear patient,  As a result of recent federal legislation (The Federal Cures Act), you may   receive  lab or pathology results from your procedure in your [facility]   account before your physician is able to contact you. Your physician or   their representative will relay the results to you with their   recommendations at their soonest availability.  Thank you,  [employer]OVATION

## 2025-02-19 NOTE — TRANSFER OF CARE
"Anesthesia Transfer of Care Note    Patient: Amanda Navarrete    Procedure(s) Performed: Procedure(s) (LRB):  COLONOSCOPY, SCREENING, LOW RISK PATIENT (N/A)    Patient location: GI    Anesthesia Type: general    Transport from OR: Transported from OR on room air with adequate spontaneous ventilation    Post pain: adequate analgesia    Post assessment: no apparent anesthetic complications and tolerated procedure well    Post vital signs: stable    Level of consciousness: awake and alert    Nausea/Vomiting: no nausea/vomiting    Complications: none    Transfer of care protocol was followed      Last vitals: Visit Vitals  BP 95/58   Pulse 72   Temp 36.5 °C (97.7 °F)   Resp 20   Ht 5' 1" (1.549 m)   Wt 83.9 kg (185 lb)   SpO2 100%   Breastfeeding No   BMI 34.96 kg/m²     "

## 2025-02-19 NOTE — ANESTHESIA PREPROCEDURE EVALUATION
02/19/2025  Amanda Navarrete is a 49 y.o., female here for a colonoscopy.       Pre-op Assessment    I have reviewed the Patient Summary Reports.    I have reviewed the NPO Status.   I have reviewed the Medications.     Review of Systems  Anesthesia Hx:  No problems with previous Anesthesia   History of prior surgery of interest to airway management or planning:  Previous anesthesia: General sleeve gastrectomy 10/24 with general anesthesia.       Airway issues documented on chart review include mask, easy       Denies Personal Hx of Anesthesia complications.                    Social:  Former Smoker       Cardiovascular:                hyperlipidemia                               Pulmonary:        Sleep Apnea                Hepatic/GI:     GERD                Musculoskeletal:  Arthritis               Neurological:    Neuromuscular Disease,  Headaches           Chronic Pain Syndrome                         Endocrine:   Hypothyroidism        Obesity / BMI > 30  Psych:  Psychiatric History                  Physical Exam  General: Well nourished, Cooperative, Alert and Oriented    Airway:  Mallampati: II   Mouth Opening: Normal  TM Distance: Normal  Tongue: Normal  Neck ROM: Normal ROM    Chest/Lungs:  Clear to auscultation, Normal Respiratory Rate    Heart:  Rate: Normal  Rhythm: Regular Rhythm        Anesthesia Plan  Type of Anesthesia, risks & benefits discussed:    Anesthesia Type: Gen Natural Airway  Intra-op Monitoring Plan: Standard ASA Monitors  Post Op Pain Control Plan: multimodal analgesia  Induction:  IV  Informed Consent: Informed consent signed with the Patient and all parties understand the risks and agree with anesthesia plan.  All questions answered.   ASA Score: 3    Ready For Surgery From Anesthesia Perspective.     .

## 2025-02-19 NOTE — H&P
Short Stay Endoscopy History and Physical    PCP - Laya Jaffe, DO    Procedure - Colonoscopy  ASA - per anesthesia  Mallampati - per anesthesia  History of Anesthesia problems - no  Family history Anesthesia problems - no   Plan of anesthesia - General    HPI:  This is a 49 y.o. female here for evaluation of : asymptomatic screening exam      ROS:  Constitutional: No fevers, chills, No weight loss  CV: No chest pain  Pulm: No cough, No shortness of breath  GI: see HPI  Derm: No rash    Medical History:  has a past medical history of Affective disorder, Back pain, GERD (gastroesophageal reflux disease), Headache(784.0), Hyperlipidemia, mixed (2019), Impaired fasting glucose (2021), Influenza A (2022), Migraine headache, Narcolepsy without cataplexy(347.00), Pre-syncope (2024), Syncope (2024), Thyroid disease, and TMJ arthralgia.    Surgical History:  has a past surgical history that includes  section, classic; Spine surgery (2015); Caudal epidural steroid injection (N/A, 2018); Cosmetic surgery; Injection of anesthetic agent into sacroiliac joint (Bilateral, 2020); Esophagogastroduodenoscopy (N/A, 2024); and Robot-assisted laparoscopic sleeve gastrectomy using da Aileen Xi (N/A, 10/23/2024).    Family History: family history includes Cancer in her maternal aunt and paternal aunt; Hypertension in her mother; Kidney disease in her mother; Narcolepsy in her father; Sleep apnea in her mother.. Otherwise no colon cancer, inflammatory bowel disease, or GI malignancies.    Social History:  reports that she has quit smoking. Her smoking use included cigarettes. She started smoking about 10 years ago. She has a 16 pack-year smoking history. She has never been exposed to tobacco smoke. She has never used smokeless tobacco. She reports current alcohol use. She reports that she does not use drugs.    Review of patient's allergies indicates:   Allergen Reactions     Tamiflu [oseltamivir] Itching and Swelling       Medications:   Prescriptions Prior to Admission[1]      Physical Exam:    Vital Signs:   Vitals:    02/19/25 0722   BP: 113/64   Pulse: 64   Resp: 20   Temp: 97.7 °F (36.5 °C)       General Appearance: Well appearing in no acute distress  Eyes:    No scleral icterus  ENT: Neck supple, Lips, mucosa, and tongue normal; teeth and gums normal  Abdomen: Soft, non tender, non distended with positive bowel sounds. No hepatosplenomegaly, ascites, or mass.  Extremities: 2+ pulses, no clubbing, cyanosis or edema  Skin: No rash      Labs:  Lab Results   Component Value Date    WBC 8.10 01/31/2025    HGB 14.9 01/31/2025    HCT 43.4 01/31/2025     01/31/2025    CHOL 170 01/31/2025    TRIG 69 01/31/2025    HDL 46 01/31/2025    ALT 17 01/31/2025    AST 14 01/31/2025     01/31/2025    K 3.8 01/31/2025     (H) 01/31/2025    CREATININE 0.7 01/31/2025    BUN 21 (H) 01/31/2025    CO2 18 (L) 01/31/2025    TSH 4.049 (H) 01/31/2025    HGBA1C 4.9 01/31/2025       I have explained the risks and benefits of endoscopy procedures to the patient including but not limited to bleeding, perforation, infection, and death.  The patient was asked if they understand and allowed to ask any further questions to their satisfaction.    Franki Rawls MD         [1]   Medications Prior to Admission   Medication Sig Dispense Refill Last Dose/Taking    calcium-vitamin D3 (OS-RHNODA 500 + D3) 500 mg-5 mcg (200 unit) per tablet Take 1 tablet by mouth once daily.   2/18/2025    cyanocobalamin (VITAMIN B-12) 1000 MCG tablet Take 100 mcg by mouth once daily.   2/18/2025    darifenacin (ENABLEX) 7.5 MG Tb24 Take 1 tablet (7.5 mg total) by mouth once daily. 30 tablet 11 2/18/2025    dextroamphetamine-amphetamine (ADDERALL XR) 20 MG 24 hr capsule Take 1 capsule (20 mg total) by mouth every morning. 30 capsule 0 2/18/2025    dextroamphetamine-amphetamine (ADDERALL) 20 mg tablet Take 1 tablet by mouth 2  (two) times a day. 60 tablet 0 2/18/2025    DULoxetine (CYMBALTA) 60 MG capsule Take 1 capsule by mouth once daily.   2/18/2025    ergocalciferol (VITAMIN D2) 50,000 unit Cap Take 1 capsule (50,000 Units total) by mouth twice a week. 24 capsule 3 2/18/2025    levothyroxine (SYNTHROID) 88 MCG tablet Take 1 tablet (88 mcg total) by mouth daily before breakfast. 90 tablet 3 2/18/2025    meclizine (ANTIVERT) 25 mg tablet Take 1 tablet (25 mg total) by mouth 3 (three) times daily as needed for Dizziness. 30 tablet 5 Past Month    methocarbamol (ROBAXIN) 500 MG Tab Take 500 mg by mouth every evening.  2 Past Week    omeprazole (PRILOSEC) 40 MG capsule Take 1 capsule (40 mg total) by mouth every morning. Open capsule and take with apple sauce 30 capsule 2 Past Month    ondansetron (ZOFRAN-ODT) 8 MG TbDL Dissolve 1 tablet (8 mg total) by mouth every 6 (six) hours as needed (Nausea). 30 tablet 0 Past Month    oxyCODONE (ROXICODONE) 5 mg/5 mL Soln Take 5 mLs (5 mg total) by mouth every 4 (four) hours as needed. 25 mL 0 Past Week    rosuvastatin (CRESTOR) 10 MG tablet Take 1 tablet (10 mg total) by mouth once daily. 90 tablet 3 2/18/2025    topiramate (TOPAMAX) 100 MG tablet Take 1 tablet by mouth once daily AND 1 ½ tablets every evening 225 tablet 3 2/18/2025    valACYclovir (VALTREX) 1000 MG tablet Take 1 tablet by mouth every 12 hours as needed (cold sores). 60 tablet 3 2/18/2025    albuterol (PROVENTIL/VENTOLIN HFA) 90 mcg/actuation inhaler Inhale 1-2 puffs into the lungs every 4 (four) hours as needed for Wheezing or Shortness of Breath. Rescue 18 g 5     clonazePAM (KLONOPIN) 0.5 MG tablet    More than a month    diclofenac sodium (VOLTAREN) 1 % Gel Apply 2 g topically 4 (four) times daily as needed (pain). 100 g 0     fluticasone propionate (FLONASE) 50 mcg/actuation nasal spray 1 spray (50 mcg total) by Each Nostril route once daily. 16 g 0     gabapentin (NEURONTIN) 300 MG capsule OPEN capsule into a tablespoon and  consume with sip of liquid. Take once the MORNING OF SURGERY. After surgery: take one capsule TWICE DAILY for at least 3 days and up to 5 days as needed for pain. 11 capsule 0 Unknown    MIRENA 20 mcg/24 hours (6 yrs) 52 mg IUD        modafiniL (PROVIGIL) 200 MG Tab Take 1 tablet by mouth every morning and ½ tablet at noon. 45 tablet 5     oxyCODONE-acetaminophen (PERCOCET) 5-325 mg per tablet Take 1 tablet by mouth 2 (two) times daily as needed.  0     sodium,calcium,mag,pot oxybate (XYWAV) 0.5 gram/mL Soln Take 4.5 g by mouth As instructed. 540 mL 5     ubrogepant (UBRELVY) 100 mg tablet TAKE 1 TABLET (100 MG TOTAL) BY MOUTH EVERY 2 (TWO) HOURS AS NEEDED FOR MIGRAINE. MAXIMUM: 200 MG PER 24 HOURS 16 tablet 5     ursodioL (GEOVANNY FORTE) 500 MG tablet Take 1 tablet (500 mg total) by mouth once daily. For gallstone prevention. 180 tablet 0

## 2025-02-19 NOTE — ANESTHESIA POSTPROCEDURE EVALUATION
Anesthesia Post Evaluation    Patient: Amanda Navarrete    Procedure(s) Performed: Procedure(s) (LRB):  COLONOSCOPY, SCREENING, LOW RISK PATIENT (N/A)    Final Anesthesia Type: general      Patient location during evaluation: GI PACU  Patient participation: Yes- Able to Participate  Level of consciousness: awake and alert, oriented and awake  Post-procedure vital signs: reviewed and stable  Pain management: adequate  Airway patency: patent    PONV status at discharge: No PONV  Anesthetic complications: no      Cardiovascular status: stable  Respiratory status: unassisted and room air  Hydration status: euvolemic  Follow-up not needed.              Vitals Value Taken Time   /60 02/19/25 09:05   Temp 36.5 °C (97.7 °F) 02/19/25 08:35   Pulse 68 02/19/25 09:05   Resp 19 02/19/25 09:05   SpO2 97 % 02/19/25 09:05         Event Time   Out of Recovery 09:17:47         Pain/Jo Score: Jo Score: 10 (2/19/2025  9:05 AM)

## 2025-02-20 ENCOUNTER — OFFICE VISIT (OUTPATIENT)
Dept: SLEEP MEDICINE | Facility: CLINIC | Age: 50
End: 2025-02-20
Attending: PSYCHIATRY & NEUROLOGY
Payer: COMMERCIAL

## 2025-02-20 VITALS
BODY MASS INDEX: 35.98 KG/M2 | WEIGHT: 190.38 LBS | HEART RATE: 77 BPM | DIASTOLIC BLOOD PRESSURE: 80 MMHG | SYSTOLIC BLOOD PRESSURE: 117 MMHG

## 2025-02-20 DIAGNOSIS — G47.411 NARCOLEPSY WITH CATAPLEXY: Primary | ICD-10-CM

## 2025-02-20 DIAGNOSIS — G43.009 MIGRAINE WITHOUT AURA AND WITHOUT STATUS MIGRAINOSUS, NOT INTRACTABLE: ICD-10-CM

## 2025-02-20 RX ORDER — UBROGEPANT 100 MG/1
100 TABLET ORAL
Qty: 16 TABLET | Refills: 5 | Status: SHIPPED | OUTPATIENT
Start: 2025-02-20

## 2025-02-20 NOTE — PROGRESS NOTES
The patient location is: [place]  The chief complaint leading to consultation is: narcolepsy/EUNICE    Visit type: audiovisual    Face to Face time with patient:20 minutes of total time spent on the encounter, which includes face to face time and non-face to face time preparing to see the patient (eg, review of tests), Obtaining and/or reviewing separately obtained history, Documenting clinical information in the electronic or other health record, Independently interpreting results (not separately reported) and communicating results to the patient/family/caregiver, or Care coordination (not separately reported). Each patient to whom he or she provides medical services by telemedicine is:  (1) informed of the relationship between the physician and patient and the respective role of any other health care provider with respect to management of the patient; and (2) notified that he or she may decline to receive medical services by telemedicine and may withdraw from such care at any time.       Using apap qhs with resmed.  Denies s/e with Xywav 4.5G 2x nightly. 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 ESS=21  Gastic sleeve since seen, 90# loss thus far. Takes adderall xr 20mg and 20mg am and after noonalong with provigil 200mg mid morning then after lunch too.   Aware of sleep attacks now on adderall vs ritalin, may yawn.   Needs prn ubrelvy , helps (tried nurtec and maxalt)    Remote  73/180d used ag 5h/n 90% tile 9.5cm. AHI 1.3      HX  She has history of narcolepsy with sleep attacks and occasional mild cataplectic episodes during which time her hands become weak. However these are infrequent. Symptoms are well controlled with a combination of Ritalin and Provigil.  She also has a history of migraines. Headaches are intermittent, usually noted on awakening in the morning. Headaches are usually frontal temporal and pressure-like and are not associated with any focal neurological or visual symptoms. They are  usually precipitated by stress. She is presently on Topamax 100 mg twice a day which has decreased the intensity and frequency of the headaches.  She denies any new medical problems otherwise and has been overall stable.  She continues to follow-up with pain management for her back problems having had back surgery and is presently on pain medications.  She is also under the care of his psychiatrist because of significant problem with depression and reports that she was diagnosed as having bipolar disorder    8/2021:She continues to take 10mg Ritalin QID (preferred  20mg bid) and Ritalin XR 20mg qam and provigil 200mg qam and 100mg afternoon. She has to be sure not to leave house without it or else can't drive. Has mild cataplexy  infrequently. HA remain stable in location and nature as below. Takes topamax 100mg am and 50mg qhs. Ubrelvy helps.   Got setup with apap 6-16cm after having + HST revealing AHI 7(RDI 11)/low sat 91%. Doing well. Wants to trial FFM now. Mouth drying despite chin strap and air blowing eyes(eye mask on now also). Snoring resolved and sleep more consolidated.   Remote- 3-mos after setup 4/22/21 6:40h/night. AHI 0.3, 90% tile 9cm      ASSESSMENT:  Narcolepsy with mild cataplexy,stable on stimulants and 9G TDD Xywav , when can obtain scripts  EUNICE, mild, Remains adherent with PAP but limited due to living arrangements, benefits from therapy AHI<5  Depression, stable on meds  Chronic pain, once daily percocet      PLAN:  stop provigil and begin sunosi 150mg qd/notify me status (other considerations 250mg nuvigil and wakix). Continue adderall XR 20mg qd and 20mg bid and 4.5G 2x nightly Xywav  [facility] D[employer] supplies. Continue apap 6-16cm. Improve mask on time to help hypersomnia  RTC 6 mos, SOONEr if needed  Ubrelvy prn

## 2025-02-21 ENCOUNTER — PATIENT MESSAGE (OUTPATIENT)
Dept: INTERNAL MEDICINE | Facility: CLINIC | Age: 50
End: 2025-02-21
Payer: COMMERCIAL

## 2025-02-21 LAB
FINAL PATHOLOGIC DIAGNOSIS: NORMAL
GROSS: NORMAL
Lab: NORMAL

## 2025-02-26 DIAGNOSIS — Z00.01 ENCOUNTER FOR GENERAL ADULT MEDICAL EXAMINATION WITH ABNORMAL FINDINGS: Primary | ICD-10-CM

## 2025-02-28 ENCOUNTER — PATIENT MESSAGE (OUTPATIENT)
Dept: BARIATRICS | Facility: CLINIC | Age: 50
End: 2025-02-28
Payer: COMMERCIAL

## 2025-02-28 ENCOUNTER — PATIENT MESSAGE (OUTPATIENT)
Dept: SLEEP MEDICINE | Facility: CLINIC | Age: 50
End: 2025-02-28
Payer: COMMERCIAL

## 2025-02-28 DIAGNOSIS — G47.411 NARCOLEPSY WITH CATAPLEXY: ICD-10-CM

## 2025-02-28 RX ORDER — ONDANSETRON 8 MG/1
8 TABLET, ORALLY DISINTEGRATING ORAL EVERY 6 HOURS PRN
Qty: 30 TABLET | Refills: 0 | Status: SHIPPED | OUTPATIENT
Start: 2025-02-28

## 2025-02-28 RX ORDER — DEXTROAMPHETAMINE SACCHARATE, AMPHETAMINE ASPARTATE MONOHYDRATE, DEXTROAMPHETAMINE SULFATE AND AMPHETAMINE SULFATE 5; 5; 5; 5 MG/1; MG/1; MG/1; MG/1
20 CAPSULE, EXTENDED RELEASE ORAL EVERY MORNING
Qty: 30 CAPSULE | Refills: 0 | Status: SHIPPED | OUTPATIENT
Start: 2025-02-28

## 2025-02-28 RX ORDER — DEXTROAMPHETAMINE SACCHARATE, AMPHETAMINE ASPARTATE, DEXTROAMPHETAMINE SULFATE AND AMPHETAMINE SULFATE 5; 5; 5; 5 MG/1; MG/1; MG/1; MG/1
1 TABLET ORAL 2 TIMES DAILY
Qty: 60 TABLET | Refills: 0 | Status: SHIPPED | OUTPATIENT
Start: 2025-02-28

## 2025-03-01 ENCOUNTER — RESULTS FOLLOW-UP (OUTPATIENT)
Dept: GASTROENTEROLOGY | Facility: HOSPITAL | Age: 50
End: 2025-03-01

## 2025-03-02 ENCOUNTER — PATIENT MESSAGE (OUTPATIENT)
Dept: PRIMARY CARE CLINIC | Facility: CLINIC | Age: 50
End: 2025-03-02
Payer: COMMERCIAL

## 2025-03-02 DIAGNOSIS — J10.1 INFLUENZA A: ICD-10-CM

## 2025-03-02 RX ORDER — ALBUTEROL SULFATE 90 UG/1
1-2 INHALANT RESPIRATORY (INHALATION) EVERY 4 HOURS PRN
Qty: 54 G | Refills: 3 | Status: SHIPPED | OUTPATIENT
Start: 2025-03-02 | End: 2025-03-03 | Stop reason: SDUPTHER

## 2025-03-03 RX ORDER — ALBUTEROL SULFATE 90 UG/1
1-2 INHALANT RESPIRATORY (INHALATION) EVERY 4 HOURS PRN
Qty: 54 G | Refills: 3 | Status: SHIPPED | OUTPATIENT
Start: 2025-03-03 | End: 2025-03-03

## 2025-03-03 RX ORDER — ALBUTEROL SULFATE 90 UG/1
1-2 INHALANT RESPIRATORY (INHALATION) EVERY 4 HOURS PRN
Qty: 54 G | Refills: 3 | Status: SHIPPED | OUTPATIENT
Start: 2025-03-03 | End: 2025-03-03 | Stop reason: SDUPTHER

## 2025-03-03 RX ORDER — ALBUTEROL SULFATE 90 UG/1
1-2 INHALANT RESPIRATORY (INHALATION) EVERY 4 HOURS PRN
Qty: 54 G | Refills: 3 | Status: SHIPPED | OUTPATIENT
Start: 2025-03-03

## 2025-03-03 NOTE — TELEPHONE ENCOUNTER
Refill Decision Note   Amanda Landon  is requesting a refill authorization.  Brief Assessment and Rationale for Refill:  Approve     Medication Therapy Plan:         Comments:     Note composed:10:02 PM 03/02/2025             Appointments     Last Visit   2/3/2025 Laya Jaffe, DO   Next Visit   Visit date not found Laya Jaffe, DO

## 2025-03-03 NOTE — TELEPHONE ENCOUNTER
No care due was identified.  [facility] Embedded Care Due Messages. Reference number: [id number].   3/02/2025 9:32:37 PM CST

## 2025-03-03 NOTE — TELEPHONE ENCOUNTER
No care due was identified.  [facility] Embedded Care Due Messages. Reference number: [id number].   3/03/2025 11:24:44 AM CST

## 2025-03-03 NOTE — TELEPHONE ENCOUNTER
Refill Encounter    PCP Visits: Recent Visits  Date Type Provider Dept   02/03/25 Office Visit Laya Jaffe,  [facility]   05/21/24 Office Visit Laya Jaffe,  [facility]   03/21/24 Office Visit Laya Jaffe,  [facility]   Showing recent visits within past 360 days and meeting all other requirements  Future Appointments  Date Type Provider Dept   03/05/26 Appointment Laya Jaffe,  [facility]   Showing future appointments within next 720 days and meeting all other requirements      Last 3 Blood Pressure:   BP Readings from Last 3 Encounters:   02/20/25 117/80   02/19/25 104/60   02/03/25 108/75     Preferred Pharmacy:   [facility] [address] [address]  Phone: [phone] Fax: [phone]    [facility] [address] [address]  Phone: [phone] Fax: [phone]    [facility]  [address]  [facility] [address]  Phone: [phone] Fax: [phone]    [facility] #[id number] - [place] - [address] [facility]  [address]  [place] [address]  Phone: [phone] Fax: [phone]    Requested RX:  Requested Prescriptions     Pending Prescriptions Disp Refills    albuterol (PROVENTIL/VENTOLIN HFA) 90 mcg/actuation inhaler 54 g 3     Sig: Inhale 1-2 puffs into the lungs every 4 (four) hours as needed for Wheezing or Shortness of Breath. Rescue      RX Route: Normal

## 2025-03-07 ENCOUNTER — PATIENT MESSAGE (OUTPATIENT)
Dept: PRIMARY CARE CLINIC | Facility: CLINIC | Age: 50
End: 2025-03-07
Payer: COMMERCIAL

## 2025-03-10 ENCOUNTER — PATIENT MESSAGE (OUTPATIENT)
Dept: BARIATRICS | Facility: CLINIC | Age: 50
End: 2025-03-10
Payer: COMMERCIAL

## 2025-03-12 ENCOUNTER — PATIENT MESSAGE (OUTPATIENT)
Dept: INTERNAL MEDICINE | Facility: CLINIC | Age: 50
End: 2025-03-12
Payer: COMMERCIAL

## 2025-03-13 ENCOUNTER — PATIENT MESSAGE (OUTPATIENT)
Dept: BARIATRICS | Facility: CLINIC | Age: 50
End: 2025-03-13
Payer: COMMERCIAL

## 2025-03-16 RX ORDER — OMEPRAZOLE 40 MG/1
40 CAPSULE, DELAYED RELEASE ORAL EVERY MORNING
Qty: 30 CAPSULE | Refills: 2 | Status: SHIPPED | OUTPATIENT
Start: 2025-03-16

## 2025-03-19 ENCOUNTER — PATIENT MESSAGE (OUTPATIENT)
Dept: SLEEP MEDICINE | Facility: CLINIC | Age: 50
End: 2025-03-19
Payer: COMMERCIAL

## 2025-03-26 ENCOUNTER — RESULTS FOLLOW-UP (OUTPATIENT)
Dept: PRIMARY CARE CLINIC | Facility: CLINIC | Age: 50
End: 2025-03-26

## 2025-04-08 ENCOUNTER — PATIENT MESSAGE (OUTPATIENT)
Dept: BARIATRICS | Facility: CLINIC | Age: 50
End: 2025-04-08
Payer: COMMERCIAL

## 2025-04-11 ENCOUNTER — PATIENT MESSAGE (OUTPATIENT)
Dept: BARIATRICS | Facility: CLINIC | Age: 50
End: 2025-04-11
Payer: COMMERCIAL

## 2025-04-16 ENCOUNTER — PATIENT MESSAGE (OUTPATIENT)
Dept: SLEEP MEDICINE | Facility: CLINIC | Age: 50
End: 2025-04-16
Payer: COMMERCIAL

## 2025-04-16 DIAGNOSIS — G47.411 NARCOLEPSY WITH CATAPLEXY: ICD-10-CM

## 2025-04-16 RX ORDER — DEXTROAMPHETAMINE SACCHARATE, AMPHETAMINE ASPARTATE MONOHYDRATE, DEXTROAMPHETAMINE SULFATE AND AMPHETAMINE SULFATE 5; 5; 5; 5 MG/1; MG/1; MG/1; MG/1
20 CAPSULE, EXTENDED RELEASE ORAL EVERY MORNING
Qty: 30 CAPSULE | Refills: 0 | Status: SHIPPED | OUTPATIENT
Start: 2025-04-16 | End: 2025-04-17 | Stop reason: SDUPTHER

## 2025-04-16 RX ORDER — DEXTROAMPHETAMINE SACCHARATE, AMPHETAMINE ASPARTATE, DEXTROAMPHETAMINE SULFATE AND AMPHETAMINE SULFATE 5; 5; 5; 5 MG/1; MG/1; MG/1; MG/1
1 TABLET ORAL 2 TIMES DAILY
Qty: 60 TABLET | Refills: 0 | Status: SHIPPED | OUTPATIENT
Start: 2025-04-16 | End: 2025-04-17 | Stop reason: SDUPTHER

## 2025-04-17 DIAGNOSIS — G47.411 NARCOLEPSY WITH CATAPLEXY: ICD-10-CM

## 2025-04-17 RX ORDER — DEXTROAMPHETAMINE SACCHARATE, AMPHETAMINE ASPARTATE, DEXTROAMPHETAMINE SULFATE AND AMPHETAMINE SULFATE 5; 5; 5; 5 MG/1; MG/1; MG/1; MG/1
1 TABLET ORAL 2 TIMES DAILY
Qty: 60 TABLET | Refills: 0 | Status: SHIPPED | OUTPATIENT
Start: 2025-04-17

## 2025-04-17 RX ORDER — DEXTROAMPHETAMINE SACCHARATE, AMPHETAMINE ASPARTATE MONOHYDRATE, DEXTROAMPHETAMINE SULFATE AND AMPHETAMINE SULFATE 5; 5; 5; 5 MG/1; MG/1; MG/1; MG/1
20 CAPSULE, EXTENDED RELEASE ORAL EVERY MORNING
Qty: 30 CAPSULE | Refills: 0 | Status: SHIPPED | OUTPATIENT
Start: 2025-04-17

## 2025-04-19 DIAGNOSIS — E66.813 CLASS 3 SEVERE OBESITY DUE TO EXCESS CALORIES WITH SERIOUS COMORBIDITY AND BODY MASS INDEX (BMI) OF 45.0 TO 49.9 IN ADULT: ICD-10-CM

## 2025-04-19 DIAGNOSIS — E66.01 CLASS 3 SEVERE OBESITY DUE TO EXCESS CALORIES WITH SERIOUS COMORBIDITY AND BODY MASS INDEX (BMI) OF 45.0 TO 49.9 IN ADULT: ICD-10-CM

## 2025-04-19 DIAGNOSIS — Z98.84 S/P BARIATRIC SURGERY: ICD-10-CM

## 2025-04-19 RX ORDER — URSODIOL 500 MG/1
500 TABLET, FILM COATED ORAL DAILY
Qty: 180 TABLET | Refills: 0 | Status: CANCELLED | OUTPATIENT
Start: 2025-04-19 | End: 2025-10-16

## 2025-04-21 DIAGNOSIS — E55.9 VITAMIN D DEFICIENCY: ICD-10-CM

## 2025-04-21 NOTE — TELEPHONE ENCOUNTER
No care due was identified.  [facility] Embedded Care Due Messages. Reference number: [id number].   4/21/2025 5:09:46 PM CDT

## 2025-04-22 RX ORDER — ERGOCALCIFEROL 1.25 MG/1
50000 CAPSULE ORAL
Qty: 24 CAPSULE | Refills: 3 | Status: SHIPPED | OUTPATIENT
Start: 2025-04-24 | End: 2026-04-24

## 2025-04-22 NOTE — TELEPHONE ENCOUNTER
Refill Routing Note   Medication(s) are not appropriate for processing by [facility] for the following reason(s):        Outside of protocol    [facility] action(s):  Route             Appointments  past 12m or future 3m with PCP    Date Provider   Last Visit   2/3/2025 Laya Jaffe, DO   Next Visit   Visit date not found Laya Jaffe, DO   ED visits in past 90 days: 0        Note composed:7:52 AM 04/22/2025           no

## 2025-04-23 ENCOUNTER — OFFICE VISIT (OUTPATIENT)
Dept: BARIATRICS | Facility: CLINIC | Age: 50
End: 2025-04-23
Payer: COMMERCIAL

## 2025-04-23 ENCOUNTER — PATIENT MESSAGE (OUTPATIENT)
Dept: BARIATRICS | Facility: CLINIC | Age: 50
End: 2025-04-23

## 2025-04-23 ENCOUNTER — LAB VISIT (OUTPATIENT)
Dept: LAB | Facility: HOSPITAL | Age: 50
End: 2025-04-23
Payer: COMMERCIAL

## 2025-04-23 VITALS
HEIGHT: 61 IN | DIASTOLIC BLOOD PRESSURE: 73 MMHG | SYSTOLIC BLOOD PRESSURE: 109 MMHG | WEIGHT: 170.63 LBS | OXYGEN SATURATION: 100 % | HEART RATE: 72 BPM | BODY MASS INDEX: 32.22 KG/M2

## 2025-04-23 DIAGNOSIS — E78.2 HYPERLIPIDEMIA, MIXED: ICD-10-CM

## 2025-04-23 DIAGNOSIS — K21.9 GASTROESOPHAGEAL REFLUX DISEASE, UNSPECIFIED WHETHER ESOPHAGITIS PRESENT: ICD-10-CM

## 2025-04-23 DIAGNOSIS — Z98.84 S/P BARIATRIC SURGERY: ICD-10-CM

## 2025-04-23 DIAGNOSIS — E06.3 HYPOTHYROIDISM DUE TO HASHIMOTO'S THYROIDITIS: ICD-10-CM

## 2025-04-23 DIAGNOSIS — Z98.84 S/P BARIATRIC SURGERY: Primary | ICD-10-CM

## 2025-04-23 DIAGNOSIS — G47.33 OSA (OBSTRUCTIVE SLEEP APNEA): ICD-10-CM

## 2025-04-23 LAB
25(OH)D3+25(OH)D2 SERPL-MCNC: 71 NG/ML (ref 30–96)
ABSOLUTE EOSINOPHIL (OHS): 0.19 K/UL
ABSOLUTE MONOCYTE (OHS): 0.52 K/UL (ref 0.3–1)
ABSOLUTE NEUTROPHIL COUNT (OHS): 4.89 K/UL (ref 1.8–7.7)
ALBUMIN SERPL BCP-MCNC: 3.6 G/DL (ref 3.5–5.2)
ALP SERPL-CCNC: 98 UNIT/L (ref 40–150)
ALT SERPL W/O P-5'-P-CCNC: 11 UNIT/L (ref 10–44)
ANION GAP (OHS): 6 MMOL/L (ref 8–16)
AST SERPL-CCNC: 14 UNIT/L (ref 11–45)
BASOPHILS # BLD AUTO: 0.05 K/UL
BASOPHILS NFR BLD AUTO: 0.7 %
BILIRUB SERPL-MCNC: 0.5 MG/DL (ref 0.1–1)
BUN SERPL-MCNC: 16 MG/DL (ref 6–20)
CALCIUM SERPL-MCNC: 9.4 MG/DL (ref 8.7–10.5)
CHLORIDE SERPL-SCNC: 113 MMOL/L (ref 95–110)
CHOLEST SERPL-MCNC: 151 MG/DL (ref 120–199)
CHOLEST/HDLC SERPL: 3.1 {RATIO} (ref 2–5)
CO2 SERPL-SCNC: 27 MMOL/L (ref 23–29)
CREAT SERPL-MCNC: 0.7 MG/DL (ref 0.5–1.4)
ERYTHROCYTE [DISTWIDTH] IN BLOOD BY AUTOMATED COUNT: 13.7 % (ref 11.5–14.5)
GFR SERPLBLD CREATININE-BSD FMLA CKD-EPI: >60 ML/MIN/1.73/M2
GLUCOSE SERPL-MCNC: 75 MG/DL (ref 70–110)
HCT VFR BLD AUTO: 41 % (ref 37–48.5)
HDLC SERPL-MCNC: 48 MG/DL (ref 40–75)
HDLC SERPL: 31.8 % (ref 20–50)
HGB BLD-MCNC: 13.4 GM/DL (ref 12–16)
IMM GRANULOCYTES # BLD AUTO: 0.02 K/UL (ref 0–0.04)
IMM GRANULOCYTES NFR BLD AUTO: 0.3 % (ref 0–0.5)
IRON SATN MFR SERPL: 34 % (ref 20–50)
IRON SERPL-MCNC: 102 UG/DL (ref 30–160)
LDLC SERPL CALC-MCNC: 91.4 MG/DL (ref 63–159)
LYMPHOCYTES # BLD AUTO: 1.3 K/UL (ref 1–4.8)
MCH RBC QN AUTO: 33.3 PG (ref 27–31)
MCHC RBC AUTO-ENTMCNC: 32.7 G/DL (ref 32–36)
MCV RBC AUTO: 102 FL (ref 82–98)
NONHDLC SERPL-MCNC: 103 MG/DL
NUCLEATED RBC (/100WBC) (OHS): 0 /100 WBC
PLATELET # BLD AUTO: 229 K/UL (ref 150–450)
PMV BLD AUTO: 12.6 FL (ref 9.2–12.9)
POTASSIUM SERPL-SCNC: 3.8 MMOL/L (ref 3.5–5.1)
PROT SERPL-MCNC: 6.6 GM/DL (ref 6–8.4)
RBC # BLD AUTO: 4.03 M/UL (ref 4–5.4)
RELATIVE EOSINOPHIL (OHS): 2.7 %
RELATIVE LYMPHOCYTE (OHS): 18.7 % (ref 18–48)
RELATIVE MONOCYTE (OHS): 7.5 % (ref 4–15)
RELATIVE NEUTROPHIL (OHS): 70.1 % (ref 38–73)
SODIUM SERPL-SCNC: 146 MMOL/L (ref 136–145)
TIBC SERPL-MCNC: 297 UG/DL (ref 250–450)
TRANSFERRIN SERPL-MCNC: 201 MG/DL (ref 200–375)
TRIGL SERPL-MCNC: 58 MG/DL (ref 30–150)
VIT B12 SERPL-MCNC: >2000 PG/ML (ref 210–950)
WBC # BLD AUTO: 6.97 K/UL (ref 3.9–12.7)

## 2025-04-23 PROCEDURE — 3044F HG A1C LEVEL LT 7.0%: CPT | Mod: CPTII,S$GLB,, | Performed by: NURSE PRACTITIONER

## 2025-04-23 PROCEDURE — 80053 COMPREHEN METABOLIC PANEL: CPT

## 2025-04-23 PROCEDURE — 3078F DIAST BP <80 MM HG: CPT | Mod: CPTII,S$GLB,, | Performed by: NURSE PRACTITIONER

## 2025-04-23 PROCEDURE — 84425 ASSAY OF VITAMIN B-1: CPT

## 2025-04-23 PROCEDURE — 1159F MED LIST DOCD IN RCRD: CPT | Mod: CPTII,S$GLB,, | Performed by: NURSE PRACTITIONER

## 2025-04-23 PROCEDURE — 3008F BODY MASS INDEX DOCD: CPT | Mod: CPTII,S$GLB,, | Performed by: NURSE PRACTITIONER

## 2025-04-23 PROCEDURE — 82306 VITAMIN D 25 HYDROXY: CPT

## 2025-04-23 PROCEDURE — 99999 PR PBB SHADOW E&M-EST. PATIENT-LVL V: CPT | Mod: PBBFAC,,, | Performed by: NURSE PRACTITIONER

## 2025-04-23 PROCEDURE — 84466 ASSAY OF TRANSFERRIN: CPT

## 2025-04-23 PROCEDURE — 3074F SYST BP LT 130 MM HG: CPT | Mod: CPTII,S$GLB,, | Performed by: NURSE PRACTITIONER

## 2025-04-23 PROCEDURE — 99213 OFFICE O/P EST LOW 20 MIN: CPT | Mod: S$GLB,,, | Performed by: NURSE PRACTITIONER

## 2025-04-23 PROCEDURE — 80061 LIPID PANEL: CPT

## 2025-04-23 PROCEDURE — 36415 COLL VENOUS BLD VENIPUNCTURE: CPT

## 2025-04-23 PROCEDURE — 85025 COMPLETE CBC W/AUTO DIFF WBC: CPT

## 2025-04-23 PROCEDURE — 1160F RVW MEDS BY RX/DR IN RCRD: CPT | Mod: CPTII,S$GLB,, | Performed by: NURSE PRACTITIONER

## 2025-04-23 PROCEDURE — 82607 VITAMIN B-12: CPT

## 2025-04-23 RX ORDER — POLYETHYLENE GLYCOL 3350 17 G/17G
17 POWDER, FOR SOLUTION ORAL DAILY
Qty: 1530 G | Refills: 1 | Status: SHIPPED | OUTPATIENT
Start: 2025-04-23

## 2025-04-23 RX ORDER — DOCUSATE SODIUM 100 MG/1
100 CAPSULE, LIQUID FILLED ORAL 2 TIMES DAILY
Qty: 90 CAPSULE | Refills: 1 | Status: SHIPPED | OUTPATIENT
Start: 2025-04-23

## 2025-04-23 RX ORDER — ONDANSETRON 8 MG/1
8 TABLET, ORALLY DISINTEGRATING ORAL EVERY 6 HOURS PRN
Qty: 30 TABLET | Refills: 0 | Status: SHIPPED | OUTPATIENT
Start: 2025-04-23

## 2025-04-23 NOTE — PATIENT INSTRUCTIONS
Meal Ideas for Regular Bariatric Diet  *Recipes and products available at www.bariatriceating.com      Breakfast: (15-20g protein)    - Egg white omelet: 2 egg whites or ½ cup Egg Beaters. (Optional proteins: cheese, shrimp, black beans, chicken, sliced turkey) (Optional veggies: tomatoes, salsa, spinach, mushrooms, onions, green peppers, or small slice avocado)     - Egg and sausage: 1 egg or ¼ cup Egg Beaters (any variety), with 1 leanna or 2 links of Turkey sausage or Veggie breakfast sausage (Makepolo.com or Cybereason)    - Crust-less breakfast quiche: To make a glass pie dish, mix 4oz part skim Ricotta, 1 cup skim milk, and 2 eggs as your base. Add protein: shredded cheese, sliced lean ham or turkey, turkey hardwick/sausage. Add veggies: tomato, onion, green onion, mushroom, green pepper, spinach, etc.    - Yogurt parfait: Mix 1 - 6oz container Dannon Light N Fit vanilla yogurt, with ¼ cup crushed unsalted nuts    - Cottage cheese and fruit: ½ cup part-skim cottage cheese or ricotta cheese topped with fresh fruit or sugar free preserves     - Mary Fischer's Vanilla Egg custard* (add 2 Tbsp instant coffee granules to make Cappuccino Custard*)    - Hi-Protein café latte (skim milk, decaf coffee, 1 scoop protein powder). Optional to add Sugar free syrup or extract flavoring.    - Breakfast Lox: spread fat free cream cheese on slices of smoked salmon. Serve over scrambled or egg over easy (sauteed with nonstick cookspray) OR on a cucumber slice    - Eggwhich: Scramble or cook 1 large egg over easy using nonstick cookspray. Place between 2 slices of [place]agu[place] hardwick and low fat cheese.     Lunch: (20-30g protein)    - ½ cup Black bean soup (Homemade or Progresso), with ¼ cup shredded low-fat cheese. Top with chopped tomato or fresh salsa.     - Lean deli turkey breast and low-fat sliced cheese, mustard or light hurley to moisten, rolled up together, or wrapped in a Xander lettuce leaf    - Chicken salad made from dinner  leftovers, moisten with low-fat salad dressing or light hurley. Also try leftover salmon, shrimp, tuna or boiled eggs. Serve ½ cup over dark green salad    - Fat-free canned refried beans, topped with ¼ cup shredded low-fat cheese. Top with chopped tomato or fresh salsa.     - Greek salad: Top mixed greens with 1-2oz grilled chicken, tomatoes, red onions, 2-3 kalamata olives, and sprinkle lightly with feta cheese. Spritz with Balsamic vinegar to taste.     - Crust-less lunch quiche: To make a glass pie dish, mix 4oz part skim Ricotta, 1 cup skim milk, and 2 eggs as your base. Add protein: shredded cheese, sliced lean ham or turkey, shrimp, chicken. Add veggies: tomato, onion, green onion, mushroom, green pepper, spinach, artichoke, broccoli, etc.    - Pizza bake: spread a  lana traci mushroom with tomato sauce, low-fat shredded mozzarella and turkey pepperoni or Kinde hardwick. Add any veggies. Roast for 10-15 minutes, until cheese melted.     - Cucumber crab bites: Spread ¼ cup crab dip (lump crabmeat + light cream cheese and green onions) over sliced cucumber.     - Chicken with light spinach and artichoke dip*: Puree in : 6oz cooked and drained spinach, 2 cloves garlic, 1 can cannelloni beans, ½ cup chopped green onions, 1 can drained artichoke hearts (not marinated in oil), lemon juice and basil. Mix in 2oz chopped up chicken.    Supper: (20-30g protein)    - Serve grilled fish over dark green salad tossed with low-fat dressing, served with grilled asparagus prakash     - Rotisserie chicken salad: served with sliced strawberries, walnuts, fat-free feta cheese crumbles and 1 tbsp Velásquezs Own Light Raspberry Foley Vinaigrette    - Shrimp cocktail: Dip cold boiled shrimp in homemade low-sugar cocktail sauce (1/2 cup Gloria One Carb ketchup, 2 tbsp horseradish, 1/4 tsp hot sauce, 1 tsp Worcestershire sauce, 1 tbsp freshly-squeezed lemon juice). Serve with dark green salad, walnuts, and crumbled blue  cheese drizzled with olive oil and Balsamic vinegar    - Tuna Melt: Spread tuna salad onto 2 thick slices of tomato. Top with low-fat cheese and broil until cheese is melted. May also be made with chicken salad of shrimp salad. Del Sol with different types of cheeses.    - Chicken or beef fajitas (no tortilla, rice, beans, chips). Top meat and veggies w/ fresh salsa, fat free sour cream.     - Homemade low-fat Chili using extra lean ground beef or ground turkey. Top with shredded cheese and salsa as desired. May add dollop fat-free sour cream if desired    - Chicken parmesan: Top chicken breast w/ low sugar marinara sauce, mozzarella cheese and bake until chicken reaches 165*.  Serve w/ spaghetti SQUASH or Bahamian cut green beans    - Dinner Omelet with shrimp or chicken and onion, green peppers and chives.    - No noodle lasagna: Use sliced zucchini or eggplant in place of noodles.  Layer with part skim ricotta cheese and low sugar meat sauce (use very lean ground beef or ground turkey).    - Mexican chicken bake: Bake chunks of chicken breast or thigh with taco seasoning, Pace brand enchilada sauce, green onions and low-fat cheese. Serve with ¼ cup black beans or fat free refried beans topped with chopped tomatoes or salsa.    - Gunner frozen meatballs, simmered in Classico Marinara sauce. Different flavors of salsa or spaghetti sauce create different dishes! Sprinkle with parmesan cheese. Serve with grilled or steamed veggies, or a dark green salad.    - Simmer boneless skinless chicken thigh chunks in Classico Marinara sauce or roasted salsa until tender with chopped onion, bell pepper, garlic, mushrooms, spinach, etc.     - Hamburger or veggie burger, without the bun, dressed the way you like. Served with grilled or steamed veggies.    - Eggplant parmesan: Bake slices of eggplant at 350 degrees for 15 minutes. Layer tomato sauce, sliced eggplant and low-fat mozzarella cheese in a baking dish and cover with  foil. Bake 30-40 more minutes or until bubbly. Uncover and bake at 400 degrees for about 15 more minutes, or until top is slightly crisp.    - Fish tacos: grilled/baked white fish, wrapped in Xander lettuce leaf, topped with salsa, shredded low-fat cheese, and light coleslaw.    - Chicken bobo: Sprinkle chicken w/ 1 tsp of hidden valley ranch dip mix. Then grill chicken and top with black beans, salsa and 1 tsp fat free sour cream.     - Cauliflower pizza crust: Use cauliflower as crust (see recipe on pinterest, no flour!). Top w/ low fat cheese, turkey pepperoni and veggies and bake again    - chicken or turkey crust pizza: use ground chicken or turkey instead of cauliflower, spread in [place] and bake at 350 for about 20-30 minutes(may want to add garlic, black pepper, oregano and other herbs to ground meat mixture).  Remove and top w/ low fat cheese, turkey pepperoni and veggies and bake again for another 10 minutes or until cheese is browned.     Snacks: (100-200 calories; >5g protein)    - 1 low-fat cheese stick with 8 cherry tomatoes or 1 serving fresh fruit  - 4 thin slices fat-free turkey breast and 1 slice low-fat cheese  - 4 thin slices fat-free honey ham with wedge of melon  - 6-8 edamame pods (equivalent to about 1/4 cup edamame without pods).   - 1/4 cup unsalted nuts with ½ cup fruit  - 6-oz container Dannon Light n Fit vanilla yogurt, topped with 1oz unsalted nuts         - apple, celery or baby carrots spread with 2 Tbsp PB2  - apple slices with 1 oz slice low-fat cheese  - Apple slices dipped in 2 Tbsp of PB2  - celery, cucumber, bell pepper or baby carrots dipped in ¼ cup hummus bean spread or light spinach and artichoke dip (*recipe in lunch section)  - celery, cucumber, baby carrots dipped in high protein greek yogurt (Mix 16 oz plain greek yogurt + 1 packet of hidden valley ranch dip mix)  - Evaristo Links Beef Steak - 14g protein! (similar to beef jerky)  - 2 wedges Laughing Cow - Light Herb  & Garlic Cheese with sliced cucumber or green bell pepper  - 1/2 cup low-fat cottage cheese with ¼ cup fruit or ¼ cup salsa  - RTD Protein drinks: Atkins, Low Carb Slim Fast, EAS light, Muscle Milk Light, etc.  - Homemade Protein drinks: GNC Soy95, Isopure, Nectar, UNJURY, Whey Gourmet, etc. Mix 1 scoop powder with 8oz skim/1% milk or light soymilk.  - Protein bars: Atkins, EAS, Pure Protein, Think Thin, Detour, etc. Must have 0-4 grams sugar - Read the label.    Takeout Options: No more than twice/week  Deli - Salads (no pasta or rice), meats, cheeses. Roasted chicken. Lox (salmon)    Mexican - Platters which don't include tortillas, chips, or rice. Go easy on the beans. Example: Fajitas without the tortillas. Ask the  not to bring chips to the table if they are too tempting.    Greek - Meat or fish and vegetable, but no bread or rice. Including hummus, baba ganoush, etc, is OK. Most sit-down Greek restaurants can provide you with cucumber slices for dipping instead of merritt bread.    Fast Food (Avoid as much as possible) - Salads (no croutons and limit salad dressing to 2 tbsp), grilled chicken sandwich without the bun and ask for no hurley. Christinas low fat chili or Taco Bell pintos and cheese.    BBQ - The meats are fine if you ask for sauces on the side, but most of the traditional side dishes are loaded with carbs. Deep slaw, baked beans and BBQ sauce are typically made with sugar.    Chinese - Nothing deep-fried, no rice or noodles. Many Chinese sauces have starch and sugar in them, so you'll have to use your judgement. If you find that these sauces trigger cravings, or cause Dumping, you can ask for the sauce to be made without sugar or just use soy sauce.

## 2025-04-23 NOTE — PROGRESS NOTES
BARIATRIC POST-OPERATIVE VISIT:    HPI:  Amanda Navarrete is a 50 y.o. year old female presents for 6 months week post op visit following s/p sleeve.  she is doing well and tolerating the diet without difficulty.  she has no complaints.    Denies: nausea, vomiting, abdominal pain, changes in bowel movement pattern, fever, chills, dysphagia, chest pain, and shortness of breath.    Review of Systems   Constitutional:  Negative for fatigue and fever.   HENT:  Negative for tinnitus and trouble swallowing.    Eyes:  Negative for visual disturbance.   Respiratory:  Negative for cough, chest tightness and shortness of breath.    Cardiovascular:  Negative for chest pain, palpitations and leg swelling.   Gastrointestinal:  Positive for constipation. Negative for abdominal pain, anal bleeding, blood in stool (diarrhea (resolved)), diarrhea, nausea and vomiting.   Genitourinary:  Negative for decreased urine volume.   Skin:  Negative for rash.   Neurological:  Negative for dizziness, light-headedness and headaches.   Psychiatric/Behavioral:  Negative for dysphoric mood, self-injury, sleep disturbance and suicidal ideas. The patient is not nervous/anxious.      EXERCISE & VITAMINS:  See Bariatric Assessment  Good vitamin regimen     MEDICATIONS/ALLERGIES:  Have been reviewed.    DIET: Regular Bariatric Diet.  grams protein. 64+ fl oz SF clear beverage.      See Dietician note from today for a more detailed assessment.      Physical Exam  Vitals reviewed.   Constitutional:       Appearance: She is obese.   HENT:      Head: Normocephalic and atraumatic.   Eyes:      Extraocular Movements: Extraocular movements intact.      Conjunctiva/sclera: Conjunctivae normal.      Pupils: Pupils are equal, round, and reactive to light.   Cardiovascular:      Rate and Rhythm: Normal rate and regular rhythm.      Pulses: Normal pulses.      Heart sounds: Normal heart sounds. No murmur heard.  Pulmonary:      Effort: Pulmonary effort is normal.  No respiratory distress.      Breath sounds: Normal breath sounds.   Abdominal:      General: Bowel sounds are normal.      Palpations: Abdomen is soft.      Tenderness: There is no abdominal tenderness.   Musculoskeletal:         General: No swelling. Normal range of motion.      Cervical back: Normal range of motion.   Skin:     General: Skin is warm and dry.      Capillary Refill: Capillary refill takes less than 2 seconds.   Neurological:      General: No focal deficit present.      Mental Status: She is alert and oriented to person, place, and time.   Psychiatric:         Mood and Affect: Mood normal.         Behavior: Behavior normal.         Thought Content: Thought content normal.         Judgment: Judgment normal.       ASSESSMENT:  - Morbid obesity s/p sleeve gastrectomy  - Co-morbidities: dyslipidemia, GERD, obstructive sleep apnea, and anxiety  - Good Weight loss, 67.8#'s and 62% EWL  - Good Exercise routine yoga, pilate's, walking   - Good Diet  - Good Vitamin regimen     PLAN:  - Ursodiol d/c  - Miralax daily for constipation    - Emphasized the importance of regular exercise and adherence to bariatric diet to achieve maximum weight loss.  - Encouraged patient to start/continue regular exercise.  - Follow-up with dietician to advance diet.  - Continue daily vitamins and medications.  - RTC per schedule  - Call the office for any issues.  - Check labs today.

## 2025-05-08 DIAGNOSIS — R42 VERTIGO: ICD-10-CM

## 2025-05-08 RX ORDER — MECLIZINE HYDROCHLORIDE 25 MG/1
25 TABLET ORAL 3 TIMES DAILY PRN
Qty: 30 TABLET | Refills: 5 | Status: SHIPPED | OUTPATIENT
Start: 2025-05-08

## 2025-05-08 NOTE — TELEPHONE ENCOUNTER
Refill Encounter    PCP Visits: Recent Visits  Date Type Provider Dept   02/03/25 Office Visit Laya Jaffe DO [facility]   05/21/24 Office Visit Laya Jaffe DO [facility]   Showing recent visits within past 360 days and meeting all other requirements  Future Appointments  Date Type Provider Dept   03/05/26 Appointment Laya Jaffe DO [facility]   Showing future appointments within next 720 days and meeting all other requirements     Last 3 Blood Pressure:   BP Readings from Last 3 Encounters:   04/23/25 109/73   02/20/25 117/80   02/19/25 104/60     Preferred Pharmacy:   [facility] [place] - [address] [place] [address]  [address] [place] [address]  Phone: [phone] Fax: [phone]    [facility] [address] [address]  Phone: [phone] Fax: [phone]    [facility] [place] [facility]  [address] [facility] [address]  [place] [address]  Phone: [phone] Fax: [phone]    [id number] - [place] - [address] [facility]  [address] [facility]  [place] [address]  Phone: [phone] Fax: [phone]    [id number] - [place] - [address]  [address]  [place] [address]  Phone: [phone] Fax: [phone]    [facility] [place] - [address] [facility] [address]  [address]  [place] [address]  Phone: [phone] Fax: [phone]    Requested RX:  Requested Prescriptions     Pending Prescriptions Disp Refills    meclizine (ANTIVERT) 25 mg tablet 30 tablet 5     Sig: Take 1 tablet (25 mg total) by mouth 3 (three) times daily as needed for Dizziness.      RX Route: Normal

## 2025-05-08 NOTE — TELEPHONE ENCOUNTER
No care due was identified.  [facility] Embedded Care Due Messages. Reference number: [id number].   5/08/2025 12:31:16 PM CDT

## 2025-05-13 ENCOUNTER — PATIENT MESSAGE (OUTPATIENT)
Dept: BARIATRICS | Facility: CLINIC | Age: 50
End: 2025-05-13
Payer: COMMERCIAL

## 2025-05-14 ENCOUNTER — PATIENT MESSAGE (OUTPATIENT)
Dept: SLEEP MEDICINE | Facility: CLINIC | Age: 50
End: 2025-05-14
Payer: COMMERCIAL

## 2025-05-14 DIAGNOSIS — G47.411 NARCOLEPSY WITH CATAPLEXY: ICD-10-CM

## 2025-05-14 RX ORDER — DEXTROAMPHETAMINE SACCHARATE, AMPHETAMINE ASPARTATE, DEXTROAMPHETAMINE SULFATE AND AMPHETAMINE SULFATE 5; 5; 5; 5 MG/1; MG/1; MG/1; MG/1
1 TABLET ORAL 2 TIMES DAILY
Qty: 60 TABLET | Refills: 0 | Status: SHIPPED | OUTPATIENT
Start: 2025-05-14

## 2025-05-14 RX ORDER — DEXTROAMPHETAMINE SACCHARATE, AMPHETAMINE ASPARTATE MONOHYDRATE, DEXTROAMPHETAMINE SULFATE AND AMPHETAMINE SULFATE 5; 5; 5; 5 MG/1; MG/1; MG/1; MG/1
20 CAPSULE, EXTENDED RELEASE ORAL EVERY MORNING
Qty: 30 CAPSULE | Refills: 0 | Status: SHIPPED | OUTPATIENT
Start: 2025-05-14

## 2025-06-03 ENCOUNTER — PATIENT MESSAGE (OUTPATIENT)
Dept: BARIATRICS | Facility: CLINIC | Age: 50
End: 2025-06-03
Payer: COMMERCIAL

## 2025-06-10 ENCOUNTER — PATIENT MESSAGE (OUTPATIENT)
Dept: BARIATRICS | Facility: CLINIC | Age: 50
End: 2025-06-10
Payer: COMMERCIAL

## 2025-06-16 DIAGNOSIS — Z86.19 HISTORY OF HERPES LABIALIS: ICD-10-CM

## 2025-06-16 DIAGNOSIS — J11.1 INFLUENZA: ICD-10-CM

## 2025-06-16 RX ORDER — VALACYCLOVIR HYDROCHLORIDE 1 G/1
1000 TABLET, FILM COATED ORAL EVERY 12 HOURS PRN
Qty: 60 TABLET | Refills: 8 | Status: SHIPPED | OUTPATIENT
Start: 2025-06-16

## 2025-06-16 RX ORDER — FLUTICASONE PROPIONATE 50 MCG
1 SPRAY, SUSPENSION (ML) NASAL DAILY
Qty: 32 G | Refills: 2 | Status: SHIPPED | OUTPATIENT
Start: 2025-06-16

## 2025-06-16 NOTE — TELEPHONE ENCOUNTER
Refill Decision Note   Amanda Navarrete  is requesting a refill authorization.    Brief Assessment and Rationale for Refill:   Approve       Medication Therapy Plan:          Comments:     Note composed:4:34 PM 06/16/2025

## 2025-06-16 NOTE — TELEPHONE ENCOUNTER
No care due was identified.  [facility] Embedded Care Due Messages. Reference number: [id number].   6/16/2025 3:31:47 PM CDT

## 2025-06-16 NOTE — TELEPHONE ENCOUNTER
Refill Decision Note   Amanda Navarrete  is requesting a refill authorization.  Brief Assessment and Rationale for Refill:  Approve     Medication Therapy Plan:        Comments:     Note composed:4:35 PM 06/16/2025

## 2025-06-16 NOTE — TELEPHONE ENCOUNTER
No care due was identified.  [facility] Embedded Care Due Messages. Reference number: [id number].   6/16/2025 1:58:52 PM CDT

## 2025-06-18 ENCOUNTER — PATIENT MESSAGE (OUTPATIENT)
Dept: SLEEP MEDICINE | Facility: CLINIC | Age: 50
End: 2025-06-18
Payer: COMMERCIAL

## 2025-06-18 DIAGNOSIS — G47.411 NARCOLEPSY WITH CATAPLEXY: ICD-10-CM

## 2025-06-18 RX ORDER — DEXTROAMPHETAMINE SACCHARATE, AMPHETAMINE ASPARTATE, DEXTROAMPHETAMINE SULFATE AND AMPHETAMINE SULFATE 5; 5; 5; 5 MG/1; MG/1; MG/1; MG/1
1 TABLET ORAL 2 TIMES DAILY
Qty: 60 TABLET | Refills: 0 | Status: SHIPPED | OUTPATIENT
Start: 2025-06-18 | End: 2025-06-20 | Stop reason: SDUPTHER

## 2025-06-18 RX ORDER — DEXTROAMPHETAMINE SACCHARATE, AMPHETAMINE ASPARTATE MONOHYDRATE, DEXTROAMPHETAMINE SULFATE AND AMPHETAMINE SULFATE 5; 5; 5; 5 MG/1; MG/1; MG/1; MG/1
20 CAPSULE, EXTENDED RELEASE ORAL EVERY MORNING
Qty: 30 CAPSULE | Refills: 0 | Status: SHIPPED | OUTPATIENT
Start: 2025-06-18 | End: 2025-06-20 | Stop reason: SDUPTHER

## 2025-06-20 DIAGNOSIS — G47.411 NARCOLEPSY WITH CATAPLEXY: ICD-10-CM

## 2025-06-20 RX ORDER — DEXTROAMPHETAMINE SACCHARATE, AMPHETAMINE ASPARTATE, DEXTROAMPHETAMINE SULFATE AND AMPHETAMINE SULFATE 5; 5; 5; 5 MG/1; MG/1; MG/1; MG/1
1 TABLET ORAL 2 TIMES DAILY
Qty: 60 TABLET | Refills: 0 | Status: SHIPPED | OUTPATIENT
Start: 2025-06-20

## 2025-06-20 RX ORDER — DEXTROAMPHETAMINE SACCHARATE, AMPHETAMINE ASPARTATE MONOHYDRATE, DEXTROAMPHETAMINE SULFATE AND AMPHETAMINE SULFATE 5; 5; 5; 5 MG/1; MG/1; MG/1; MG/1
20 CAPSULE, EXTENDED RELEASE ORAL EVERY MORNING
Qty: 30 CAPSULE | Refills: 0 | Status: SHIPPED | OUTPATIENT
Start: 2025-06-20

## 2025-06-25 ENCOUNTER — HOSPITAL ENCOUNTER (INPATIENT)
Facility: HOSPITAL | Age: 50
LOS: 3 days | Discharge: HOME | DRG: 394 | End: 2025-06-28
Attending: INTERNAL MEDICINE | Admitting: INTERNAL MEDICINE
Payer: COMMERCIAL

## 2025-06-25 DIAGNOSIS — R07.9 CHEST PAIN: ICD-10-CM

## 2025-06-25 DIAGNOSIS — K56.609 SBO (SMALL BOWEL OBSTRUCTION): ICD-10-CM

## 2025-06-25 PROBLEM — N94.89 FEMALE PELVIC CONGESTION SYNDROME: Status: ACTIVE | Noted: 2025-06-25

## 2025-06-25 PROCEDURE — 94660 CPAP INITIATION&MGMT: CPT

## 2025-06-25 PROCEDURE — 63600175 PHARM REV CODE 636 W HCPCS: Performed by: INTERNAL MEDICINE

## 2025-06-25 PROCEDURE — 11000001 HC ACUTE MED/SURG PRIVATE ROOM

## 2025-06-25 PROCEDURE — 63600175 PHARM REV CODE 636 W HCPCS

## 2025-06-25 PROCEDURE — 27000190 HC CPAP FULL FACE MASK W/VALVE

## 2025-06-25 PROCEDURE — 99900035 HC TECH TIME PER 15 MIN (STAT)

## 2025-06-25 PROCEDURE — 94761 N-INVAS EAR/PLS OXIMETRY MLT: CPT

## 2025-06-25 RX ORDER — NALOXONE HCL 0.4 MG/ML
0.02 VIAL (ML) INJECTION
Status: DISCONTINUED | OUTPATIENT
Start: 2025-06-25 | End: 2025-06-28 | Stop reason: HOSPADM

## 2025-06-25 RX ORDER — SODIUM CHLORIDE, SODIUM LACTATE, POTASSIUM CHLORIDE, CALCIUM CHLORIDE 600; 310; 30; 20 MG/100ML; MG/100ML; MG/100ML; MG/100ML
INJECTION, SOLUTION INTRAVENOUS CONTINUOUS
Status: ACTIVE | OUTPATIENT
Start: 2025-06-25 | End: 2025-06-26

## 2025-06-25 RX ORDER — SIMETHICONE 80 MG
1 TABLET,CHEWABLE ORAL 4 TIMES DAILY PRN
Status: DISCONTINUED | OUTPATIENT
Start: 2025-06-25 | End: 2025-06-28 | Stop reason: HOSPADM

## 2025-06-25 RX ORDER — ONDANSETRON HYDROCHLORIDE 2 MG/ML
INJECTION, SOLUTION INTRAVENOUS
Status: DISPENSED
Start: 2025-06-25 | End: 2025-06-26

## 2025-06-25 RX ORDER — IPRATROPIUM BROMIDE AND ALBUTEROL SULFATE 2.5; .5 MG/3ML; MG/3ML
3 SOLUTION RESPIRATORY (INHALATION) EVERY 4 HOURS PRN
Status: DISCONTINUED | OUTPATIENT
Start: 2025-06-25 | End: 2025-06-28 | Stop reason: HOSPADM

## 2025-06-25 RX ORDER — HYDROMORPHONE HYDROCHLORIDE 1 MG/ML
1 INJECTION, SOLUTION INTRAMUSCULAR; INTRAVENOUS; SUBCUTANEOUS EVERY 4 HOURS PRN
Status: DISCONTINUED | OUTPATIENT
Start: 2025-06-25 | End: 2025-06-28 | Stop reason: HOSPADM

## 2025-06-25 RX ORDER — HYDROMORPHONE HYDROCHLORIDE 1 MG/ML
1 INJECTION, SOLUTION INTRAMUSCULAR; INTRAVENOUS; SUBCUTANEOUS EVERY 6 HOURS PRN
Status: DISCONTINUED | OUTPATIENT
Start: 2025-06-25 | End: 2025-06-25

## 2025-06-25 RX ORDER — ONDANSETRON HYDROCHLORIDE 2 MG/ML
4 INJECTION, SOLUTION INTRAVENOUS EVERY 8 HOURS PRN
Status: DISCONTINUED | OUTPATIENT
Start: 2025-06-25 | End: 2025-06-28 | Stop reason: HOSPADM

## 2025-06-25 RX ORDER — PROCHLORPERAZINE EDISYLATE 5 MG/ML
5 INJECTION INTRAMUSCULAR; INTRAVENOUS EVERY 6 HOURS PRN
Status: DISCONTINUED | OUTPATIENT
Start: 2025-06-25 | End: 2025-06-28 | Stop reason: HOSPADM

## 2025-06-25 RX ORDER — ALUMINUM HYDROXIDE, MAGNESIUM HYDROXIDE, AND SIMETHICONE 1200; 120; 1200 MG/30ML; MG/30ML; MG/30ML
30 SUSPENSION ORAL 4 TIMES DAILY PRN
Status: DISCONTINUED | OUTPATIENT
Start: 2025-06-25 | End: 2025-06-28 | Stop reason: HOSPADM

## 2025-06-25 RX ORDER — ACETAMINOPHEN 500 MG
1000 TABLET ORAL EVERY 8 HOURS PRN
Status: DISCONTINUED | OUTPATIENT
Start: 2025-06-25 | End: 2025-06-28 | Stop reason: HOSPADM

## 2025-06-25 RX ORDER — ACETAMINOPHEN 325 MG/1
650 TABLET ORAL EVERY 4 HOURS PRN
Status: DISCONTINUED | OUTPATIENT
Start: 2025-06-25 | End: 2025-06-28 | Stop reason: HOSPADM

## 2025-06-25 RX ORDER — TALC
6 POWDER (GRAM) TOPICAL NIGHTLY PRN
Status: DISCONTINUED | OUTPATIENT
Start: 2025-06-25 | End: 2025-06-28 | Stop reason: HOSPADM

## 2025-06-25 RX ORDER — SODIUM CHLORIDE 0.9 % (FLUSH) 0.9 %
5 SYRINGE (ML) INJECTION
Status: DISCONTINUED | OUTPATIENT
Start: 2025-06-25 | End: 2025-06-28 | Stop reason: HOSPADM

## 2025-06-25 RX ADMIN — SODIUM CHLORIDE, POTASSIUM CHLORIDE, SODIUM LACTATE AND CALCIUM CHLORIDE: 600; 310; 30; 20 INJECTION, SOLUTION INTRAVENOUS at 06:06

## 2025-06-25 RX ADMIN — ONDANSETRON 4 MG: 2 INJECTION INTRAMUSCULAR; INTRAVENOUS at 03:06

## 2025-06-25 RX ADMIN — HYDROMORPHONE HYDROCHLORIDE 1 MG: 1 INJECTION, SOLUTION INTRAMUSCULAR; INTRAVENOUS; SUBCUTANEOUS at 03:06

## 2025-06-25 RX ADMIN — PROCHLORPERAZINE EDISYLATE 5 MG: 5 INJECTION INTRAMUSCULAR; INTRAVENOUS at 08:06

## 2025-06-25 RX ADMIN — HYDROMORPHONE HYDROCHLORIDE 1 MG: 1 INJECTION, SOLUTION INTRAMUSCULAR; INTRAVENOUS; SUBCUTANEOUS at 08:06

## 2025-06-25 NOTE — ASSESSMENT & PLAN NOTE
Patient is a 50 year old female with h/o affective disorder, GERD, mixed HLD, migraines, narcolepsy, syncope, hyperthyroidism and gastric sleeve (/ Carmen Oct. 2024) who presented to OSH with abdominal pain, nausea, and urinary symptoms. Work up concerning for SBO    - Patient seen and examined. Labs and imaging reviewed. Case discussed with staff on call, Dr. Dickson  - NPO   - If patient has any emesis would recommend NGT  - Convert meds to IV as able  - Replete lytes PRN   - K>4, Mag>2, Phos>3  - Serial abdominal exams  - I&Os  - Likely gastrograffin challenge in the morning pending clinical course  - Remainder of care per primary team  - Please contact general surgery with any questions, concerns, or clinical status changes

## 2025-06-25 NOTE — SUBJECTIVE & OBJECTIVE
Current Facility-Administered Medications on File Prior to Encounter   Medication    [COMPLETED] dextrose 5 % and 0.45 % NaCl with KCl 20 mEq infusion    [COMPLETED] HYDROmorphone injection 1 mg    [COMPLETED] HYDROmorphone injection 1 mg    [COMPLETED] HYDROmorphone injection 1 mg    [COMPLETED] iohexoL (OMNIPAQUE 350) injection 75 mL    [COMPLETED] ketorolac injection 15 mg    [COMPLETED] morphine injection 4 mg    [COMPLETED] morphine injection 4 mg    [COMPLETED] ondansetron injection 4 mg    [COMPLETED] ondansetron injection 4 mg    [COMPLETED] ondansetron injection 4 mg    [COMPLETED] sodium chloride 0.9% bolus 1,000 mL 1,000 mL     Current Outpatient Medications on File Prior to Encounter   Medication Sig    albuterol (PROVENTIL/VENTOLIN HFA) 90 mcg/actuation inhaler Inhale 1-2 puffs into the lungs every 4 (four) hours as needed for Wheezing or Shortness of Breath. Rescue    calcium-vitamin D3 (OS-RHONDA 500 + D3) 500 mg-5 mcg (200 unit) per tablet Take 1 tablet by mouth once daily.    clonazePAM (KLONOPIN) 0.5 MG tablet     cyanocobalamin (VITAMIN B-12) 1000 MCG tablet Take 100 mcg by mouth once daily.    darifenacin (ENABLEX) 7.5 MG Tb24 Take 1 tablet (7.5 mg total) by mouth once daily.    dextroamphetamine-amphetamine (ADDERALL XR) 20 MG 24 hr capsule Take 1 capsule (20 mg total) by mouth every morning.    dextroamphetamine-amphetamine (ADDERALL) 20 mg tablet Take 1 tablet by mouth 2 (two) times a day.    diclofenac sodium (VOLTAREN) 1 % Gel Apply 2 g topically 4 (four) times daily as needed (pain).    diclofenac sodium (VOLTAREN) 1 % Gel Apply topically to affected area four times daily.    docusate sodium (COLACE) 100 MG capsule Take 1 capsule (100 mg total) by mouth 2 (two) times daily.    DULoxetine (CYMBALTA) 60 MG capsule Take 1 capsule by mouth once daily.    DULoxetine (CYMBALTA) 60 MG capsule TAKE 1 CAPSULE BY MOUTH ONCE A DAY    ergocalciferol (VITAMIN D2) 50,000 unit Cap Take 1 capsule (50,000  Units total) by mouth twice a week.    fluticasone propionate (FLONASE) 50 mcg/actuation nasal spray Use 1 spray in each nostril once daily.    levothyroxine (SYNTHROID) 88 MCG tablet Take 1 tablet (88 mcg total) by mouth daily before breakfast.    meclizine (ANTIVERT) 25 mg tablet Take 1 tablet (25 mg total) by mouth 3 (three) times daily as needed for Dizziness.    methocarbamol (ROBAXIN) 500 MG Tab Take 500 mg by mouth every evening.    methocarbamoL (ROBAXIN) 500 MG Tab Take 1 tablet by mouth every evening as needed    MIRENA 20 mcg/24 hours (6 yrs) 52 mg IUD     ondansetron (ZOFRAN-ODT) 8 MG TbDL Take 1 tablet (8 mg total) by mouth every 6 (six) hours as needed (Nausea).    oxyCODONE-acetaminophen (PERCOCET) 5-325 mg per tablet Take 1 tablet by mouth 2 (two) times daily as needed.    oxyCODONE-acetaminophen (PERCOCET) 5-325 mg per tablet Take 1 tablet by mouth daily as needed for pain    oxyCODONE-acetaminophen (PERCOCET) 5-325 mg per tablet Take 1 tablet by mouth daily as needed for pain    oxyCODONE-acetaminophen (PERCOCET) 5-325 mg per tablet Take 1 tablet by mouth every morning as needed for pain    polyethylene glycol (GLYCOLAX) 17 gram/dose powder Take 17 g by mouth once daily.    rosuvastatin (CRESTOR) 10 MG tablet Take 1 tablet (10 mg total) by mouth once daily.    sodium,calcium,mag,pot oxybate (XYWAV) 0.5 gram/mL Soln Take 4.5 g by mouth As instructed.    solriamfetoL 150 mg Tab Take 1 tablet (150 mg) by mouth once daily.    topiramate (TOPAMAX) 100 MG tablet Take 1 tablet by mouth once daily AND 1 ½ tablets every evening    ubrogepant (UBRELVY) 100 mg tablet Take 1 tablet (100 mg total) by mouth every 2 (two) hours as needed for Migraine. Maximum: 200 mg per 24 hours    valACYclovir (VALTREX) 1000 MG tablet Take 1 tablet by mouth every 12 hours as needed (cold sores).       Review of patient's allergies indicates:   Allergen Reactions    Tamiflu [oseltamivir] Itching and Swelling       Past Medical  History:   Diagnosis Date    Affective disorder     sses Dr. Dial, psychiatry    Back pain     GERD (gastroesophageal reflux disease)     Headache(784.0)     Hyperlipidemia, mixed 2019    Impaired fasting glucose 2021    Lab Results      Component    Value    Date           HGBA1C    4.9    2025     - discussed recommendation for diet and cardiovascular exercise  - counseling on lifestyle modifications for risk factor reduction  - counseling on management options      Influenza A 2022    - supportive care - albuterol inhaler - discussed proceed to the ER if severe shortness of breath or chest pain. - Follow-up in office 2022 scheduled    Migraine headache     Narcolepsy without cataplexy(347.00)     Pre-syncope 2024    - recommend Cardiology evaluation      Syncope 2024    - recommend Cardiology evaluation    Thyroid disease     hyperthyroidism    TMJ arthralgia      Past Surgical History:   Procedure Laterality Date    CAUDAL EPIDURAL STEROID INJECTION N/A 2018    Procedure: INJECTION, STEROID, CAUDAL, EPIDURAL;  Surgeon: Geoffrey Aleman MD;  Location: [facility];  Service: Pain Management;  Laterality: N/A;     SECTION, CLASSIC      COLONOSCOPY, SCREENING, LOW RISK PATIENT N/A 2025    Procedure: COLONOSCOPY, SCREENING, LOW RISK PATIENT;  Surgeon: Franki Rawsl MD;  Location: [facility];  Service: Endoscopy;  Laterality: N/A;  Referred by Dr. Laya Jaffe, Miralax, portal -ml    COSMETIC SURGERY      breast augmentation    ESOPHAGOGASTRODUODENOSCOPY N/A 2024    Procedure: EGD (ESOPHAGOGASTRODUODENOSCOPY);  Surgeon: Chris Morales MD;  Location: [address]);  Service: Endoscopy;  Laterality: N/A;  referred by Lorene Jones np bmi 49, instructions sent to  portal.  -pr call complete-tb    INJECTION OF ANESTHETIC AGENT INTO SACROILIAC JOINT Bilateral 2020    Procedure: BLOCK, SACROILIAC JOINT;  Surgeon: Geoffrey Aleman MD;   Location: [facility] OR;  Service: Pain Management;  Laterality: Bilateral;    ROBOT-ASSISTED LAPAROSCOPIC SLEEVE GASTRECTOMY USING DA PRAVEEN XI N/A 10/23/2024    Procedure: XI ROBOTIC SLEEVE GASTRECTOMY;  Surgeon: Chris Morales MD;  [facility];  Service: General;  Laterality: N/A;    SPINE SURGERY  11/19/2015    fusion L spine     Family History       Problem Relation (Age of Onset)    Cancer Maternal Aunt, Paternal Aunt    Hypertension Mother    Kidney disease Mother    Narcolepsy Father    Sleep apnea Mother          Tobacco Use    Smoking status: Former     Current packs/day: 1.00     Average packs/day: 1 pack/day for 16.4 years (16.4 ttl pk-yrs)     Types: Cigarettes     Start date: 8/11/2014     Passive exposure: Never    Smokeless tobacco: Never   Substance and Sexual Activity    Alcohol use: Yes     Comment: Social, rare    Drug use: No    Sexual activity: Yes     Partners: Male     Review of Systems   Constitutional:  Negative for chills and fever.   Respiratory:  Negative for chest tightness and shortness of breath.    Cardiovascular:  Negative for chest pain.   Gastrointestinal:  Positive for abdominal pain and nausea. Negative for diarrhea and vomiting.   Genitourinary:  Positive for difficulty urinating and dysuria. Negative for urgency.   Skin:  Negative for rash.   Neurological:  Negative for syncope and weakness.     Objective:     Vital Signs (Most Recent):  Temp: 98.1 °F (36.7 °C) (06/25/25 1505)  Pulse: (!) 56 (06/25/25 1505)  Resp: 18 (06/25/25 1557)  BP: 130/66 (06/25/25 1505)  SpO2: 98 % (06/25/25 1505) Vital Signs (24h Range):  Temp:  [97.8 °F (36.6 °C)-98.1 °F (36.7 °C)] 98.1 °F (36.7 °C)  Pulse:  [47-78] 56  Resp:  [13-22] 18  SpO2:  [95 %-100 %] 98 %  BP: (109-158)/(66-94) 130/66     Weight: 71.5 kg (157 lb 10.1 oz)  Body mass index is 29.78 kg/m².     Physical Exam  Constitutional:       General: She is not in acute distress.     Appearance: Normal appearance. She is not  ill-appearing.   HENT:      Head: Normocephalic and atraumatic.      Nose: Nose normal.      Mouth/Throat:      Mouth: Mucous membranes are moist.   Eyes:      Extraocular Movements: Extraocular movements intact.      Conjunctiva/sclera: Conjunctivae normal.   Cardiovascular:      Rate and Rhythm: Normal rate and regular rhythm.   Pulmonary:      Effort: Pulmonary effort is normal. No respiratory distress.   Abdominal:      General: There is no distension.      Palpations: Abdomen is soft.      Tenderness: There is abdominal tenderness.      Comments: TTP is periumbilical and RLQ region mostly, mildly tender in left abdomen   Musculoskeletal:      Cervical back: Normal range of motion.   Skin:     General: Skin is warm and dry.   Neurological:      General: No focal deficit present.      Mental Status: She is alert and oriented to person, place, and time. Mental status is at baseline.            I have reviewed all pertinent lab results within the past 24 hours.    Significant Diagnostics:  I have reviewed all pertinent imaging results/findings within the past 24 hours.

## 2025-06-25 NOTE — ASSESSMENT & PLAN NOTE
51 yo F with PMHx of affective disorder, GERD, HLD, migraines, narcolepsy, hypothyroidism, and gastric sleeve (10/2024) who [place] [facility] [place] severe abdominal pain with associated nausea. CT in the ED was concerning for high grade partial or complete SBO with possible internal hernia.     - General surgery consulted; appreciate recs  - will hold on NG tube for now as she has no emesis. Prn antiemetics  - prn IV dilaudid for pain  - strict NPO  - start cIVFs

## 2025-06-25 NOTE — CONSULTS
"[facility] - Surgery  General Surgery  Consult Note    Patient Name: Amanda Navarrete  MRN: [id number]  Code Status: Full Code  Admission Date: 6/25/2025  Hospital Length of Stay: 0 days  Attending Physician: Hali Palma MD  Primary Care Provider: Laya Jaffe DO    Patient information was obtained from patient, past medical records, and ER records.     Inpatient consult to General Surgery  Consult performed by: Saleem Shelton MD  Consult ordered by: Estefanía Forman PA-C        Subjective:     Principal Problem: SBO (small bowel obstruction)    History of Present Illness: Patient is a 50 year old female with h/o affective disorder, GERD, mixed HLD, migraines, narcolepsy, syncope, hyperthyroidism and gastric sleeve (Dr. Morales Oct. 2024) who presented to OSH with abdominal pain, nausea, pelvis pressure, and urinary symptoms. [place] [facility] [place] and [place] admitted to hospital medicine, general surgery consulted for SBO. Abdominal pain began yesterday, waxes and wanes in severity. Some nausea but no emesis. Passing some flatus, unsure last BM - reports it has been at least two days. Also reports dysuria. She denies fever, chills, emesis, diarrhea, CP, SOB, and all other symptoms.   Achieves >4 METs  Not on AC  Previous abd sx: gastric sleeve    Upon eval, she is afebrile, HDS without tachycardia or hypotension  No leukocytosis or lactic acidosis   CMP largely unremarkable   CT a/p early AM 6/25 with "Abnormal small bowel pattern consistent with small bowel obstructive process, concerning for high-grade partial or complete small bowel obstruction, and with a pattern that raises concern for internal hernia"        Current Facility-Administered Medications on File Prior to Encounter   Medication    [COMPLETED] dextrose 5 % and 0.45 % NaCl with KCl 20 mEq infusion    [COMPLETED] HYDROmorphone injection 1 mg    [COMPLETED] HYDROmorphone injection 1 mg    [COMPLETED] HYDROmorphone injection 1 mg    [COMPLETED] " iohexoL (OMNIPAQUE 350) injection 75 mL    [COMPLETED] ketorolac injection 15 mg    [COMPLETED] morphine injection 4 mg    [COMPLETED] morphine injection 4 mg    [COMPLETED] ondansetron injection 4 mg    [COMPLETED] ondansetron injection 4 mg    [COMPLETED] ondansetron injection 4 mg    [COMPLETED] sodium chloride 0.9% bolus 1,000 mL 1,000 mL     Current Outpatient Medications on File Prior to Encounter   Medication Sig    albuterol (PROVENTIL/VENTOLIN HFA) 90 mcg/actuation inhaler Inhale 1-2 puffs into the lungs every 4 (four) hours as needed for Wheezing or Shortness of Breath. Rescue    calcium-vitamin D3 (OS-RHONDA 500 + D3) 500 mg-5 mcg (200 unit) per tablet Take 1 tablet by mouth once daily.    clonazePAM (KLONOPIN) 0.5 MG tablet     cyanocobalamin (VITAMIN B-12) 1000 MCG tablet Take 100 mcg by mouth once daily.    darifenacin (ENABLEX) 7.5 MG Tb24 Take 1 tablet (7.5 mg total) by mouth once daily.    dextroamphetamine-amphetamine (ADDERALL XR) 20 MG 24 hr capsule Take 1 capsule (20 mg total) by mouth every morning.    dextroamphetamine-amphetamine (ADDERALL) 20 mg tablet Take 1 tablet by mouth 2 (two) times a day.    diclofenac sodium (VOLTAREN) 1 % Gel Apply 2 g topically 4 (four) times daily as needed (pain).    diclofenac sodium (VOLTAREN) 1 % Gel Apply topically to affected area four times daily.    docusate sodium (COLACE) 100 MG capsule Take 1 capsule (100 mg total) by mouth 2 (two) times daily.    DULoxetine (CYMBALTA) 60 MG capsule Take 1 capsule by mouth once daily.    DULoxetine (CYMBALTA) 60 MG capsule TAKE 1 CAPSULE BY MOUTH ONCE A DAY    ergocalciferol (VITAMIN D2) 50,000 unit Cap Take 1 capsule (50,000 Units total) by mouth twice a week.    fluticasone propionate (FLONASE) 50 mcg/actuation nasal spray Use 1 spray in each nostril once daily.    levothyroxine (SYNTHROID) 88 MCG tablet Take 1 tablet (88 mcg total) by mouth daily before breakfast.    meclizine (ANTIVERT) 25 mg tablet Take 1 tablet (25  mg total) by mouth 3 (three) times daily as needed for Dizziness.    methocarbamol (ROBAXIN) 500 MG Tab Take 500 mg by mouth every evening.    methocarbamoL (ROBAXIN) 500 MG Tab Take 1 tablet by mouth every evening as needed    MIRENA 20 mcg/24 hours (6 yrs) 52 mg IUD     ondansetron (ZOFRAN-ODT) 8 MG TbDL Take 1 tablet (8 mg total) by mouth every 6 (six) hours as needed (Nausea).    oxyCODONE-acetaminophen (PERCOCET) 5-325 mg per tablet Take 1 tablet by mouth 2 (two) times daily as needed.    oxyCODONE-acetaminophen (PERCOCET) 5-325 mg per tablet Take 1 tablet by mouth daily as needed for pain    oxyCODONE-acetaminophen (PERCOCET) 5-325 mg per tablet Take 1 tablet by mouth daily as needed for pain    oxyCODONE-acetaminophen (PERCOCET) 5-325 mg per tablet Take 1 tablet by mouth every morning as needed for pain    polyethylene glycol (GLYCOLAX) 17 gram/dose powder Take 17 g by mouth once daily.    rosuvastatin (CRESTOR) 10 MG tablet Take 1 tablet (10 mg total) by mouth once daily.    sodium,calcium,mag,pot oxybate (XYWAV) 0.5 gram/mL Soln Take 4.5 g by mouth As instructed.    solriamfetoL 150 mg Tab Take 1 tablet (150 mg) by mouth once daily.    topiramate (TOPAMAX) 100 MG tablet Take 1 tablet by mouth once daily AND 1 ½ tablets every evening    ubrogepant (UBRELVY) 100 mg tablet Take 1 tablet (100 mg total) by mouth every 2 (two) hours as needed for Migraine. Maximum: 200 mg per 24 hours    valACYclovir (VALTREX) 1000 MG tablet Take 1 tablet by mouth every 12 hours as needed (cold sores).       Review of patient's allergies indicates:   Allergen Reactions    Tamiflu [oseltamivir] Itching and Swelling       Past Medical History:   Diagnosis Date    Affective disorder     sses Dr. Dial, psychiatry    Back pain     GERD (gastroesophageal reflux disease)     Headache(784.0)     Hyperlipidemia, mixed 09/18/2019    Impaired fasting glucose 12/22/2021    Lab Results      Component    Value    Date           HGBA1C    4.9     2025     - discussed recommendation for diet and cardiovascular exercise  - counseling on lifestyle modifications for risk factor reduction  - counseling on management options      Influenza A 2022    - supportive care - albuterol inhaler - discussed proceed to the ER if severe shortness of breath or chest pain. - Follow-up in office 2022 scheduled    Migraine headache     Narcolepsy without cataplexy(347.00)     Pre-syncope 2024    - recommend Cardiology evaluation      Syncope 2024    - recommend Cardiology evaluation    Thyroid disease     hyperthyroidism    TMJ arthralgia      Past Surgical History:   Procedure Laterality Date    CAUDAL EPIDURAL STEROID INJECTION N/A 2018    Procedure: INJECTION, STEROID, CAUDAL, EPIDURAL;  Surgeon: Geoffrey Aleman MD;  [facility];  Service: Pain Management;  Laterality: N/A;     SECTION, CLASSIC      COLONOSCOPY, SCREENING, LOW RISK PATIENT N/A 2025    Procedure: COLONOSCOPY, SCREENING, LOW RISK PATIENT;  Surgeon: Franki Rawls MD;  Location: [facility];  Service: Endoscopy;  Laterality: N/A;  Referred by Dr. Laya Jaffe, Miralax, portal -ml    COSMETIC SURGERY      breast augmentation    ESOPHAGOGASTRODUODENOSCOPY N/A 2024    Procedure: EGD (ESOPHAGOGASTRODUODENOSCOPY);  Surgeon: Chris Morales MD;  Location: [facility] (2ND FLR);  Service: Endoscopy;  Laterality: N/A;  referred by Lorene Jones np bmi 49, instructions sent to  portal.  -pr call complete-tb    INJECTION OF ANESTHETIC AGENT INTO SACROILIAC JOINT Bilateral 2020    Procedure: BLOCK, SACROILIAC JOINT;  Surgeon: Geoffrey Aleman MD;  Location: [facility];  Service: Pain Management;  Laterality: Bilateral;    ROBOT-ASSISTED LAPAROSCOPIC SLEEVE GASTRECTOMY USING DA PRAVEEN XI N/A 10/23/2024    Procedure: XI ROBOTIC SLEEVE GASTRECTOMY;  Surgeon: Chris Morales MD;  [address]  Service: General;  Laterality: N/A;    SPINE  SURGERY  11/19/2015    fusion L spine     Family History       Problem Relation (Age of Onset)    Cancer Maternal Aunt, Paternal Aunt    Hypertension Mother    Kidney disease Mother    Narcolepsy Father    Sleep apnea Mother          Tobacco Use    Smoking status: Former     Current packs/day: 1.00     Average packs/day: 1 pack/day for 16.4 years (16.4 ttl pk-yrs)     Types: Cigarettes     Start date: 8/11/2014     Passive exposure: Never    Smokeless tobacco: Never   Substance and Sexual Activity    Alcohol use: Yes     Comment: Social, rare    Drug use: No    Sexual activity: Yes     Partners: Male     Review of Systems   Constitutional:  Negative for chills and fever.   Respiratory:  Negative for chest tightness and shortness of breath.    Cardiovascular:  Negative for chest pain.   Gastrointestinal:  Positive for abdominal pain and nausea. Negative for diarrhea and vomiting.   Genitourinary:  Positive for difficulty urinating and dysuria. Negative for urgency.   Skin:  Negative for rash.   Neurological:  Negative for syncope and weakness.     Objective:     Vital Signs (Most Recent):  Temp: 98.1 °F (36.7 °C) (06/25/25 1505)  Pulse: (!) 56 (06/25/25 1505)  Resp: 18 (06/25/25 1557)  BP: 130/66 (06/25/25 1505)  SpO2: 98 % (06/25/25 1505) Vital Signs (24h Range):  Temp:  [97.8 °F (36.6 °C)-98.1 °F (36.7 °C)] 98.1 °F (36.7 °C)  Pulse:  [47-78] 56  Resp:  [13-22] 18  SpO2:  [95 %-100 %] 98 %  BP: (109-158)/(66-94) 130/66     Weight: 71.5 kg (157 lb 10.1 oz)  Body mass index is 29.78 kg/m².     Physical Exam  Constitutional:       General: She is not in acute distress.     Appearance: Normal appearance. She is not ill-appearing.   HENT:      Head: Normocephalic and atraumatic.      Nose: Nose normal.      Mouth/Throat:      Mouth: Mucous membranes are moist.   Eyes:      Extraocular Movements: Extraocular movements intact.      Conjunctiva/sclera: Conjunctivae normal.   Cardiovascular:      Rate and Rhythm: Normal rate  and regular rhythm.   Pulmonary:      Effort: Pulmonary effort is normal. No respiratory distress.   Abdominal:      General: There is no distension.      Palpations: Abdomen is soft.      Tenderness: There is abdominal tenderness.      Comments: TTP is periumbilical and RLQ region mostly, mildly tender in left abdomen   Musculoskeletal:      Cervical back: Normal range of motion.   Skin:     General: Skin is warm and dry.   Neurological:      General: No focal deficit present.      Mental Status: She is alert and oriented to person, place, and time. Mental status is at baseline.            I have reviewed all pertinent lab results within the past 24 hours.    Significant Diagnostics:  I have reviewed all pertinent imaging results/findings within the past 24 hours.    Assessment/Plan:     * SBO (small bowel obstruction)  Patient is a 50 year old female with h/o affective disorder, GERD, mixed HLD, migraines, narcolepsy, syncope, hyperthyroidism and gastric sleeve (/ Carmen Oct. 2024) who presented to OSH with abdominal pain, nausea, and urinary symptoms. Work up concerning for SBO    - Patient seen and examined. Labs and imaging reviewed. Case discussed with staff on call, Dr. Dickson  - NPO   - If patient has any emesis would recommend NGT  - Convert meds to IV as able  - Replete lytes PRN   - K>4, Mag>2, Phos>3  - Serial abdominal exams  - Abx for possible enteritis  - I&Os  - Likely gastrograffin challenge in the morning pending clinical course  - Remainder of care per primary team  - Please contact general surgery with any questions, concerns, or clinical status changes        VTE Risk Mitigation (From admission, onward)           Ordered     IP VTE LOW RISK PATIENT  Once         06/25/25 1513     Place sequential compression device  Until discontinued         06/25/25 1513                  Saleem Shelton MD  General Surgery  [facility] - Surgery

## 2025-06-25 NOTE — HPI
"Patient is a 50 year old female with h/o affective disorder, GERD, mixed HLD, migraines, narcolepsy, syncope, hyperthyroidism and gastric sleeve (Dr. Morales Oct. 2024) who presented to OSH with abdominal pain, nausea, pelvis pressure, and urinary symptoms. [place] [facility] [place] to hospital medicine, general surgery consulted for SBO. Abdominal pain began yesterday, waxes and wanes in severity. Some nausea but no emesis. Passing some flatus, unsure last BM - reports it has been at least two days. Also reports dysuria. She denies fever, chills, emesis, diarrhea, CP, SOB, and all other symptoms.   Achieves >4 METs  Not on AC  Previous abd sx: gastric sleeve    Upon eval, she is afebrile, HDS without tachycardia or hypotension  No leukocytosis or lactic acidosis   CMP largely unremarkable   CT a/p early AM 6/25 with "Abnormal small bowel pattern consistent with small bowel obstructive process, concerning for high-grade partial or complete small bowel obstruction, and with a pattern that raises concern for internal hernia"      "

## 2025-06-25 NOTE — PROGRESS NOTES
Patient oriented to new room. AAOx4. Med team aware she is here and placing orders. VSS. Having some pain and nausea. Ambulated to void. Will admit pt and continue to monitor.

## 2025-06-25 NOTE — SUBJECTIVE & OBJECTIVE
Past Medical History:   Diagnosis Date    Affective disorder     sses Dr. Dial, psychiatry    Back pain     GERD (gastroesophageal reflux disease)     Headache(784.0)     Hyperlipidemia, mixed 2019    Impaired fasting glucose 2021    Lab Results      Component    Value    Date           HGBA1C    4.9    2025     - discussed recommendation for diet and cardiovascular exercise  - counseling on lifestyle modifications for risk factor reduction  - counseling on management options      Influenza A 2022    - supportive care - albuterol inhaler - discussed proceed to the ER if severe shortness of breath or chest pain. - Follow-up in office 2022 scheduled    Migraine headache     Narcolepsy without cataplexy(347.00)     Pre-syncope 2024    - recommend Cardiology evaluation      Syncope 2024    - recommend Cardiology evaluation    Thyroid disease     hyperthyroidism    TMJ arthralgia        Past Surgical History:   Procedure Laterality Date    CAUDAL EPIDURAL STEROID INJECTION N/A 2018    Procedure: INJECTION, STEROID, CAUDAL, EPIDURAL;  Surgeon: Geoffrey Aleman MD;  Location: [facility];  Service: Pain Management;  Laterality: N/A;     SECTION, CLASSIC      COLONOSCOPY, SCREENING, LOW RISK PATIENT N/A 2025    Procedure: COLONOSCOPY, SCREENING, LOW RISK PATIENT;  Surgeon: Franki Rawls MD;  Location: [facility];  Service: Endoscopy;  Laterality: N/A;  Referred by Dr. Laya Jaffe, Miralax, portal -ml    COSMETIC SURGERY      breast augmentation    ESOPHAGOGASTRODUODENOSCOPY N/A 2024    Procedure: EGD (ESOPHAGOGASTRODUODENOSCOPY);  Surgeon: Chris Morales MD;  Location: [address]);  Service: Endoscopy;  Laterality: N/A;  referred by Lorene Jones np bmi 49, instructions sent to  portal.  -pr call complete-tb    INJECTION OF ANESTHETIC AGENT INTO SACROILIAC JOINT Bilateral 2020    Procedure: BLOCK, SACROILIAC JOINT;  Surgeon: Geoffrey RAMIRES  MD Love;  Location: [facility] OR;  Service: Pain Management;  Laterality: Bilateral;    ROBOT-ASSISTED LAPAROSCOPIC SLEEVE GASTRECTOMY USING DA PRAVEEN XI N/A 10/23/2024    Procedure: XI ROBOTIC SLEEVE GASTRECTOMY;  Surgeon: Chris Morales MD;  [facility];  Service: General;  Laterality: N/A;    SPINE SURGERY  11/19/2015    fusion L spine       Review of patient's allergies indicates:   Allergen Reactions    Tamiflu [oseltamivir] Itching and Swelling       Current Facility-Administered Medications on File Prior to Encounter   Medication    [COMPLETED] dextrose 5 % and 0.45 % NaCl with KCl 20 mEq infusion    [COMPLETED] HYDROmorphone injection 1 mg    [COMPLETED] HYDROmorphone injection 1 mg    [COMPLETED] HYDROmorphone injection 1 mg    [COMPLETED] iohexoL (OMNIPAQUE 350) injection 75 mL    [COMPLETED] ketorolac injection 15 mg    [COMPLETED] morphine injection 4 mg    [COMPLETED] morphine injection 4 mg    [COMPLETED] ondansetron injection 4 mg    [COMPLETED] ondansetron injection 4 mg    [COMPLETED] ondansetron injection 4 mg    [COMPLETED] sodium chloride 0.9% bolus 1,000 mL 1,000 mL     Current Outpatient Medications on File Prior to Encounter   Medication Sig    albuterol (PROVENTIL/VENTOLIN HFA) 90 mcg/actuation inhaler Inhale 1-2 puffs into the lungs every 4 (four) hours as needed for Wheezing or Shortness of Breath. Rescue    calcium-vitamin D3 (OS-RHONDA 500 + D3) 500 mg-5 mcg (200 unit) per tablet Take 1 tablet by mouth once daily.    clonazePAM (KLONOPIN) 0.5 MG tablet     cyanocobalamin (VITAMIN B-12) 1000 MCG tablet Take 100 mcg by mouth once daily.    darifenacin (ENABLEX) 7.5 MG Tb24 Take 1 tablet (7.5 mg total) by mouth once daily.    dextroamphetamine-amphetamine (ADDERALL XR) 20 MG 24 hr capsule Take 1 capsule (20 mg total) by mouth every morning.    dextroamphetamine-amphetamine (ADDERALL) 20 mg tablet Take 1 tablet by mouth 2 (two) times a day.    diclofenac sodium (VOLTAREN) 1 % Gel  Apply 2 g topically 4 (four) times daily as needed (pain).    diclofenac sodium (VOLTAREN) 1 % Gel Apply topically to affected area four times daily.    docusate sodium (COLACE) 100 MG capsule Take 1 capsule (100 mg total) by mouth 2 (two) times daily.    DULoxetine (CYMBALTA) 60 MG capsule Take 1 capsule by mouth once daily.    DULoxetine (CYMBALTA) 60 MG capsule TAKE 1 CAPSULE BY MOUTH ONCE A DAY    ergocalciferol (VITAMIN D2) 50,000 unit Cap Take 1 capsule (50,000 Units total) by mouth twice a week.    fluticasone propionate (FLONASE) 50 mcg/actuation nasal spray Use 1 spray in each nostril once daily.    levothyroxine (SYNTHROID) 88 MCG tablet Take 1 tablet (88 mcg total) by mouth daily before breakfast.    meclizine (ANTIVERT) 25 mg tablet Take 1 tablet (25 mg total) by mouth 3 (three) times daily as needed for Dizziness.    methocarbamol (ROBAXIN) 500 MG Tab Take 500 mg by mouth every evening.    methocarbamoL (ROBAXIN) 500 MG Tab Take 1 tablet by mouth every evening as needed    MIRENA 20 mcg/24 hours (6 yrs) 52 mg IUD     ondansetron (ZOFRAN-ODT) 8 MG TbDL Take 1 tablet (8 mg total) by mouth every 6 (six) hours as needed (Nausea).    oxyCODONE-acetaminophen (PERCOCET) 5-325 mg per tablet Take 1 tablet by mouth 2 (two) times daily as needed.    oxyCODONE-acetaminophen (PERCOCET) 5-325 mg per tablet Take 1 tablet by mouth daily as needed for pain    oxyCODONE-acetaminophen (PERCOCET) 5-325 mg per tablet Take 1 tablet by mouth daily as needed for pain    oxyCODONE-acetaminophen (PERCOCET) 5-325 mg per tablet Take 1 tablet by mouth every morning as needed for pain    polyethylene glycol (GLYCOLAX) 17 gram/dose powder Take 17 g by mouth once daily.    rosuvastatin (CRESTOR) 10 MG tablet Take 1 tablet (10 mg total) by mouth once daily.    sodium,calcium,mag,pot oxybate (XYWAV) 0.5 gram/mL Soln Take 4.5 g by mouth As instructed.    solriamfetoL 150 mg Tab Take 1 tablet (150 mg) by mouth once daily.    topiramate  (TOPAMAX) 100 MG tablet Take 1 tablet by mouth once daily AND 1 ½ tablets every evening    ubrogepant (UBRELVY) 100 mg tablet Take 1 tablet (100 mg total) by mouth every 2 (two) hours as needed for Migraine. Maximum: 200 mg per 24 hours    valACYclovir (VALTREX) 1000 MG tablet Take 1 tablet by mouth every 12 hours as needed (cold sores).     Family History       Problem Relation (Age of Onset)    Cancer Maternal Aunt, Paternal Aunt    Hypertension Mother    Kidney disease Mother    Narcolepsy Father    Sleep apnea Mother          Tobacco Use    Smoking status: Former     Current packs/day: 1.00     Average packs/day: 1 pack/day for 16.4 years (16.4 ttl pk-yrs)     Types: Cigarettes     Start date: 8/11/2014     Passive exposure: Never    Smokeless tobacco: Never   Substance and Sexual Activity    Alcohol use: Yes     Comment: Social, rare    Drug use: No    Sexual activity: Yes     Partners: Male     Review of Systems   Constitutional:  Negative for activity change, chills and fever.   HENT:  Negative for trouble swallowing.    Eyes:  Negative for photophobia and visual disturbance.   Respiratory:  Negative for cough, chest tightness and shortness of breath.    Cardiovascular:  Negative for chest pain, palpitations and leg swelling.   Gastrointestinal:  Positive for abdominal pain, constipation and nausea. Negative for diarrhea and vomiting.   Genitourinary:  Positive for difficulty urinating, dysuria and pelvic pain. Negative for frequency and hematuria.   Musculoskeletal:  Positive for back pain. Negative for gait problem and neck pain.   Skin:  Negative for rash and wound.   Neurological:  Negative for dizziness, syncope, speech difficulty and light-headedness.   Psychiatric/Behavioral:  Negative for agitation and confusion. The patient is not nervous/anxious.      Objective:     Vital Signs (Most Recent):  Temp: 98.1 °F (36.7 °C) (06/25/25 1505)  Pulse: (!) 56 (06/25/25 1505)  Resp: 18 (06/25/25 1557)  BP:  130/66 (06/25/25 1505)  SpO2: 98 % (06/25/25 1505) Vital Signs (24h Range):  Temp:  [97.8 °F (36.6 °C)-98.1 °F (36.7 °C)] 98.1 °F (36.7 °C)  Pulse:  [47-78] 56  Resp:  [13-22] 18  SpO2:  [95 %-100 %] 98 %  BP: (109-158)/(66-94) 130/66     Weight: 71.5 kg (157 lb 10.1 oz)  Body mass index is 29.78 kg/m².     Physical Exam  Vitals and nursing note reviewed.   Constitutional:       General: She is not in acute distress.     Appearance: She is well-developed.      Comments: In obvious pain    HENT:      Head: Normocephalic and atraumatic.      Mouth/Throat:      Mouth: Mucous membranes are dry.      Pharynx: No oropharyngeal exudate.   Eyes:      Conjunctiva/sclera: Conjunctivae normal.      Pupils: Pupils are equal, round, and reactive to light.   Cardiovascular:      Rate and Rhythm: Regular rhythm. Bradycardia present.      Heart sounds: Normal heart sounds.   Pulmonary:      Effort: Pulmonary effort is normal. No respiratory distress.      Breath sounds: Normal breath sounds. No wheezing.   Abdominal:      General: There is no distension.      Palpations: Abdomen is soft.      Tenderness: There is abdominal tenderness (centralized, bilateral lower quadrants, and suprapubic).      Comments: Bowel sounds noted    Musculoskeletal:         General: No tenderness. Normal range of motion.      Cervical back: Normal range of motion and neck supple.      Right lower leg: No edema.      Left lower leg: No edema.   Lymphadenopathy:      Cervical: No cervical adenopathy.   Skin:     General: Skin is warm and dry.      Capillary Refill: Capillary refill takes less than 2 seconds.      Findings: No rash.   Neurological:      Mental Status: She is alert and oriented to person, place, and time.      Cranial Nerves: No cranial nerve deficit.      Sensory: No sensory deficit.      Coordination: Coordination normal.   Psychiatric:         Behavior: Behavior normal.         Thought Content: Thought content normal.         Judgment:  Judgment normal.              CRANIAL NERVES     CN III, IV, VI   Pupils are equal, round, and reactive to light.       Significant Labs: All pertinent labs within the past 24 hours have been reviewed.  CBC:   Recent Labs   Lab 06/24/25  2338   WBC 10.29   HGB 14.2   HCT 41.6        CMP:   Recent Labs   Lab 06/24/25  2338      K 3.4*      CO2 27   *   BUN 20   CREATININE 0.8   CALCIUM 9.6   PROT 7.2   ALBUMIN 4.0   BILITOT 0.4   ALKPHOS 79   AST 17   ALT 17   ANIONGAP 5*     Urine Studies:   Recent Labs   Lab 06/24/25  2332   COLORU Yellow   APPEARANCEUA Cloudy*   PHUR 7.0   SPECGRAV 1.015   PROTEINUA Negative   GLUCUA Negative   BILIRUBINUA Negative   OCCULTUA Negative   NITRITE Negative   UROBILINOGEN Negative   LEUKOCYTESUR Negative       Significant Imaging: I have reviewed all pertinent imaging results/findings within the past 24 hours.

## 2025-06-25 NOTE — PLAN OF CARE
Problem: Adult Inpatient Plan of Care  Goal: Plan of Care Review  Outcome: Progressing     Problem: Adult Inpatient Plan of Care  Goal: Patient-Specific Goal (Individualized)  Outcome: Progressing     Problem: Intestinal Obstruction  Goal: Optimal Bowel Function  Outcome: Progressing     Problem: Intestinal Obstruction  Goal: Fluid and Electrolyte Balance  Outcome: Progressing     Problem: Intestinal Obstruction  Goal: Optimal Pain Control and Function  Outcome: Progressing

## 2025-06-25 NOTE — ASSESSMENT & PLAN NOTE
- noted on imaging. Patient endorses pain to her groin and suprapubic region as well as some discomfort with urination   - prn pain management  - recommend conservative measures such as pelvic floor exercises  - can follow up OP with gynecology vs. Vascular surgery to discuss further options

## 2025-06-25 NOTE — H&P
[facility] Medicine  History & Physical    Patient Name: Amanda Navarrete  MRN: [id number]  Patient Class: IP- Inpatient  Admission Date: 6/25/2025  Attending Physician: Hali Palma MD   Primary Care Provider: Laya Jaffe DO         Patient information was obtained from patient and ER records.     Subjective:     Principal Problem:SBO (small bowel obstruction)    Chief Complaint: No chief complaint on file.       HPI: Amanda Navarrete is a 49 yo F with PMHx of affective disorder, GERD, HLD, migraines, narcolepsy, hypothyroidism, and gastric sleeve (10/2024) who presented to [place] ED last night for severe abdominal pain that began shortly after eating. Patient reports that she occasionally gets epigastric pains and indigestion after eating ever since having her gastric sleeve done, but it typically only lasts 30 minutes. She thought that's what this pain might be initially, but noticed the pain was further down to her midline center of her abdomen. Pain was associated with severe nausea, but no emesis. She took her home percocet and robaxin (which she typically takes for chronic back pain) as well as Mirafast, but the pain continued for >3 hours prompting her to present to ED. CT in the ED was concerning for high grade partial or complete SBO with possible internal hernia. Imaging also noted prominence of the region of the lower uterine segment/cervix and concern for Pelvic congestion syndrome. The case was discussed with general surgery at [facility] [place] and decision was made for transfer.     Patient seen on arrival to [facility] [place]. She reports severe centralized abdominal pain that feels like it takes her breath away. She describes the pain as stabbing and punching. She also endorses a strange pressure in her groin. States that at the other facility she was having some mild dysuria and difficulty urinating, which is abnormal for her. She believes her last BM was approximately 2 days ago, but she is  not completely sure of this. Denies fever, chills, chest pain, palpitations, cough, and LE swelling.    Past Medical History:   Diagnosis Date    Affective disorder     sses Dr. Dial, psychiatry    Back pain     GERD (gastroesophageal reflux disease)     Headache(784.0)     Hyperlipidemia, mixed 2019    Impaired fasting glucose 2021    Lab Results      Component    Value    Date           HGBA1C    4.9    2025     - discussed recommendation for diet and cardiovascular exercise  - counseling on lifestyle modifications for risk factor reduction  - counseling on management options      Influenza A 2022    - supportive care - albuterol inhaler - discussed proceed to the ER if severe shortness of breath or chest pain. - Follow-up in office 2022 scheduled    Migraine headache     Narcolepsy without cataplexy(347.00)     Pre-syncope 2024    - recommend Cardiology evaluation      Syncope 2024    - recommend Cardiology evaluation    Thyroid disease     hyperthyroidism    TMJ arthralgia        Past Surgical History:   Procedure Laterality Date    CAUDAL EPIDURAL STEROID INJECTION N/A 2018    Procedure: INJECTION, STEROID, CAUDAL, EPIDURAL;  Surgeon: Geoffrey Aleman MD;  Location: [facility] OR;  Service: Pain Management;  Laterality: N/A;     SECTION, CLASSIC      COLONOSCOPY, SCREENING, LOW RISK PATIENT N/A 2025    Procedure: COLONOSCOPY, SCREENING, LOW RISK PATIENT;  Surgeon: Franki Rawls MD;  Location: [facility];  Service: Endoscopy;  Laterality: N/A;  Referred by Dr. Laya Jaffe, [place], [facility]    COSMETIC SURGERY      breast augmentation    ESOPHAGOGASTRODUODENOSCOPY N/A 2024    Procedure: EGD (ESOPHAGOGASTRODUODENOSCOPY);  Surgeon: Chris Morales MD;  [facility]  Service: Endoscopy;  Laterality: N/A;  referred by Lorene Jones np bmi 49, instructions sent to  portal.  -pr call complete-tb    INJECTION OF ANESTHETIC AGENT  INTO SACROILIAC JOINT Bilateral 12/11/2020    Procedure: BLOCK, SACROILIAC JOINT;  Surgeon: Geoffrey Aleman MD;  Location: [facility] OR;  Service: Pain Management;  Laterality: Bilateral;    ROBOT-ASSISTED LAPAROSCOPIC SLEEVE GASTRECTOMY USING DA PRAVEEN XI N/A 10/23/2024    Procedure: XI ROBOTIC SLEEVE GASTRECTOMY;  Surgeon: Chris Morales MD;  Location: [facility] OR 2ND FLR;  Service: General;  Laterality: N/A;    SPINE SURGERY  11/19/2015    fusion L spine       Review of patient's allergies indicates:   Allergen Reactions    Tamiflu [oseltamivir] Itching and Swelling       Current Facility-Administered Medications on File Prior to Encounter   Medication    [COMPLETED] dextrose 5 % and 0.45 % NaCl with KCl 20 mEq infusion    [COMPLETED] HYDROmorphone injection 1 mg    [COMPLETED] HYDROmorphone injection 1 mg    [COMPLETED] HYDROmorphone injection 1 mg    [COMPLETED] iohexoL (OMNIPAQUE 350) injection 75 mL    [COMPLETED] ketorolac injection 15 mg    [COMPLETED] morphine injection 4 mg    [COMPLETED] morphine injection 4 mg    [COMPLETED] ondansetron injection 4 mg    [COMPLETED] ondansetron injection 4 mg    [COMPLETED] ondansetron injection 4 mg    [COMPLETED] sodium chloride 0.9% bolus 1,000 mL 1,000 mL     Current Outpatient Medications on File Prior to Encounter   Medication Sig    albuterol (PROVENTIL/VENTOLIN HFA) 90 mcg/actuation inhaler Inhale 1-2 puffs into the lungs every 4 (four) hours as needed for Wheezing or Shortness of Breath. Rescue    calcium-vitamin D3 (OS-RHONDA 500 + D3) 500 mg-5 mcg (200 unit) per tablet Take 1 tablet by mouth once daily.    clonazePAM (KLONOPIN) 0.5 MG tablet     cyanocobalamin (VITAMIN B-12) 1000 MCG tablet Take 100 mcg by mouth once daily.    darifenacin (ENABLEX) 7.5 MG Tb24 Take 1 tablet (7.5 mg total) by mouth once daily.    dextroamphetamine-amphetamine (ADDERALL XR) 20 MG 24 hr capsule Take 1 capsule (20 mg total) by mouth every morning.    dextroamphetamine-amphetamine  (ADDERALL) 20 mg tablet Take 1 tablet by mouth 2 (two) times a day.    diclofenac sodium (VOLTAREN) 1 % Gel Apply 2 g topically 4 (four) times daily as needed (pain).    diclofenac sodium (VOLTAREN) 1 % Gel Apply topically to affected area four times daily.    docusate sodium (COLACE) 100 MG capsule Take 1 capsule (100 mg total) by mouth 2 (two) times daily.    DULoxetine (CYMBALTA) 60 MG capsule Take 1 capsule by mouth once daily.    DULoxetine (CYMBALTA) 60 MG capsule TAKE 1 CAPSULE BY MOUTH ONCE A DAY    ergocalciferol (VITAMIN D2) 50,000 unit Cap Take 1 capsule (50,000 Units total) by mouth twice a week.    fluticasone propionate (FLONASE) 50 mcg/actuation nasal spray Use 1 spray in each nostril once daily.    levothyroxine (SYNTHROID) 88 MCG tablet Take 1 tablet (88 mcg total) by mouth daily before breakfast.    meclizine (ANTIVERT) 25 mg tablet Take 1 tablet (25 mg total) by mouth 3 (three) times daily as needed for Dizziness.    methocarbamol (ROBAXIN) 500 MG Tab Take 500 mg by mouth every evening.    methocarbamoL (ROBAXIN) 500 MG Tab Take 1 tablet by mouth every evening as needed    MIRENA 20 mcg/24 hours (6 yrs) 52 mg IUD     ondansetron (ZOFRAN-ODT) 8 MG TbDL Take 1 tablet (8 mg total) by mouth every 6 (six) hours as needed (Nausea).    oxyCODONE-acetaminophen (PERCOCET) 5-325 mg per tablet Take 1 tablet by mouth 2 (two) times daily as needed.    oxyCODONE-acetaminophen (PERCOCET) 5-325 mg per tablet Take 1 tablet by mouth daily as needed for pain    oxyCODONE-acetaminophen (PERCOCET) 5-325 mg per tablet Take 1 tablet by mouth daily as needed for pain    oxyCODONE-acetaminophen (PERCOCET) 5-325 mg per tablet Take 1 tablet by mouth every morning as needed for pain    polyethylene glycol (GLYCOLAX) 17 gram/dose powder Take 17 g by mouth once daily.    rosuvastatin (CRESTOR) 10 MG tablet Take 1 tablet (10 mg total) by mouth once daily.    sodium,calcium,mag,pot oxybate (XYWAV) 0.5 gram/mL Soln Take 4.5 g by  mouth As instructed.    solriamfetoL 150 mg Tab Take 1 tablet (150 mg) by mouth once daily.    topiramate (TOPAMAX) 100 MG tablet Take 1 tablet by mouth once daily AND 1 ½ tablets every evening    ubrogepant (UBRELVY) 100 mg tablet Take 1 tablet (100 mg total) by mouth every 2 (two) hours as needed for Migraine. Maximum: 200 mg per 24 hours    valACYclovir (VALTREX) 1000 MG tablet Take 1 tablet by mouth every 12 hours as needed (cold sores).     Family History       Problem Relation (Age of Onset)    Cancer Maternal Aunt, Paternal Aunt    Hypertension Mother    Kidney disease Mother    Narcolepsy Father    Sleep apnea Mother          Tobacco Use    Smoking status: Former     Current packs/day: 1.00     Average packs/day: 1 pack/day for 16.4 years (16.4 ttl pk-yrs)     Types: Cigarettes     Start date: 8/11/2014     Passive exposure: Never    Smokeless tobacco: Never   Substance and Sexual Activity    Alcohol use: Yes     Comment: Social, rare    Drug use: No    Sexual activity: Yes     Partners: Male     Review of Systems   Constitutional:  Negative for activity change, chills and fever.   HENT:  Negative for trouble swallowing.    Eyes:  Negative for photophobia and visual disturbance.   Respiratory:  Negative for cough, chest tightness and shortness of breath.    Cardiovascular:  Negative for chest pain, palpitations and leg swelling.   Gastrointestinal:  Positive for abdominal pain, constipation and nausea. Negative for diarrhea and vomiting.   Genitourinary:  Positive for difficulty urinating, dysuria and pelvic pain. Negative for frequency and hematuria.   Musculoskeletal:  Positive for back pain. Negative for gait problem and neck pain.   Skin:  Negative for rash and wound.   Neurological:  Negative for dizziness, syncope, speech difficulty and light-headedness.   Psychiatric/Behavioral:  Negative for agitation and confusion. The patient is not nervous/anxious.      Objective:     Vital Signs (Most  Recent):  Temp: 98.1 °F (36.7 °C) (06/25/25 1505)  Pulse: (!) 56 (06/25/25 1505)  Resp: 18 (06/25/25 1557)  BP: 130/66 (06/25/25 1505)  SpO2: 98 % (06/25/25 1505) Vital Signs (24h Range):  Temp:  [97.8 °F (36.6 °C)-98.1 °F (36.7 °C)] 98.1 °F (36.7 °C)  Pulse:  [47-78] 56  Resp:  [13-22] 18  SpO2:  [95 %-100 %] 98 %  BP: (109-158)/(66-94) 130/66     Weight: 71.5 kg (157 lb 10.1 oz)  Body mass index is 29.78 kg/m².     Physical Exam  Vitals and nursing note reviewed.   Constitutional:       General: She is not in acute distress.     Appearance: She is well-developed.      Comments: In obvious pain    HENT:      Head: Normocephalic and atraumatic.      Mouth/Throat:      Mouth: Mucous membranes are dry.      Pharynx: No oropharyngeal exudate.   Eyes:      Conjunctiva/sclera: Conjunctivae normal.      Pupils: Pupils are equal, round, and reactive to light.   Cardiovascular:      Rate and Rhythm: Regular rhythm. Bradycardia present.      Heart sounds: Normal heart sounds.   Pulmonary:      Effort: Pulmonary effort is normal. No respiratory distress.      Breath sounds: Normal breath sounds. No wheezing.   Abdominal:      General: There is no distension.      Palpations: Abdomen is soft.      Tenderness: There is abdominal tenderness (centralized, bilateral lower quadrants, and suprapubic).      Comments: Bowel sounds noted    Musculoskeletal:         General: No tenderness. Normal range of motion.      Cervical back: Normal range of motion and neck supple.      Right lower leg: No edema.      Left lower leg: No edema.   Lymphadenopathy:      Cervical: No cervical adenopathy.   Skin:     General: Skin is warm and dry.      Capillary Refill: Capillary refill takes less than 2 seconds.      Findings: No rash.   Neurological:      Mental Status: She is alert and oriented to person, place, and time.      Cranial Nerves: No cranial nerve deficit.      Sensory: No sensory deficit.      Coordination: Coordination normal.    Psychiatric:         Behavior: Behavior normal.         Thought Content: Thought content normal.         Judgment: Judgment normal.              CRANIAL NERVES     CN III, IV, VI   Pupils are equal, round, and reactive to light.       Significant Labs: All pertinent labs within the past 24 hours have been reviewed.  CBC:   Recent Labs   Lab 06/24/25  2338   WBC 10.29   HGB 14.2   HCT 41.6        CMP:   Recent Labs   Lab 06/24/25  2338      K 3.4*      CO2 27   *   BUN 20   CREATININE 0.8   CALCIUM 9.6   PROT 7.2   ALBUMIN 4.0   BILITOT 0.4   ALKPHOS 79   AST 17   ALT 17   ANIONGAP 5*     Urine Studies:   Recent Labs   Lab 06/24/25  2332   COLORU Yellow   APPEARANCEUA Cloudy*   PHUR 7.0   SPECGRAV 1.015   PROTEINUA Negative   GLUCUA Negative   BILIRUBINUA Negative   OCCULTUA Negative   NITRITE Negative   UROBILINOGEN Negative   LEUKOCYTESUR Negative       Significant Imaging: I have reviewed all pertinent imaging results/findings within the past 24 hours.    Assessment/Plan:     Assessment & Plan  SBO (small bowel obstruction)  51 yo F with PMHx of affective disorder, GERD, HLD, migraines, narcolepsy, hypothyroidism, and gastric sleeve (10/2024) who was transferred from [place] [facility] [place] [facility] [place] 1 day of severe abdominal pain with associated nausea. CT in the ED was concerning for high grade partial or complete SBO with possible internal hernia.     - General surgery consulted; appreciate recs  - will hold on NG tube for now as she has no emesis. Prn antiemetics  - prn IV dilaudid for pain  - strict NPO  - start cIVFs  Chronic bilateral low back pain with bilateral sciatica  - hold home percocet and robaxin while npo  - prn IV pain meds   Narcolepsy cataplexy syndrome  - follows with sleep medicine  - continue adderall and solriamfetol   Hypothyroidism due to Hashimoto's thyroiditis  - continue synthroid when not npo   Hyperlipidemia, mixed  - continue statin when not npo   EUNICE  (obstructive sleep apnea)  - CPAP qhs   Female pelvic congestion syndrome  - noted on imaging. Patient endorses pain to her groin and suprapubic region as well as some discomfort with urination   - prn pain management  - recommend conservative measures such as pelvic floor exercises  - can follow up OP with gynecology vs. Vascular surgery to discuss further options   VTE Risk Mitigation (From admission, onward)           Ordered     IP VTE LOW RISK PATIENT  Once         06/25/25 1513     Place sequential compression device  Until discontinued         06/25/25 1513                    SDOH Screening:  The patient was screened for food insecurity, housing instability, transportation needs, utility difficulties, and interpersonal safety. The social determinant(s) of health identified as a concern this admission are:  Housing instability    Will discuss with case management and/or community health workers.    Social Drivers of Health with Concerns     Housing Stability: High Risk (6/25/2025)         Patient oriented to new room. AAOx4. Med team aware she is here and placing orders. VSS. Having some pain and nausea. Ambulated to void. Will admit pt and continue to monitor.    Estefanía Forman PA-C  Department of Hospital Medicine  [facility] - Surgery

## 2025-06-25 NOTE — HPI
Amanda Navarrete is a 51 yo F with PMHx of affective disorder, GERD, HLD, migraines, narcolepsy, hypothyroidism, and gastric sleeve (10/2024) who presented to [place] ED last night for severe abdominal pain that began shortly after eating. Patient reports that she occasionally gets epigastric pains and indigestion after eating ever since having her gastric sleeve done, but it typically only lasts 30 minutes. She thought that's what this pain might be initially, but noticed the pain was further down to her midline center of her abdomen. Pain was associated with severe nausea, but no emesis. She took her home percocet and robaxin (which she typically takes for chronic back pain) as well as Mirafast, but the pain continued for >3 hours prompting her to present to ED. CT in the ED was concerning for high grade partial or complete SBO with possible internal hernia. Imaging also noted prominence of the region of the lower uterine segment/cervix and concern for Pelvic congestion syndrome. The case was discussed with general surgery at [facility] [place] and decision was made for transfer.     Patient seen on arrival to [facility]. She reports severe centralized abdominal pain that feels like it takes her breath away. She describes the pain as stabbing and punching. She also endorses a strange pressure in her groin. States that at the other facility she was having some mild dysuria and difficulty urinating, which is abnormal for her. She believes her last BM was approximately 2 days ago, but she is not completely sure of this. Denies fever, chills, chest pain, palpitations, cough, and LE swelling.

## 2025-06-26 LAB
ANION GAP (OHS): 10 MMOL/L (ref 8–16)
BUN SERPL-MCNC: 11 MG/DL (ref 6–20)
CALCIUM SERPL-MCNC: 8.9 MG/DL (ref 8.7–10.5)
CHLORIDE SERPL-SCNC: 109 MMOL/L (ref 95–110)
CO2 SERPL-SCNC: 23 MMOL/L (ref 23–29)
CREAT SERPL-MCNC: 0.7 MG/DL (ref 0.5–1.4)
ERYTHROCYTE [DISTWIDTH] IN BLOOD BY AUTOMATED COUNT: 13.2 % (ref 11.5–14.5)
GFR SERPLBLD CREATININE-BSD FMLA CKD-EPI: >60 ML/MIN/1.73/M2
GLUCOSE SERPL-MCNC: 94 MG/DL (ref 70–110)
HCT VFR BLD AUTO: 43.6 % (ref 37–48.5)
HGB BLD-MCNC: 14.4 GM/DL (ref 12–16)
MAGNESIUM SERPL-MCNC: 2.2 MG/DL (ref 1.6–2.6)
MCH RBC QN AUTO: 33.8 PG (ref 27–31)
MCHC RBC AUTO-ENTMCNC: 33 G/DL (ref 32–36)
MCV RBC AUTO: 102 FL (ref 82–98)
PLATELET # BLD AUTO: 225 K/UL (ref 150–450)
PMV BLD AUTO: 12.2 FL (ref 9.2–12.9)
POTASSIUM SERPL-SCNC: 4.2 MMOL/L (ref 3.5–5.1)
RBC # BLD AUTO: 4.26 M/UL (ref 4–5.4)
SODIUM SERPL-SCNC: 142 MMOL/L (ref 136–145)
WBC # BLD AUTO: 12.91 K/UL (ref 3.9–12.7)

## 2025-06-26 PROCEDURE — 5A09457 ASSISTANCE WITH RESPIRATORY VENTILATION, 24-96 CONSECUTIVE HOURS, CONTINUOUS POSITIVE AIRWAY PRESSURE: ICD-10-PCS

## 2025-06-26 PROCEDURE — 36415 COLL VENOUS BLD VENIPUNCTURE: CPT

## 2025-06-26 PROCEDURE — 99900035 HC TECH TIME PER 15 MIN (STAT)

## 2025-06-26 PROCEDURE — 63600175 PHARM REV CODE 636 W HCPCS

## 2025-06-26 PROCEDURE — 25500020 PHARM REV CODE 255: Performed by: STUDENT IN AN ORGANIZED HEALTH CARE EDUCATION/TRAINING PROGRAM

## 2025-06-26 PROCEDURE — 27100171 HC OXYGEN HIGH FLOW UP TO 24 HOURS

## 2025-06-26 PROCEDURE — 94761 N-INVAS EAR/PLS OXIMETRY MLT: CPT

## 2025-06-26 PROCEDURE — 25000003 PHARM REV CODE 250: Performed by: NURSE PRACTITIONER

## 2025-06-26 PROCEDURE — 11000001 HC ACUTE MED/SURG PRIVATE ROOM

## 2025-06-26 PROCEDURE — 51798 US URINE CAPACITY MEASURE: CPT

## 2025-06-26 PROCEDURE — 85027 COMPLETE CBC AUTOMATED: CPT

## 2025-06-26 PROCEDURE — 80048 BASIC METABOLIC PNL TOTAL CA: CPT

## 2025-06-26 PROCEDURE — 83735 ASSAY OF MAGNESIUM: CPT

## 2025-06-26 RX ORDER — MUPIROCIN 20 MG/G
OINTMENT TOPICAL 2 TIMES DAILY
Status: DISCONTINUED | OUTPATIENT
Start: 2025-06-26 | End: 2025-06-28 | Stop reason: HOSPADM

## 2025-06-26 RX ADMIN — SODIUM CHLORIDE, POTASSIUM CHLORIDE, SODIUM LACTATE AND CALCIUM CHLORIDE: 600; 310; 30; 20 INJECTION, SOLUTION INTRAVENOUS at 04:06

## 2025-06-26 RX ADMIN — HYDROMORPHONE HYDROCHLORIDE 1 MG: 1 INJECTION, SOLUTION INTRAMUSCULAR; INTRAVENOUS; SUBCUTANEOUS at 04:06

## 2025-06-26 RX ADMIN — MUPIROCIN: 20 OINTMENT TOPICAL at 11:06

## 2025-06-26 RX ADMIN — HYDROMORPHONE HYDROCHLORIDE 1 MG: 1 INJECTION, SOLUTION INTRAMUSCULAR; INTRAVENOUS; SUBCUTANEOUS at 11:06

## 2025-06-26 RX ADMIN — ONDANSETRON 4 MG: 2 INJECTION INTRAMUSCULAR; INTRAVENOUS at 04:06

## 2025-06-26 RX ADMIN — DIATRIZOATE MEGLUMINE AND DIATRIZOATE SODIUM 100 ML: 660; 100 LIQUID ORAL; RECTAL at 09:06

## 2025-06-26 RX ADMIN — PROCHLORPERAZINE EDISYLATE 5 MG: 5 INJECTION INTRAMUSCULAR; INTRAVENOUS at 12:06

## 2025-06-26 RX ADMIN — ONDANSETRON 4 MG: 2 INJECTION INTRAMUSCULAR; INTRAVENOUS at 11:06

## 2025-06-26 NOTE — ASSESSMENT & PLAN NOTE
49 yo F with PMHx of affective disorder, GERD, HLD, migraines, narcolepsy, hypothyroidism, and gastric sleeve (10/2024) who was transferred from [place] ED to [facility] [place] for 1 day of severe abdominal pain with associated nausea. CT in the ED was concerning for high grade partial or complete SBO with possible internal hernia.     General surgery consulted; appreciate recs  Will place NGT should she develop vomiting  Prn antiemetics  Pain control  Strict NPO  Cont IVFs  Gastrografin study today per GS

## 2025-06-26 NOTE — SUBJECTIVE & OBJECTIVE
Interval History: Reports intermittent nausea. No vomiting. Is not passing gas or having bowel movements. Abdominal pain has improved slightly.    Medications:  Continuous Infusions:  Scheduled Meds:  PRN Meds:  Current Facility-Administered Medications:     acetaminophen, 1,000 mg, Oral, Q8H PRN    acetaminophen, 650 mg, Oral, Q4H PRN    albuterol-ipratropium, 3 mL, Nebulization, Q4H PRN    aluminum-magnesium hydroxide-simethicone, 30 mL, Oral, QID PRN    HYDROmorphone, 1 mg, Intravenous, Q4H PRN    melatonin, 6 mg, Oral, Nightly PRN    naloxone, 0.02 mg, Intravenous, PRN    ondansetron, 4 mg, Intravenous, Q8H PRN    prochlorperazine, 5 mg, Intravenous, Q6H PRN    simethicone, 1 tablet, Oral, QID PRN    sodium chloride 0.9%, 5 mL, Intravenous, PRN     Review of patient's allergies indicates:   Allergen Reactions    Tamiflu [oseltamivir] Itching and Swelling     Objective:     Vital Signs (Most Recent):  Temp: 98 °F (36.7 °C) (06/26/25 0747)  Pulse: (!) 51 (06/26/25 0747)  Resp: 20 (06/26/25 0747)  BP: 135/75 (06/26/25 0747)  SpO2: 100 % (06/26/25 0747) Vital Signs (24h Range):  Temp:  [98 °F (36.7 °C)-98.8 °F (37.1 °C)] 98 °F (36.7 °C)  Pulse:  [47-71] 51  Resp:  [14-22] 20  SpO2:  [95 %-100 %] 100 %  BP: (108-158)/(66-92) 135/75     Weight: 71.5 kg (157 lb 10.1 oz)  Body mass index is 29.78 kg/m².    Intake/Output - Last 3 Shifts         06/24 0700 06/25 0659 06/25 0700 06/26 0659 06/26 0700 06/27 0659    P.O.  0     Total Intake(mL/kg)  0 (0)     Urine (mL/kg/hr)  1300     Stool  0     Total Output  1300     Net  -1300            Unmeasured Stool Occurrence  0 x              Physical Exam  Constitutional:       General: She is not in acute distress.     Appearance: Normal appearance. She is not ill-appearing.   HENT:      Head: Normocephalic and atraumatic.      Nose: Nose normal.      Mouth/Throat:      Mouth: Mucous membranes are moist.   Eyes:      Extraocular Movements: Extraocular movements intact.       Conjunctiva/sclera: Conjunctivae normal.   Cardiovascular:      Rate and Rhythm: Normal rate and regular rhythm.   Pulmonary:      Effort: Pulmonary effort is normal. No respiratory distress.   Abdominal:      General: There is no distension.      Palpations: Abdomen is soft.      Tenderness: There is abdominal tenderness.      Comments: TTP is periumbilical and RLQ region mostly, mildly tender in left abdomen  No rebound or guarding   Musculoskeletal:      Cervical back: Normal range of motion.   Skin:     General: Skin is warm and dry.   Neurological:      General: No focal deficit present.      Mental Status: She is alert and oriented to person, place, and time. Mental status is at baseline.          Significant Labs:  I have reviewed all pertinent lab results within the past 24 hours.    Significant Diagnostics:  I have reviewed all pertinent imaging results/findings within the past 24 hours.

## 2025-06-26 NOTE — HOSPITAL COURSE
Presented to [place] ED with severe, centralized abdominal pain that began shortly after eating.  Pain was stabbing in nature, associated with nausea (without emesis), and persisted despite home medications including Percocet, Robaxin, and Mirafast.  CT abdomen/pelvis revealed concern for high-grade partial or complete small bowel obstruction (SBO), possibly due to internal hernia.  Due to concerning imaging, patient was transferred to [facility] [place] [place] for higher level of care.  On arrival, she continued to report severe midline abdominal pain and groin pressure, along with mild dysuria and difficulty urinating.  General Surgery consulted and opted for conservative management.  Gastrografin study looked fine.  Now having bowel movements.  Diet advanced, tolerated well.  Pt deemed appropriate for discharge; seen and examined prior to departure.  Plan discussed with pt, who was agreeable and amenable; medications were discussed and reviewed, outpatient follow-up scheduled, ER precautions were given, all questions were answered to the pt's satisfaction, and was subsequently discharged.

## 2025-06-26 NOTE — PLAN OF CARE
Problem: Adult Inpatient Plan of Care  Goal: Plan of Care Review  Outcome: Progressing  Goal: Patient-Specific Goal (Individualized)  Outcome: Progressing  Goal: Absence of Hospital-Acquired Illness or Injury  Outcome: Progressing  Goal: Optimal Comfort and Wellbeing  Outcome: Progressing  Goal: Readiness for Transition of Care  Outcome: Progressing     Problem: Infection  Goal: Absence of Infection Signs and Symptoms  Outcome: Progressing     Problem: Intestinal Obstruction  Goal: Optimal Bowel Function  Outcome: Progressing  Goal: Fluid and Electrolyte Balance  Outcome: Progressing  Goal: Absence of Infection Signs and Symptoms  Outcome: Progressing  Goal: Optimize Nutrition Status  Outcome: Progressing  Goal: Optimal Pain Control and Function  Outcome: Progressing

## 2025-06-26 NOTE — SUBJECTIVE & OBJECTIVE
Interval History:  Seen and evaluated on general medical floor  No acute events overnight    Clinical status stable, unchanged  No new complaints, cont to have abd pain  No BM yet  No vomiting    Objective:     Vital Signs (Most Recent):  Temp: 98.1 °F (36.7 °C) (06/26/25 1158)  Pulse: (!) 51 (06/26/25 1158)  Resp: 18 (06/26/25 1158)  BP: (!) 147/65 (06/26/25 1158)  SpO2: 96 % (06/26/25 1158) Vital Signs (24h Range):  Temp:  [98 °F (36.7 °C)-98.8 °F (37.1 °C)] 98.1 °F (36.7 °C)  Pulse:  [50-65] 51  Resp:  [14-20] 18  SpO2:  [96 %-100 %] 96 %  BP: (108-147)/(65-80) 147/65     Weight: 71.5 kg (157 lb 10.1 oz)  Body mass index is 29.78 kg/m².    Intake/Output Summary (Last 24 hours) at 6/26/2025 1354  Last data filed at 6/26/2025 1228  Gross per 24 hour   Intake 0 ml   Output 1500 ml   Net -1500 ml         Physical Exam  Constitutional:       General: She is not in acute distress.     Appearance: Normal appearance. She is not ill-appearing.   HENT:      Head: Normocephalic and atraumatic.      Nose: Nose normal.      Mouth/Throat:      Mouth: Mucous membranes are moist.   Eyes:      Extraocular Movements: Extraocular movements intact.      Conjunctiva/sclera: Conjunctivae normal.   Cardiovascular:      Rate and Rhythm: Normal rate and regular rhythm.   Pulmonary:      Effort: Pulmonary effort is normal. No respiratory distress.   Abdominal:      General: There is no distension.      Palpations: Abdomen is soft.      Tenderness: There is abdominal tenderness. There is no guarding or rebound.   Musculoskeletal:      Cervical back: Normal range of motion.   Skin:     General: Skin is warm and dry.   Neurological:      General: No focal deficit present.      Mental Status: She is alert and oriented to person, place, and time. Mental status is at baseline.               Significant Labs: All pertinent labs within the past 24 hours have been reviewed.    Significant Imaging: I have reviewed all pertinent imaging  results/findings within the past 24 hours.

## 2025-06-26 NOTE — PROGRESS NOTES
[facility]  Progress Note    Patient Name: Amanda Navarrete  MRN: [id number]  Patient Class: IP- Inpatient   Admission Date: 6/25/2025  Length of Stay: 1 days  Attending Physician: Zeus Ingram DO  Primary Care Provider: Laya Jaffe DO        Subjective     Principal Problem:SBO (small bowel obstruction)        HPI:  Amanda Navarrete is a 49 yo F with PMHx of affective disorder, GERD, HLD, migraines, narcolepsy, hypothyroidism, and gastric sleeve (10/2024) who presented to [place] ED last night for severe abdominal pain that began shortly after eating. Patient reports that she occasionally gets epigastric pains and indigestion after eating ever since having her gastric sleeve done, but it typically only lasts 30 minutes. She thought that's what this pain might be initially, but noticed the pain was further down to her midline center of her abdomen. Pain was associated with severe nausea, but no emesis. She took her home percocet and robaxin (which she typically takes for chronic back pain) as well as Mirafast, but the pain continued for >3 hours prompting her to present to ED. CT in the ED was concerning for high grade partial or complete SBO with possible internal hernia. Imaging also noted prominence of the region of the lower uterine segment/cervix and concern for Pelvic congestion syndrome. [place] case [place] [facility] [place] for [place].     Patient seen on [place] [facility] She reports severe centralized abdominal pain that feels like it takes her breath away. She describes the pain as stabbing and punching. She also endorses a strange pressure in her groin. States that at the other facility she was having some mild dysuria and difficulty urinating, which is abnormal for her. She believes her last BM was approximately 2 days ago, but she is not completely sure of this. Denies fever, chills, chest pain, palpitations, cough, and LE  swelling.    Overview/Hospital Course:  Presented to [place] ED with severe, centralized abdominal pain that began shortly after eating.  Pain was stabbing in nature, associated with nausea (without emesis), and persisted despite home medications including Percocet, Robaxin, and Mirafast.  CT abdomen/pelvis revealed concern for high-grade partial or complete small bowel obstruction (SBO), possibly due to internal hernia.  Due to concerning imaging, patient was [place] [facility] [place] of [place].  On arrival, she continued to report severe midline abdominal pain and groin pressure, along with mild dysuria and difficulty urinating.  General Surgery consulted and opted for conservative management.  Gastrografin study planned for further evaluation.    Interval History:  Seen and evaluated on general medical floor  No acute events overnight    Clinical status stable, unchanged  No new complaints, cont to have abd pain  No BM yet  No vomiting    Objective:     Vital Signs (Most Recent):  Temp: 98.1 °F (36.7 °C) (06/26/25 1158)  Pulse: (!) 51 (06/26/25 1158)  Resp: 18 (06/26/25 1158)  BP: (!) 147/65 (06/26/25 1158)  SpO2: 96 % (06/26/25 1158) Vital Signs (24h Range):  Temp:  [98 °F (36.7 °C)-98.8 °F (37.1 °C)] 98.1 °F (36.7 °C)  Pulse:  [50-65] 51  Resp:  [14-20] 18  SpO2:  [96 %-100 %] 96 %  BP: (108-147)/(65-80) 147/65     Weight: 71.5 kg (157 lb 10.1 oz)  Body mass index is 29.78 kg/m².    Intake/Output Summary (Last 24 hours) at 6/26/2025 1354  Last data filed at 6/26/2025 1228  Gross per 24 hour   Intake 0 ml   Output 1500 ml   Net -1500 ml         Physical Exam  Constitutional:       General: She is not in acute distress.     Appearance: Normal appearance. She is not ill-appearing.   HENT:      Head: Normocephalic and atraumatic.      Nose: Nose normal.      Mouth/Throat:      Mouth: Mucous membranes are moist.   Eyes:      Extraocular Movements: Extraocular movements intact.      Conjunctiva/sclera:  Conjunctivae normal.   Cardiovascular:      Rate and Rhythm: Normal rate and regular rhythm.   Pulmonary:      Effort: Pulmonary effort is normal. No respiratory distress.   Abdominal:      General: There is no distension.      Palpations: Abdomen is soft.      Tenderness: There is abdominal tenderness. There is no guarding or rebound.   Musculoskeletal:      Cervical back: Normal range of motion.   Skin:     General: Skin is warm and dry.   Neurological:      General: No focal deficit present.      Mental Status: She is alert and oriented to person, place, and time. Mental status is at baseline.               Significant Labs: All pertinent labs within the past 24 hours have been reviewed.    Significant Imaging: I have reviewed all pertinent imaging results/findings within the past 24 hours.      Assessment & Plan  SBO (small bowel obstruction)  49 yo F with PMHx of affective disorder, GERD, HLD, migraines, narcolepsy, hypothyroidism, and gastric sleeve (10/2024) who was transferred from [place] ED to [facility] for 1 day of severe abdominal pain with associated nausea. CT in the ED was concerning for high grade partial or complete SBO with possible internal hernia.     General surgery consulted; appreciate recs  Will place NGT should she develop vomiting  Prn antiemetics  Pain control  Strict NPO  Cont IVFs  Gastrografin study today per GS    Chronic bilateral low back pain with bilateral sciatica  Hold home percocet and robaxin while npo  PRN IV pain meds    Narcolepsy cataplexy syndrome  Follows with sleep medicine  Continue adderall and solriamfetol    Hypothyroidism due to Hashimoto's thyroiditis  Continue synthroid when not npo    Hyperlipidemia, mixed  Continue statin when not npo    EUNICE (obstructive sleep apnea)  CPAP qhs    Female pelvic congestion syndrome  Noted on imaging  Patient endorses pain to her groin and suprapubic region as well as some discomfort with urination  PRN pain management  Recommend  conservative measures such as pelvic floor exercises  Can follow up OP with gynecology vs. Vascular surgery to discuss further options    VTE Risk Mitigation (From admission, onward)           Ordered     IP VTE LOW RISK PATIENT  Once         06/25/25 1513     Place sequential compression device  Until discontinued         06/25/25 1513                    Discharge Planning   JEOVANY: 6/29/2025     Code Status: Full Code   Medical Readiness for Discharge Date:   Discharge Plan A: Home                        Zeus Ingram DO  Department of Hospital Medicine   [facility] - Surgery

## 2025-06-26 NOTE — PLAN OF CARE
[facility]  Initial Discharge Assessment       Primary Care Provider: Laya Jaffe DO    Admission Diagnosis: Small bowel obstruction [K56.609]  SBO (small bowel obstruction) [K56.609]    Admission Date: 6/25/2025  Expected Discharge Date: 6/29/2025    Transition of Care Barriers: None    Payor: BLUE CROSS BLUE SHIELD / Plan: BCBS FEDERAL BASIC / Product Type: PPO /     Extended Emergency Contact Information  Primary Emergency Contact: brielle pederson  Address: [address] [place] [address]           [place] [address] [facility]  Home Phone: [phone]  Mobile Phone: [phone]  Relation: Sister  Preferred language: English   needed? No  Secondary Emergency Contact: Vidya Navarrete  Address: [address] [place] [address] [place] [address] [facility]  Home Phone: [phone]  Work Phone: [phone]  Mobile Phone: [phone]  Relation: Daughter    Discharge Plan A: Home with family  Discharge Plan B: Home Health, Home with family      [facility] - [address] [address]  Phone: [phone] Fax: [phone]    [facility] [address] [address]  Phone: [phone] Fax: [phone]    [facility] [place] [facility]  [address] [facility] [address]  [place] [address]  Phone: [phone] Fax: [phone]    [id number] [place] - [address]  [address]  [place] [address]  Phone: [phone] Fax: [phone]    [id number] - [place] - [address]  [address]  [place] [address]  Phone: [phone] Fax: [phone]    [facility] [place] - [place] - [address] [facility] [address]  [address] [facility] [address]  [place] [address]  Phone: [phone] Fax: [phone]      Initial Assessment (most recent)       Adult Discharge Assessment - 06/26/25 1500          Discharge Assessment    Assessment Type Discharge Planning Assessment      Confirmed/corrected address, phone number and insurance Yes     Confirmed Demographics Correct on Facesheet     Source of Information patient     Communicated JEOVANY with patient/caregiver Yes     People in Home child(linda), adult     Do you expect to return to your current living situation? Yes     Do you have help at home or someone to help you manage your care at home? Yes     Who are your caregiver(s) and their phone number(s)? daughter Mary Kate     Prior to hospitilization cognitive status: Alert/Oriented     Home Layout Able to live on 1st floor     Equipment Currently Used at Home cane, straight;CPAP     Do you currently have service(s) that help you manage your care at home? No     Do you take prescription medications? Yes     Do you have prescription coverage? Yes     Do you have any problems affording any of your prescribed medications? No     Is the patient taking medications as prescribed? yes     How do you get to doctors appointments? car, drives self     Are you on dialysis? No     Do you take coumadin? No     Discharge Plan A Home with family     Discharge Plan B Home Health;Home with family     DME Needed Upon Discharge  none     Discharge Plan discussed with: Patient     Transition of Care Barriers None                   Patient lives with 27y/o daughter in a single story home with one entry step. Patient does not feel she will need any post acute services upon hospital discharge. Family will provide patient transportation home.

## 2025-06-26 NOTE — PROGRESS NOTES
06/25/25 1954   Vital Signs   Temp 98.2 °F (36.8 °C)   Temp Source Oral   Pulse (!) 52   Heart Rate Source Monitor   Resp 18   SpO2 99 %   Pulse Oximetry Type Intermittent   Device (Oxygen Therapy) room air   BP (!) 145/78   MAP (mmHg) 103   BP Location Left arm   BP Method Automatic   Patient Position Lying     Pt AAOX4, lying in bed with c/o abd pain and nausea.  Even and regular breathing. NAD. Pt medicated for pain and nausea. Safety measures in place. Will continue to monitor.

## 2025-06-26 NOTE — ASSESSMENT & PLAN NOTE
Patient is a 50 year old female with h/o affective disorder, GERD, mixed HLD, migraines, narcolepsy, syncope, hyperthyroidism and gastric sleeve (Stephen Morales Oct. 2024) who presented to OSH with abdominal pain, nausea, and urinary symptoms. Work up concerning for SBO    - NPO  - Will plan for gastrograffin challenge this morning (see GGC instructions below)  - Convert meds to IV as able  - Replete lytes PRN   - K>4, Mag>2, Phos>3  - Serial abdominal exams  - I&Os  - Remainder of care per primary team  - Please contact general surgery with any questions, concerns, or clinical status changes    Gastrografin Challenge     Instructions:  - Obtain 100 mL Gastrografin (ordered)  - Mix Gastrografin with 50 mL sterile water and administer via NGT or have patient drink  - Clamp NGT after administration if NGT present  - Notify MD when contrast administered - General Surgery MD will order abdominal plain films with timing depending on time of administration  - Will obtain KUB at 4 and 8 hours after administration

## 2025-06-26 NOTE — PLAN OF CARE
Problem: Adult Inpatient Plan of Care  Goal: Plan of Care Review  Outcome: Ongoing  Goal: Patient-Specific Goal (Individualized)  Outcome: Ongoing  Goal: Absence of Hospital-Acquired Illness or Injury  Outcome: Ongoing  Goal: Optimal Comfort and Wellbeing  Outcome: Ongoing  Goal: Readiness for Transition of Care  Outcome: Ongoing     Problem: Wound  Goal: Optimal Coping  Outcome: Ongoing  Goal: Optimal Functional Ability  Outcome: Ongoing  Goal: Absence of Infection Signs and Symptoms  Outcome: Ongoing  Goal: Improved Oral Intake  Outcome: Ongoing  Goal: Optimal Pain Control and Function  Outcome: Ongoing  Goal: Skin Health and Integrity  Outcome: Ongoing  Goal: Optimal Wound Healing  Outcome: Ongoing     Problem: Infection  Goal: Absence of Infection Signs and Symptoms  Outcome: Ongoing     Problem: Intestinal Obstruction  Goal: Optimal Bowel Function  Outcome: Ongoing  Goal: Fluid and Electrolyte Balance  Outcome: Ongoing  Goal: Absence of Infection Signs and Symptoms  Outcome: Ongoing  Goal: Optimize Nutrition Status  Outcome: Ongoing  Goal: Optimal Pain Control and Function  Outcome: Ongoing

## 2025-06-26 NOTE — PROGRESS NOTES
"[facility]  General Surgery  Progress Note    Subjective:     History of Present Illness:  Patient is a 50 year old female with h/o affective disorder, GERD, mixed HLD, migraines, narcolepsy, syncope, hyperthyroidism and gastric sleeve (Dr. Morales Oct. 2024) who presented to OSH with abdominal pain, nausea, pelvis pressure, and urinary symptoms. [place] [facility] [place] to hospital medicine, general surgery consulted for SBO. Abdominal pain began yesterday, waxes and wanes in severity. Some nausea but no emesis. Passing some flatus, unsure last BM - reports it has been at least two days. Also reports dysuria. She denies fever, chills, emesis, diarrhea, CP, SOB, and all other symptoms.   Achieves >4 METs  Not on AC  Previous abd sx: gastric sleeve    Upon eval, she is afebrile, HDS without tachycardia or hypotension  No leukocytosis or lactic acidosis   CMP largely unremarkable   CT a/p early AM 6/25 with "Abnormal small bowel pattern consistent with small bowel obstructive process, concerning for high-grade partial or complete small bowel obstruction, and with a pattern that raises concern for internal hernia"        Post-Op Info:  * No surgery found *         Interval History: Reports intermittent nausea. No vomiting. Is not passing gas or having bowel movements. Abdominal pain has improved slightly.    Medications:  Continuous Infusions:  Scheduled Meds:  PRN Meds:  Current Facility-Administered Medications:     acetaminophen, 1,000 mg, Oral, Q8H PRN    acetaminophen, 650 mg, Oral, Q4H PRN    albuterol-ipratropium, 3 mL, Nebulization, Q4H PRN    aluminum-magnesium hydroxide-simethicone, 30 mL, Oral, QID PRN    HYDROmorphone, 1 mg, Intravenous, Q4H PRN    melatonin, 6 mg, Oral, Nightly PRN    naloxone, 0.02 mg, Intravenous, PRN    ondansetron, 4 mg, Intravenous, Q8H PRN    prochlorperazine, 5 mg, Intravenous, Q6H PRN    simethicone, 1 tablet, Oral, QID PRN    sodium chloride 0.9%, 5 mL, " Intravenous, PRN     Review of patient's allergies indicates:   Allergen Reactions    Tamiflu [oseltamivir] Itching and Swelling     Objective:     Vital Signs (Most Recent):  Temp: 98 °F (36.7 °C) (06/26/25 0747)  Pulse: (!) 51 (06/26/25 0747)  Resp: 20 (06/26/25 0747)  BP: 135/75 (06/26/25 0747)  SpO2: 100 % (06/26/25 0747) Vital Signs (24h Range):  Temp:  [98 °F (36.7 °C)-98.8 °F (37.1 °C)] 98 °F (36.7 °C)  Pulse:  [47-71] 51  Resp:  [14-22] 20  SpO2:  [95 %-100 %] 100 %  BP: (108-158)/(66-92) 135/75     Weight: 71.5 kg (157 lb 10.1 oz)  Body mass index is 29.78 kg/m².    Intake/Output - Last 3 Shifts         06/24 0700 06/25 0659 06/25 0700 06/26 0659 06/26 0700 06/27 0659    P.O.  0     Total Intake(mL/kg)  0 (0)     Urine (mL/kg/hr)  1300     Stool  0     Total Output  1300     Net  -1300            Unmeasured Stool Occurrence  0 x              Physical Exam  Constitutional:       General: She is not in acute distress.     Appearance: Normal appearance. She is not ill-appearing.   HENT:      Head: Normocephalic and atraumatic.      Nose: Nose normal.      Mouth/Throat:      Mouth: Mucous membranes are moist.   Eyes:      Extraocular Movements: Extraocular movements intact.      Conjunctiva/sclera: Conjunctivae normal.   Cardiovascular:      Rate and Rhythm: Normal rate and regular rhythm.   Pulmonary:      Effort: Pulmonary effort is normal. No respiratory distress.   Abdominal:      General: There is no distension.      Palpations: Abdomen is soft.      Tenderness: There is abdominal tenderness.      Comments: TTP is periumbilical and RLQ region mostly, mildly tender in left abdomen  No rebound or guarding   Musculoskeletal:      Cervical back: Normal range of motion.   Skin:     General: Skin is warm and dry.   Neurological:      General: No focal deficit present.      Mental Status: She is alert and oriented to person, place, and time. Mental status is at baseline.          Significant Labs:  I have  reviewed all pertinent lab results within the past 24 hours.    Significant Diagnostics:  I have reviewed all pertinent imaging results/findings within the past 24 hours.  Assessment/Plan:     * SBO (small bowel obstruction)  Patient is a 50 year old female with h/o affective disorder, GERD, mixed HLD, migraines, narcolepsy, syncope, hyperthyroidism and gastric sleeve (/ Carmen Oct. 2024) who presented to OSH with abdominal pain, nausea, and urinary symptoms. Work up concerning for SBO    - NPO  - Will plan for gastrograffin challenge this morning (see GGC instructions below)  - Convert meds to IV as able  - Replete lytes PRN   - K>4, Mag>2, Phos>3  - Serial abdominal exams  - I&Os  - Remainder of care per primary team  - Please contact general surgery with any questions, concerns, or clinical status changes    Gastrografin Challenge     Instructions:  - Obtain 100 mL Gastrografin (ordered)  - Mix Gastrografin with 50 mL sterile water and administer via NGT or have patient drink  - Clamp NGT after administration if NGT present  - Notify MD when contrast administered - General Surgery MD will order abdominal plain films with timing depending on time of administration  - Will obtain KUB at 4 and 8 hours after administration           Saleem Shelton MD  General Surgery  [facility]

## 2025-06-26 NOTE — ASSESSMENT & PLAN NOTE
Noted on imaging  Patient endorses pain to her groin and suprapubic region as well as some discomfort with urination  PRN pain management  Recommend conservative measures such as pelvic floor exercises  Can follow up OP with gynecology vs. Vascular surgery to discuss further options

## 2025-06-26 NOTE — CARE UPDATE
Unit [facility] Care Support Interaction      I have reviewed the chart of Amanda Navarrete who is hospitalized for SBO (small bowel obstruction). The patient is currently located in the following unit: [facility]        I have assisted the primary physician in management of the following:      MRSA Decolonization - Mupirocin ordered and CHG ordered          ABBY Calzada-BC  Unit Based [facility]

## 2025-06-27 LAB
ANION GAP (OHS): 11 MMOL/L (ref 8–16)
BUN SERPL-MCNC: 12 MG/DL (ref 6–20)
CALCIUM SERPL-MCNC: 8.7 MG/DL (ref 8.7–10.5)
CHLORIDE SERPL-SCNC: 107 MMOL/L (ref 95–110)
CO2 SERPL-SCNC: 22 MMOL/L (ref 23–29)
CREAT SERPL-MCNC: 0.6 MG/DL (ref 0.5–1.4)
ERYTHROCYTE [DISTWIDTH] IN BLOOD BY AUTOMATED COUNT: 13.1 % (ref 11.5–14.5)
GFR SERPLBLD CREATININE-BSD FMLA CKD-EPI: >60 ML/MIN/1.73/M2
GLUCOSE SERPL-MCNC: 103 MG/DL (ref 70–110)
HCT VFR BLD AUTO: 42.7 % (ref 37–48.5)
HGB BLD-MCNC: 14.4 GM/DL (ref 12–16)
MAGNESIUM SERPL-MCNC: 2 MG/DL (ref 1.6–2.6)
MCH RBC QN AUTO: 33.8 PG (ref 27–31)
MCHC RBC AUTO-ENTMCNC: 33.7 G/DL (ref 32–36)
MCV RBC AUTO: 100 FL (ref 82–98)
PLATELET # BLD AUTO: 238 K/UL (ref 150–450)
PMV BLD AUTO: 12.5 FL (ref 9.2–12.9)
POTASSIUM SERPL-SCNC: 3.5 MMOL/L (ref 3.5–5.1)
RBC # BLD AUTO: 4.26 M/UL (ref 4–5.4)
SODIUM SERPL-SCNC: 140 MMOL/L (ref 136–145)
WBC # BLD AUTO: 13.35 K/UL (ref 3.9–12.7)

## 2025-06-27 PROCEDURE — 94761 N-INVAS EAR/PLS OXIMETRY MLT: CPT

## 2025-06-27 PROCEDURE — 25000003 PHARM REV CODE 250

## 2025-06-27 PROCEDURE — 27100171 HC OXYGEN HIGH FLOW UP TO 24 HOURS

## 2025-06-27 PROCEDURE — 36415 COLL VENOUS BLD VENIPUNCTURE: CPT

## 2025-06-27 PROCEDURE — 85027 COMPLETE CBC AUTOMATED: CPT

## 2025-06-27 PROCEDURE — 99900035 HC TECH TIME PER 15 MIN (STAT)

## 2025-06-27 PROCEDURE — 94660 CPAP INITIATION&MGMT: CPT

## 2025-06-27 PROCEDURE — 11000001 HC ACUTE MED/SURG PRIVATE ROOM

## 2025-06-27 PROCEDURE — 83735 ASSAY OF MAGNESIUM: CPT

## 2025-06-27 PROCEDURE — 80048 BASIC METABOLIC PNL TOTAL CA: CPT

## 2025-06-27 PROCEDURE — 25000003 PHARM REV CODE 250: Performed by: NURSE PRACTITIONER

## 2025-06-27 RX ORDER — DULOXETIN HYDROCHLORIDE 60 MG/1
60 CAPSULE, DELAYED RELEASE ORAL DAILY
Status: DISCONTINUED | OUTPATIENT
Start: 2025-06-27 | End: 2025-06-28 | Stop reason: HOSPADM

## 2025-06-27 RX ORDER — LEVOTHYROXINE SODIUM 88 UG/1
88 TABLET ORAL
Status: DISCONTINUED | OUTPATIENT
Start: 2025-06-28 | End: 2025-06-28 | Stop reason: HOSPADM

## 2025-06-27 RX ADMIN — DULOXETINE 60 MG: 60 CAPSULE, DELAYED RELEASE ORAL at 09:06

## 2025-06-27 RX ADMIN — MUPIROCIN: 20 OINTMENT TOPICAL at 08:06

## 2025-06-27 NOTE — ASSESSMENT & PLAN NOTE
Patient is a 50 year old female with h/o affective disorder, GERD, mixed HLD, migraines, narcolepsy, syncope, hyperthyroidism and gastric sleeve (/ Carmen Oct. 2024) who presented to OSH with abdominal pain, nausea, and urinary symptoms. Work up concerning for SBO    - Regular diet - would avoid carbonated beverages  - Resume home meds as appropriate  - Replete lytes PRN   - K>4, Mag>2, Phos>3  - I&Os  - Remainder of care per primary team  - Please contact general surgery with any further questions, concerns, or clinical status changes

## 2025-06-27 NOTE — SUBJECTIVE & OBJECTIVE
Interval History:  Seen and evaluated on general medical floor  No acute events overnight    Clinical status stable, unchanged  No new complaints  Has had multiple bowel movements  Pain is better but not completely resolved  She was able to eat a small amount    Objective:     Vital Signs (Most Recent):  Temp: 98.6 °F (37 °C) (06/27/25 1106)  Pulse: 62 (06/27/25 1106)  Resp: 17 (06/27/25 1106)  BP: (!) 115/58 (06/27/25 1106)  SpO2: 100 % (06/27/25 1106) Vital Signs (24h Range):  Temp:  [97.9 °F (36.6 °C)-98.6 °F (37 °C)] 98.6 °F (37 °C)  Pulse:  [52-62] 62  Resp:  [17-20] 17  SpO2:  [95 %-100 %] 100 %  BP: (113-146)/(58-90) 115/58     Weight: 71.5 kg (157 lb 10.1 oz)  Body mass index is 29.78 kg/m².    Intake/Output Summary (Last 24 hours) at 6/27/2025 1326  Last data filed at 6/26/2025 1655  Gross per 24 hour   Intake --   Output 5 ml   Net -5 ml         Physical Exam  Constitutional:       General: She is not in acute distress.     Appearance: Normal appearance. She is not ill-appearing.   HENT:      Head: Normocephalic and atraumatic.      Nose: Nose normal.      Mouth/Throat:      Mouth: Mucous membranes are moist.   Eyes:      Extraocular Movements: Extraocular movements intact.      Conjunctiva/sclera: Conjunctivae normal.   Cardiovascular:      Rate and Rhythm: Normal rate and regular rhythm.   Pulmonary:      Effort: Pulmonary effort is normal. No respiratory distress.   Abdominal:      General: There is no distension.      Palpations: Abdomen is soft.      Tenderness: There is abdominal tenderness. There is no guarding or rebound.   Musculoskeletal:      Cervical back: Normal range of motion.   Skin:     General: Skin is warm and dry.   Neurological:      General: No focal deficit present.      Mental Status: She is alert and oriented to person, place, and time. Mental status is at baseline.               Significant Labs: All pertinent labs within the past 24 hours have been reviewed.    Significant  Imaging: I have reviewed all pertinent imaging results/findings within the past 24 hours.

## 2025-06-27 NOTE — PLAN OF CARE
51 y/o female AAOX4 lying supine in bed. NAD noted on room air. PIV clean, dry, intact. No complaints or concerns voiced at this time. Safety measures in place. Verbalized understanding.     Problem: Adult Inpatient Plan of Care  Goal: Plan of Care Review  Outcome: Progressing     Problem: Adult Inpatient Plan of Care  Goal: Patient-Specific Goal (Individualized)  Outcome: Progressing     Problem: Adult Inpatient Plan of Care  Goal: Optimal Comfort and Wellbeing  Outcome: Progressing     Problem: Wound  Goal: Optimal Pain Control and Function  Outcome: Progressing     Problem: Intestinal Obstruction  Goal: Optimal Pain Control and Function  Outcome: Progressing

## 2025-06-27 NOTE — ASSESSMENT & PLAN NOTE
49 yo F with PMHx of affective disorder, GERD, HLD, migraines, narcolepsy, hypothyroidism, and gastric sleeve (10/2024) who was transferred from [place] ED to [facility] for 1 day of severe abdominal pain with associated nausea. CT in the ED was concerning for high grade partial or complete SBO with possible internal hernia.     General surgery consulted; appreciate recs  Prn antiemetics  Pain control  Gastrografin study fine  Diet advanced  Hopeful for dc within 24 hours if she tolerates diet

## 2025-06-27 NOTE — PROGRESS NOTES
[facility] Medicine  Progress Note    Patient Name: Amanda Navarrete  MRN: [id number]  Patient Class: IP- Inpatient   Admission Date: 6/25/2025  Length of Stay: 2 days  Attending Physician: Zeus Ingram DO  Primary Care Provider: Laya Jaffe DO        Subjective     Principal Problem:SBO (small bowel obstruction)        HPI:  Amanda Navarrete is a 51 yo F with PMHx of affective disorder, GERD, HLD, migraines, narcolepsy, hypothyroidism, and gastric sleeve (10/2024) who presented to [place] ED last night for severe abdominal pain that began shortly after eating. Patient reports that she occasionally gets epigastric pains and indigestion after eating ever since having her gastric sleeve done, but it typically only lasts 30 minutes. She thought that's what this pain might be initially, but noticed the pain was further down to her midline center of her abdomen. Pain was associated with severe nausea, but no emesis. She took her home percocet and robaxin (which she typically takes for chronic back pain) as well as Mirafast, but the pain continued for >3 hours prompting her to present to ED. CT in the ED was concerning for high grade partial or complete SBO with possible internal hernia. Imaging also noted prominence of the region of the lower uterine segment/cervix and concern for Pelvic congestion syndrome. The case was discussed with general surgery at [facility] and decision was made for transfer.     Patient seen on arrival to [facility]. She reports severe centralized abdominal pain that feels like it takes her breath away. She describes the pain as stabbing and punching. She also endorses a strange pressure in her groin. States that at the other facility she was having some mild dysuria and difficulty urinating, which is abnormal for her. She believes her last BM was approximately 2 days ago, but she is not completely sure of this. Denies fever, chills, chest pain, palpitations, cough, and LE  swelling.    Overview/Hospital Course:  Presented to [place] ED with severe, centralized abdominal pain that began shortly after eating.  Pain was stabbing in nature, associated with nausea (without emesis), and persisted despite home medications including Percocet, Robaxin, and Mirafast.  CT abdomen/pelvis revealed concern for high-grade partial or complete small bowel obstruction (SBO), possibly due to internal hernia.  Due to concerning imaging, patient was transferred to [facility] for higher level of care.  On arrival, she continued to report severe midline abdominal pain and groin pressure, along with mild dysuria and difficulty urinating.  General Surgery consulted and opted for conservative management.  Gastrografin study looked fine.  Now having bowel movements.  Diet advanced.      Interval History:  Seen and evaluated on general medical floor  No acute events overnight    Clinical status stable, unchanged  No new complaints  Has had multiple bowel movements  Pain is better but not completely resolved  She was able to eat a small amount    Objective:     Vital Signs (Most Recent):  Temp: 98.6 °F (37 °C) (06/27/25 1106)  Pulse: 62 (06/27/25 1106)  Resp: 17 (06/27/25 1106)  BP: (!) 115/58 (06/27/25 1106)  SpO2: 100 % (06/27/25 1106) Vital Signs (24h Range):  Temp:  [97.9 °F (36.6 °C)-98.6 °F (37 °C)] 98.6 °F (37 °C)  Pulse:  [52-62] 62  Resp:  [17-20] 17  SpO2:  [95 %-100 %] 100 %  BP: (113-146)/(58-90) 115/58     Weight: 71.5 kg (157 lb 10.1 oz)  Body mass index is 29.78 kg/m².    Intake/Output Summary (Last 24 hours) at 6/27/2025 1326  Last data filed at 6/26/2025 1655  Gross per 24 hour   Intake --   Output 5 ml   Net -5 ml         Physical Exam  Constitutional:       General: She is not in acute distress.     Appearance: Normal appearance. She is not ill-appearing.   HENT:      Head: Normocephalic and atraumatic.      Nose: Nose normal.      Mouth/Throat:      Mouth: Mucous membranes are moist.   Eyes:       Extraocular Movements: Extraocular movements intact.      Conjunctiva/sclera: Conjunctivae normal.   Cardiovascular:      Rate and Rhythm: Normal rate and regular rhythm.   Pulmonary:      Effort: Pulmonary effort is normal. No respiratory distress.   Abdominal:      General: There is no distension.      Palpations: Abdomen is soft.      Tenderness: There is abdominal tenderness. There is no guarding or rebound.   Musculoskeletal:      Cervical back: Normal range of motion.   Skin:     General: Skin is warm and dry.   Neurological:      General: No focal deficit present.      Mental Status: She is alert and oriented to person, place, and time. Mental status is at baseline.               Significant Labs: All pertinent labs within the past 24 hours have been reviewed.    Significant Imaging: I have reviewed all pertinent imaging results/findings within the past 24 hours.      Assessment & Plan  SBO (small bowel obstruction)  49 yo F with PMHx of affective disorder, GERD, HLD, migraines, narcolepsy, hypothyroidism, and gastric sleeve (10/2024) who was transferred from [place] ED [place] [facility] [place] 1 day of severe abdominal pain with associated nausea. CT in the ED was concerning for high grade partial or complete SBO with possible internal hernia.     General surgery consulted; appreciate recs  Prn antiemetics  Pain control  Gastrografin study fine  Diet advanced  Hopeful for dc within 24 hours if she tolerates diet    Chronic bilateral low back pain with bilateral sciatica  Hold oral opioids in setting of SBO  PRN IV pain meds    Narcolepsy cataplexy syndrome  Follows with sleep medicine  Continue adderall and solriamfetol on dc    Hypothyroidism due to Hashimoto's thyroiditis  Continue synthroid    Hyperlipidemia, mixed  Continue statin when not npo    EUNICE (obstructive sleep apnea)  CPAP qhs    Female pelvic congestion syndrome  Noted on imaging  Patient endorses pain to her groin and suprapubic region as well as some  discomfort with urination  PRN pain management  Recommend conservative measures such as pelvic floor exercises  Can follow up OP with gynecology vs. Vascular surgery to discuss further options    VTE Risk Mitigation (From admission, onward)           Ordered     IP VTE LOW RISK PATIENT  Once         06/25/25 1513     Place sequential compression device  Until discontinued         06/25/25 1513                    Discharge Planning   JEOVANY: 6/29/2025     Code Status: Full Code   Medical Readiness for Discharge Date:   Discharge Plan A: Home with family                        Zeus Ingram DO  Department of Hospital Medicine   [facility] - Surgery

## 2025-06-27 NOTE — SUBJECTIVE & OBJECTIVE
Interval History: Patient passed gastrograffin challenge yesterday. Having bowel movements and continues to pass gas. Started on CLD yesterday and tolerated it well with no emesis. Abdominal pain significantly improved.    Objective:     Vital Signs (Most Recent):  Temp: 98.3 °F (36.8 °C) (06/27/25 0720)  Pulse: 60 (06/27/25 0720)  Resp: 18 (06/27/25 0720)  BP: 126/70 (06/27/25 0720)  SpO2: 99 % (06/27/25 0720) Vital Signs (24h Range):  Temp:  [97.9 °F (36.6 °C)-98.3 °F (36.8 °C)] 98.3 °F (36.8 °C)  Pulse:  [51-60] 60  Resp:  [17-20] 18  SpO2:  [95 %-100 %] 99 %  BP: (113-147)/(58-90) 126/70     Weight: 71.5 kg (157 lb 10.1 oz)  Body mass index is 29.78 kg/m².    Intake/Output Summary (Last 24 hours) at 6/27/2025 0817  Last data filed at 6/26/2025 1655  Gross per 24 hour   Intake --   Output 205 ml   Net -205 ml         Physical Exam  Constitutional:       General: She is not in acute distress.     Appearance: Normal appearance. She is not ill-appearing.   HENT:      Head: Normocephalic and atraumatic.      Nose: Nose normal.      Mouth/Throat:      Mouth: Mucous membranes are moist.   Eyes:      Extraocular Movements: Extraocular movements intact.      Conjunctiva/sclera: Conjunctivae normal.   Cardiovascular:      Rate and Rhythm: Normal rate and regular rhythm.   Pulmonary:      Effort: Pulmonary effort is normal. No respiratory distress.   Abdominal:      General: There is no distension.      Palpations: Abdomen is soft.      Tenderness: There is no abdominal tenderness. There is no guarding or rebound.   Musculoskeletal:      Cervical back: Normal range of motion.   Skin:     General: Skin is warm and dry.   Neurological:      General: No focal deficit present.      Mental Status: She is alert and oriented to person, place, and time. Mental status is at baseline.               Significant Labs: All pertinent labs within the past 24 hours have been reviewed.    Significant Imaging: I have reviewed all pertinent  imaging results/findings within the past 24 hours.

## 2025-06-27 NOTE — PROGRESS NOTES
"[facility]  General Surgery  Progress Note    Subjective:     History of Present Illness:  Patient is a 50 year old female with h/o affective disorder, GERD, mixed HLD, migraines, narcolepsy, syncope, hyperthyroidism and gastric sleeve (Dr. Morales Oct. 2024) who presented to OSH with abdominal pain, nausea, pelvis pressure, and urinary symptoms. Transferred to [facility] and currently admitted to hospital medicine, general surgery consulted for SBO. Abdominal pain began yesterday, waxes and wanes in severity. Some nausea but no emesis. Passing some flatus, unsure last BM - reports it has been at least two days. Also reports dysuria. She denies fever, chills, emesis, diarrhea, CP, SOB, and all other symptoms.   Achieves >4 METs  Not on AC  Previous abd sx: gastric sleeve    Upon eval, she is afebrile, HDS without tachycardia or hypotension  No leukocytosis or lactic acidosis   CMP largely unremarkable   CT a/p early AM 6/25 with "Abnormal small bowel pattern consistent with small bowel obstructive process, concerning for high-grade partial or complete small bowel obstruction, and with a pattern that raises concern for internal hernia"        Post-Op Info:  * No surgery found *         Interval History: Patient passed gastrograffin challenge yesterday. Having bowel movements and continues to pass gas. Started on CLD yesterday and tolerated it well with no emesis. Abdominal pain significantly improved.    Objective:     Vital Signs (Most Recent):  Temp: 98.3 °F (36.8 °C) (06/27/25 0720)  Pulse: 60 (06/27/25 0720)  Resp: 18 (06/27/25 0720)  BP: 126/70 (06/27/25 0720)  SpO2: 99 % (06/27/25 0720) Vital Signs (24h Range):  Temp:  [97.9 °F (36.6 °C)-98.3 °F (36.8 °C)] 98.3 °F (36.8 °C)  Pulse:  [51-60] 60  Resp:  [17-20] 18  SpO2:  [95 %-100 %] 99 %  BP: (113-147)/(58-90) 126/70     Weight: 71.5 kg (157 lb 10.1 oz)  Body mass index is 29.78 kg/m².    Intake/Output Summary (Last 24 hours) at 6/27/2025 0817  Last data filed " at 6/26/2025 1655  Gross per 24 hour   Intake --   Output 205 ml   Net -205 ml         Physical Exam  Constitutional:       General: She is not in acute distress.     Appearance: Normal appearance. She is not ill-appearing.   HENT:      Head: Normocephalic and atraumatic.      Nose: Nose normal.      Mouth/Throat:      Mouth: Mucous membranes are moist.   Eyes:      Extraocular Movements: Extraocular movements intact.      Conjunctiva/sclera: Conjunctivae normal.   Cardiovascular:      Rate and Rhythm: Normal rate and regular rhythm.   Pulmonary:      Effort: Pulmonary effort is normal. No respiratory distress.   Abdominal:      General: There is no distension.      Palpations: Abdomen is soft.      Tenderness: There is no abdominal tenderness. There is no guarding or rebound.   Musculoskeletal:      Cervical back: Normal range of motion.   Skin:     General: Skin is warm and dry.   Neurological:      General: No focal deficit present.      Mental Status: She is alert and oriented to person, place, and time. Mental status is at baseline.               Significant Labs: All pertinent labs within the past 24 hours have been reviewed.    Significant Imaging: I have reviewed all pertinent imaging results/findings within the past 24 hours.  Assessment/Plan:     * SBO (small bowel obstruction)  Patient is a 50 year old female with h/o affective disorder, GERD, mixed HLD, migraines, narcolepsy, syncope, hyperthyroidism and gastric sleeve (/ Carmen Oct. 2024) who presented to OSH with abdominal pain, nausea, and urinary symptoms. Work up concerning for SBO    - Regular diet - would avoid carbonated beverages  - Resume home meds as appropriate  - Replete lytes PRN   - K>4, Mag>2, Phos>3  - I&Os  - Remainder of care per primary team  - Please contact general surgery with any further questions, concerns, or clinical status changes          Saleem Shelton MD  General Surgery  [facility]

## 2025-06-28 VITALS
BODY MASS INDEX: 29.76 KG/M2 | OXYGEN SATURATION: 95 % | WEIGHT: 157.63 LBS | DIASTOLIC BLOOD PRESSURE: 70 MMHG | HEIGHT: 61 IN | RESPIRATION RATE: 20 BRPM | TEMPERATURE: 98 F | HEART RATE: 68 BPM | SYSTOLIC BLOOD PRESSURE: 123 MMHG

## 2025-06-28 LAB
ANION GAP (OHS): 11 MMOL/L (ref 8–16)
BUN SERPL-MCNC: 11 MG/DL (ref 6–20)
CALCIUM SERPL-MCNC: 8.4 MG/DL (ref 8.7–10.5)
CHLORIDE SERPL-SCNC: 107 MMOL/L (ref 95–110)
CO2 SERPL-SCNC: 21 MMOL/L (ref 23–29)
CREAT SERPL-MCNC: 0.6 MG/DL (ref 0.5–1.4)
ERYTHROCYTE [DISTWIDTH] IN BLOOD BY AUTOMATED COUNT: 12.9 % (ref 11.5–14.5)
GFR SERPLBLD CREATININE-BSD FMLA CKD-EPI: >60 ML/MIN/1.73/M2
GLUCOSE SERPL-MCNC: 88 MG/DL (ref 70–110)
HCT VFR BLD AUTO: 40.2 % (ref 37–48.5)
HGB BLD-MCNC: 13.1 GM/DL (ref 12–16)
MAGNESIUM SERPL-MCNC: 1.8 MG/DL (ref 1.6–2.6)
MCH RBC QN AUTO: 33.3 PG (ref 27–31)
MCHC RBC AUTO-ENTMCNC: 32.6 G/DL (ref 32–36)
MCV RBC AUTO: 102 FL (ref 82–98)
PLATELET # BLD AUTO: 200 K/UL (ref 150–450)
PMV BLD AUTO: 11.7 FL (ref 9.2–12.9)
POTASSIUM SERPL-SCNC: 3.8 MMOL/L (ref 3.5–5.1)
RBC # BLD AUTO: 3.93 M/UL (ref 4–5.4)
SODIUM SERPL-SCNC: 139 MMOL/L (ref 136–145)
WBC # BLD AUTO: 10.95 K/UL (ref 3.9–12.7)

## 2025-06-28 PROCEDURE — 25000003 PHARM REV CODE 250

## 2025-06-28 PROCEDURE — 85027 COMPLETE CBC AUTOMATED: CPT

## 2025-06-28 PROCEDURE — 99900035 HC TECH TIME PER 15 MIN (STAT)

## 2025-06-28 PROCEDURE — 63600175 PHARM REV CODE 636 W HCPCS

## 2025-06-28 PROCEDURE — 27100171 HC OXYGEN HIGH FLOW UP TO 24 HOURS

## 2025-06-28 PROCEDURE — 80048 BASIC METABOLIC PNL TOTAL CA: CPT

## 2025-06-28 PROCEDURE — 94761 N-INVAS EAR/PLS OXIMETRY MLT: CPT

## 2025-06-28 PROCEDURE — 25000003 PHARM REV CODE 250: Performed by: NURSE PRACTITIONER

## 2025-06-28 PROCEDURE — 94660 CPAP INITIATION&MGMT: CPT

## 2025-06-28 PROCEDURE — 83735 ASSAY OF MAGNESIUM: CPT

## 2025-06-28 PROCEDURE — 36415 COLL VENOUS BLD VENIPUNCTURE: CPT

## 2025-06-28 RX ORDER — ONDANSETRON 8 MG/1
8 TABLET, ORALLY DISINTEGRATING ORAL EVERY 6 HOURS PRN
Qty: 30 TABLET | Refills: 0 | Status: SHIPPED | OUTPATIENT
Start: 2025-06-28

## 2025-06-28 RX ADMIN — ACETAMINOPHEN 1000 MG: 500 TABLET ORAL at 08:06

## 2025-06-28 RX ADMIN — ONDANSETRON 4 MG: 2 INJECTION INTRAMUSCULAR; INTRAVENOUS at 08:06

## 2025-06-28 RX ADMIN — MUPIROCIN: 20 OINTMENT TOPICAL at 08:06

## 2025-06-28 RX ADMIN — LEVOTHYROXINE SODIUM 88 MCG: 88 TABLET ORAL at 06:06

## 2025-06-28 RX ADMIN — DULOXETINE 60 MG: 60 CAPSULE, DELAYED RELEASE ORAL at 08:06

## 2025-06-28 NOTE — ASSESSMENT & PLAN NOTE
49 yo F with PMHx of affective disorder, GERD, HLD, migraines, narcolepsy, hypothyroidism, and gastric sleeve (10/2024) who was transferred from [place] ED to [facility] for 1 day of severe abdominal pain with associated nausea. CT in the ED was concerning for high grade partial or complete SBO with possible internal hernia.     General surgery consulted; appreciate recs  Prn antiemetics  Pain control  Gastrografin study fine  Diet advanced  Hopeful for dc within 24 hours if she tolerates diet

## 2025-06-28 NOTE — NURSING
Pt has been discharged. Discharge instructions have been given and pt verbalized understanding. IV has been taken out. Pt's medicine has been delivered via bedside pharmacy. Transported out via wheelchair.

## 2025-06-28 NOTE — PLAN OF CARE
CHW met with patient/family at bedside. Patient experience rounding completed and reviewed the following.    Do you know your discharge plan? Yes or No  If yes, what is the plan? (Home, Home Health, Rehab, SNF, LTAC, or Other) Yes Home    Have you dicussed your needs and preferences with your SW/CM? Yes or No Yes Home    If you are discharging home do you have help at home? Yes or No Yes (daughter)    Do you think you will need additional help at home at discharge? Yes or No  No    Do you currently have difficulty keeping up with bills, affording medicine or buying food? Yes or No No    Assigned SW/Cm notified of any patient/family needs or concerns. Appropriate resources provided to address patient needs. Jerica Vincent, SANA, RSW, BSW  Case Management  [phone]

## 2025-06-28 NOTE — DISCHARGE INSTRUCTIONS
Continue bowel regimen at home in hopes of avoiding future obstructions  Follow up with your PCP or OBGYN regarding pevic congestion syndrome seen on imaging here

## 2025-06-28 NOTE — PLAN OF CARE
[facility]  Discharge Final Note    Primary Care Provider: Laya Jaffe DO    Expected Discharge Date: 6/28/2025    Pt d/c home with no needs. Pt family/friend to provide transportation home.     Future Appointments   Date Time Provider Department Center   10/24/2025 10:00 AM Lorene Jones, NP [facility] [address]   3/5/2026 10:30 AM Laya Jaffe DO [facility] [place]       Final Discharge Note (most recent)       Final Note - 06/28/25 1308          Final Note    Assessment Type Final Discharge Note (P)      Anticipated Discharge Disposition Home or Self Care (P)         Post-Acute Status    Post-Acute Authorization Other (P)      Other Status No Post-Acute Service Needs (P)      Discharge Delays None known at this time (P)                      Important Message from Medicare      EMIL Pastor, CSW.   Case Management  [facility]  Email: [email or web address]

## 2025-06-28 NOTE — DISCHARGE SUMMARY
[facility] - [facility] Medicine  Discharge Summary      Patient Name: Amanda Navarrete  MRN: [id number]  ANDREA: [id number]  Patient Class: IP- Inpatient  Admission Date: 6/25/2025  Hospital Length of Stay: 3 days  Discharge Date and Time: 06/28/2025 10:08 AM  Attending Physician: Zeus Ingram DO   Discharging Provider: Zeus Ingram DO  Primary Care Provider: Laya Jaffe DO  Hospital Medicine Team: [facility] HOSP MED R Zeus Ingram DO  Primary Care Team: [facility] HOSP MED R    HPI:   Amanda Navarrete is a 49 yo F with PMHx of affective disorder, GERD, HLD, migraines, narcolepsy, hypothyroidism, and gastric sleeve (10/2024) who presented to [place] ED last night for severe abdominal pain that began shortly after eating. Patient reports that she occasionally gets epigastric pains and indigestion after eating ever since having her gastric sleeve done, but it typically only lasts 30 minutes. She thought that's what this pain might be initially, but noticed the pain was further down to her midline center of her abdomen. Pain was associated with severe nausea, but no emesis. She took her home percocet and robaxin (which she typically takes for chronic back pain) as well as Mirafast, but the pain continued for >3 hours prompting her to present to ED. CT in the ED was concerning for high grade partial or complete SBO with possible internal hernia. Imaging also noted prominence of the region of the lower uterine segment/cervix and concern for Pelvic congestion syndrome. The case was discussed with general surgery at [facility] and decision was made for transfer.     Patient seen on arrival to [facility]. She reports severe centralized abdominal pain that feels like it takes her breath away. She describes the pain as stabbing and punching. She also endorses a strange pressure in her groin. States that at the other facility she was having some mild dysuria and difficulty urinating, which is abnormal for her. She believes her last BM  was approximately 2 days ago, but she is not completely sure of this. Denies fever, chills, chest pain, palpitations, cough, and LE swelling.    * No surgery found *      Hospital Course:   Presented to [facility] ED with severe, centralized abdominal pain that began shortly after eating.  Pain was stabbing in nature, associated with nausea (without emesis), and persisted despite home medications including Percocet, Robaxin, and Mirafast.  CT abdomen/pelvis revealed concern for high-grade partial or complete small bowel obstruction (SBO), possibly due to internal hernia.  Due to concerning imaging, patient was transferred to [facility] for higher level of care.  On arrival, she continued to report severe midline abdominal pain and groin pressure, along with mild dysuria and difficulty urinating.  General Surgery consulted and opted for conservative management.  Gastrografin study looked fine.  Now having bowel movements.  Diet advanced, tolerated well.  Pt deemed appropriate for discharge; seen and examined prior to departure.  Plan discussed with pt, who was agreeable and amenable; medications were discussed and reviewed, outpatient follow-up scheduled, ER precautions were given, all questions were answered to the pt's satisfaction, and was subsequently discharged.     Goals of Care Treatment Preferences:  Code Status: Full Code         Consults:   Consults (From admission, onward)          Status Ordering Provider     Inpatient consult to General Surgery  Once        Provider:  (Not yet assigned)    Completed MAYRA PINZON            Assessment & Plan  SBO (small bowel obstruction)  51 yo F with PMHx of affective disorder, GERD, HLD, migraines, narcolepsy, hypothyroidism, and gastric sleeve (10/2024) who was transferred from [facility] ED to [facility] for 1 day of severe abdominal pain with associated nausea. CT in the ED was concerning for high grade partial or complete SBO with possible internal hernia.     General  surgery consulted; appreciate recs  Prn antiemetics  Pain control  Gastrografin study fine  Diet advanced  Hopeful for dc within 24 hours if she tolerates diet    Chronic bilateral low back pain with bilateral sciatica  Hold oral opioids in setting of SBO  PRN IV pain meds    Narcolepsy cataplexy syndrome  Follows with sleep medicine  Continue adderall and solriamfetol on dc    Hypothyroidism due to Hashimoto's thyroiditis  Continue synthroid    Hyperlipidemia, mixed  Continue statin when not npo    EUNICE (obstructive sleep apnea)  CPAP qhs    Female pelvic congestion syndrome  Noted on imaging  Patient endorses pain to her groin and suprapubic region as well as some discomfort with urination  PRN pain management  Recommend conservative measures such as pelvic floor exercises  Can follow up OP with gynecology vs. Vascular surgery to discuss further options    Final Active Diagnoses:    Diagnosis Date Noted POA    PRINCIPAL PROBLEM:  SBO (small bowel obstruction) [K56.609] 06/25/2025 Yes    Female pelvic congestion syndrome [N94.89] 06/25/2025 Yes    EUNICE (obstructive sleep apnea) [G47.33]  Yes    Hyperlipidemia, mixed [E78.2] 09/18/2019 Yes    Hypothyroidism due to Hashimoto's thyroiditis [E06.3] 02/16/2017 Yes    Chronic bilateral low back pain with bilateral sciatica [M54.42, M54.41, G89.29]  Yes    Narcolepsy cataplexy syndrome [G47.411]  Yes      Problems Resolved During this Admission:       Discharged Condition: good    Disposition:     Follow Up:    Patient Instructions:      Diet Adult Regular     Notify your health care provider if you experience any of the following:  severe uncontrolled pain     Notify your health care provider if you experience any of the following:  persistent nausea and vomiting or diarrhea     Activity as tolerated       Significant Diagnostic Studies: N/A    Pending Diagnostic Studies:       None           Medications:  Reconciled Home Medications:      Medication List        CHANGE how  you take these medications      DULoxetine 60 MG capsule  Commonly known as: CYMBALTA  TAKE 1 CAPSULE BY MOUTH ONCE A DAY  What changed: Another medication with the same name was removed. Continue taking this medication, and follow the directions you see here.     methocarbamoL 500 MG Tab  Commonly known as: Robaxin  Take 1 tablet by mouth every evening as needed  What changed: Another medication with the same name was removed. Continue taking this medication, and follow the directions you see here.     oxyCODONE-acetaminophen 5-325 mg per tablet  Commonly known as: PERCOCET  Take 1 tablet by mouth daily as needed for pain  What changed: Another medication with the same name was removed. Continue taking this medication, and follow the directions you see here.            CONTINUE taking these medications      albuterol 90 mcg/actuation inhaler  Commonly known as: PROVENTIL/VENTOLIN HFA  Inhale 1-2 puffs into the lungs every 4 (four) hours as needed for Wheezing or Shortness of Breath. Rescue     calcium-vitamin D3 500 mg-5 mcg (200 unit) per tablet  Commonly known as: OS-RHONDA 500 + D3  Take 1 tablet by mouth once daily.     clonazePAM 0.5 MG tablet  Commonly known as: KlonoPIN     cyanocobalamin 1000 MCG tablet  Commonly known as: VITAMIN B-12  Take 100 mcg by mouth once daily.     darifenacin 7.5 MG Tb24  Commonly known as: ENABLEX  Take 1 tablet (7.5 mg total) by mouth once daily.     * dextroamphetamine-amphetamine 20 MG 24 hr capsule  Commonly known as: ADDERALL XR  Take 1 capsule (20 mg total) by mouth every morning.     * dextroamphetamine-amphetamine 20 mg tablet  Commonly known as: ADDERALL  Take 1 tablet by mouth 2 (two) times a day.     * diclofenac sodium 1 % Gel  Commonly known as: VOLTAREN  Apply 2 g topically 4 (four) times daily as needed (pain).     * diclofenac sodium 1 % Gel  Commonly known as: VOLTAREN  Apply topically to affected area four times daily.     docusate sodium 100 MG capsule  Commonly  known as: COLACE  Take 1 capsule (100 mg total) by mouth 2 (two) times daily.     fluticasone propionate 50 mcg/actuation nasal spray  Commonly known as: FLONASE  Use 1 spray in each nostril once daily.     levothyroxine 88 MCG tablet  Commonly known as: SYNTHROID  Take 1 tablet (88 mcg total) by mouth daily before breakfast.     meclizine 25 mg tablet  Commonly known as: ANTIVERT  Take 1 tablet (25 mg total) by mouth 3 (three) times daily as needed for Dizziness.     MIRENA 21 mcg/24hr (up to 8 yrs) 52 mg IUD  Generic drug: levonorgestreL     ondansetron 8 MG Tbdl  Commonly known as: ZOFRAN-ODT  Take 1 tablet (8 mg total) by mouth every 6 (six) hours as needed (Nausea).     polyethylene glycol 17 gram/dose powder  Commonly known as: GLYCOLAX  Take 17 g by mouth once daily.     rosuvastatin 10 MG tablet  Commonly known as: CRESTOR  Take 1 tablet (10 mg total) by mouth once daily.     SUNOSI 150 mg Tab  Generic drug: solriamfetoL  Take 1 tablet (150 mg) by mouth once daily.     topiramate 100 MG tablet  Commonly known as: TOPAMAX  Take 1 tablet by mouth once daily AND 1 ½ tablets every evening     UBRELVY 100 mg tablet  Generic drug: ubrogepant  Take 1 tablet (100 mg total) by mouth every 2 (two) hours as needed for Migraine. Maximum: 200 mg per 24 hours     valACYclovir 1000 MG tablet  Commonly known as: VALTREX  Take 1 tablet by mouth every 12 hours as needed (cold sores).     VITAMIN D2 1,250 mcg (50,000 unit) capsule  Generic drug: ergocalciferol  Take 1 capsule (50,000 Units total) by mouth twice a week.     XYWAV 0.5 gram/mL Soln  Generic drug: sodium,calcium,mag,pot oxybate  Take 4.5 g by mouth As instructed.           * This list has 4 medication(s) that are the same as other medications prescribed for you. Read the directions carefully, and ask your doctor or other care provider to review them with you.                  Indwelling Lines/Drains at time of discharge:   Lines/Drains/Airways       None                        Time spent on the discharge of patient: >35 minutes         Zeus Ingram DO  Department of Hospital Medicine  [facility] - Surgery

## 2025-06-30 ENCOUNTER — PATIENT OUTREACH (OUTPATIENT)
Dept: ADMINISTRATIVE | Facility: CLINIC | Age: 50
End: 2025-06-30
Payer: COMMERCIAL

## 2025-06-30 ENCOUNTER — OFFICE VISIT (OUTPATIENT)
Dept: BARIATRICS | Facility: CLINIC | Age: 50
End: 2025-06-30
Payer: COMMERCIAL

## 2025-06-30 ENCOUNTER — TELEPHONE (OUTPATIENT)
Dept: BARIATRICS | Facility: CLINIC | Age: 50
End: 2025-06-30

## 2025-06-30 ENCOUNTER — TELEPHONE (OUTPATIENT)
Dept: PRIMARY CARE CLINIC | Facility: CLINIC | Age: 50
End: 2025-06-30
Payer: COMMERCIAL

## 2025-06-30 ENCOUNTER — PATIENT MESSAGE (OUTPATIENT)
Dept: ADMINISTRATIVE | Facility: CLINIC | Age: 50
End: 2025-06-30
Payer: COMMERCIAL

## 2025-06-30 ENCOUNTER — TELEPHONE (OUTPATIENT)
Dept: BARIATRICS | Facility: CLINIC | Age: 50
End: 2025-06-30
Payer: COMMERCIAL

## 2025-06-30 DIAGNOSIS — K56.609 SBO (SMALL BOWEL OBSTRUCTION): Primary | ICD-10-CM

## 2025-06-30 DIAGNOSIS — K82.8 DILATED GALLBLADDER: ICD-10-CM

## 2025-06-30 DIAGNOSIS — R10.13 POSTPRANDIAL EPIGASTRIC PAIN: ICD-10-CM

## 2025-06-30 DIAGNOSIS — Z98.84 S/P BARIATRIC SURGERY: ICD-10-CM

## 2025-06-30 PROBLEM — E66.812 CLASS 2 SEVERE OBESITY DUE TO EXCESS CALORIES WITH SERIOUS COMORBIDITY AND BODY MASS INDEX (BMI) OF 36.0 TO 36.9 IN ADULT: Status: RESOLVED | Noted: 2020-12-07 | Resolved: 2025-06-30

## 2025-06-30 PROBLEM — E66.01 CLASS 2 SEVERE OBESITY DUE TO EXCESS CALORIES WITH SERIOUS COMORBIDITY AND BODY MASS INDEX (BMI) OF 36.0 TO 36.9 IN ADULT: Status: RESOLVED | Noted: 2020-12-07 | Resolved: 2025-06-30

## 2025-06-30 PROCEDURE — 3044F HG A1C LEVEL LT 7.0%: CPT | Mod: CPTII,95,, | Performed by: SURGERY

## 2025-06-30 PROCEDURE — 98005 SYNCH AUDIO-VIDEO EST LOW 20: CPT | Mod: 95,,, | Performed by: SURGERY

## 2025-06-30 NOTE — PROGRESS NOTES
C3 nurse attempted to contact Amanda Navarrete for a TCC post hospital discharge follow up call. No answer. LVM requesting a callback at [phone].    The patient does not have a scheduled HOSFU appointment. Message sent to PCP's staff to assist with HOSFU appointment scheduling.

## 2025-06-30 NOTE — TELEPHONE ENCOUNTER
Called pt, scheduled U/S. Time, date, and location approved per pt. All questions and concerns addressed.

## 2025-06-30 NOTE — TELEPHONE ENCOUNTER
"S/p Robotic gastric sleeve with Dr. Morales on 2/19/25.    Called and spoke with the pt. She went to the ED and was transferred to [facility].  She was released today and told to follow up with Bariatrics.  Her CT included:  "Abnormal small bowel pattern consistent with small bowel obstructive process, concerning for high-grade partial or complete small bowel obstruction, and with a pattern that raises concern for internal hernia, as discussed above"    Will verify if pt can be seen for a virtual today with Dr. Morales since this is his clinic day.  Pt is at home in [place], [place].  Unless she hears back from me, pt knows to proceed with checking in for her virtual.  In clinic nurses and Dr. Morales messaged with the update.     "

## 2025-06-30 NOTE — TELEPHONE ENCOUNTER
Copied from CRM #[id number]. Topic: Appointments - Appointment Rescheduling  >> Jun 30, 2025 10:54 AM Geraldine wrote:  Patient calling to speak to provider regarding medical condition. Pls call and how it worsened. Patient was recently discharged from hospital.  >> Jun 30, 2025 12:55 PM Lorene Jones NP wrote:    ----- Message -----  From: Geraldine Mace  Sent: 6/30/2025  10:56 AM CDT  To: [facility] Staff

## 2025-06-30 NOTE — PROGRESS NOTES
"The patient location is: [place]  The chief complaint leading to consultation is: ER follow up; possible small bowel obstruction    Visit type: audiovisual    Face to Face time with patient: 17  22 minutes of total time spent on the encounter, which includes face to face time and non-face to face time preparing to see the patient (eg, review of tests), Obtaining and/or reviewing separately obtained history, Documenting clinical information in the electronic or other health record, Independently interpreting results (not separately reported) and communicating results to the patient/family/caregiver, or Care coordination (not separately reported).         Each patient to whom he or she provides medical services by telemedicine is:  (1) informed of the relationship between the physician and patient and the respective role of any other health care provider with respect to management of the patient; and (2) notified that he or she may decline to receive medical services by telemedicine and may withdraw from such care at any time.    Notes:     Recent observation stay and small bowel follow through    S/p sleeve gastrectomy 10/2024    Periumbilical discomfort Tuesday night (6 days ago) after dinner. Sometimes has some discomfort in epigastrium but this was different. Had associated nausea and bladder pressure. "Shooting / punching pains" into the groin. UA was unremarkable. She now feels ok; had loose BMs after her SBFT until yesterday; now solid.   On further questioning, had eaten out with family from [place]. Daughter had also reported feeling nauseated though for a shorter duration of time and no pain.    She does at times have epigastric discomfort postprandial discomfort but mostly when she eats a little too fast / too much. Pain after meals will last minutes to <1 hour.  CT in the ER demonstrated a distended gallbladder but also some dilated loops of proximal small bowel concerning for "an internal hernia" " though after a sleeve she doesn't really have the risk for this - possibly adhesive small bowel obstruction. UGI SBFT demonstrated flow of contrast from stomach through intestines into the colon.   She has no significant heartburn. Rare TUMs / alkaseltzer.     Weight loss has been excellent - is down to a BMI of 29!    Review of CT does demonstrate some prominent fluid filled small bowel loops but I do not appreciate mesenteric swirling or a transition point.   SBFT with contrast in the colon.    A/P  Reviewed with patient that it is most likely that she had gastroenteritis which can demonstrate fluid filled looped of small bowel. She doesn't have internal hernia spaces after a sleeve gastrectomy, though adhesive small bowel disease is certainly still a possibility. She feels well now.  Given significant weight loss after her sleeve and h/o epigastric postprandial discomfort, will get a RUQ US to look for gallbladder stones. Cholecystectomy if indicated.  Unless she has additional episodes of crampy abdominal discomfort, would not recommend further evaluation of her abnormal CT scan otherwise though can look around if in the belly for a cholecystectomy.  She expressed understanding and is comfortable with the plan.      Chris Morales MD FACS  Minimally Invasive General & Bariatric Surgery  [facility] [place], [place]

## 2025-07-02 ENCOUNTER — RESULTS FOLLOW-UP (OUTPATIENT)
Dept: SURGERY | Facility: HOSPITAL | Age: 50
End: 2025-07-02

## 2025-07-02 PROBLEM — Z00.01 ENCOUNTER FOR GENERAL ADULT MEDICAL EXAMINATION WITH ABNORMAL FINDINGS: Status: RESOLVED | Noted: 2021-12-22 | Resolved: 2025-07-02

## 2025-07-02 NOTE — PROGRESS NOTES
"HOSPITAL FOLLOW-UP/[facility]  FAMILY MEDICINE  [facility]    CC:   Chief Complaint   Patient presents with    Hospital follow up for small bowel obstruction       HPI: Amanda Navarrete   is a 50 y.o. female with     - with obesity, chronic low back pain with bilateral sciatica, migraine headaches, hypothyroidism, anxiety, hyperlipidemia, vitamin-D deficiency, GERD, narcolepsy cataplexy syndrome, and s/p gastric sleeve (10/2024) presents for hospital follow up for small bowel obstruction     Sleep Medicine: KIRK Reilly  Gynecology: Dr. Flores  - mammograms [facility]   Ortho Dr. Najera  Pain Dr. Geoffrey Aleman  Urogyn Barb Baltazar, KIRK  Cardiology Galindo Abdalla DNP  Bariatric Anastacia Lemus PA-C and Chris Vidal MD     Amanda Navarrete was recently admitted to [facility] from 6/25/25 until 6/28/25 for small-bowel obstruction. she was discharged without home health needs.     Today she presents alone and reports that she is feeling much better.  She is focusing on a bowel regimen.  She had her last bowel movement this morning and reports that it was loose.  It was nonbloody.  She is tolerating foods without any issues.  She denies any recurrent abdominal pain.  She did see her bariatric surgeon 06/30/2025 and I reviewed the note.      She had a liver/gallbladder ultrasound completed.  Gallbladder was normal however liver showed " There is a coarsened hepatic echotexture which is nonspecific but can be seen with cirrhosis".  Incidentally her CT abdomen pelvis 06/25/2025 showed "Diminished attenuation along the portal venous triads, this can be seen with aggressive hydration, may relate to edema, can be seen with hepatitis or cholangitis, clinical and historical and laboratory correlation is otherwise needed." She does have a history of positive hepatitis-C antibody in 2021.  Her hepatitis-C viral load was negative at time.  Her liver function tests are normal.  Her " "prior liver imaging has been normal.    Also incidentally it was noted on the CT "Prominent vasculature of the left pelvis, can be seen with pelvic congestion syndrome."She has an IUD in place that was placed 2021.  She is asymptomatic.  She plans to follow up with her gynecologist    Hospital Summary:  "Amanda Navarrete is a 51 yo F with PMHx of affective disorder, GERD, HLD, migraines, narcolepsy, hypothyroidism, and gastric sleeve (10/2024) who presented to [place] ED last night for severe abdominal pain that began shortly after eating. Patient reports that she occasionally gets epigastric pains and indigestion after eating ever since having her gastric sleeve done, but it typically only lasts 30 minutes. She thought that's what this pain might be initially, but noticed the pain was further down to her midline center of her abdomen. Pain was associated with severe nausea, but no emesis. She took her home percocet and robaxin (which she typically takes for chronic back pain) as well as Mirafast, but the pain continued for >3 hours prompting her to present to ED. CT in the ED was concerning for high grade partial or complete SBO with possible internal hernia. Imaging also noted prominence of the region of the lower uterine segment/cervix and concern for Pelvic congestion syndrome. The case was discussed with general surgery at [facility] [place] and decision was made for transfer.     Patient seen on arrival to [facility] [place]. She reports severe centralized abdominal pain that feels like it takes her breath away. She describes the pain as stabbing and punching. She also endorses a strange pressure in her groin. States that at the other facility she was having some mild dysuria and difficulty urinating, which is abnormal for her. She believes her last BM was approximately 2 days ago, but she is not completely sure of this. Denies fever, chills, chest pain, palpitations, cough, and LE swelling.     Hospital Course:   Presented to [facility] ED with severe, centralized abdominal pain that began shortly after eating.  Pain was stabbing in nature, associated with nausea (without emesis), and persisted despite home medications including Percocet, Robaxin, and Mirafast.  CT abdomen/pelvis revealed concern for high-grade partial or complete small bowel obstruction (SBO), possibly due to internal hernia.  Due to concerning imaging, [place] [facility] [place].  On arrival, she continued to report severe midline abdominal pain and groin pressure, along with mild dysuria and difficulty urinating.  General Surgery consulted and opted for conservative management.  Gastrografin study looked fine.  Now having bowel movements.  Diet advanced, tolerated well.  Pt deemed appropriate for discharge; seen and examined prior to departure.  Plan discussed with pt, who was agreeable and amenable; medications were discussed and reviewed, outpatient follow-up scheduled, ER precautions were given, all questions were answered to the pt's satisfaction, and was subsequently discharged.    SBO (small bowel obstruction)  51 yo F with PMHx of affective disorder, GERD, HLD, migraines, narcolepsy, hypothyroidism, and gastric sleeve (10/2024) who was transferred [place] [facility] [place] [facility] [place] 1 [place] of severe abdominal pain with associated nausea. CT in the ED was concerning for high grade partial or complete SBO with possible internal hernia.      General surgery consulted; appreciate recs  Prn antiemetics  Pain control  Gastrografin study fine  Diet advanced  Hopeful for dc within 24 hours if she tolerates diet     Chronic bilateral low back pain with bilateral sciatica  Hold oral opioids in setting of SBO  PRN IV pain meds     Narcolepsy cataplexy syndrome  Follows with sleep medicine  Continue adderall and solriamfetol on dc     Hypothyroidism due to Hashimoto's thyroiditis  Continue synthroid     Hyperlipidemia, mixed  Continue statin when not  "npo     EUNICE (obstructive sleep apnea)  CPAP qhs     Female pelvic congestion syndrome  Noted on imaging  Patient endorses pain to her groin and suprapubic region as well as some discomfort with urination  PRN pain management  Recommend conservative measures such as pelvic floor exercises  Can follow up OP with gynecology vs. Vascular surgery to discuss further options   CHANGE how you take these medications      DULoxetine 60 MG capsule  Commonly known as: CYMBALTA  TAKE 1 CAPSULE BY MOUTH ONCE A DAY  What changed: Another medication with the same name was removed. Continue taking this medication, and follow the directions you see here.     methocarbamoL 500 MG Tab  Commonly known as: Robaxin  Take 1 tablet by mouth every evening as needed  What changed: Another medication with the same name was removed. Continue taking this medication, and follow the directions you see here.     oxyCODONE-acetaminophen 5-325 mg per tablet  Commonly known as: PERCOCET  Take 1 tablet by mouth daily as needed for pain  What changed: Another medication with the same name was removed. Continue taking this medication, and follow the directions you see here."              Review of Systems   All other systems reviewed and are negative.      HISTORY:   PMHX:   Past Medical History:   Diagnosis Date    Affective disorder     sses Dr. Dial, psychiatry    Back pain     Class 2 severe obesity due to excess calories with serious comorbidity and body mass index (BMI) of 36.0 to 36.9 in adult 12/07/2020    - s/p gastric sleeve 10/2024          Wt Readings from Last 10 Encounters:      02/03/25    87.1 kg (191 lb 14.6 oz)      01/15/25    89.8 kg (197 lb 15.6 oz)      12/18/24    93.5 kg (206 lb 2.1 oz)      12/18/24    93.5 kg (206 lb 2.1 oz)      11/06/24    99.3 kg (218 lb 14.7 oz)      10/23/24    104.9 kg (231 lb 4.2 oz)      10/07/24    108.3 kg (238 lb 12.1 oz)      09/05/24    111.7 kg (246 l    GERD (gastroesophageal reflux disease)     " Headache(784.0)     Hyperlipidemia, mixed 2019    Impaired fasting glucose 2021    Lab Results      Component    Value    Date           HGBA1C    4.9    2025     - discussed recommendation for diet and cardiovascular exercise  - counseling on lifestyle modifications for risk factor reduction  - counseling on management options      Influenza A 2022    - supportive care - albuterol inhaler - discussed proceed to the ER if severe shortness of breath or chest pain. - Follow-up in office 2022 scheduled    Migraine headache     Narcolepsy without cataplexy(347.00)     Pre-syncope 2024    - recommend Cardiology evaluation      Syncope 2024    - recommend Cardiology evaluation    Thyroid disease     hyperthyroidism    TMJ arthralgia        PSHX:   Past Surgical History:   Procedure Laterality Date    CAUDAL EPIDURAL STEROID INJECTION N/A 2018    Procedure: INJECTION, STEROID, CAUDAL, EPIDURAL;  Surgeon: Geoffrey Aleman MD;  Location: [facility];  Service: Pain Management;  Laterality: N/A;     SECTION, CLASSIC      COLONOSCOPY, SCREENING, LOW RISK PATIENT N/A 2025    Procedure: COLONOSCOPY, SCREENING, LOW RISK PATIENT;  Surgeon: Franki Rawls MD;  Location: [facility];  Service: Endoscopy;  Laterality: N/A;  [facility]    COSMETIC SURGERY      breast augmentation    ESOPHAGOGASTRODUODENOSCOPY N/A 2024    Procedure: EGD (ESOPHAGOGASTRODUODENOSCOPY);  Surgeon: Chris Morales MD;  Location: [facility] ([address] [place] [address]);  Service: Endoscopy;  Laterality: N/A;  referred by Lorene Jones np bmi 49, instructions sent to  portal.  -pr call complete-tb    INJECTION OF ANESTHETIC AGENT INTO SACROILIAC JOINT Bilateral 2020    Procedure: BLOCK, SACROILIAC JOINT;  Surgeon: Geoffrey Aleman MD;  Location: [facility] OR;  Service: Pain Management;  Laterality: Bilateral;    ROBOT-ASSISTED LAPAROSCOPIC SLEEVE GASTRECTOMY USING DA  PRAVEEN STARK N/A 10/23/2024    Procedure: XI ROBOTIC SLEEVE GASTRECTOMY;  Surgeon: Chris Morales MD;  Location: [facility] OR [address] [place] [address];  Service: General;  Laterality: N/A;    SPINE SURGERY  11/19/2015    fusion L spine       FAMHX:   Family History   Problem Relation Name Age of Onset    Hypertension Mother      Kidney disease Mother      Sleep apnea Mother      Narcolepsy Father      Cancer Maternal Aunt          colon cancer gene    Cancer Paternal Aunt          lung ca       SOCHX:   Social History     Social History Narrative    She works at [email or web address]. She still has a daughter living at home while in college. The others are grown. She has 4 dogs.      Social History[1]    ALLERGIES:   Review of patient's allergies indicates:   Allergen Reactions    Tamiflu [oseltamivir] Itching and Swelling       MEDS:   Current Outpatient Medications on File Prior to Visit   Medication Sig Dispense Refill Last Dose/Taking    albuterol (PROVENTIL/VENTOLIN HFA) 90 mcg/actuation inhaler Inhale 1-2 puffs into the lungs every 4 (four) hours as needed for Wheezing or Shortness of Breath. Rescue 54 g 3     calcium-vitamin D3 (OS-RHONDA 500 + D3) 500 mg-5 mcg (200 unit) per tablet Take 1 tablet by mouth once daily.       clonazePAM (KLONOPIN) 0.5 MG tablet        cyanocobalamin (VITAMIN B-12) 1000 MCG tablet Take 100 mcg by mouth once daily.       darifenacin (ENABLEX) 7.5 MG Tb24 Take 1 tablet (7.5 mg total) by mouth once daily. 30 tablet 11     dextroamphetamine-amphetamine (ADDERALL XR) 20 MG 24 hr capsule Take 1 capsule (20 mg total) by mouth every morning. 30 capsule 0     dextroamphetamine-amphetamine (ADDERALL) 20 mg tablet Take 1 tablet by mouth 2 (two) times a day. 60 tablet 0     diclofenac sodium (VOLTAREN) 1 % Gel Apply 2 g topically 4 (four) times daily as needed (pain). 100 g 0     diclofenac sodium (VOLTAREN) 1 % Gel Apply topically to affected area four times daily. 150 g 5     docusate sodium (COLACE) 100 MG  capsule Take 1 capsule (100 mg total) by mouth 2 (two) times daily. 90 capsule 1     DULoxetine (CYMBALTA) 60 MG capsule TAKE 1 CAPSULE BY MOUTH ONCE A DAY 90 capsule 2     ergocalciferol (VITAMIN D2) 50,000 unit Cap Take 1 capsule (50,000 Units total) by mouth twice a week. 24 capsule 3     fluticasone propionate (FLONASE) 50 mcg/actuation nasal spray Use 1 spray in each nostril once daily. 32 g 2     levothyroxine (SYNTHROID) 88 MCG tablet Take 1 tablet (88 mcg total) by mouth daily before breakfast. 90 tablet 3     meclizine (ANTIVERT) 25 mg tablet Take 1 tablet (25 mg total) by mouth 3 (three) times daily as needed for Dizziness. 30 tablet 5     methocarbamoL (ROBAXIN) 500 MG Tab Take 1 tablet by mouth every evening as needed 30 tablet 2     MIRENA 20 mcg/24 hours (6 yrs) 52 mg IUD        ondansetron (ZOFRAN-ODT) 8 MG TbDL Dissolve 1 tablet (8 mg total) by mouth every 6 (six) hours as needed (Nausea). 30 tablet 0     oxyCODONE-acetaminophen (PERCOCET) 5-325 mg per tablet Take 1 tablet by mouth daily as needed for pain 30 tablet 0     polyethylene glycol (GLYCOLAX) 17 gram/dose powder Take 17 g by mouth once daily. 1530 g 1     rosuvastatin (CRESTOR) 10 MG tablet Take 1 tablet (10 mg total) by mouth once daily. 90 tablet 3     sodium,calcium,mag,pot oxybate (XYWAV) 0.5 gram/mL Soln Take 4.5 g by mouth As instructed. 540 mL 5     solriamfetoL 150 mg Tab Take 1 tablet (150 mg) by mouth once daily. 30 tablet 5     topiramate (TOPAMAX) 100 MG tablet Take 1 tablet by mouth once daily AND 1 ½ tablets every evening 225 tablet 3     ubrogepant (UBRELVY) 100 mg tablet Take 1 tablet (100 mg total) by mouth every 2 (two) hours as needed for Migraine. Maximum: 200 mg per 24 hours 16 tablet 5     valACYclovir (VALTREX) 1000 MG tablet Take 1 tablet by mouth every 12 hours as needed (cold sores). 60 tablet 8          Vital signs:   Vitals:    07/08/25 1149   BP: 123/78   BP Location: Left arm   Patient Position: Sitting  "  Pulse: 88   Resp: 18   SpO2: 100%   Weight: 69 kg (152 lb 1.9 oz)   Height: 5' 1" (1.549 m)     Body mass index is 28.74 kg/m².    PHYSICAL EXAM:   Physical Exam  Constitutional:       General: She is not in acute distress.  Cardiovascular:      Rate and Rhythm: Normal rate and regular rhythm.      Heart sounds: Normal heart sounds. No murmur heard.     No friction rub. No gallop.   Pulmonary:      Effort: Pulmonary effort is normal.      Breath sounds: Normal breath sounds. No wheezing, rhonchi or rales.   Abdominal:      General: Abdomen is flat. Bowel sounds are normal. There is no distension.      Palpations: Abdomen is soft. There is no hepatomegaly.      Tenderness: There is no abdominal tenderness. There is no guarding or rebound.   Musculoskeletal:      Cervical back: Neck supple.      Right lower leg: No edema.      Left lower leg: No edema.   Neurological:      Mental Status: She is alert.               PERTINANT RESULTS:   Admission on 06/25/2025, Discharged on 06/28/2025   Component Date Value Ref Range Status    Sodium 06/26/2025 142  136 - 145 mmol/L Final    Potassium 06/26/2025 4.2  3.5 - 5.1 mmol/L Final    Chloride 06/26/2025 109  95 - 110 mmol/L Final    CO2 06/26/2025 23  23 - 29 mmol/L Final    Glucose 06/26/2025 94  70 - 110 mg/dL Final    BUN 06/26/2025 11  6 - 20 mg/dL Final    Creatinine 06/26/2025 0.7  0.5 - 1.4 mg/dL Final    Calcium 06/26/2025 8.9  8.7 - 10.5 mg/dL Final    Anion Gap 06/26/2025 10  8 - 16 mmol/L Final    eGFR 06/26/2025 >60  >60 mL/min/1.73/m2 Final    Estimated GFR calculated using the CKD-EPI creatinine (2021) equation.    Magnesium  06/26/2025 2.2  1.6 - 2.6 mg/dL Final    WBC 06/26/2025 12.91 (H)  3.90 - 12.70 K/uL Final    RBC 06/26/2025 4.26  4.00 - 5.40 M/uL Final    HGB 06/26/2025 14.4  12.0 - 16.0 gm/dL Final    HCT 06/26/2025 43.6  37.0 - 48.5 % Final    MCV 06/26/2025 102 (H)  82 - 98 fL Final    MCHC 06/26/2025 33.0  32.0 - 36.0 g/dL Final    RDW 06/26/2025 " 13.2  11.5 - 14.5 % Final    Platelet Count 06/26/2025 225  150 - 450 K/uL Final    MCH 06/26/2025 33.8 (H)  27.0 - 31.0 pg Final    MPV 06/26/2025 12.2  9.2 - 12.9 fL Final    Sodium 06/27/2025 140  136 - 145 mmol/L Final    Potassium 06/27/2025 3.5  3.5 - 5.1 mmol/L Final    Chloride 06/27/2025 107  95 - 110 mmol/L Final    CO2 06/27/2025 22 (L)  23 - 29 mmol/L Final    Glucose 06/27/2025 103  70 - 110 mg/dL Final    BUN 06/27/2025 12  6 - 20 mg/dL Final    Creatinine 06/27/2025 0.6  0.5 - 1.4 mg/dL Final    Calcium 06/27/2025 8.7  8.7 - 10.5 mg/dL Final    Anion Gap 06/27/2025 11  8 - 16 mmol/L Final    eGFR 06/27/2025 >60  >60 mL/min/1.73/m2 Final    Estimated GFR calculated using the CKD-EPI creatinine (2021) equation.    Magnesium  06/27/2025 2.0  1.6 - 2.6 mg/dL Final    WBC 06/27/2025 13.35 (H)  3.90 - 12.70 K/uL Final    RBC 06/27/2025 4.26  4.00 - 5.40 M/uL Final    HGB 06/27/2025 14.4  12.0 - 16.0 gm/dL Final    HCT 06/27/2025 42.7  37.0 - 48.5 % Final    MCV 06/27/2025 100 (H)  82 - 98 fL Final    MCHC 06/27/2025 33.7  32.0 - 36.0 g/dL Final    RDW 06/27/2025 13.1  11.5 - 14.5 % Final    Platelet Count 06/27/2025 238  150 - 450 K/uL Final    MCH 06/27/2025 33.8 (H)  27.0 - 31.0 pg Final    MPV 06/27/2025 12.5  9.2 - 12.9 fL Final    Sodium 06/28/2025 139  136 - 145 mmol/L Final    Potassium 06/28/2025 3.8  3.5 - 5.1 mmol/L Final    Chloride 06/28/2025 107  95 - 110 mmol/L Final    CO2 06/28/2025 21 (L)  23 - 29 mmol/L Final    Glucose 06/28/2025 88  70 - 110 mg/dL Final    BUN 06/28/2025 11  6 - 20 mg/dL Final    Creatinine 06/28/2025 0.6  0.5 - 1.4 mg/dL Final    Calcium 06/28/2025 8.4 (L)  8.7 - 10.5 mg/dL Final    Anion Gap 06/28/2025 11  8 - 16 mmol/L Final    eGFR 06/28/2025 >60  >60 mL/min/1.73/m2 Final    Estimated GFR calculated using the CKD-EPI creatinine (2021) equation.    Magnesium  06/28/2025 1.8  1.6 - 2.6 mg/dL Final    WBC 06/28/2025 10.95  3.90 - 12.70 K/uL Final    RBC 06/28/2025 3.93  (L)  4.00 - 5.40 M/uL Final    HGB 06/28/2025 13.1  12.0 - 16.0 gm/dL Final    HCT 06/28/2025 40.2  37.0 - 48.5 % Final    MCV 06/28/2025 102 (H)  82 - 98 fL Final    MCHC 06/28/2025 32.6  32.0 - 36.0 g/dL Final    RDW 06/28/2025 12.9  11.5 - 14.5 % Final    Platelet Count 06/28/2025 200  150 - 450 K/uL Final    MCH 06/28/2025 33.3 (H)  27.0 - 31.0 pg Final    MPV 06/28/2025 11.7  9.2 - 12.9 fL Final   Admission on 06/24/2025, Discharged on 06/25/2025   Component Date Value Ref Range Status    Sodium 06/24/2025 141  136 - 145 mmol/L Final    Potassium 06/24/2025 3.4 (L)  3.5 - 5.1 mmol/L Final    Chloride 06/24/2025 109  95 - 110 mmol/L Final    CO2 06/24/2025 27  23 - 29 mmol/L Final    Glucose 06/24/2025 117 (H)  70 - 110 mg/dL Final    BUN 06/24/2025 20  6 - 20 mg/dL Final    Creatinine 06/24/2025 0.8  0.5 - 1.4 mg/dL Final    Calcium 06/24/2025 9.6  8.7 - 10.5 mg/dL Final    Protein Total 06/24/2025 7.2  6.0 - 8.4 gm/dL Final    Albumin 06/24/2025 4.0  3.5 - 5.2 g/dL Final    Bilirubin Total 06/24/2025 0.4  0.1 - 1.0 mg/dL Final    For infants and newborns, interpretation of results should be based   on gestational age, weight and in agreement with clinical   observations.    Premature Infant recommended reference ranges:   0-24 hours:  <8.0 mg/dL   24-48 hours: <12.0 mg/dL   3-5 days:    <15.0 mg/dL   6-29 days:   <15.0 mg/dL    ALP 06/24/2025 79  40 - 150 unit/L Final    AST 06/24/2025 17  11 - 45 unit/L Final    ALT 06/24/2025 17  10 - 44 unit/L Final    Anion Gap 06/24/2025 5 (L)  8 - 16 mmol/L Final    eGFR 06/24/2025 >60  >60 mL/min/1.73/m2 Final    Estimated GFR calculated using the CKD-EPI creatinine (2021) equation.    Lipase Level 06/24/2025 19  4 - 60 U/L Final    Color, UA 06/24/2025 Yellow  Straw, Susi, Yellow, Light-Orange Final    Appearance, UA 06/24/2025 Cloudy (A)  Clear Final    pH, UA 06/24/2025 7.0  5.0 - 8.0 Final    Spec Grav UA 06/24/2025 1.015  1.005 - 1.030 Final    Protein, UA  06/24/2025 Negative  Negative Final    Recommend a 24 hour urine protein or a urine protein/creatinine ratio if globulin induced proteinuria is clinically suspected.    Glucose, UA 06/24/2025 Negative  Negative Final    Ketones, UA 06/24/2025 Negative  Negative Final    Bilirubin, UA 06/24/2025 Negative  Negative Final    Blood, UA 06/24/2025 Negative  Negative Final    Nitrites, UA 06/24/2025 Negative  Negative Final    Urobilinogen, UA 06/24/2025 Negative  <2.0 EU/dL Final    Leukocyte Esterase, UA 06/24/2025 Negative  Negative Final    Lactic Acid Level 06/24/2025 0.7  0.5 - 2.2 mmol/L Final    WBC 06/24/2025 10.29  3.90 - 12.70 K/uL Final    RBC 06/24/2025 4.17  4.00 - 5.40 M/uL Final    HGB 06/24/2025 14.2  12.0 - 16.0 gm/dL Final    HCT 06/24/2025 41.6  37.0 - 48.5 % Final    MCV 06/24/2025 100 (H)  82 - 98 fL Final    MCH 06/24/2025 34.1 (H)  27.0 - 31.0 pg Final    MCHC 06/24/2025 34.1  32.0 - 36.0 g/dL Final    RDW 06/24/2025 12.9  11.5 - 14.5 % Final    Platelet Count 06/24/2025 227  150 - 450 K/uL Final    MPV 06/24/2025 11.3  9.2 - 12.9 fL Final    Nucleated RBC 06/24/2025 0  <=0 /100 WBC Final    Neut % 06/24/2025 76.7 (H)  38 - 73 % Final    Lymph % 06/24/2025 14.4 (L)  18 - 48 % Final    Mono % 06/24/2025 6.4  4 - 15 % Final    Eos % 06/24/2025 1.3  <=8 % Final    Basophil % 06/24/2025 0.9  <=1.9 % Final    Imm Grans % 06/24/2025 0.3  0.0 - 0.5 % Final    Neut # 06/24/2025 7.90 (H)  1.8 - 7.7 K/uL Final    Lymph # 06/24/2025 1.48  1 - 4.8 K/uL Final    Mono # 06/24/2025 0.66  0.3 - 1 K/uL Final    Eos # 06/24/2025 0.13  <=0.5 K/uL Final    Baso # 06/24/2025 0.09  <=0.2 K/uL Final    Imm Grans # 06/24/2025 0.03  0.00 - 0.04 K/uL Final    Mild elevation in immature granulocytes is non specific and can be seen in a variety of conditions including stress response, acute inflammation, trauma and pregnancy. Correlation with other laboratory and clinical findings is essential.    Extra Tube 06/24/2025 Hold  for add-ons.   Final    Auto resulted.       Extra Tube 06/24/2025 Hold for add-ons.   Final    Auto resulted.       Extra Tube 06/24/2025 Hold for add-ons.   Final    Auto resulted.       Extra Tube 06/24/2025 Hold for add-ons.   Final    Auto resulted.       Squamous Epithelial Cells, UA 06/24/2025 25  /HPF Final    Amorphous, UA 06/24/2025 Many (A)  None, Occasional, Few, Moderate, Rare Final    Microscopic Comment 06/24/2025    Final    Other formed elements not mentioned in the report are not present in the microscopic examination.     Reading Physician Reading Date Result Priority   Gustavo Montoya MD  [phone]  [phone]  6/27/2025 Timed     Narrative & Impression  EXAMINATION:  XR GASTROGRAFFIN CHALLENGE     CLINICAL HISTORY:  8 hours GGC;     TECHNIQUE:  Abdomen one view     COMPARISON:  N 06/26/2025 one     FINDINGS:  Contrast material identified in the slightly dilated small bowel loops but also in the entire colon.  No definite free air in the abdomen.  Postsurgical changes in the lumbar spine as before     Impression:     See above        Electronically signed by:Gustavo Montoya MD  Date:                                            06/27/2025  Time:                                           07:16  Narrative & Impression  EXAMINATION:  CT ABDOMEN PELVIS WITH IV CONTRAST     CLINICAL HISTORY:  Bowel obstruction suspected;     TECHNIQUE:  Low dose axial images, sagittal and coronal reformations were obtained from the lung bases to the pubic symphysis following the IV administration of 75 mL of Omnipaque 350 oral contrast was not utilized.  Single phase postcontrast CT examination of the abdomen and pelvis is submitted.     COMPARISON:  CT examination of the abdomen and pelvis March 19, 2012     FINDINGS:  Incompletely imaged bilateral breast implants are noted.  The visualized lung bases demonstrate appearance consistent with atelectatic change.  Postoperative change of the stomach is noted.     There  is mild to moderate gallbladder distention, there is no pericholecystic inflammatory change, there is no peripancreatic inflammatory change, there is no abnormal pancreatic or biliary ductal dilatation.  There is mild diminished attenuation along the course of the portal venous triads, this can be seen with aggressive hydration, may represent edema, can be seen with hepatitis or cholangitis, clinical and historical correlation is needed.     There is no evidence for acute process of the spleen or adrenal glands.  Small hypodensity of the right kidney noted, too small for characterization.  There is no evidence for ureteral calculus or obstructive uropathy or perinephric inflammatory change bilaterally.  The abdominal aorta appears normal in caliber, demonstrates appropriate opacification.  Atherosclerotic change noted.     There is prominent appearance of the region of the lower uterine segment/cervix for which clinical and historical correlation and evaluation is recommended.  An intrauterine contraceptive device is noted.  Diminished attenuation along the cervical canal may relate to fluid along the cervical canal.  The ovaries demonstrate appearance of small follicles.  Prominent vasculature of the left pelvis noted, can be seen with pelvic congestion syndrome.  The urinary bladder appears unremarkable for degree of distention, mild wall thickening may relate to incomplete distention.     There are several dilated small bowel loops of the central abdomen, as best seen on axial images , appearance of mild mesenteric and interloop edema/fluid.  The distal small bowel loops appear decompressed, the appearance is consistent with a small bowel obstructive process.  Focal transition is difficult to delineate, however the somewhat clustered appearance particularly on coronal reconstruction imaging raises concern for internal hernia.     The appendix is identified, it does not appear inflamed.  There is mild  prominence of the colon with air and stool, there is no evidence for inflammatory or obstructive change of the colon.     There is no evidence for pneumatosis or portal venous air or mesenteric venous air and there is no evidence for free intraperitoneal air.     The visualized osseous structures demonstrate chronic and postoperative change, with postoperative hardware of the spine producing artifact.     Impression:     Abnormal small bowel pattern consistent with small bowel obstructive process, concerning for high-grade partial or complete small bowel obstruction, and with a pattern that raises concern for internal hernia, as discussed above.     Diminished attenuation along the portal venous triads, this can be seen with aggressive hydration, may relate to edema, can be seen with hepatitis or cholangitis, clinical and historical and laboratory correlation is otherwise needed.     Prominence of the region of the lower uterine segment/cervix for which clinical and historical correlation and evaluation is recommended.     Prominent vasculature of the left pelvis, can be seen with pelvic congestion syndrome.     Additional findings as above.     This report was flagged in Epic as abnormal.        Electronically signed by:Anthony Rick  Date:                                            06/25/2025  Time:                                           00:55  US Abdomen Limited  Narrative: EXAMINATION:  US ABDOMEN LIMITED    CLINICAL HISTORY:  Other specified diseases of gallbladder    TECHNIQUE:  Limited ultrasound of the right upper quadrant of the abdomen including pancreas, liver, gallbladder, common bile duct was performed.    COMPARISON:  06/10/2021.    FINDINGS:  Liver: Normal in size, measuring 16.4 cm in length.  There is a coarsened hepatic echotexture which is nonspecific but can be seen with cirrhosis.  No focal hepatic lesions.    Gallbladder: No calculi, wall thickening, or pericholecystic fluid.  No  sonographic Reich's sign.    Biliary system: The common duct is not dilated, measuring 5 mm.  No intrahepatic ductal dilatation.    Spleen: Normal in size with a homogeneous echotexture, measuring 7.2 x 2.5 cm.    Pancreas: The visualized portions of pancreas appear normal.    Miscellaneous: No ascites.  Impression: Coarsened hepatic echotexture.  This is nonspecific but can be seen with cirrhosis.    No evidence for gallstones.    Electronically signed by: Solitario Harden MD  Date:    07/02/2025  Time:    08:56    Results for orders placed or performed in visit on 06/26/24   EKG 12-lead    Collection Time: 06/26/24 12:38 PM   Result Value Ref Range    QRS Duration 88 ms    [facility] QTC Calculation 390 ms    Narrative    Test Reason :     Vent. Rate : 083 BPM     Atrial Rate : 083 BPM     P-R Int : 150 ms          QRS Dur : 088 ms      QT Int : 332 ms       P-R-T Axes : 032 -06 038 degrees     QTc Int : 390 ms    Normal sinus rhythm  Nonspecific ST and T wave abnormality  Abnormal ECG  When compared with ECG of 05-JUN-2024 11:57,  No significant change was found  Confirmed by Thao HERNÁNDEZ, Yanick IQBAL ([id number]) on 7/5/2024 12:36:19 PM    Referred By: AMARILIS LORENZO           Confirmed By:Yanick Osuna MD         ASSESSMENT/PLAN:  1. SBO (small bowel obstruction)  Overview:  - 06/25/2025 hospitalization   -she did not require NG tube placement or surgical intervention   -unclear inciting event.  She did follow up with her bariatric surgeon 06/30/2025 and I reviewed his note   -possibly caused by severe constipation versus adhesions   -she is recovering well.  She is tolerating p.o..  Her bowel movements are loose.      2. Abnormal liver CT  Overview:  06/25/2025 CT abdomen pelvis: There is mild diminished attenuation along the course of the portal venous triads, this can be seen with aggressive hydration, may represent edema, can be seen with hepatitis or cholangitis, clinical and historical correlation is needed.  - 07/02/2025  liver ultrasound: Liver: Normal in size, measuring 16.4 cm in length.  There is a coarsened hepatic echotexture which is nonspecific but can be seen with cirrhosis.  No focal hepatic lesions.  - recommend evaluation by hepatology    Orders:  -     Ambulatory referral/consult to Hepatology; Future; Expected date: 07/09/2025    3. Positive hepatitis C antibody test  Overview:  - 6/4/2021 hepatitis C antibody positive  - 6/11/2021 hepatitis C viral load not detectable  - 6/10/21 Liver US: Liver: Normal in size, measuring 15.3 cm. Homogeneous echotexture. No focal hepatic lesions.  - 07/02/2025 liver ultrasound: There is a coarsened hepatic echotexture which is nonspecific but can be seen with cirrhosis.   - no history of IV drug use  - positive tattoos  Lab Results   Component Value Date    ALT 17 06/24/2025    AST 17 06/24/2025    ALKPHOS 79 06/24/2025    BILITOT 0.4 06/24/2025           4. Hypothyroidism due to Hashimoto's thyroiditis  Overview:  Lab Results   Component Value Date    TSH 3.578 03/26/2025     - well controlled  - continue current management plan   - patient encouraged to notify me with any changes      5. Female pelvic congestion syndrome  Overview:  06/25/2025 CT abdomen pelvis: Prominent vasculature of the left pelvis, can be seen with pelvic congestion syndrome.  - she plans to follow up with gynecology            ORDERS:   Orders Placed This Encounter    Ambulatory referral/consult to Hepatology       Vaccines recommended:  COVID-19    Follow up in 8 months (on 3/8/2026), or if symptoms worsen or fail to improve. or sooner with any concerns    Transitional Care Note  Family and/or Caretaker present at visit?  No.  Diagnostic tests reviewed/disposition: I have reviewed all completed as well as pending diagnostic tests at the time of discharge.  Disease/illness education: yes  Home health/community services discussion/referrals: Patient does not have home health established from hospital visit.   They do not need home health.  If needed, we will set up home health for the patient.   [facility] nursing completed: 6/30/25 unable to contact patient.  Voicemail left    This note is dictated using the M*Modal Fluency Direct word recognition program. There are word recognition mistakes that are occasionally missed on review.  Dr. Laya Jaffe D.O.   Family Medicine               [1]   Social History  Tobacco Use    Smoking status: Former     Current packs/day: 1.00     Average packs/day: 1 pack/day for 16.4 years (16.4 ttl pk-yrs)     Types: Cigarettes     Start date: 8/11/2014     Passive exposure: Never    Smokeless tobacco: Never   Substance Use Topics    Alcohol use: Yes     Comment: Social, rare    Drug use: No

## 2025-07-07 ENCOUNTER — PATIENT MESSAGE (OUTPATIENT)
Dept: BARIATRICS | Facility: CLINIC | Age: 50
End: 2025-07-07
Payer: COMMERCIAL

## 2025-07-08 ENCOUNTER — PATIENT MESSAGE (OUTPATIENT)
Dept: BARIATRICS | Facility: CLINIC | Age: 50
End: 2025-07-08
Payer: COMMERCIAL

## 2025-07-08 ENCOUNTER — OFFICE VISIT (OUTPATIENT)
Dept: PRIMARY CARE CLINIC | Facility: CLINIC | Age: 50
End: 2025-07-08
Attending: FAMILY MEDICINE
Payer: COMMERCIAL

## 2025-07-08 VITALS
HEIGHT: 61 IN | HEART RATE: 88 BPM | WEIGHT: 152.13 LBS | RESPIRATION RATE: 18 BRPM | DIASTOLIC BLOOD PRESSURE: 78 MMHG | OXYGEN SATURATION: 100 % | BODY MASS INDEX: 28.72 KG/M2 | SYSTOLIC BLOOD PRESSURE: 123 MMHG

## 2025-07-08 DIAGNOSIS — E06.3 HYPOTHYROIDISM DUE TO HASHIMOTO'S THYROIDITIS: ICD-10-CM

## 2025-07-08 DIAGNOSIS — R76.8 POSITIVE HEPATITIS C ANTIBODY TEST: ICD-10-CM

## 2025-07-08 DIAGNOSIS — N94.89 FEMALE PELVIC CONGESTION SYNDROME: ICD-10-CM

## 2025-07-08 DIAGNOSIS — K56.609 SBO (SMALL BOWEL OBSTRUCTION): Primary | ICD-10-CM

## 2025-07-08 DIAGNOSIS — R93.2 ABNORMAL LIVER CT: ICD-10-CM

## 2025-07-08 PROBLEM — K76.0 FATTY LIVER: Status: ACTIVE | Noted: 2025-07-08

## 2025-07-08 PROBLEM — K76.0 FATTY LIVER: Status: RESOLVED | Noted: 2025-07-08 | Resolved: 2025-07-08

## 2025-07-08 PROCEDURE — 3074F SYST BP LT 130 MM HG: CPT | Mod: CPTII,S$GLB,, | Performed by: FAMILY MEDICINE

## 2025-07-08 PROCEDURE — 1111F DSCHRG MED/CURRENT MED MERGE: CPT | Mod: CPTII,S$GLB,, | Performed by: FAMILY MEDICINE

## 2025-07-08 PROCEDURE — 1160F RVW MEDS BY RX/DR IN RCRD: CPT | Mod: CPTII,S$GLB,, | Performed by: FAMILY MEDICINE

## 2025-07-08 PROCEDURE — 99215 OFFICE O/P EST HI 40 MIN: CPT | Mod: S$GLB,,, | Performed by: FAMILY MEDICINE

## 2025-07-08 PROCEDURE — 99999 PR PBB SHADOW E&M-EST. PATIENT-LVL V: CPT | Mod: PBBFAC,,, | Performed by: FAMILY MEDICINE

## 2025-07-08 PROCEDURE — 3078F DIAST BP <80 MM HG: CPT | Mod: CPTII,S$GLB,, | Performed by: FAMILY MEDICINE

## 2025-07-08 PROCEDURE — 1159F MED LIST DOCD IN RCRD: CPT | Mod: CPTII,S$GLB,, | Performed by: FAMILY MEDICINE

## 2025-07-08 PROCEDURE — 3044F HG A1C LEVEL LT 7.0%: CPT | Mod: CPTII,S$GLB,, | Performed by: FAMILY MEDICINE

## 2025-07-08 PROCEDURE — 3008F BODY MASS INDEX DOCD: CPT | Mod: CPTII,S$GLB,, | Performed by: FAMILY MEDICINE

## 2025-07-11 ENCOUNTER — TELEPHONE (OUTPATIENT)
Dept: HEPATOLOGY | Facility: CLINIC | Age: 50
End: 2025-07-11
Payer: COMMERCIAL

## 2025-07-11 ENCOUNTER — TELEPHONE (OUTPATIENT)
Dept: BARIATRICS | Facility: CLINIC | Age: 50
End: 2025-07-11
Payer: COMMERCIAL

## 2025-07-11 DIAGNOSIS — R93.2 ABNORMAL LIVER DIAGNOSTIC IMAGING: Primary | ICD-10-CM

## 2025-07-11 NOTE — TELEPHONE ENCOUNTER
Called the patient to schedule a hepatology consult from referral.  Scheduled to the next available appt 7/14/2025 with Ms. Maria Teresa Alexander. Patient confirmed and agreed with the schedule.  Reminder letter mailed.        CRM # [id number]  Owner: None  Status: Unresolved  Open  Priority: Routine Created on: 07/11/2025 09:12 AM By: Jyoti Kc     Primary Information    Source   Amanda Navarrete (Patient)    Subject   Amanda Navarrete (Patient)    Topic   General Inquiry - Return Call      Summary   Return Call   Communication   Type:  Patient Returning Call            Who Called:name      Who Left Message for Patient:      Does the patient know what this is regarding?:Scheduling      Would the patient rather a call back or a response via [facility]? Call back      Best Call Back Number:[phone]            Additional Information:

## 2025-07-11 NOTE — TELEPHONE ENCOUNTER
----- Message from Chris Morales MD sent at 7/2/2025 12:22 PM CDT -----  Please let Amanda know her ultrasound did not demonstrate an issue with her gallbladder. There are some textural changes to the liver likely related to her prior weight history. I would not worry   about this but lets please set her up with a hepatology visit.   ----- Message -----  From: Jaswinder, Rad Results In  Sent: 7/2/2025   8:58 AM CDT  To: Chris Morales MD

## 2025-07-14 ENCOUNTER — OFFICE VISIT (OUTPATIENT)
Dept: HEPATOLOGY | Facility: CLINIC | Age: 50
End: 2025-07-14
Payer: COMMERCIAL

## 2025-07-14 ENCOUNTER — LAB VISIT (OUTPATIENT)
Dept: LAB | Facility: HOSPITAL | Age: 50
End: 2025-07-14
Payer: COMMERCIAL

## 2025-07-14 ENCOUNTER — PROCEDURE VISIT (OUTPATIENT)
Dept: HEPATOLOGY | Facility: CLINIC | Age: 50
End: 2025-07-14
Payer: COMMERCIAL

## 2025-07-14 VITALS — HEIGHT: 61 IN | BODY MASS INDEX: 28.67 KG/M2 | WEIGHT: 151.88 LBS

## 2025-07-14 DIAGNOSIS — R93.2 ABNORMAL LIVER CT: Primary | ICD-10-CM

## 2025-07-14 DIAGNOSIS — R93.2 ABNORMAL LIVER CT: ICD-10-CM

## 2025-07-14 DIAGNOSIS — R93.2 ABNORMAL LIVER DIAGNOSTIC IMAGING: ICD-10-CM

## 2025-07-14 DIAGNOSIS — R76.8 POSITIVE HEPATITIS C ANTIBODY TEST: ICD-10-CM

## 2025-07-14 LAB
ALBUMIN SERPL BCP-MCNC: 3.7 G/DL (ref 3.5–5.2)
ALP SERPL-CCNC: 76 UNIT/L (ref 40–150)
ALT SERPL W/O P-5'-P-CCNC: 24 UNIT/L (ref 10–44)
AST SERPL-CCNC: 19 UNIT/L (ref 11–45)
BILIRUB DIRECT SERPL-MCNC: 0.3 MG/DL (ref 0.1–0.3)
BILIRUB SERPL-MCNC: 0.6 MG/DL (ref 0.1–1)
HAV AB SER QL IA: NORMAL
HBV CORE AB SERPL QL IA: NORMAL
HBV SURFACE AB SER-ACNC: <3 MIU/ML
HBV SURFACE AB SERPL IA-ACNC: NORMAL M[IU]/ML
HBV SURFACE AG SERPL QL IA: NORMAL
PROT SERPL-MCNC: 6.4 GM/DL (ref 6–8.4)

## 2025-07-14 PROCEDURE — 3044F HG A1C LEVEL LT 7.0%: CPT | Mod: CPTII,S$GLB,, | Performed by: NURSE PRACTITIONER

## 2025-07-14 PROCEDURE — 87340 HEPATITIS B SURFACE AG IA: CPT

## 2025-07-14 PROCEDURE — 91200 LIVER ELASTOGRAPHY: CPT | Mod: S$GLB,,, | Performed by: NURSE PRACTITIONER

## 2025-07-14 PROCEDURE — 86790 VIRUS ANTIBODY NOS: CPT

## 2025-07-14 PROCEDURE — 86706 HEP B SURFACE ANTIBODY: CPT

## 2025-07-14 PROCEDURE — 87522 HEPATITIS C REVRS TRNSCRPJ: CPT

## 2025-07-14 PROCEDURE — 99999 PR PBB SHADOW E&M-EST. PATIENT-LVL V: CPT | Mod: PBBFAC,,, | Performed by: NURSE PRACTITIONER

## 2025-07-14 PROCEDURE — 36415 COLL VENOUS BLD VENIPUNCTURE: CPT

## 2025-07-14 PROCEDURE — 1111F DSCHRG MED/CURRENT MED MERGE: CPT | Mod: CPTII,S$GLB,, | Performed by: NURSE PRACTITIONER

## 2025-07-14 PROCEDURE — 80076 HEPATIC FUNCTION PANEL: CPT

## 2025-07-14 PROCEDURE — 86704 HEP B CORE ANTIBODY TOTAL: CPT

## 2025-07-14 PROCEDURE — 99214 OFFICE O/P EST MOD 30 MIN: CPT | Mod: S$GLB,,, | Performed by: NURSE PRACTITIONER

## 2025-07-14 PROCEDURE — 3008F BODY MASS INDEX DOCD: CPT | Mod: CPTII,S$GLB,, | Performed by: NURSE PRACTITIONER

## 2025-07-14 PROCEDURE — 1160F RVW MEDS BY RX/DR IN RCRD: CPT | Mod: CPTII,S$GLB,, | Performed by: NURSE PRACTITIONER

## 2025-07-14 PROCEDURE — 1159F MED LIST DOCD IN RCRD: CPT | Mod: CPTII,S$GLB,, | Performed by: NURSE PRACTITIONER

## 2025-07-14 NOTE — PROCEDURES
FibroScan Transplant Hepatology(Vibration Controlled Transient Elastography)    Date/Time: 7/14/2025 10:45 AM    Performed by: Maria Teresa Alexander NP  Authorized by: Maria Teresa Alexander NP    Diagnosis:  Other    Probe:  M    Universal Protocol: Patient's identity, procedure and site were verified, confirmatory pause was performed.  Discussed procedure including risks and potential complications.  Questions answered.  Patient verbalizes understanding and wishes to proceed with VCTE.     Procedure: After providing explanations of the procedure, patient was placed in the supine position with right arm in maximum abduction to allow optimal exposure of right lateral abdomen.  Patient was briefly assessed, Testing was performed in the mid-axillary location, 50Hz Shear Wave pulses were applied and the resulting Shear Wave and Propagation Speed detected with a 3.5 MHz ultrasonic signal, using the FibroScan probe, Skin to liver capsule distance and liver parenchyma were accessed during the entire examination with the FibroScan probe, Patient was instructed to breathe normally and to abstain from sudden movements during the procedure, allowing for random measurements of liver stiffness. At least 10 Shear Waves were produced, Individual measurements of each Shear Wave were calculated.  Patient tolerated the procedure well with no complications.  Meets discharge criteria as was dismissed.  Rates pain 0 out of 10.  Patient will follow up with ordering provider to review results.    Findings  Median liver stiffness score:  3.6  CAP Reading: dB/m:  155    IQR/med %:  8  Interpretation  Fibrosis interpretation is based on medial liver stiffness - Kilopascal (kPa).    Fibrosis Stage:  F 0-1  Steatosis interpretation is based on controlled attenuation parameter - (dB/m).    Steatosis Grade:  <S1

## 2025-07-14 NOTE — PROGRESS NOTES
[facility] VISIT NEW PT NOTE    REFERRING PROVIDER:  Dr. Laya Jaffe  PCP: Laya Jaffe DO     CHIEF COMPLAINT: abnormal liver imaging, previous Hep C antibody positive     HPI: This is a 50 y.o. patient with PMH noted below, presenting for evaluation of Above    No  family history of liver disease    Previous serologic w/u negative for Hep B - willl repeat Hep B and C testing   Hep C ab positive in the past but RNA negtive    Prior serologic workup:   Lab Results   Component Value Date    ANASCREEN Negative <1:80 06/10/2021    FESATURATED 23 12/18/2024    HEPBSAG Negative 06/10/2021    HEPCAB Positive (A) 06/04/2021     Risk factors for fatty liver include HLD    Liver fibrosis staging:  -- fibroscan 7/2025 normal     Interval HPI: Presents today alone. CT during hospital admission for abd pain noted mild diminished attenuation along the course of the portal venous triads and US noted possible nodular contour   Liver enzymes normal  Fibroscan today normal     Labs done 6/2025 show normal transaminase levels     Lab Results   Component Value Date    ALT 17 06/24/2025    AST 17 06/24/2025    ALKPHOS 79 06/24/2025    BILITOT 0.4 06/24/2025    ALBUMIN 4.0 06/24/2025     06/28/2025       Abd U/S done 7/2025 showed Liver: Normal in size, measuring 16.4 cm in length. There is a coarsened hepatic echotexture which is nonspecific but can be seen with cirrhosis. No focal hepatic lesions.     CT 6/2025 noted There is mild diminished attenuation along the course of the portal venous triads, this can be seen with aggressive hydration, may represent edema, can be seen with hepatitis or cholangitis, clinical and historical correlation is needed.      Denies significant Alcohol consumption, see below  Social History     Substance and Sexual Activity   Alcohol Use Not Currently       Immunity to Hep A and B: will check titers with future labs          Allergy and medication list reviewed and updated  "    PMHX:  has a past medical history of Affective disorder, Back pain, Class 2 severe obesity due to excess calories with serious comorbidity and body mass index (BMI) of 36.0 to 36.9 in adult (2020), GERD (gastroesophageal reflux disease), Headache(784.0), Hyperlipidemia, mixed (2019), Impaired fasting glucose (2021), Influenza A (2022), Migraine headache, Narcolepsy without cataplexy(347.00), Pre-syncope (2024), Syncope (2024), Thyroid disease, and TMJ arthralgia.    PSHX:  has a past surgical history that includes  section, classic; Spine surgery (2015); Caudal epidural steroid injection (N/A, 2018); Cosmetic surgery; Injection of anesthetic agent into sacroiliac joint (Bilateral, 2020); Esophagogastroduodenoscopy (N/A, 2024); Robot-assisted laparoscopic sleeve gastrectomy using da Aileen Xi (N/A, 10/23/2024); and colonoscopy, screening, low risk patient (N/A, 2025).    FAMILY HISTORY: Updated and reviewed in EPIC    ROS:   GENERAL: Denies fatigue  CARDIOVASCULAR: Denies edema  SKIN: Denies rash, itching   NEURO: Denies confusion, memory loss, or mood changes    PHYSICAL EXAM:   In no acute distress; alert and oriented to person, place and time  VITALS: Ht 5' 1" (1.549 m)   Wt 68.9 kg (151 lb 14.4 oz)   BMI 28.70 kg/m²   EYES: Sclerae anicteric  GI: Soft, non-tender, non-distended. No ascites.  EXTREMITIES:  No edema.  SKIN: Warm and dry. No jaundice. No telangectasias noted. No palmar erythema.  NEURO:  No asterixis.  PSYCH:  Thought and speech pattern appropriate. Behavior normal      EDUCATION:  See instructions discussed with patient in Instructions section of the After Visit Summary     ASSESSMENT & PLAN:  50 y.o. female with:  1.  Abnormal liver imaging  Noted around time of significant abd pain. Fibroscan WNL. Labs WNL. Will repeat US and fibroscan in 1 year  - see HPI  -- Immunity to Hep A and B : see HPI        Labs today then "   Follow up in about 1 year (around 7/14/2026). with US and fibroscan before  Orders Placed This Encounter   Procedures    FibroScan Transplant Hepatology(Vibration Controlled Transient Elastography)    US Abdomen Complete    Hepatic Function Panel    Hepatitis A antibody, IgG    Hepatitis B Surface Ab, Qualitative    Hepatitis B Core Antibody, Total    Hepatitis C RNA, Quantitative, PCR    Hepatitis B Surface Antigen        Thank you for allowing me to participate in the care of Amanda MONIQUE Coleman    I spent a total of 30 minutes on the day of the visit.This includes face to face time and non-face to face time preparing to see the patient (eg, review of tests), obtaining and/or reviewing separately obtained history, documenting clinical information in the electronic or other health record, independently interpreting results and communicating results to the patient/family/caregiver, and coordinating care.         CC'ed note to:   Laya Jaffe, DO Chris Morales

## 2025-07-14 NOTE — PATIENT INSTRUCTIONS
US showed possible inflammation or scar tissue in the liver, but fibroscan normal    Labs today    Repeat US and fibroscan in 1 year with  follow up visit

## 2025-07-15 ENCOUNTER — PATIENT MESSAGE (OUTPATIENT)
Dept: SLEEP MEDICINE | Facility: CLINIC | Age: 50
End: 2025-07-15
Payer: COMMERCIAL

## 2025-07-15 DIAGNOSIS — G47.411 NARCOLEPSY WITH CATAPLEXY: ICD-10-CM

## 2025-07-15 LAB — HCV RNA SERPL NAA+PROBE-LOG IU: NOT DETECTED {LOG_IU}/ML

## 2025-07-15 RX ORDER — DEXTROAMPHETAMINE SACCHARATE, AMPHETAMINE ASPARTATE, DEXTROAMPHETAMINE SULFATE AND AMPHETAMINE SULFATE 5; 5; 5; 5 MG/1; MG/1; MG/1; MG/1
1 TABLET ORAL 2 TIMES DAILY
Qty: 60 TABLET | Refills: 0 | Status: SHIPPED | OUTPATIENT
Start: 2025-07-17

## 2025-07-15 RX ORDER — DEXTROAMPHETAMINE SACCHARATE, AMPHETAMINE ASPARTATE MONOHYDRATE, DEXTROAMPHETAMINE SULFATE AND AMPHETAMINE SULFATE 5; 5; 5; 5 MG/1; MG/1; MG/1; MG/1
20 CAPSULE, EXTENDED RELEASE ORAL EVERY MORNING
Qty: 30 CAPSULE | Refills: 0 | Status: SHIPPED | OUTPATIENT
Start: 2025-07-17

## 2025-08-03 DIAGNOSIS — E78.2 HYPERLIPIDEMIA, MIXED: ICD-10-CM

## 2025-08-03 DIAGNOSIS — E06.3 HYPOTHYROIDISM DUE TO HASHIMOTO'S THYROIDITIS: ICD-10-CM

## 2025-08-03 DIAGNOSIS — G43.019 INTRACTABLE MIGRAINE WITHOUT AURA AND WITHOUT STATUS MIGRAINOSUS: ICD-10-CM

## 2025-08-03 RX ORDER — LEVOTHYROXINE SODIUM 88 UG/1
88 TABLET ORAL
Qty: 90 TABLET | Refills: 2 | Status: SHIPPED | OUTPATIENT
Start: 2025-08-03 | End: 2026-04-30

## 2025-08-03 RX ORDER — ROSUVASTATIN CALCIUM 10 MG/1
10 TABLET, COATED ORAL DAILY
Qty: 90 TABLET | Refills: 2 | Status: SHIPPED | OUTPATIENT
Start: 2025-08-03

## 2025-08-03 NOTE — TELEPHONE ENCOUNTER
Refill Routing Note   Medication(s) are not appropriate for processing by [facility] for the following reason(s):        Outside of protocol    [facility] action(s):  Route             Appointments  past 12m or future 3m with PCP    Date Provider   Last Visit   7/8/2025 Laya Jaffe, DO   Next Visit   Visit date not found Laya Jaffe, DO   ED visits in past 90 days: 1        Note composed:11:45 AM 08/03/2025

## 2025-08-03 NOTE — TELEPHONE ENCOUNTER
Refill Decision Note   Amanda Navarreet  is requesting a refill authorization.  Brief Assessment and Rationale for Refill:  Approve     Medication Therapy Plan: TSH-WNL      Comments:     Note composed:12:25 PM 08/03/2025

## 2025-08-03 NOTE — TELEPHONE ENCOUNTER
Refill Decision Note   Amanda Navarrete  is requesting a refill authorization.  Brief Assessment and Rationale for Refill:  Approve     Medication Therapy Plan:        Comments:     Note composed:12:25 PM 08/03/2025

## 2025-08-03 NOTE — TELEPHONE ENCOUNTER
No care due was identified.  [facility] Embedded Care Due Messages. Reference number: [id number].   8/03/2025 10:35:25 AM CDT

## 2025-08-03 NOTE — TELEPHONE ENCOUNTER
No care due was identified.  [facility] Embedded Care Due Messages. Reference number: [id number].   8/03/2025 10:36:24 AM CDT

## 2025-08-04 RX ORDER — TOPIRAMATE 100 MG/1
TABLET, FILM COATED ORAL
Qty: 225 TABLET | Refills: 3 | Status: SHIPPED | OUTPATIENT
Start: 2025-08-04 | End: 2026-08-04

## 2025-08-06 ENCOUNTER — PATIENT MESSAGE (OUTPATIENT)
Dept: PRIMARY CARE CLINIC | Facility: CLINIC | Age: 50
End: 2025-08-06
Payer: COMMERCIAL

## 2025-08-06 RX ORDER — DOCUSATE SODIUM 100 MG/1
100 CAPSULE, LIQUID FILLED ORAL 2 TIMES DAILY
Qty: 90 CAPSULE | Refills: 1 | OUTPATIENT
Start: 2025-08-06

## 2025-08-08 ENCOUNTER — OFFICE VISIT (OUTPATIENT)
Dept: PRIMARY CARE CLINIC | Facility: CLINIC | Age: 50
End: 2025-08-08
Payer: COMMERCIAL

## 2025-08-08 DIAGNOSIS — R42 VERTIGO: Primary | ICD-10-CM

## 2025-08-08 DIAGNOSIS — J30.2 SEASONAL ALLERGIC RHINITIS, UNSPECIFIED TRIGGER: ICD-10-CM

## 2025-08-08 DIAGNOSIS — N95.1 PERIMENOPAUSAL SYMPTOMS: ICD-10-CM

## 2025-08-08 NOTE — ASSESSMENT & PLAN NOTE
- Assessed chronic positional vertigo, currently exacerbated and unresponsive to usual meclizine dose.  - Patient reports positional vertigo lasting for 1 week with lightheadedness, dizziness, and pressure in the ear, affecting daily activities like driving.  - Evaluated that meclizine (Antivert) is not effective for current symptoms, with possible fluid in the inner ear due to allergies affecting balance.  - Increased meclizine dosage to 50 mg per single dose, with instructions to wait at least 6 hours before taking another dose if needed, not exceeding 100 mg daily.  - Referred to vestibular therapy for inner ear to manage vertigo symptoms.  - Recommend having ear exam for any foreign objects or blockages when able.  - Blood pressure was checked in different positions to rule out orthostatic hypotension.  - Advised to contact the office if increased meclizine dosage and vestibular therapy do not provide relief, to consider referral back to ENT or neurology.

## 2025-08-08 NOTE — PROGRESS NOTES
INTERNAL MEDICINE  [facility]    The patient location is: [place]  The chief complaint leading to consultation is: Vertigo; Dizziness    Visit type: audiovisual    Face to Face time with patient: 30 minutes  39 minutes of total time spent on the encounter, which includes face to face time and non-face to face time preparing to see the patient (eg, review of tests), Obtaining and/or reviewing separately obtained history, Documenting clinical information in the electronic or other health record, Independently interpreting results (not separately reported) and communicating results to the patient/family/caregiver, or Care coordination (not separately reported).     Each patient to whom he or she provides medical services by telemedicine is:  (1) informed of the relationship between the physician and patient and the respective role of any other health care provider with respect to management of the patient; and (2) notified that he or she may decline to receive medical services by telemedicine and may withdraw from such care at any time.    Notes:     Reason for visit:   Chief Complaint   Patient presents with    Dizziness     HPI: Amanda Navarrete is a 50 y.o. female presenting today for increased dizziness.    Patient is an established patient of PCP, Dr. Laya Jaffe DO. This patient is new to me.    Patient presents today for worsening positional vertigo with lightheadedness and dizziness.    Current Symptoms-  She reports current vertigo symptoms present for one week, characterized by significant lightheadedness and dizziness. Her usual medication, Meclizine (Antivert) 25 mg, is not providing relief as it typically has in the past. She experiences ear pressure and notable equilibrium disruptions, including feeling unsteady. She has been unable to drive for the past week due to symptom severity. She describes experiencing intermittent dizziness that impacts her balance, with moments where  she realizes her equilibrium is compromised even when not actively feeling dizzy. She suspects the worsened symptoms may be due to increased environmental allergies.    Allergies-  She takes Zyrtec daily and uses Flonase for allergies. She has a history of prolonged allergy symptoms that can persist for up to six weeks. Daily Zyrtec helps manage her allergies, making them less noticeable. She experiences occasional minor allergy flare-ups and describes this flare as more severe.    Pertinent History-  She has a long-standing history of vertigo spanning approximately 20 years, first noticing lightheaded dizziness around the time her stepson graduated high school. The vertigo has progressively worsened over time, with episodes lasting over a week. She has concurrent medical history of narcolepsy and ADHD. She has a Mirena IUD in place.     Previous Evaluation-  She has previously consulted a cardiologist and underwent 48-hour heart monitoring to rule out potential cardiovascular causes such as POTS. She has sought care at urgent care for vertigo symptoms in the past and has had a previous ENT evaluation.    Cognitive Symptoms-  She reports experiencing cognitive difficulties including challenges with concentration, difficulty initiating tasks, procrastination, and inability to complete tasks in a timely manner. These symptoms have been progressively worsening over time. She has previously discussed these symptoms with her gynecologist and neurologist, expressing uncertainty about whether the symptoms are related to pre-menopause or ADHD. Despite her gynecologist's previous assessment that her reproductive system appears normal, she continues to experience cognitive changes that are impacting her daily functioning.        Answers submitted by the patient for this visit:  Review of Systems Questionnaire (Submitted on 8/8/2025)  activity change: Yes  unexpected weight change: No  rhinorrhea: No  trouble swallowing:  No  visual disturbance: No  chest tightness: No  polyuria: Yes  difficulty urinating: No  menstrual problem: No  joint swelling: No  arthralgias: Yes  confusion: Yes  dysphoric mood: No    Review of Systems   HENT:  Negative for hearing loss.    Eyes:  Negative for discharge.   Respiratory:  Negative for wheezing.    Cardiovascular:  Positive for palpitations. Negative for chest pain.   Gastrointestinal:  Positive for blood in stool, constipation and diarrhea. Negative for vomiting.   Genitourinary:  Negative for dysuria and hematuria.   Musculoskeletal:  Positive for neck pain.   Neurological:  Positive for weakness and headaches.   Endo/Heme/Allergies:  Negative for polydipsia.   All other systems reviewed and are negative.      Social History     Social History Narrative    She works at [email or web address]. She still has a daughter living at home while in college. The others are grown. She has 4 dogs.        ALLERGIES:   Review of patient's allergies indicates:   Allergen Reactions    Tamiflu [oseltamivir] Itching and Swelling       MEDS:   Current Outpatient Medications on File Prior to Visit   Medication Sig Dispense Refill Last Dose/Taking    albuterol (PROVENTIL/VENTOLIN HFA) 90 mcg/actuation inhaler Inhale 1-2 puffs into the lungs every 4 (four) hours as needed for Wheezing or Shortness of Breath. Rescue 54 g 3     calcium-vitamin D3 (OS-RHONDA 500 + D3) 500 mg-5 mcg (200 unit) per tablet Take 1 tablet by mouth once daily.       clonazePAM (KLONOPIN) 0.5 MG tablet        cyanocobalamin (VITAMIN B-12) 1000 MCG tablet Take 100 mcg by mouth once daily.       darifenacin (ENABLEX) 7.5 MG Tb24 Take 1 tablet (7.5 mg total) by mouth once daily. 30 tablet 11     dextroamphetamine-amphetamine (ADDERALL XR) 20 MG 24 hr capsule Take 1 capsule (20 mg total) by mouth every morning. 30 capsule 0     dextroamphetamine-amphetamine (ADDERALL) 20 mg tablet Take 1 tablet by mouth 2 (two) times a day. 60 tablet 0      diclofenac sodium (VOLTAREN) 1 % Gel Apply 2 g topically 4 (four) times daily as needed (pain). 100 g 0     diclofenac sodium (VOLTAREN) 1 % Gel Apply topically to affected area four times daily. 150 g 5     docusate sodium (COLACE) 100 MG capsule Take 1 capsule (100 mg total) by mouth 2 (two) times daily. 90 capsule 1     DULoxetine (CYMBALTA) 60 MG capsule TAKE 1 CAPSULE BY MOUTH ONCE A DAY 90 capsule 2     ergocalciferol (VITAMIN D2) 50,000 unit Cap Take 1 capsule (50,000 Units total) by mouth twice a week. 24 capsule 3     fluticasone propionate (FLONASE) 50 mcg/actuation nasal spray Use 1 spray in each nostril once daily. 32 g 2     levothyroxine (SYNTHROID) 88 MCG tablet Take 1 tablet (88 mcg total) by mouth daily before breakfast. 90 tablet 2     meclizine (ANTIVERT) 25 mg tablet Take 1 tablet (25 mg total) by mouth 3 (three) times daily as needed for Dizziness. 30 tablet 5     methocarbamoL (ROBAXIN) 500 MG Tab Take 1 tablet by mouth every evening as needed 30 tablet 2     MIRENA 20 mcg/24 hours (6 yrs) 52 mg IUD        ondansetron (ZOFRAN-ODT) 8 MG TbDL Dissolve 1 tablet (8 mg total) by mouth every 6 (six) hours as needed (Nausea). 30 tablet 0     oxyCODONE-acetaminophen (PERCOCET) 5-325 mg per tablet Take 1 tablet by mouth daily as needed for pain 30 tablet 0     oxyCODONE-acetaminophen (PERCOCET) 5-325 mg per tablet Take 1 tablet by mouth every day as needed for pain 30 tablet 0     [START ON 9/12/2025] oxyCODONE-acetaminophen (PERCOCET) 5-325 mg per tablet Take one tablet by mouth once a day as needed for pain. a supply of more than 7 days is medically necessary 30 tablet 0     [START ON 8/15/2025] oxyCODONE-acetaminophen (PERCOCET) 5-325 mg per tablet Take one tablet by mouth once a day as needed for pain. a supply of more than 7 days is medically necessary 30 tablet 0     polyethylene glycol (GLYCOLAX) 17 gram/dose powder Take 17 g by mouth once daily. 1530 g 1     rosuvastatin (CRESTOR) 10 MG tablet  Take 1 tablet (10 mg total) by mouth once daily. 90 tablet 2     sodium,calcium,mag,pot oxybate (XYWAV) 0.5 gram/mL Soln Take 4.5 g by mouth As instructed. 540 mL 5     solriamfetoL 150 mg Tab Take 1 tablet (150 mg) by mouth once daily. 30 tablet 5     topiramate (TOPAMAX) 100 MG tablet Take 1 tablet by mouth once daily AND 1 ½ tablets every evening 225 tablet 3     ubrogepant (UBRELVY) 100 mg tablet Take 1 tablet (100 mg total) by mouth every 2 (two) hours as needed for Migraine. Maximum: 200 mg per 24 hours 16 tablet 5     valACYclovir (VALTREX) 1000 MG tablet Take 1 tablet by mouth every 12 hours as needed (cold sores). 60 tablet 8        Vital signs:   There were no vitals filed for this visit.  There is no height or weight on file to calculate BMI.    PHYSICAL EXAM:     Physical Exam  Constitutional:       Appearance: Normal appearance. She is not ill-appearing or diaphoretic.   HENT:      Head: Normocephalic and atraumatic.   Pulmonary:      Effort: Pulmonary effort is normal. No respiratory distress.   Skin:     Coloration: Skin is not pale.   Neurological:      Mental Status: She is alert and oriented to person, place, and time.   Psychiatric:         Mood and Affect: Mood normal.         Behavior: Behavior normal.         Thought Content: Thought content normal.         Judgment: Judgment normal.           PERTINENT RESULTS:   No visits with results within 1 Week(s) from this visit.   Latest known visit with results is:   Lab Visit on 07/14/2025   Component Date Value Ref Range Status    Protein Total 07/14/2025 6.4  6.0 - 8.4 gm/dL Final    Albumin 07/14/2025 3.7  3.5 - 5.2 g/dL Final    Bilirubin Total 07/14/2025 0.6  0.1 - 1.0 mg/dL Final    For infants and newborns, interpretation of results should be based   on gestational age, weight and in agreement with clinical   observations.    Premature Infant recommended reference ranges:   0-24 hours:  <8.0 mg/dL   24-48 hours: <12.0 mg/dL   3-5 days:    <15.0  mg/dL   6-29 days:   <15.0 mg/dL    Bilirubin Direct 07/14/2025 0.3  0.1 - 0.3 mg/dL Final    ALP 07/14/2025 76  40 - 150 unit/L Final    AST 07/14/2025 19  11 - 45 unit/L Final    ALT 07/14/2025 24  10 - 44 unit/L Final    Hep A IgG Interp 07/14/2025 Non-Reactive    Final    IgG anti-HAV not detected.     Hep BsAb Interp 07/14/2025 Non-Reactive    Final    Individual is considered not immune to HBV infection    Hep BsAb 07/14/2025 <3.00  mIU/mL Final    Hep BcAb Interp 07/14/2025 Non-Reactive  Non-Reactive Final    Hepatitis C RNA, Qualitative 07/14/2025 Not Detected  Not Detected, Invalid Final    Hep BsAg Interp 07/14/2025 Non-Reactive  Non-Reactive Final       ASSESSMENT/PLAN:        1. Vertigo  Assessment & Plan:  - Assessed chronic positional vertigo, currently exacerbated and unresponsive to usual meclizine dose.  - Patient reports positional vertigo lasting for 1 week with lightheadedness, dizziness, and pressure in the ear, affecting daily activities like driving.  - Evaluated that meclizine (Antivert) is not effective for current symptoms, with possible fluid in the inner ear due to allergies affecting balance.  - Increased meclizine dosage to 50 mg per single dose, with instructions to wait at least 6 hours before taking another dose if needed, not exceeding 100 mg daily.  - Referred to vestibular therapy for inner ear to manage vertigo symptoms.  - Recommend having ear exam for any foreign objects or blockages when able.  - Blood pressure was checked in different positions to rule out orthostatic hypotension.  - Advised to contact the office if increased meclizine dosage and vestibular therapy do not provide relief, to consider referral back to ENT or neurology.    Orders:  -     Ambulatory Referral/Consult to Physical Therapy    2. Perimenopausal symptoms  -     Ambulatory referral/consult to [facility]; Future; Expected date: 08/15/2025    3. Seasonal allergic rhinitis,  unspecified trigger  Assessment & Plan:  - Patient takes Zyrtec daily and uses Flonase for allergy management, reporting nasal congestion and pressure in the ear.  - Evaluated allergy symptoms possibly contributing to vertigo and dizziness, with the patient experiencing nasal dryness and epistaxis due to antihistamines.  - Considered allergies as potential trigger for current vertigo flare-up.  - Discussed antihistamine side effects, including potential for excessive dryness.  - Continued Zyrtec daily and Flonase nasal spray for allergy management.  - See vertigo.      Follow up if symptoms worsen or fail to improve.     JACK ResendizP-C   Internal Medicine

## 2025-08-08 NOTE — ASSESSMENT & PLAN NOTE
- Patient takes Zyrtec daily and uses Flonase for allergy management, reporting nasal congestion and pressure in the ear.  - Evaluated allergy symptoms possibly contributing to vertigo and dizziness, with the patient experiencing nasal dryness and epistaxis due to antihistamines.  - Considered allergies as potential trigger for current vertigo flare-up.  - Discussed antihistamine side effects, including potential for excessive dryness.  - Continued Zyrtec daily and Flonase nasal spray for allergy management.  - See vertigo.

## 2025-08-12 ENCOUNTER — PATIENT MESSAGE (OUTPATIENT)
Dept: BARIATRICS | Facility: CLINIC | Age: 50
End: 2025-08-12
Payer: COMMERCIAL

## 2025-08-12 DIAGNOSIS — G47.411 NARCOLEPSY WITH CATAPLEXY: ICD-10-CM

## 2025-08-12 RX ORDER — (CALCIUM, MAGNESIUM, POTASSIUM, AND SODIUM OXYBATES) .5; .5; .5; .5 G/ML; G/ML; G/ML; G/ML
4.5 SOLUTION ORAL SEE ADMIN INSTRUCTIONS
Qty: 540 ML | Refills: 5 | Status: SHIPPED | OUTPATIENT
Start: 2025-08-12

## 2025-08-13 ENCOUNTER — PATIENT MESSAGE (OUTPATIENT)
Dept: NEUROLOGY | Facility: CLINIC | Age: 50
End: 2025-08-13
Payer: COMMERCIAL

## 2025-08-13 DIAGNOSIS — G47.411 NARCOLEPSY WITH CATAPLEXY: ICD-10-CM

## 2025-08-13 RX ORDER — DEXTROAMPHETAMINE SACCHARATE, AMPHETAMINE ASPARTATE, DEXTROAMPHETAMINE SULFATE AND AMPHETAMINE SULFATE 5; 5; 5; 5 MG/1; MG/1; MG/1; MG/1
1 TABLET ORAL 2 TIMES DAILY
Qty: 60 TABLET | Refills: 0 | Status: SHIPPED | OUTPATIENT
Start: 2025-08-13

## 2025-08-13 RX ORDER — DEXTROAMPHETAMINE SACCHARATE, AMPHETAMINE ASPARTATE MONOHYDRATE, DEXTROAMPHETAMINE SULFATE AND AMPHETAMINE SULFATE 5; 5; 5; 5 MG/1; MG/1; MG/1; MG/1
20 CAPSULE, EXTENDED RELEASE ORAL EVERY MORNING
Qty: 30 CAPSULE | Refills: 0 | Status: SHIPPED | OUTPATIENT
Start: 2025-08-13

## 2025-08-14 ENCOUNTER — CLINICAL SUPPORT (OUTPATIENT)
Dept: OBSTETRICS AND GYNECOLOGY | Facility: CLINIC | Age: 50
End: 2025-08-14
Payer: COMMERCIAL

## 2025-08-14 DIAGNOSIS — N95.1 MENOPAUSAL SYMPTOMS: Primary | ICD-10-CM

## 2025-08-15 ENCOUNTER — PATIENT MESSAGE (OUTPATIENT)
Dept: BARIATRICS | Facility: CLINIC | Age: 50
End: 2025-08-15
Payer: COMMERCIAL

## 2025-08-15 DIAGNOSIS — Z98.84 S/P BARIATRIC SURGERY: Primary | ICD-10-CM

## 2025-08-19 PROBLEM — Z74.09 IMPAIRED MOBILITY AND ACTIVITIES OF DAILY LIVING: Status: ACTIVE | Noted: 2025-08-19

## 2025-08-19 PROBLEM — Z78.9 IMPAIRED MOBILITY AND ACTIVITIES OF DAILY LIVING: Status: ACTIVE | Noted: 2025-08-19

## 2025-08-25 PROBLEM — Z78.9 IMPAIRED MOBILITY AND ACTIVITIES OF DAILY LIVING: Status: RESOLVED | Noted: 2025-08-19 | Resolved: 2025-08-25

## 2025-08-25 PROBLEM — Z74.09 IMPAIRED MOBILITY AND ACTIVITIES OF DAILY LIVING: Status: RESOLVED | Noted: 2025-08-19 | Resolved: 2025-08-25

## 2025-08-28 PROBLEM — Z74.09 IMPAIRED MOBILITY AND ACTIVITIES OF DAILY LIVING: Chronic | Status: ACTIVE | Noted: 2025-08-19

## 2025-08-28 PROBLEM — Z78.9 IMPAIRED MOBILITY AND ACTIVITIES OF DAILY LIVING: Chronic | Status: ACTIVE | Noted: 2025-08-19

## (undated) DEVICE — SYS SMOKE EVACUATION LAP

## (undated) DEVICE — DRAPE ARM DAVINCI XI

## (undated) DEVICE — RELOAD SUREFORM 60 4.3 GRN 6R

## (undated) DEVICE — NDL INSUF ULTRA VERESS 120MM

## (undated) DEVICE — DRAPE STERI INSTRUMENT 1018

## (undated) DEVICE — SUT MONOCRYL 4-0 PS-2

## (undated) DEVICE — SEAL CANN UNIVERSAL 5-12MM

## (undated) DEVICE — SEALER VESSEL EXTEND

## (undated) DEVICE — BLADE SURG CARBON STEEL SZ11

## (undated) DEVICE — NDL HYPO REG 25G X 1 1/2

## (undated) DEVICE — KIT PROCEDURE STER INLET CLOSU

## (undated) DEVICE — RELOAD SUREFORM 60 3.5 BLU 6R

## (undated) DEVICE — GOWN POLY REINF BRTH SLV XL

## (undated) DEVICE — STAPLER SUREFORM 60 SPU

## (undated) DEVICE — Device

## (undated) DEVICE — ADHESIVE DERMABOND ADVANCED

## (undated) DEVICE — ELECTRODE REM PLYHSV RETURN 9

## (undated) DEVICE — OBTURATOR BLADELESS 8MM XI CLR

## (undated) DEVICE — SUT 2/0 36IN ETHIBOND EXCE

## (undated) DEVICE — DRAPE SCOPE PILLOW WARMER

## (undated) DEVICE — DRAPE COLUMN DAVINCI XI

## (undated) DEVICE — TRAY MINOR GEN SURG OMC

## (undated) DEVICE — SUT 0 VICRYL / UR6 (J603)

## (undated) DEVICE — SUT VLOC 180 2-0 GS-22 NDL